# Patient Record
Sex: FEMALE | Race: WHITE | NOT HISPANIC OR LATINO | Employment: FULL TIME | URBAN - METROPOLITAN AREA
[De-identification: names, ages, dates, MRNs, and addresses within clinical notes are randomized per-mention and may not be internally consistent; named-entity substitution may affect disease eponyms.]

---

## 2017-02-07 ENCOUNTER — HOSPITAL ENCOUNTER (INPATIENT)
Facility: HOSPITAL | Age: 37
LOS: 1 days | Discharge: HOME | DRG: 872 | End: 2017-02-09
Attending: EMERGENCY MEDICINE | Admitting: INTERNAL MEDICINE
Payer: COMMERCIAL

## 2017-02-07 DIAGNOSIS — R10.11 INTRACTABLE RIGHT UPPER QUADRANT ABDOMINAL PAIN: Primary | ICD-10-CM

## 2017-02-07 DIAGNOSIS — K83.8 ACQUIRED DILATION OF COMMON BILE DUCT: ICD-10-CM

## 2017-02-07 PROCEDURE — 82150 ASSAY OF AMYLASE: CPT | Performed by: NURSE PRACTITIONER

## 2017-02-07 PROCEDURE — 96374 THER/PROPH/DIAG INJ IV PUSH: CPT

## 2017-02-07 PROCEDURE — 80053 COMPREHEN METABOLIC PANEL: CPT | Performed by: EMERGENCY MEDICINE

## 2017-02-07 PROCEDURE — 36415 COLL VENOUS BLD VENIPUNCTURE: CPT | Performed by: EMERGENCY MEDICINE

## 2017-02-07 PROCEDURE — 83735 ASSAY OF MAGNESIUM: CPT | Performed by: NURSE PRACTITIONER

## 2017-02-07 PROCEDURE — 83690 ASSAY OF LIPASE: CPT | Performed by: EMERGENCY MEDICINE

## 2017-02-07 RX ORDER — ONDANSETRON 2 MG/ML
INJECTION INTRAMUSCULAR; INTRAVENOUS
Status: COMPLETED
Start: 2017-02-07 | End: 2017-02-07

## 2017-02-07 RX ORDER — ALPRAZOLAM 1 MG/1
1 TABLET ORAL 3 TIMES DAILY PRN
COMMUNITY
End: 2020-07-09 | Stop reason: SDUPTHER

## 2017-02-07 RX ORDER — ONDANSETRON 2 MG/ML
4 INJECTION INTRAMUSCULAR; INTRAVENOUS ONCE AS NEEDED
Status: COMPLETED | OUTPATIENT
Start: 2017-02-07 | End: 2017-02-07

## 2017-02-07 RX ADMIN — ONDANSETRON 4 MG: 2 INJECTION INTRAMUSCULAR; INTRAVENOUS at 23:59

## 2017-02-08 ENCOUNTER — APPOINTMENT (OUTPATIENT)
Dept: RADIOLOGY | Facility: HOSPITAL | Age: 37
DRG: 872 | End: 2017-02-08
Payer: COMMERCIAL

## 2017-02-08 ENCOUNTER — APPOINTMENT (EMERGENCY)
Dept: RADIOLOGY | Facility: HOSPITAL | Age: 37
DRG: 872 | End: 2017-02-08
Payer: COMMERCIAL

## 2017-02-08 PROBLEM — K83.8 DILATED CBD, ACQUIRED: Status: ACTIVE | Noted: 2017-02-08

## 2017-02-08 PROBLEM — A41.9 SEPSIS (HCC): Status: ACTIVE | Noted: 2017-02-08

## 2017-02-08 PROBLEM — R10.9 ABDOMINAL PAIN: Status: ACTIVE | Noted: 2017-02-08

## 2017-02-08 PROBLEM — E87.6 HYPOKALEMIA: Status: ACTIVE | Noted: 2017-02-08

## 2017-02-08 PROBLEM — D72.829 LEUKOCYTOSIS: Status: ACTIVE | Noted: 2017-02-08

## 2017-02-08 LAB
ALBUMIN SERPL BCP-MCNC: 4.1 G/DL (ref 3.5–5)
ALP SERPL-CCNC: 49 U/L (ref 46–116)
ALT SERPL W P-5'-P-CCNC: 28 U/L (ref 12–78)
AMYLASE SERPL-CCNC: 50 IU/L (ref 25–115)
ANION GAP SERPL CALCULATED.3IONS-SCNC: 5 MMOL/L (ref 4–13)
APTT PPP: 30 SECONDS (ref 24–33)
AST SERPL W P-5'-P-CCNC: 17 U/L (ref 5–45)
BASOPHILS # BLD AUTO: 0 THOUSANDS/ΜL (ref 0–0.1)
BASOPHILS NFR BLD AUTO: 0 % (ref 0–1)
BILIRUB SERPL-MCNC: 0.6 MG/DL (ref 0.2–1)
BILIRUB UR QL STRIP: NEGATIVE
BUN SERPL-MCNC: 13 MG/DL (ref 5–25)
CALCIUM SERPL-MCNC: 8.5 MG/DL (ref 8.3–10.1)
CHLORIDE SERPL-SCNC: 107 MMOL/L (ref 100–108)
CLARITY UR: CLEAR
CO2 SERPL-SCNC: 30 MMOL/L (ref 21–32)
COLOR UR: YELLOW
CREAT SERPL-MCNC: 0.87 MG/DL (ref 0.6–1.3)
EOSINOPHIL # BLD AUTO: 0.1 THOUSAND/ΜL (ref 0–0.61)
EOSINOPHIL NFR BLD AUTO: 1 % (ref 0–6)
ERYTHROCYTE [DISTWIDTH] IN BLOOD BY AUTOMATED COUNT: 13.5 % (ref 11.6–15.1)
ERYTHROCYTE [DISTWIDTH] IN BLOOD BY AUTOMATED COUNT: 13.8 % (ref 11.6–15.1)
FLUAV AG SPEC QL IA: NEGATIVE
FLUBV AG SPEC QL IA: NEGATIVE
GFR SERPL CREATININE-BSD FRML MDRD: >60 ML/MIN/1.73SQ M
GLUCOSE SERPL-MCNC: 91 MG/DL (ref 65–140)
GLUCOSE UR STRIP-MCNC: NEGATIVE MG/DL
HCT VFR BLD AUTO: 36.3 % (ref 37–47)
HCT VFR BLD AUTO: 41.7 % (ref 37–47)
HGB BLD-MCNC: 12 G/DL (ref 12–16)
HGB BLD-MCNC: 14 G/DL (ref 12–16)
HGB UR QL STRIP.AUTO: NEGATIVE
INR PPP: 1.12 (ref 0.86–1.16)
KETONES UR STRIP-MCNC: NEGATIVE MG/DL
LACTATE SERPL-SCNC: 0.7 MMOL/L (ref 0.5–2)
LEUKOCYTE ESTERASE UR QL STRIP: NEGATIVE
LIPASE SERPL-CCNC: 134 U/L (ref 73–393)
LYMPHOCYTES # BLD AUTO: 0.37 THOUSAND/UL (ref 0.6–4.47)
LYMPHOCYTES # BLD AUTO: 1.5 THOUSANDS/ΜL (ref 0.6–4.47)
LYMPHOCYTES # BLD AUTO: 3 %
LYMPHOCYTES NFR BLD AUTO: 9 % (ref 14–44)
MAGNESIUM SERPL-MCNC: 1.6 MG/DL (ref 1.6–2.6)
MCH RBC QN AUTO: 31.5 PG (ref 27–31)
MCH RBC QN AUTO: 31.7 PG (ref 27–31)
MCHC RBC AUTO-ENTMCNC: 33.2 G/DL (ref 31.4–37.4)
MCHC RBC AUTO-ENTMCNC: 33.5 G/DL (ref 31.4–37.4)
MCV RBC AUTO: 95 FL (ref 82–98)
MCV RBC AUTO: 95 FL (ref 82–98)
MONOCYTES # BLD AUTO: 0.37 THOUSAND/UL (ref 0–1.22)
MONOCYTES # BLD AUTO: 0.7 THOUSAND/ΜL (ref 0.17–1.22)
MONOCYTES NFR BLD AUTO: 3 % (ref 4–12)
MONOCYTES NFR BLD AUTO: 5 % (ref 4–12)
NEUTROPHILS # BLD AUTO: 13.5 THOUSANDS/ΜL (ref 1.85–7.62)
NEUTS BAND NFR BLD MANUAL: 0 % (ref 0–8)
NEUTS SEG # BLD: 11.56 THOUSAND/UL (ref 1.81–6.82)
NEUTS SEG NFR BLD AUTO: 85 % (ref 43–75)
NEUTS SEG NFR BLD AUTO: 94 %
NITRITE UR QL STRIP: NEGATIVE
NRBC BLD AUTO-RTO: 0 /100 WBCS
NRBC BLD AUTO-RTO: 0 /100 WBCS
OVALOCYTES BLD QL SMEAR: PRESENT
PH UR STRIP.AUTO: 6 [PH] (ref 5–9)
PLATELET # BLD AUTO: 242 THOUSANDS/UL (ref 130–400)
PLATELET # BLD AUTO: 298 THOUSANDS/UL (ref 130–400)
PLATELET BLD QL SMEAR: ADEQUATE
PLATELET BLD QL SMEAR: ADEQUATE
PMV BLD AUTO: 7.9 FL (ref 8.9–12.7)
PMV BLD AUTO: 8.4 FL (ref 8.9–12.7)
POTASSIUM SERPL-SCNC: 3.3 MMOL/L (ref 3.5–5.3)
PROT SERPL-MCNC: 7.2 G/DL (ref 6.4–8.2)
PROT UR STRIP-MCNC: NEGATIVE MG/DL
PROTHROMBIN TIME: 11.8 SECONDS (ref 9.4–11.7)
RBC # BLD AUTO: 3.82 MILLION/UL (ref 4.2–5.4)
RBC # BLD AUTO: 4.4 MILLION/UL (ref 4.2–5.4)
SODIUM SERPL-SCNC: 142 MMOL/L (ref 136–145)
SP GR UR STRIP.AUTO: <=1.005 (ref 1–1.03)
TOTAL CELLS COUNTED SPEC: 100
TOXIC GRANULES BLD QL SMEAR: PRESENT
UROBILINOGEN UR QL STRIP.AUTO: 0.2 E.U./DL
WBC # BLD AUTO: 12.3 THOUSAND/UL (ref 4.8–10.8)
WBC # BLD AUTO: 15.9 THOUSAND/UL (ref 4.8–10.8)

## 2017-02-08 PROCEDURE — 87798 DETECT AGENT NOS DNA AMP: CPT | Performed by: NURSE PRACTITIONER

## 2017-02-08 PROCEDURE — 96376 TX/PRO/DX INJ SAME DRUG ADON: CPT

## 2017-02-08 PROCEDURE — 85025 COMPLETE CBC W/AUTO DIFF WBC: CPT | Performed by: EMERGENCY MEDICINE

## 2017-02-08 PROCEDURE — 96375 TX/PRO/DX INJ NEW DRUG ADDON: CPT

## 2017-02-08 PROCEDURE — 85007 BL SMEAR W/DIFF WBC COUNT: CPT | Performed by: INTERNAL MEDICINE

## 2017-02-08 PROCEDURE — 76705 ECHO EXAM OF ABDOMEN: CPT

## 2017-02-08 PROCEDURE — 85610 PROTHROMBIN TIME: CPT | Performed by: NURSE PRACTITIONER

## 2017-02-08 PROCEDURE — 81003 URINALYSIS AUTO W/O SCOPE: CPT | Performed by: EMERGENCY MEDICINE

## 2017-02-08 PROCEDURE — 74177 CT ABD & PELVIS W/CONTRAST: CPT

## 2017-02-08 PROCEDURE — 87400 INFLUENZA A/B EACH AG IA: CPT | Performed by: NURSE PRACTITIONER

## 2017-02-08 PROCEDURE — 85027 COMPLETE CBC AUTOMATED: CPT | Performed by: INTERNAL MEDICINE

## 2017-02-08 PROCEDURE — 36415 COLL VENOUS BLD VENIPUNCTURE: CPT | Performed by: EMERGENCY MEDICINE

## 2017-02-08 PROCEDURE — 87040 BLOOD CULTURE FOR BACTERIA: CPT | Performed by: NURSE PRACTITIONER

## 2017-02-08 PROCEDURE — 96361 HYDRATE IV INFUSION ADD-ON: CPT

## 2017-02-08 PROCEDURE — 87081 CULTURE SCREEN ONLY: CPT | Performed by: NURSE PRACTITIONER

## 2017-02-08 PROCEDURE — 85049 AUTOMATED PLATELET COUNT: CPT | Performed by: NURSE PRACTITIONER

## 2017-02-08 PROCEDURE — A9270 NON-COVERED ITEM OR SERVICE: HCPCS

## 2017-02-08 PROCEDURE — 99285 EMERGENCY DEPT VISIT HI MDM: CPT

## 2017-02-08 PROCEDURE — A9270 NON-COVERED ITEM OR SERVICE: HCPCS | Performed by: NURSE PRACTITIONER

## 2017-02-08 PROCEDURE — 83605 ASSAY OF LACTIC ACID: CPT | Performed by: EMERGENCY MEDICINE

## 2017-02-08 PROCEDURE — 85730 THROMBOPLASTIN TIME PARTIAL: CPT | Performed by: NURSE PRACTITIONER

## 2017-02-08 RX ORDER — MAGNESIUM HYDROXIDE/ALUMINUM HYDROXICE/SIMETHICONE 120; 1200; 1200 MG/30ML; MG/30ML; MG/30ML
30 SUSPENSION ORAL EVERY 6 HOURS PRN
Status: DISCONTINUED | OUTPATIENT
Start: 2017-02-08 | End: 2017-02-09 | Stop reason: HOSPADM

## 2017-02-08 RX ORDER — ONDANSETRON 2 MG/ML
4 INJECTION INTRAMUSCULAR; INTRAVENOUS ONCE
Status: COMPLETED | OUTPATIENT
Start: 2017-02-08 | End: 2017-02-08

## 2017-02-08 RX ORDER — HEPARIN SODIUM 5000 [USP'U]/ML
5000 INJECTION, SOLUTION INTRAVENOUS; SUBCUTANEOUS EVERY 8 HOURS SCHEDULED
Status: DISCONTINUED | OUTPATIENT
Start: 2017-02-08 | End: 2017-02-09 | Stop reason: HOSPADM

## 2017-02-08 RX ORDER — IBUPROFEN 600 MG/1
TABLET ORAL
Status: COMPLETED
Start: 2017-02-08 | End: 2017-02-08

## 2017-02-08 RX ORDER — LEVOFLOXACIN 5 MG/ML
750 INJECTION, SOLUTION INTRAVENOUS ONCE
Status: COMPLETED | OUTPATIENT
Start: 2017-02-08 | End: 2017-02-08

## 2017-02-08 RX ORDER — HEPARIN SODIUM 5000 [USP'U]/ML
5000 INJECTION, SOLUTION INTRAVENOUS; SUBCUTANEOUS EVERY 8 HOURS SCHEDULED
Status: DISCONTINUED | OUTPATIENT
Start: 2017-02-08 | End: 2017-02-08

## 2017-02-08 RX ORDER — MORPHINE SULFATE 4 MG/ML
4 INJECTION, SOLUTION INTRAMUSCULAR; INTRAVENOUS ONCE
Status: COMPLETED | OUTPATIENT
Start: 2017-02-08 | End: 2017-02-08

## 2017-02-08 RX ORDER — ONDANSETRON 2 MG/ML
INJECTION INTRAMUSCULAR; INTRAVENOUS
Status: COMPLETED
Start: 2017-02-08 | End: 2017-02-08

## 2017-02-08 RX ORDER — MORPHINE SULFATE 4 MG/ML
4 INJECTION, SOLUTION INTRAMUSCULAR; INTRAVENOUS
Status: DISCONTINUED | OUTPATIENT
Start: 2017-02-08 | End: 2017-02-09

## 2017-02-08 RX ORDER — SODIUM CHLORIDE 9 MG/ML
125 INJECTION, SOLUTION INTRAVENOUS CONTINUOUS
Status: DISCONTINUED | OUTPATIENT
Start: 2017-02-08 | End: 2017-02-09 | Stop reason: HOSPADM

## 2017-02-08 RX ORDER — ALPRAZOLAM 0.5 MG/1
1 TABLET ORAL 3 TIMES DAILY PRN
Status: DISCONTINUED | OUTPATIENT
Start: 2017-02-08 | End: 2017-02-09 | Stop reason: HOSPADM

## 2017-02-08 RX ORDER — ONDANSETRON 2 MG/ML
4 INJECTION INTRAMUSCULAR; INTRAVENOUS EVERY 6 HOURS PRN
Status: DISCONTINUED | OUTPATIENT
Start: 2017-02-08 | End: 2017-02-09 | Stop reason: HOSPADM

## 2017-02-08 RX ORDER — LEVOFLOXACIN 5 MG/ML
500 INJECTION, SOLUTION INTRAVENOUS EVERY 24 HOURS
Status: DISCONTINUED | OUTPATIENT
Start: 2017-02-09 | End: 2017-02-09 | Stop reason: HOSPADM

## 2017-02-08 RX ORDER — POTASSIUM CHLORIDE 14.9 MG/ML
20 INJECTION INTRAVENOUS ONCE
Status: COMPLETED | OUTPATIENT
Start: 2017-02-08 | End: 2017-02-08

## 2017-02-08 RX ORDER — ACETAMINOPHEN 325 MG/1
650 TABLET ORAL EVERY 6 HOURS PRN
Status: DISCONTINUED | OUTPATIENT
Start: 2017-02-08 | End: 2017-02-09 | Stop reason: HOSPADM

## 2017-02-08 RX ORDER — IBUPROFEN 600 MG/1
600 TABLET ORAL ONCE
Status: COMPLETED | OUTPATIENT
Start: 2017-02-08 | End: 2017-02-08

## 2017-02-08 RX ORDER — METOCLOPRAMIDE HYDROCHLORIDE 5 MG/ML
10 INJECTION INTRAMUSCULAR; INTRAVENOUS ONCE
Status: COMPLETED | OUTPATIENT
Start: 2017-02-08 | End: 2017-02-08

## 2017-02-08 RX ADMIN — IBUPROFEN 600 MG: 600 TABLET, FILM COATED ORAL at 03:29

## 2017-02-08 RX ADMIN — METRONIDAZOLE 500 MG: 500 INJECTION, SOLUTION INTRAVENOUS at 13:52

## 2017-02-08 RX ADMIN — MORPHINE SULFATE 4 MG: 4 INJECTION, SOLUTION INTRAMUSCULAR; INTRAVENOUS at 09:48

## 2017-02-08 RX ADMIN — SODIUM CHLORIDE 125 ML/HR: 0.9 INJECTION, SOLUTION INTRAVENOUS at 13:52

## 2017-02-08 RX ADMIN — MORPHINE SULFATE 4 MG: 4 INJECTION, SOLUTION INTRAMUSCULAR; INTRAVENOUS at 13:40

## 2017-02-08 RX ADMIN — LEVOFLOXACIN 750 MG: 5 INJECTION, SOLUTION INTRAVENOUS at 03:10

## 2017-02-08 RX ADMIN — ONDANSETRON 4 MG: 2 INJECTION INTRAMUSCULAR; INTRAVENOUS at 18:44

## 2017-02-08 RX ADMIN — FAMOTIDINE 20 MG: 10 INJECTION, SOLUTION INTRAVENOUS at 21:54

## 2017-02-08 RX ADMIN — MORPHINE SULFATE 4 MG: 4 INJECTION, SOLUTION INTRAMUSCULAR; INTRAVENOUS at 03:10

## 2017-02-08 RX ADMIN — IOHEXOL 50 ML: 240 INJECTION, SOLUTION INTRATHECAL; INTRAVASCULAR; INTRAVENOUS; ORAL at 00:16

## 2017-02-08 RX ADMIN — ONDANSETRON 4 MG: 2 INJECTION INTRAMUSCULAR; INTRAVENOUS at 00:51

## 2017-02-08 RX ADMIN — IBUPROFEN 600 MG: 600 TABLET ORAL at 03:29

## 2017-02-08 RX ADMIN — IOHEXOL 100 ML: 350 INJECTION, SOLUTION INTRAVENOUS at 01:47

## 2017-02-08 RX ADMIN — METRONIDAZOLE 500 MG: 500 INJECTION, SOLUTION INTRAVENOUS at 05:35

## 2017-02-08 RX ADMIN — METRONIDAZOLE 500 MG: 500 INJECTION, SOLUTION INTRAVENOUS at 22:00

## 2017-02-08 RX ADMIN — ALPRAZOLAM 1 MG: 0.5 TABLET ORAL at 18:50

## 2017-02-08 RX ADMIN — FAMOTIDINE 20 MG: 10 INJECTION, SOLUTION INTRAVENOUS at 05:35

## 2017-02-08 RX ADMIN — HEPARIN SODIUM 5000 UNITS: 5000 INJECTION, SOLUTION INTRAVENOUS; SUBCUTANEOUS at 22:01

## 2017-02-08 RX ADMIN — MORPHINE SULFATE 4 MG: 4 INJECTION, SOLUTION INTRAMUSCULAR; INTRAVENOUS at 21:56

## 2017-02-08 RX ADMIN — SODIUM CHLORIDE 125 ML/HR: 0.9 INJECTION, SOLUTION INTRAVENOUS at 04:43

## 2017-02-08 RX ADMIN — HEPARIN SODIUM 5000 UNITS: 5000 INJECTION, SOLUTION INTRAVENOUS; SUBCUTANEOUS at 13:48

## 2017-02-08 RX ADMIN — MORPHINE SULFATE 4 MG: 4 INJECTION, SOLUTION INTRAMUSCULAR; INTRAVENOUS at 00:10

## 2017-02-08 RX ADMIN — SODIUM CHLORIDE 1000 ML: 0.9 INJECTION, SOLUTION INTRAVENOUS at 00:06

## 2017-02-08 RX ADMIN — POTASSIUM CHLORIDE 20 MEQ: 200 INJECTION, SOLUTION INTRAVENOUS at 04:57

## 2017-02-08 RX ADMIN — METOCLOPRAMIDE 10 MG: 5 INJECTION, SOLUTION INTRAMUSCULAR; INTRAVENOUS at 02:18

## 2017-02-08 RX ADMIN — MORPHINE SULFATE 4 MG: 4 INJECTION, SOLUTION INTRAMUSCULAR; INTRAVENOUS at 06:26

## 2017-02-08 RX ADMIN — ALPRAZOLAM 1 MG: 0.5 TABLET ORAL at 09:48

## 2017-02-09 VITALS
TEMPERATURE: 97.5 F | HEART RATE: 86 BPM | OXYGEN SATURATION: 99 % | SYSTOLIC BLOOD PRESSURE: 118 MMHG | DIASTOLIC BLOOD PRESSURE: 76 MMHG | RESPIRATION RATE: 18 BRPM | WEIGHT: 132.94 LBS | BODY MASS INDEX: 23.55 KG/M2 | HEIGHT: 63 IN

## 2017-02-09 LAB
ALBUMIN SERPL BCP-MCNC: 2.7 G/DL (ref 3.5–5)
ALP SERPL-CCNC: 34 U/L (ref 46–116)
ALT SERPL W P-5'-P-CCNC: 18 U/L (ref 12–78)
ANION GAP SERPL CALCULATED.3IONS-SCNC: 9 MMOL/L (ref 4–13)
AST SERPL W P-5'-P-CCNC: 11 U/L (ref 5–45)
BASOPHILS # BLD AUTO: 0 THOUSANDS/ΜL (ref 0–0.1)
BASOPHILS NFR BLD AUTO: 0 % (ref 0–1)
BILIRUB SERPL-MCNC: 0.2 MG/DL (ref 0.2–1)
BUN SERPL-MCNC: 10 MG/DL (ref 5–25)
CALCIUM SERPL-MCNC: 7.4 MG/DL (ref 8.3–10.1)
CHLORIDE SERPL-SCNC: 109 MMOL/L (ref 100–108)
CO2 SERPL-SCNC: 22 MMOL/L (ref 21–32)
CREAT SERPL-MCNC: 0.66 MG/DL (ref 0.6–1.3)
EOSINOPHIL # BLD AUTO: 0.1 THOUSAND/ΜL (ref 0–0.61)
EOSINOPHIL NFR BLD AUTO: 1 % (ref 0–6)
ERYTHROCYTE [DISTWIDTH] IN BLOOD BY AUTOMATED COUNT: 13.6 % (ref 11.6–15.1)
FLUAV AG SPEC QL: NORMAL
FLUBV AG SPEC QL: NORMAL
GFR SERPL CREATININE-BSD FRML MDRD: >60 ML/MIN/1.73SQ M
GLUCOSE SERPL-MCNC: 91 MG/DL (ref 65–140)
HCT VFR BLD AUTO: 31.5 % (ref 37–47)
HGB BLD-MCNC: 10.4 G/DL (ref 12–16)
LYMPHOCYTES # BLD AUTO: 1.3 THOUSANDS/ΜL (ref 0.6–4.47)
LYMPHOCYTES NFR BLD AUTO: 31 % (ref 14–44)
MCH RBC QN AUTO: 31.5 PG (ref 27–31)
MCHC RBC AUTO-ENTMCNC: 33.1 G/DL (ref 31.4–37.4)
MCV RBC AUTO: 95 FL (ref 82–98)
MONOCYTES # BLD AUTO: 0.5 THOUSAND/ΜL (ref 0.17–1.22)
MONOCYTES NFR BLD AUTO: 12 % (ref 4–12)
MRSA NOSE QL CULT: NORMAL
NEUTROPHILS # BLD AUTO: 2.4 THOUSANDS/ΜL (ref 1.85–7.62)
NEUTS SEG NFR BLD AUTO: 56 % (ref 43–75)
NRBC BLD AUTO-RTO: 0 /100 WBCS
PLATELET # BLD AUTO: 186 THOUSANDS/UL (ref 130–400)
PMV BLD AUTO: 8.3 FL (ref 8.9–12.7)
POTASSIUM SERPL-SCNC: 3.2 MMOL/L (ref 3.5–5.3)
PROT SERPL-MCNC: 5.2 G/DL (ref 6.4–8.2)
RBC # BLD AUTO: 3.3 MILLION/UL (ref 4.2–5.4)
RSV B RNA SPEC QL NAA+PROBE: NORMAL
SODIUM SERPL-SCNC: 140 MMOL/L (ref 136–145)
WBC # BLD AUTO: 4.3 THOUSAND/UL (ref 4.8–10.8)

## 2017-02-09 PROCEDURE — 80053 COMPREHEN METABOLIC PANEL: CPT | Performed by: INTERNAL MEDICINE

## 2017-02-09 PROCEDURE — A9270 NON-COVERED ITEM OR SERVICE: HCPCS | Performed by: NURSE PRACTITIONER

## 2017-02-09 PROCEDURE — 85025 COMPLETE CBC W/AUTO DIFF WBC: CPT | Performed by: INTERNAL MEDICINE

## 2017-02-09 RX ORDER — POTASSIUM CHLORIDE 20MEQ/15ML
40 LIQUID (ML) ORAL 2 TIMES DAILY
Status: DISCONTINUED | OUTPATIENT
Start: 2017-02-09 | End: 2017-02-09 | Stop reason: HOSPADM

## 2017-02-09 RX ORDER — POLYETHYLENE GLYCOL 3350 17 G/17G
17 POWDER, FOR SOLUTION ORAL DAILY
Qty: 510 G | Refills: 0 | Status: SHIPPED | OUTPATIENT
Start: 2017-02-09 | End: 2019-02-11 | Stop reason: ALTCHOICE

## 2017-02-09 RX ORDER — MORPHINE SULFATE 2 MG/ML
2 INJECTION, SOLUTION INTRAMUSCULAR; INTRAVENOUS
Status: DISCONTINUED | OUTPATIENT
Start: 2017-02-09 | End: 2017-02-09

## 2017-02-09 RX ORDER — POLYETHYLENE GLYCOL 3350 17 G/17G
17 POWDER, FOR SOLUTION ORAL DAILY
Status: DISCONTINUED | OUTPATIENT
Start: 2017-02-09 | End: 2017-02-09 | Stop reason: HOSPADM

## 2017-02-09 RX ADMIN — POTASSIUM CHLORIDE 40 MEQ: 20 SOLUTION ORAL at 10:39

## 2017-02-09 RX ADMIN — METRONIDAZOLE 500 MG: 500 INJECTION, SOLUTION INTRAVENOUS at 05:41

## 2017-02-09 RX ADMIN — LEVOFLOXACIN 500 MG: 5 INJECTION, SOLUTION INTRAVENOUS at 04:18

## 2017-02-09 RX ADMIN — HEPARIN SODIUM 5000 UNITS: 5000 INJECTION, SOLUTION INTRAVENOUS; SUBCUTANEOUS at 05:31

## 2017-02-09 RX ADMIN — SODIUM CHLORIDE 125 ML/HR: 0.9 INJECTION, SOLUTION INTRAVENOUS at 01:12

## 2017-02-09 RX ADMIN — MORPHINE SULFATE 4 MG: 4 INJECTION, SOLUTION INTRAMUSCULAR; INTRAVENOUS at 06:39

## 2017-02-09 RX ADMIN — ACETAMINOPHEN 650 MG: 325 TABLET, FILM COATED ORAL at 05:32

## 2017-02-09 RX ADMIN — ALPRAZOLAM 1 MG: 0.5 TABLET ORAL at 12:02

## 2017-02-09 RX ADMIN — POLYETHYLENE GLYCOL 3350 17 G: 17 POWDER, FOR SOLUTION ORAL at 11:23

## 2017-02-09 RX ADMIN — FAMOTIDINE 20 MG: 10 INJECTION, SOLUTION INTRAVENOUS at 09:01

## 2017-02-10 ENCOUNTER — GENERIC CONVERSION - ENCOUNTER (OUTPATIENT)
Dept: OTHER | Facility: OTHER | Age: 37
End: 2017-02-10

## 2017-02-13 LAB
BACTERIA BLD CULT: NORMAL
BACTERIA BLD CULT: NORMAL

## 2017-04-03 ENCOUNTER — ALLSCRIPTS OFFICE VISIT (OUTPATIENT)
Dept: OTHER | Facility: OTHER | Age: 37
End: 2017-04-03

## 2017-04-03 DIAGNOSIS — S46.812A STRAIN OF OTHER MUSCLES, FASCIA AND TENDONS AT SHOULDER AND UPPER ARM LEVEL, LEFT ARM, INITIAL ENCOUNTER: ICD-10-CM

## 2017-08-28 ENCOUNTER — TRANSCRIBE ORDERS (OUTPATIENT)
Dept: ADMINISTRATIVE | Facility: HOSPITAL | Age: 37
End: 2017-08-28

## 2017-08-28 DIAGNOSIS — A08.11 EPIDEMIC VOMITING SYNDROME: Primary | ICD-10-CM

## 2017-09-14 ENCOUNTER — HOSPITAL ENCOUNTER (OUTPATIENT)
Dept: RADIOLOGY | Facility: HOSPITAL | Age: 37
Discharge: HOME | End: 2017-09-14
Payer: COMMERCIAL

## 2017-09-14 DIAGNOSIS — A08.11 EPIDEMIC VOMITING SYNDROME: ICD-10-CM

## 2017-09-14 PROCEDURE — 78264 GASTRIC EMPTYING IMG STUDY: CPT

## 2017-09-14 PROCEDURE — A9541 TC99M SULFUR COLLOID: HCPCS

## 2017-12-07 ENCOUNTER — GENERIC CONVERSION - ENCOUNTER (OUTPATIENT)
Dept: OTHER | Facility: OTHER | Age: 37
End: 2017-12-07

## 2018-01-14 VITALS
DIASTOLIC BLOOD PRESSURE: 70 MMHG | TEMPERATURE: 97.2 F | BODY MASS INDEX: 24.27 KG/M2 | HEIGHT: 63 IN | RESPIRATION RATE: 20 BRPM | HEART RATE: 78 BPM | WEIGHT: 137 LBS | SYSTOLIC BLOOD PRESSURE: 120 MMHG

## 2018-01-16 NOTE — MISCELLANEOUS
Chief Complaint      I spoke with Clarencedarrell Hull at length on 2/10/17 after her hospital discharge to home on 2/9/17  She is still not feeling well and she is very frustrated and concerned that she has a dysfunctional gallbladder  She has been dealing with GI issues for the past year  She has had constipation and acid reflux and has been seeing Dr Adeola Contreras for that  She hasn't really eaten since Tuesday so she is tired and still has stomach cramps (not pain) but no further vomiting  She had chills last pm but doesn't have a thermometer to check her temp  She denies chills this am  She was told that she needs a Disida Scan but it was not done in the hospital and she is frustrated since she was told that she may have had Gastroenteritis and not a gall bladder issue  She isn't sure if she wants to see Dr Liberty Lee again but I told her to either call his office for a hospital follow up appt or we could recommend another GI specialist if she preferred  I reviewed her medications with her and updated her chart  Appt given for this afternoon with Syl Guevara and she is aware that if she has worsening pain in the abdomen or fevers, vomiting, chest pain, dyspnea, etc she needs to go to the ER [facility]   Chief Complaint Free Text Note Form: SHE CANCELLED HER APPT AND WAS NOT SEEN FOR THIS TCM [email or web address]      History of Present Illness  TCM Communication [facility]: The patient is being contacted for follow-up after hospitalization  Hospital records were reviewed  She was hospitalized at [facility]  The date of admission: 2/7/17, date of discharge: 2/9/17  Diagnosis: Sepsis  She was discharged to home  Medications reviewed and updated today  She scheduled a follow up appointment  Follow-up appointments with other specialists: She will call Dr Adeola Contreras to set up her appt  Counseling was provided to the patient   Topics counseled included instructions for management, risk factor reductions, patient and family education, activities of daily living and importance of compliance with treatment  Communication performed and completed by Darshan MOREL 2/10/17      Active Problems    1  Actinic keratosis (702 0) (L57 0)   2  Acute upper respiratory infection (465 9) (J06 9)   3  Allergic rhinitis (477 9) (J30 9)   4  Anxiety disorder (300 00) (F41 9)   5  Body mass index (BMI) of 23 0 to 23 9 in adult (V85 1) (Z68 23)   6  Chronic idiopathic constipation (564 00) (K59 04)   7  Encounter for screening for malignant neoplasm of colon (V76 51) (Z12 11)   8  Herpes simplex infection (054 9) (B00 9)   9  Overactive bladder (596 51) (N32 81)    Past Medical History    1  History of Acute maxillary sinusitis (461 0) (J01 00)   2  History of Acute maxillary sinusitis, recurrence not specified (461 0) (J01 00)   3  History of Acute maxillary sinusitis, recurrence not specified (461 0) (J01 00)   4  History of Acute sinusitis (461 9) (J01 90)   5  History of Acute UTI (599 0) (N39 0)   6  History of Benign paroxysmal positional vertigo (386 11) (H81 10)   7  History of acute bronchitis (V12 69) (Z87 09)   8  History of acute sinusitis (V12 69) (Z87 09)    Family History  Mother    1  No pertinent family history    Social History    · Former smoker (W26 98) (I32 216)    Current Meds   1  Fluticasone Propionate 50 MCG/ACT Nasal Suspension; USE 2 SPRAYS IN EACH   NOSTRIL ONCE DAILY; Therapy: 28Apr2016 to (Last Rx:28Apr2016)  Requested for: 28Apr2016 Ordered   2  MiraLax Oral Packet; USE AS DIRECTED; Therapy: (Recorded:96Uob3530) to Recorded   3  Xanax 1 MG Oral Tablet; 1 Three Times A Day as needed for anxiety; Therapy: 82MEB4725 to  Requested for: 23PUR3822 Recorded  Medication List Reviewed: The medication list was reviewed and updated today  Allergies    1  No Known Drug Allergies    Attending Note  Collaborating Physician Note: Collaborating Physician: I agree with the Advanced Practitioner note        Message   Recorded as Task   Date: 02/09/2017 03:38 PM, Created By: System   Task Name: [facility]   Assigned To:  Skyler Schafer   Regarding Patient: Jessica Bourgeois, Status: Active   Comment:    System - 09 Feb 2017 3:38 PM     Patient discharged from hospital   Patient Name: Amelia Melton  Patient YOB: 1980  Discharge Date: 2/9/2017  Facility: [facility] - 09 Feb 2017 3:41 PM     TASK REASSIGNED: Previously Assigned To [employer]   Electronically signed by : Yang Faust, ; Feb 10 2017  9:19AM EST                       (Co-author)    Electronically signed by : Levy Lopez; Feb 10 2017  8:05PM EST                       (Author)    Electronically signed by : Lily Reed DO; Feb 13 2017  8:47AM EST                       (Review)

## 2018-01-23 ENCOUNTER — ANESTHESIA EVENT (OUTPATIENT)
Dept: GASTROENTEROLOGY | Facility: AMBULARY SURGERY CENTER | Age: 38
End: 2018-01-23
Payer: COMMERCIAL

## 2018-01-23 RX ORDER — BUPROPION HYDROCHLORIDE 150 MG/1
150 TABLET ORAL EVERY MORNING
COMMUNITY
End: 2019-10-01 | Stop reason: ALTCHOICE

## 2018-01-23 NOTE — PRE-PROCEDURE INSTRUCTIONS
Pre-Surgery Instructions:   Medication Instructions    ALPRAZolam (XANAX) 1 mg tablet Patient was instructed by Physician and understands   buPROPion (WELLBUTRIN XL) 150 mg 24 hr tablet Patient was instructed by Physician and understands   polyethylene glycol (MIRALAX) 17 g packet Patient was instructed by Physician and understands

## 2018-01-24 ENCOUNTER — HOSPITAL ENCOUNTER (OUTPATIENT)
Facility: AMBULARY SURGERY CENTER | Age: 38
Setting detail: OUTPATIENT SURGERY
Discharge: HOME | End: 2018-01-24
Attending: INTERNAL MEDICINE | Admitting: INTERNAL MEDICINE
Payer: COMMERCIAL

## 2018-01-24 ENCOUNTER — ANESTHESIA (OUTPATIENT)
Dept: GASTROENTEROLOGY | Facility: AMBULARY SURGERY CENTER | Age: 38
End: 2018-01-24
Payer: COMMERCIAL

## 2018-01-24 VITALS
BODY MASS INDEX: 22.86 KG/M2 | SYSTOLIC BLOOD PRESSURE: 98 MMHG | WEIGHT: 129 LBS | RESPIRATION RATE: 16 BRPM | DIASTOLIC BLOOD PRESSURE: 60 MMHG | TEMPERATURE: 98 F | HEIGHT: 63 IN | HEART RATE: 84 BPM

## 2018-01-24 VITALS
TEMPERATURE: 98.4 F | BODY MASS INDEX: 21.44 KG/M2 | RESPIRATION RATE: 18 BRPM | OXYGEN SATURATION: 100 % | HEART RATE: 69 BPM | WEIGHT: 121 LBS | HEIGHT: 63 IN | SYSTOLIC BLOOD PRESSURE: 101 MMHG | DIASTOLIC BLOOD PRESSURE: 67 MMHG

## 2018-01-24 RX ORDER — FENTANYL CITRATE/PF 50 MCG/ML
25 SYRINGE (ML) INJECTION
Status: DISCONTINUED | OUTPATIENT
Start: 2018-01-24 | End: 2018-01-24 | Stop reason: HOSPADM

## 2018-01-24 RX ORDER — SODIUM CHLORIDE, SODIUM LACTATE, POTASSIUM CHLORIDE, CALCIUM CHLORIDE 600; 310; 30; 20 MG/100ML; MG/100ML; MG/100ML; MG/100ML
125 INJECTION, SOLUTION INTRAVENOUS CONTINUOUS
Status: DISCONTINUED | OUTPATIENT
Start: 2018-01-24 | End: 2018-01-24 | Stop reason: HOSPADM

## 2018-01-24 RX ORDER — ONDANSETRON 2 MG/ML
4 INJECTION INTRAMUSCULAR; INTRAVENOUS ONCE AS NEEDED
Status: DISCONTINUED | OUTPATIENT
Start: 2018-01-24 | End: 2018-01-24 | Stop reason: HOSPADM

## 2018-01-24 RX ORDER — PROPOFOL 10 MG/ML
INJECTION, EMULSION INTRAVENOUS CONTINUOUS PRN
Status: DISCONTINUED | OUTPATIENT
Start: 2018-01-24 | End: 2018-01-24 | Stop reason: SURG

## 2018-01-24 RX ORDER — KETOROLAC TROMETHAMINE 30 MG/ML
30 INJECTION, SOLUTION INTRAMUSCULAR; INTRAVENOUS ONCE
Status: COMPLETED | OUTPATIENT
Start: 2018-01-24 | End: 2018-01-24

## 2018-01-24 RX ORDER — PROPOFOL 10 MG/ML
INJECTION, EMULSION INTRAVENOUS AS NEEDED
Status: DISCONTINUED | OUTPATIENT
Start: 2018-01-24 | End: 2018-01-24 | Stop reason: SURG

## 2018-01-24 RX ADMIN — PROPOFOL 80 MCG/KG/MIN: 10 INJECTION, EMULSION INTRAVENOUS at 10:58

## 2018-01-24 RX ADMIN — SODIUM CHLORIDE, SODIUM LACTATE, POTASSIUM CHLORIDE, AND CALCIUM CHLORIDE 125 ML/HR: .6; .31; .03; .02 INJECTION, SOLUTION INTRAVENOUS at 10:07

## 2018-01-24 RX ADMIN — FENTANYL CITRATE: 50 INJECTION INTRAMUSCULAR; INTRAVENOUS at 12:01

## 2018-01-24 RX ADMIN — FENTANYL CITRATE 25 MCG: 50 INJECTION INTRAMUSCULAR; INTRAVENOUS at 11:53

## 2018-01-24 RX ADMIN — PROPOFOL 100 MG: 10 INJECTION, EMULSION INTRAVENOUS at 10:58

## 2018-01-24 RX ADMIN — KETOROLAC TROMETHAMINE 30 MG: 30 INJECTION, SOLUTION INTRAMUSCULAR at 10:08

## 2018-01-24 NOTE — PERIOPERATIVE NURSING NOTE
Patient would not let me take any more than one post op vital sign  complaining of a level 7 pain  Passing flatus and large amounts of feces  demanding to be cleaned after each passing of flatus and /or feces  Attempting to climb OOB while still under effects of anesthesia medicated with fentanyl at 1153 and 1159  Patient now is a level 3 pain upon discharge  Prescription for percocet given to patient by Dr Kranthi Humphrey

## 2018-01-24 NOTE — OP NOTE
OPERATIVE REPORT  PATIENT NAME: Chel Gross    :  1980  MRN: [id number]  Pt Location: [place] [address] GI ROOM 01    SURGERY DATE: 2018    Surgeon(s) and Role:     Zoe Branch MD - Primary    Preop Diagnosis:  History of colonic polyps [Z86 010]  Hemorrhage of anus and rectum [K62 5]  Second degree hemorrhoids [K64 1]    Post-Op Diagnosis Codes:     * Hemorrhage of anus and rectum [K62 5]     * Second degree hemorrhoids [K64 1]    Procedure  Colonoscopy with hemorrhoidal banding     Specimen(s):  * No specimens in log *    Estimated Blood Loss:   Minimal       Anesthesia Type:   IV Sedation with Anesthesia    Operative Indications:  History of colonic polyps [Z86 010]  Hemorrhage of anus and rectum [K62 5]  Second degree hemorrhoids [K64 1]    Operative Findings:  Colonoscopy-under conscious sedation, digital rectal exam and perianal examination was done  Upper endoscope was used which was passed from anus and reach all way up to the ascending colon  Quality of prep was suboptimal poor with large amount of liquid and semi-solid stool throughout the colon, visualization was very suboptimal, cecum was unable to visualize  Overall colonic mucosa appeared normal  No sign of active bleeding was seen  In the rectum on retroflexion there is a grade 2-3 congested internal hemorrhoid  With the use of, [employer] hemorrhoidal banding device, 6 band was applied at right posterior, right anterior and left lateral location  All visible hemorrhoid tissue are banded    Estimated blood loss-minimal    Specimen-none    Impression- 1  Symptomatic grade 2-3 internal hemorrhoid, status post hemorrhoidal banding, 6 band was applied   2    Suboptimal colonic prep    Complications:   none      Patient Disposition:  PACU     SIGNATURE: Roro Ramírez MD  DATE: 2018  TIME: 11:24 AM

## 2018-01-24 NOTE — ANESTHESIA PREPROCEDURE EVALUATION
Review of Systems/Medical History  Patient summary reviewed  Chart reviewed  No history of anesthetic complications     Cardiovascular   Pulmonary       GI/Hepatic            Endo/Other     GYN       Hematology   Musculoskeletal       Neurology   Psychology   Anxiety, Depression ,              Physical Exam    Airway    Mallampati score: II  TM Distance: >3 FB  Neck ROM: full     Dental   No notable dental hx     Cardiovascular  Cardiovascular exam normal    Pulmonary  Pulmonary exam normal     Other Findings        Anesthesia Plan  ASA Score- 2     Anesthesia Type- IV sedation with anesthesia with ASA Monitors  Additional Monitors:   Airway Plan:         Plan Factors-    Induction-     Postoperative Plan-     Informed Consent- Anesthetic plan and risks discussed with patient

## 2018-01-24 NOTE — H&P
History and Physical -  [facility]  Love Chowdary 42 y o  female MRN: [id number]    HPI: Love Chowdary is a 42y o  year old female who presents with intermittent rectal bleeding  Review of Systems    Historical Information   Past Medical History:   Diagnosis Date    Anxiety     Chronic constipation     Depression     Psychiatric disorder     anxiety      Past Surgical History:   Procedure Laterality Date    APPENDECTOMY      BREAST SURGERY      augmentation    LAPAROSCOPIC TUBAL LIGATION      OVARY SURGERY Bilateral      Social History   History   Alcohol Use No     History   Drug Use No     History   Smoking Status    Former Smoker    Quit date: 2014   Smokeless Tobacco    Never Used     Family History   Problem Relation Age of Onset    No Known Problems Mother     No Known Problems Father     No Known Problems Sister     No Known Problems Brother     No Known Problems Daughter     No Known Problems Son        Meds/Allergies     Prescriptions Prior to Admission   Medication    ALPRAZolam (XANAX) 1 mg tablet    buPROPion (WELLBUTRIN XL) 150 mg 24 hr tablet    polyethylene glycol (MIRALAX) 17 g packet       No Known Allergies    Objective     Blood pressure 109/70, pulse 79, temperature 98 4 °F (36 9 °C), temperature source Tympanic, resp  rate 16, height 5' 3" (1 6 m), weight 54 9 kg (121 lb), SpO2 100 %, not currently breastfeeding        PHYSICAL EXAM    Gen: NAD  CV: RRR  CHEST: Clear  ABD: soft, NT/ND  EXT: no edema  Neuro: AAO      ASSESSMENT/PLAN:  This is a 42y o  year old female here for elective colonoscopy with hemorrhoidal banding  PLAN:   Procedure:  Risk and benefit of the procedure was discussed and will proceed under conscious sedation

## 2018-03-13 ENCOUNTER — OFFICE VISIT (OUTPATIENT)
Dept: FAMILY MEDICINE CLINIC | Facility: CLINIC | Age: 38
End: 2018-03-13
Payer: COMMERCIAL

## 2018-03-13 VITALS
RESPIRATION RATE: 16 BRPM | HEIGHT: 63 IN | WEIGHT: 131 LBS | BODY MASS INDEX: 23.21 KG/M2 | DIASTOLIC BLOOD PRESSURE: 70 MMHG | HEART RATE: 82 BPM | SYSTOLIC BLOOD PRESSURE: 124 MMHG | TEMPERATURE: 98 F

## 2018-03-13 DIAGNOSIS — J01.90 ACUTE SINUSITIS, RECURRENCE NOT SPECIFIED, UNSPECIFIED LOCATION: Primary | ICD-10-CM

## 2018-03-13 DIAGNOSIS — B00.2 ORAL HERPES SIMPLEX INFECTION: ICD-10-CM

## 2018-03-13 PROCEDURE — 99213 OFFICE O/P EST LOW 20 MIN: CPT | Performed by: NURSE PRACTITIONER

## 2018-03-13 RX ORDER — VALACYCLOVIR HYDROCHLORIDE 1 G/1
TABLET, FILM COATED ORAL
Qty: 6 TABLET | Refills: 0 | Status: SHIPPED | OUTPATIENT
Start: 2018-03-13 | End: 2019-02-11 | Stop reason: SDUPTHER

## 2018-03-13 RX ORDER — ACETAMINOPHEN 325 MG/1
650 TABLET ORAL EVERY 6 HOURS
COMMUNITY
End: 2019-02-11 | Stop reason: ALTCHOICE

## 2018-03-13 RX ORDER — IBUPROFEN 200 MG
200 TABLET ORAL EVERY 6 HOURS
COMMUNITY
End: 2019-02-11 | Stop reason: ALTCHOICE

## 2018-03-13 RX ORDER — FLUCONAZOLE 150 MG/1
150 TABLET ORAL ONCE
Qty: 1 TABLET | Refills: 0 | Status: SHIPPED | OUTPATIENT
Start: 2018-03-13 | End: 2018-03-13

## 2018-03-13 RX ORDER — ALPRAZOLAM 1 MG/1
TABLET ORAL
COMMUNITY
Start: 2010-01-27 | End: 2019-02-11 | Stop reason: SDUPTHER

## 2018-03-13 RX ORDER — DOXYCYCLINE 100 MG/1
100 CAPSULE ORAL 2 TIMES DAILY
Qty: 20 CAPSULE | Refills: 0 | Status: SHIPPED | OUTPATIENT
Start: 2018-03-13 | End: 2018-03-23

## 2018-03-13 RX ORDER — FLUTICASONE PROPIONATE 50 MCG
2 SPRAY, SUSPENSION (ML) NASAL DAILY
Qty: 16 G | Refills: 0 | Status: SHIPPED | OUTPATIENT
Start: 2018-03-13 | End: 2018-07-16 | Stop reason: SDUPTHER

## 2018-03-13 RX ORDER — SULINDAC 150 MG/1
1 TABLET ORAL EVERY 12 HOURS
COMMUNITY
Start: 2017-04-03 | End: 2019-02-11 | Stop reason: ALTCHOICE

## 2018-03-13 NOTE — PROGRESS NOTES
Assessment/Plan:  Take medication with food  It is important that you take the entire course of antibiotics prescribed  May also take a probiotic of your choice to maintain healthy GI gagan  Can take some probiotic and yogurt with the medication  Increase fluid intake, saline nasal rinses, and hot tea with honey and lemon  Cool air humidification can be helpful as well  May take Ibuprofen or Tylenol as needed for pain or fevers  Mucinex D for sinus congestion or Coricidin HBP if you have high blood pressure or a heart condition  Mucinex or Robitussin DM are effective for cough and chest congestion  Supportive care discussed and advised  Follow up for no improvement and worsening of conditions  Patient advised and educated when to see immediate medical care  Diagnoses and all orders for this visit:    Acute sinusitis, recurrence not specified, unspecified location  -     fluticasone (FLONASE) 50 mcg/act nasal spray; 2 sprays into each nostril daily  -     doxycycline monohydrate (MONODOX) 100 mg capsule; Take 1 capsule (100 mg total) by mouth 2 (two) times a day for 10 days  -     fluconazole (DIFLUCAN) 150 mg tablet; Take 1 tablet (150 mg total) by mouth once for 1 dose    Oral herpes simplex infection  -     valACYclovir (VALTREX) 1,000 mg tablet; Valacyclovir 1 gm 2 tablets every 12 hourly at sign of eruption and then stop after 4 tabs  BMI 23 0-23 9, adult          Return if symptoms worsen or fail to improve  Subjective:      Patient ID: Anastasia Anders is a 42 y o  female  Chief Complaint   Patient presents with    Nasal Congestion     green-yellow mucus     Sore Throat     burning at night     Headache       HPI   Patient having headache from a week and taking sudafed but no relief, also having chest congestion, sinus pressure, cough and stuffy nose  Denies fever, chills and SOB  Also have cold sore on mouth      The following portions of the patient's history were reviewed and updated as appropriate: allergies, current medications, past family history, past medical history, past social history, past surgical history and problem list       Review of Systems   Constitutional: Positive for chills  Negative for fatigue and fever  HENT: Positive for congestion and sinus pressure  Negative for ear pain, postnasal drip, rhinorrhea, sinus pain, sneezing, sore throat, trouble swallowing and voice change  Respiratory: Positive for cough  Negative for chest tightness, shortness of breath and wheezing  Cardiovascular: Negative  Gastrointestinal: Negative for abdominal pain, constipation, diarrhea and nausea  Neurological: Positive for headaches  Negative for dizziness           Objective:    History   Smoking Status    Former Smoker    Quit date: 2014   Smokeless Tobacco    Never Used       Allergies: No Known Allergies    Vitals:  /70   Pulse 82   Temp 98 °F (36 7 °C)   Resp 16   Ht 5' 3" (1 6 m)   Wt 59 4 kg (131 lb)   BMI 23 21 kg/m²     Current Outpatient Prescriptions   Medication Sig Dispense Refill    ALPRAZolam (XANAX) 1 mg tablet Take 1 mg by mouth 3 (three) times a day as needed for anxiety      buPROPion (WELLBUTRIN XL) 150 mg 24 hr tablet Take 150 mg by mouth every morning      acetaminophen (TYLENOL) 325 mg tablet Take 650 mg by mouth every 6 (six) hours      ALPRAZolam (XANAX) 1 mg tablet Take by mouth      doxycycline monohydrate (MONODOX) 100 mg capsule Take 1 capsule (100 mg total) by mouth 2 (two) times a day for 10 days 20 capsule 0    fluconazole (DIFLUCAN) 150 mg tablet Take 1 tablet (150 mg total) by mouth once for 1 dose 1 tablet 0    fluticasone (FLONASE) 50 mcg/act nasal spray 2 sprays into each nostril daily 16 g 0    ibuprofen (MOTRIN) 200 mg tablet Take 200 mg by mouth every 6 (six) hours      polyethylene glycol (MIRALAX) 17 g packet Take 17 g by mouth daily for 30 days 510 g 0    sulindac (CLINORIL) 150 MG tablet Take 1 tablet by mouth every 12 (twelve) hours      valACYclovir (VALTREX) 1,000 mg tablet Valacyclovir 1 gm 2 tablets every 12 hourly at sign of eruption and then stop after 4 tabs  6 tablet 0     No current facility-administered medications for this visit  Physical Exam   Constitutional: She is oriented to person, place, and time  She appears well-developed and well-nourished  HENT:   Right Ear: Tympanic membrane and ear canal normal    Left Ear: Tympanic membrane and ear canal normal    Nose: Mucosal edema present  Right sinus exhibits maxillary sinus tenderness and frontal sinus tenderness  Left sinus exhibits maxillary sinus tenderness and frontal sinus tenderness  Mouth/Throat: Mucous membranes are normal  Posterior oropharyngeal erythema present  Cardiovascular: Normal rate, regular rhythm and normal heart sounds  Pulmonary/Chest: Effort normal and breath sounds normal    Lymphadenopathy:        Right cervical: No superficial cervical and no posterior cervical adenopathy present  Left cervical: No superficial cervical and no posterior cervical adenopathy present  Neurological: She is alert and oriented to person, place, and time  Skin: Skin is warm and dry  Psychiatric: She has a normal mood and affect  Vitals reviewed          ROSE Grossman

## 2018-03-13 NOTE — PATIENT INSTRUCTIONS
Take medication with food  It is important that you take the entire course of antibiotics prescribed  May also take a probiotic of your choice to maintain healthy GI gagan  Can take some probiotic and yogurt with the medication  Increase fluid intake, saline nasal rinses, and hot tea with honey and lemon  Cool air humidification can be helpful as well  May take Ibuprofen or Tylenol as needed for pain or fevers  Mucinex D for sinus congestion or Coricidin HBP if you have high blood pressure or a heart condition  Mucinex or Robitussin DM are effective for cough and chest congestion  Supportive care discussed and advised  Follow up for no improvement and worsening of conditions  Patient advised and educated when to see immediate medical care  Sinusitis   AMBULATORY CARE:   Sinusitis  is inflammation or infection of your sinuses  It is most often caused by a virus  Acute sinusitis may last up to 12 weeks  Chronic sinusitis lasts longer than 12 weeks  Recurrent sinusitis means you have 4 or more times in 1 year  Common symptoms include the following:   · Fever    · Pain, pressure, redness, or swelling around the forehead, cheeks, or eyes    · Thick yellow or green discharge from your nose    · Tenderness when you touch your face over your sinuses    · Dry cough that happens mostly at night or when you lie down    · Headache and face pain that is worse when you lean forward    · Tooth pain, or pain when you chew  Seek care immediately if:   · Your eye and eyelid are red, swollen, and painful  · You cannot open your eye  · You have vision changes, such as double vision  · Your eyeball bulges out or you cannot move your eye  · You are more sleepy than normal, or you notice changes in your ability to think, move, or talk  · You have a stiff neck, a fever, or a bad headache  · You have swelling of your forehead or scalp    Contact your healthcare provider if:   · Your symptoms do not improve after 3 days  · Your symptoms do not go away after 10 days  · You have nausea and are vomiting  · Your nose is bleeding  · You have questions or concerns about your condition or care  Treatment for sinusitis:  Your symptoms may go away on their own  Your healthcare provider may recommend watchful waiting for up to 10 days before starting antibiotics  You may  need any of the following:  · Acetaminophen  decreases pain and fever  It is available without a doctor's order  Ask how much to take and how often to take it  Follow directions  Read the labels of all other medicines you are using to see if they also contain acetaminophen, or ask your doctor or pharmacist  Acetaminophen can cause liver damage if not taken correctly  Do not use more than 4 grams (4,000 milligrams) total of acetaminophen in one day  · NSAIDs , such as ibuprofen, help decrease swelling, pain, and fever  This medicine is available with or without a doctor's order  NSAIDs can cause stomach bleeding or kidney problems in certain people  If you take blood thinner medicine, always ask your healthcare provider if NSAIDs are safe for you  Always read the medicine label and follow directions  · Nasal steroid sprays  may help decrease inflammation in your nose and sinuses  · Decongestants  help reduce swelling and drain mucus in the nose and sinuses  They may help you breathe easier  · Antihistamines  help dry mucus in the nose and relieve sneezing  · Antibiotics  help treat or prevent a bacterial infection  · Take your medicine as directed  Contact your healthcare provider if you think your medicine is not helping or if you have side effects  Tell him or her if you are allergic to any medicine  Keep a list of the medicines, vitamins, and herbs you take  Include the amounts, and when and why you take them  Bring the list or the pill bottles to follow-up visits   Carry your medicine list with you in case of an emergency  Self-care:   · Rinse your sinuses  Use a sinus rinse device to rinse your nasal passages with a saline (salt water) solution or distilled water  Do not use tap water  This will help thin the mucus in your nose and rinse away pollen and dirt  It will also help reduce swelling so you can breathe normally  Ask your healthcare provider how often to do this  · Breathe in steam   Heat a bowl of water until you see steam  Lean over the bowl and make a tent over your head with a large towel  Breathe deeply for about 20 minutes  Be careful not to get too close to the steam or burn yourself  Do this 3 times a day  You can also breathe deeply when you take a hot shower  · Sleep with your head elevated  Place an extra pillow under your head before you go to sleep to help your sinuses drain  · Drink liquids as directed  Ask your healthcare provider how much liquid to drink each day and which liquids are best for you  Liquids will thin the mucus in your nose and help it drain  Avoid drinks that contain alcohol or caffeine  · Do not smoke, and avoid secondhand smoke  Nicotine and other chemicals in cigarettes and cigars can make your symptoms worse  Ask your healthcare provider for information if you currently smoke and need help to quit  E-cigarettes or smokeless tobacco still contain nicotine  Talk to your healthcare provider before you use these products  Prevent the spread of germs that cause sinusitis:  Wash your hands often with soap and water  Wash your hands after you use the bathroom, change a child's diaper, or sneeze  Wash your hands before you prepare or eat food  Follow up with your healthcare provider as directed: You may be referred to an ear, nose, and throat specialist  Write down your questions so you remember to ask them during your visits     © 2017 [employer] Information is for End User's use only and may not be sold, redistributed or otherwise used for commercial purposes  All illustrations and images included in CareNotes® are the copyrighted property of [employer]  or [employer]   The above information is an  only  It is not intended as medical advice for individual conditions or treatments  Talk to your doctor, nurse or pharmacist before following any medical regimen to see if it is safe and effective for you  Doxycycline (By mouth)   Doxycycline ([id number])  Treats and prevents infections  Also used to prevent malaria and treat rosacea or severe acne  This medicine is a tetracycline antibiotic  Brand Name(s): Acticlate, Adoxa, Adoxa Robinson 1/150, Avidoxy, Avidoxy DK, BenzoDox 30 Kit, BenzoDox 60 Kit, Doryx, Doryx MPC, Monodox, Morgidox [id number], Morgidox [id number] Kit, Morgidox 1x50MG, Morgidox 1x50MG Kit, Morgidox [id number]   There may be other brand names for this medicine  When This Medicine Should Not Be Used: This medicine is not right for everyone  Do not use it if you had an allergic reaction to doxycycline or another tetracycline antibiotic, or if you are pregnant or breastfeeding  How to Use This Medicine:   Capsule, Delayed Release Capsule, Long Acting Capsule, Liquid, Tablet, Delayed Release Tablet  · Your doctor will tell you how much medicine to use  Do not use more than directed  · Ask your pharmacist or doctor if you need to take this medicine with or without food  Some forms can be taken with food or milk, but others must be taken on an empty stomach  · Tablets: You may take this medicine with food or milk to avoid stomach irritation  To break a tablet, hold the tablet between your thumb and index fingers close to the appropriate scored line  Then, apply enough pressure to snap the tablet segments apart  Do not use the tablet if it does not break on the scored lines  · Delayed-release tablets: You may also take this medicine by sprinkling the broken tablets onto room-temperature applesauce   Swallow this mixture right away; do not chew  Do not store the mixture for later use  · Oracea® capsules: This medicine must be taken on an empty stomach, at least 1 hour before or 2 hours after a meal   · Capsule: Swallow whole  Do not break, crush, chew, or open it  · Oral liquid: Shake the bottle well just before each use  Measure the oral liquid medicine with a marked measuring spoon, oral syringe, or medicine cup  · Take all of the medicine in your prescription to clear up your infection, even if you feel better after the first few doses  · Drink plenty of fluids to avoid throat problems, if you take the capsule or tablet form  · Malaria prevention: Start taking the medicine 1 or 2 days before you travel  Take the medicine every day during your trip  Keep taking it for 4 weeks after you return  However, do not use the medicine for longer than 4 months  · Do not use this medicine for more than 9 months if you are using it for rosacea  · Use only the brand of medicine your doctor prescribed  Other brands may not work the same way  · Read and follow the patient instructions that come with this medicine  Talk to your doctor or pharmacist if you have any questions  · Missed dose: Take a dose as soon as you remember  If it is almost time for your next dose, wait until then and take a regular dose  Do not take extra medicine to make up for a missed dose  · Store the medicine in a closed container at room temperature, away from heat, moisture, and direct light  Do not freeze the oral liquid  Drugs and Foods to Avoid:   Ask your doctor or pharmacist before using any other medicine, including over-the-counter medicines, vitamins, and herbal products  · Some foods and medicines can affect how doxycycline works   Tell your doctor if you are using any of the following:  ¨ Bismuth subsalicylate, isotretinoin or other acne medicines, acitretin or other medicine to treat psoriasis  ¨ Penicillin antibiotic, birth control pills, medicine for seizures (such as carbamazepine, phenobarbital, phenytoin), stomach medicine, a blood thinner (such as warfarin), or any medicine that contains aluminum, calcium, or iron (such an antacid or vitamin supplement)  Warnings While Using This Medicine:   · This medicine may cause birth defects if either partner is using it during conception or pregnancy  Tell your doctor right away if you or your partner becomes pregnant  Birth control pills may not work as well when used with this medicine  Use a second form of birth control to keep from getting pregnant  · Tell your doctor if you have kidney disease, liver disease, asthma, or an allergy to sulfites  Tell your doctor if you had surgery on your stomach, or if you have a history of yeast infections  · This medicine may cause the following problems:  ¨ Permanent change in tooth color (in children younger than 6years old)  ¨ Increased pressure inside the head  ¨ Yeast infection  ¨ Immune system problems  · This medicine can cause diarrhea  Call your doctor if the diarrhea becomes severe, does not stop, or is bloody  Do not take any medicine to stop diarrhea until you have talked to your doctor  Diarrhea can occur 2 months or more after you stop taking this medicine  · This medicine may make your skin more sensitive to sunlight  Wear sunscreen  Do not use sunlamps or tanning beds  · Tell any doctor or dentist who treats you that you are using this medicine  This medicine may affect certain medical test results  · Call your doctor if your symptoms do not improve or if they get worse  · Keep all medicine out of the reach of children  Never share your medicine with anyone    Possible Side Effects While Using This Medicine:   Call your doctor right away if you notice any of these side effects:  · Allergic reaction: Itching or hives, swelling in your face or hands, swelling or tingling in your mouth or throat, chest tightness, trouble breathing  · Blistering, peeling, red skin rash  · Burning, pain, or irritation in your upper stomach or throat  · Diarrhea that may contain blood  · Fever, chills, cough, runny or stuffy nose, sore throat, and body aches  · Joint pain, fever, rash, and unusual tiredness or weakness  · Severe headache, dizziness, or vision changes  If you notice these less serious side effects, talk with your doctor:   · Darkening of your skin, scars, teeth, or gums  · Sores or white patches on your lips, mouth, or throat  If you notice other side effects that you think are caused by this medicine, tell your doctor  Call your doctor for medical advice about side effects  You may report side effects to FDA at 2-309-FDA-2080  © 2017 [employer] [employer] Information is for End User's use only and may not be sold, redistributed or otherwise used for commercial purposes  The above information is an  only  It is not intended as medical advice for individual conditions or treatments  Talk to your doctor, nurse or pharmacist before following any medical regimen to see if it is safe and effective for you

## 2018-03-30 ENCOUNTER — TRANSCRIBE ORDERS (OUTPATIENT)
Dept: ADMINISTRATIVE | Facility: HOSPITAL | Age: 38
End: 2018-03-30

## 2018-03-30 DIAGNOSIS — R10.12 ABDOMINAL PAIN, LEFT UPPER QUADRANT: Primary | ICD-10-CM

## 2018-04-16 ENCOUNTER — HOSPITAL ENCOUNTER (OUTPATIENT)
Dept: RADIOLOGY | Facility: HOSPITAL | Age: 38
Discharge: HOME | End: 2018-04-16
Attending: INTERNAL MEDICINE
Payer: COMMERCIAL

## 2018-04-16 VITALS — BODY MASS INDEX: 23.03 KG/M2 | WEIGHT: 130 LBS

## 2018-04-16 DIAGNOSIS — R10.12 ABDOMINAL PAIN, LEFT UPPER QUADRANT: ICD-10-CM

## 2018-04-16 PROCEDURE — 78227 HEPATOBIL SYST IMAGE W/DRUG: CPT

## 2018-04-16 PROCEDURE — A9537 TC99M MEBROFENIN: HCPCS

## 2018-06-04 ENCOUNTER — OFFICE VISIT (OUTPATIENT)
Dept: FAMILY MEDICINE CLINIC | Facility: CLINIC | Age: 38
End: 2018-06-04
Payer: COMMERCIAL

## 2018-06-04 VITALS
DIASTOLIC BLOOD PRESSURE: 70 MMHG | HEIGHT: 63 IN | RESPIRATION RATE: 16 BRPM | HEART RATE: 88 BPM | BODY MASS INDEX: 23.04 KG/M2 | WEIGHT: 130 LBS | SYSTOLIC BLOOD PRESSURE: 100 MMHG | TEMPERATURE: 98.4 F

## 2018-06-04 DIAGNOSIS — J01.90 ACUTE SINUSITIS, RECURRENCE NOT SPECIFIED, UNSPECIFIED LOCATION: Primary | ICD-10-CM

## 2018-06-04 PROCEDURE — 99213 OFFICE O/P EST LOW 20 MIN: CPT | Performed by: NURSE PRACTITIONER

## 2018-06-04 RX ORDER — CEFDINIR 300 MG/1
600 CAPSULE ORAL EVERY 24 HOURS
Qty: 20 CAPSULE | Refills: 0 | Status: SHIPPED | OUTPATIENT
Start: 2018-06-04 | End: 2018-06-14

## 2018-06-04 RX ORDER — LAMOTRIGINE 100 MG/1
1 TABLET ORAL DAILY
COMMUNITY
Start: 2018-04-06 | End: 2020-07-09 | Stop reason: SDUPTHER

## 2018-06-04 RX ORDER — DICYCLOMINE HCL 20 MG
1 TABLET ORAL DAILY
COMMUNITY
Start: 2018-05-09 | End: 2019-02-11 | Stop reason: ALTCHOICE

## 2018-06-04 RX ORDER — FLUCONAZOLE 150 MG/1
150 TABLET ORAL ONCE
Qty: 1 TABLET | Refills: 0 | Status: SHIPPED | OUTPATIENT
Start: 2018-06-04 | End: 2018-06-04

## 2018-06-04 NOTE — PROGRESS NOTES
Assessment/Plan:  Continue with flonase 2 sprays each nostril  Supportive care discussed and advised  Follow up for no improvement and worsening of conditions  Patient advised and educated when to see immediate medical care  Diagnoses and all orders for this visit:    Acute sinusitis, recurrence not specified, unspecified location  -     cefdinir (OMNICEF) 300 mg capsule; Take 2 capsules (600 mg total) by mouth every 24 hours for 10 days  -     fluconazole (DIFLUCAN) 150 mg tablet; Take 1 tablet (150 mg total) by mouth once for 1 dose    BMI 23 0-23 9, adult    Other orders  -     DEXILANT 60 MG capsule; Take 1 capsule by mouth daily  -     dicyclomine (BENTYL) 20 mg tablet; Take 1 tablet by mouth daily  -     lamoTRIgine (LaMICtal) 100 mg tablet; Take 1 tablet by mouth daily          Return if symptoms worsen or fail to improve  Subjective:      Patient ID: Tamika Alanis is a 34 y o  female  Chief Complaint   Patient presents with    Sore Throat     Started 2 days ago  Afebrile JMoyle LPN    Cough       HPI  Patient having sore throat, ear ache, cough, sneezing from 4 days and feels like gonna pass out  Denies fever, chills and sore throat  Using flonase as needed  The following portions of the patient's history were reviewed and updated as appropriate: allergies, current medications, past family history, past medical history, past social history, past surgical history and problem list       Review of Systems   Constitutional: Positive for fatigue  Negative for chills and fever  HENT: Positive for congestion, ear pain, sinus pressure and sore throat  Negative for ear discharge, facial swelling, hearing loss, mouth sores, nosebleeds, postnasal drip, rhinorrhea, sinus pain, sneezing, trouble swallowing and voice change  Respiratory: Negative for cough, chest tightness, shortness of breath and wheezing  Cardiovascular: Negative      Gastrointestinal: Negative for abdominal pain, constipation, diarrhea and nausea  Neurological: Negative for dizziness, weakness, light-headedness and headaches  Objective:    History   Smoking Status    Former Smoker    Quit date: 2014   Smokeless Tobacco    Never Used       Allergies: No Known Allergies    Vitals:  /70   Pulse 88   Temp 98 4 °F (36 9 °C)   Resp 16   Ht 5' 3" (1 6 m)   Wt 59 kg (130 lb)   LMP  (Within Years) Comment: Jan 2016  BMI 23 03 kg/m²     Current Outpatient Prescriptions   Medication Sig Dispense Refill    acetaminophen (TYLENOL) 325 mg tablet Take 650 mg by mouth every 6 (six) hours      ALPRAZolam (XANAX) 1 mg tablet Take 1 mg by mouth 3 (three) times a day as needed for anxiety      buPROPion (WELLBUTRIN XL) 150 mg 24 hr tablet Take 150 mg by mouth every morning      fluticasone (FLONASE) 50 mcg/act nasal spray 2 sprays into each nostril daily 16 g 0    ibuprofen (MOTRIN) 200 mg tablet Take 200 mg by mouth every 6 (six) hours      ALPRAZolam (XANAX) 1 mg tablet Take by mouth      cefdinir (OMNICEF) 300 mg capsule Take 2 capsules (600 mg total) by mouth every 24 hours for 10 days 20 capsule 0    DEXILANT 60 MG capsule Take 1 capsule by mouth daily      dicyclomine (BENTYL) 20 mg tablet Take 1 tablet by mouth daily      fluconazole (DIFLUCAN) 150 mg tablet Take 1 tablet (150 mg total) by mouth once for 1 dose 1 tablet 0    lamoTRIgine (LaMICtal) 100 mg tablet Take 1 tablet by mouth daily      polyethylene glycol (MIRALAX) 17 g packet Take 17 g by mouth daily for 30 days 510 g 0    sulindac (CLINORIL) 150 MG tablet Take 1 tablet by mouth every 12 (twelve) hours      valACYclovir (VALTREX) 1,000 mg tablet Valacyclovir 1 gm 2 tablets every 12 hourly at sign of eruption and then stop after 4 tabs  6 tablet 0     No current facility-administered medications for this visit  Physical Exam   Constitutional: She is oriented to person, place, and time   She appears well-developed and well-nourished  HENT:   Head: Normocephalic  Right Ear: External ear and ear canal normal  Tympanic membrane is retracted  Left Ear: External ear and ear canal normal  Tympanic membrane is retracted  Nose: Mucosal edema present  Right sinus exhibits maxillary sinus tenderness and frontal sinus tenderness  Left sinus exhibits maxillary sinus tenderness and frontal sinus tenderness  Mouth/Throat: Mucous membranes are normal  Posterior oropharyngeal erythema present  Neck: Neck supple  Cardiovascular: Normal rate, regular rhythm and normal heart sounds  Pulmonary/Chest: Effort normal and breath sounds normal    Abdominal: Normal appearance and bowel sounds are normal  There is no hepatosplenomegaly  There is no tenderness  There is no rebound  Musculoskeletal: Normal range of motion  Lymphadenopathy:        Right cervical: No superficial cervical and no posterior cervical adenopathy present  Left cervical: No superficial cervical and no posterior cervical adenopathy present  Neurological: She is alert and oriented to person, place, and time  Skin: Skin is warm and dry  Psychiatric: She has a normal mood and affect  Her behavior is normal  Judgment and thought content normal    Vitals reviewed          ROSE Anderson

## 2018-06-04 NOTE — PATIENT INSTRUCTIONS
Continue with flonase 2 sprays each nostril  Supportive care discussed and advised  Follow up for no improvement and worsening of conditions  Patient advised and educated when to see immediate medical care  Sinusitis   AMBULATORY CARE:   Sinusitis  is inflammation or infection of your sinuses  It is most often caused by a virus  Acute sinusitis may last up to 12 weeks  Chronic sinusitis lasts longer than 12 weeks  Recurrent sinusitis means you have 4 or more times in 1 year  Common symptoms include the following:   · Fever    · Pain, pressure, redness, or swelling around the forehead, cheeks, or eyes    · Thick yellow or green discharge from your nose    · Tenderness when you touch your face over your sinuses    · Dry cough that happens mostly at night or when you lie down    · Headache and face pain that is worse when you lean forward    · Tooth pain, or pain when you chew  Seek care immediately if:   · Your eye and eyelid are red, swollen, and painful  · You cannot open your eye  · You have vision changes, such as double vision  · Your eyeball bulges out or you cannot move your eye  · You are more sleepy than normal, or you notice changes in your ability to think, move, or talk  · You have a stiff neck, a fever, or a bad headache  · You have swelling of your forehead or scalp  Contact your healthcare provider if:   · Your symptoms do not improve after 3 days  · Your symptoms do not go away after 10 days  · You have nausea and are vomiting  · Your nose is bleeding  · You have questions or concerns about your condition or care  Treatment for sinusitis:  Your symptoms may go away on their own  Your healthcare provider may recommend watchful waiting for up to 10 days before starting antibiotics  You may  need any of the following:  · Acetaminophen  decreases pain and fever  It is available without a doctor's order  Ask how much to take and how often to take it   Follow directions  Read the labels of all other medicines you are using to see if they also contain acetaminophen, or ask your doctor or pharmacist  Acetaminophen can cause liver damage if not taken correctly  Do not use more than 4 grams (4,000 milligrams) total of acetaminophen in one day  · NSAIDs , such as ibuprofen, help decrease swelling, pain, and fever  This medicine is available with or without a doctor's order  NSAIDs can cause stomach bleeding or kidney problems in certain people  If you take blood thinner medicine, always ask your healthcare provider if NSAIDs are safe for you  Always read the medicine label and follow directions  · Nasal steroid sprays  may help decrease inflammation in your nose and sinuses  · Decongestants  help reduce swelling and drain mucus in the nose and sinuses  They may help you breathe easier  · Antihistamines  help dry mucus in the nose and relieve sneezing  · Antibiotics  help treat or prevent a bacterial infection  · Take your medicine as directed  Contact your healthcare provider if you think your medicine is not helping or if you have side effects  Tell him or her if you are allergic to any medicine  Keep a list of the medicines, vitamins, and herbs you take  Include the amounts, and when and why you take them  Bring the list or the pill bottles to follow-up visits  Carry your medicine list with you in case of an emergency  Self-care:   · Rinse your sinuses  Use a sinus rinse device to rinse your nasal passages with a saline (salt water) solution or distilled water  Do not use tap water  This will help thin the mucus in your nose and rinse away pollen and dirt  It will also help reduce swelling so you can breathe normally  Ask your healthcare provider how often to do this  · Breathe in steam   Heat a bowl of water until you see steam  Lean over the bowl and make a tent over your head with a large towel  Breathe deeply for about 20 minutes   Be careful not to get too close to the steam or burn yourself  Do this 3 times a day  You can also breathe deeply when you take a hot shower  · Sleep with your head elevated  Place an extra pillow under your head before you go to sleep to help your sinuses drain  · Drink liquids as directed  Ask your healthcare provider how much liquid to drink each day and which liquids are best for you  Liquids will thin the mucus in your nose and help it drain  Avoid drinks that contain alcohol or caffeine  · Do not smoke, and avoid secondhand smoke  Nicotine and other chemicals in cigarettes and cigars can make your symptoms worse  Ask your healthcare provider for information if you currently smoke and need help to quit  E-cigarettes or smokeless tobacco still contain nicotine  Talk to your healthcare provider before you use these products  Prevent the spread of germs that cause sinusitis:  Wash your hands often with soap and water  Wash your hands after you use the bathroom, change a child's diaper, or sneeze  Wash your hands before you prepare or eat food  Follow up with your healthcare provider as directed: You may be referred to an ear, nose, and throat specialist  Write down your questions so you remember to ask them during your visits  © 2017 [facility] Information is for End User's use only and may not be sold, redistributed or otherwise used for commercial purposes  All illustrations and images included in CareNotes® are the copyrighted property of [employer] [facility]  or Yogi Waite  The above information is an  only  It is not intended as medical advice for individual conditions or treatments  Talk to your doctor, nurse or pharmacist before following any medical regimen to see if it is safe and effective for you  Cefdinir (By mouth)   Cefdinir (SEF-di-ciera)  Treats bacterial infections  This medicine is a cephalosporin antibiotic     Brand Name(s):   There may be other brand names for this medicine  When This Medicine Should Not Be Used: This medicine is not right for everyone  Do not use it if you had an allergic reaction to cefdinir or to any type of cephalosporin  How to Use This Medicine:   Capsule, Liquid  · Your doctor will tell you how much medicine to use  Do not use more than directed  · Shake the oral liquid well before use  · Measure the oral liquid medicine with a marked measuring spoon, oral syringe, or medicine cup  · Take all of the medicine in your prescription to clear up your infection, even if you feel better after the first few doses  · This medicine is not for long-term use  · Missed dose: Take a dose as soon as you remember  If it is almost time for your next dose, wait until then and take a regular dose  Do not take extra medicine to make up for a missed dose  · Store the medicine in a closed container at room temperature, away from heat, moisture, and direct light  Discard any unused portion of the oral liquid after 10 days  Drugs and Foods to Avoid:   Ask your doctor or pharmacist before using any other medicine, including over-the-counter medicines, vitamins, and herbal products  · Some medicines can affect how cefdinir works  Tell your doctor if you are also taking probenecid  · If you use antacids or iron supplements (including those found in multivitamins), take them at least 2 hours before or 2 hours after you take cefdinir  Warnings While Using This Medicine:   · Tell your doctor if you are pregnant or breastfeeding, have kidney disease, or had an allergic reaction to penicillin antibiotics or any other medicines  · This medicine can cause diarrhea  Call your doctor if the diarrhea becomes severe, does not stop, or is bloody  Do not take any medicine to stop diarrhea until you have talked to your doctor  Diarrhea can occur 2 months or more after you stop taking this medicine    · If you have diabetes, use Clinistix® or Fallon-Tape® for urine glucose tests while you are taking cefdinir  Cefdinir capsules and liquid may cause incorrect results on the Clinitest® urine glucose test   · Tell any doctor or dentist who treats you that you are using this medicine  This medicine may affect certain medical test results  · Call your doctor if your symptoms do not improve or if they get worse  · Keep all medicine out of the reach of children  Never share your medicine with anyone  Possible Side Effects While Using This Medicine:   Call your doctor right away if you notice any of these side effects:  · Allergic reaction: Itching or hives, swelling in your face or hands, swelling or tingling in your mouth or throat, chest tightness, trouble breathing  · Blistering, peeling, red skin rash  · Red or black stools  · Severe diarrhea or stomach pain  · Sores or white patches on your lips, mouth, or throat  If you notice these less serious side effects, talk with your doctor:   · Bloated feeling, constipation, loss of appetite, nausea, vomiting, or upset stomach  · Dizziness, drowsiness, sleepiness, or unusual weakness  · Dry mouth  · Trouble sleeping  If you notice other side effects that you think are caused by this medicine, tell your doctor  Call your doctor for medical advice about side effects  You may report side effects to FDA at 4-431-FDA-1162  © 2017 [address] [employer] Information is for End User's use only and may not be sold, redistributed or otherwise used for commercial purposes  The above information is an  only  It is not intended as medical advice for individual conditions or treatments  Talk to your doctor, nurse or pharmacist before following any medical regimen to see if it is safe and effective for you

## 2018-06-08 ENCOUNTER — TELEPHONE (OUTPATIENT)
Dept: FAMILY MEDICINE CLINIC | Facility: CLINIC | Age: 38
End: 2018-06-08

## 2018-06-08 NOTE — TELEPHONE ENCOUNTER
Yes if she is only half way through th abx I would suggest finishing them and if no better at that point she can be reevaluated    mucinex is a great choice

## 2018-06-08 NOTE — TELEPHONE ENCOUNTER
She called and saw Ruslan Jang on Monday for a sinus infection she is half done with her antibiotics and she feels no better  I did advise she takes Mucinex and let her know we are onen in the AM for appointments  Please advise any other suggestions?

## 2018-06-09 ENCOUNTER — OFFICE VISIT (OUTPATIENT)
Dept: FAMILY MEDICINE CLINIC | Facility: CLINIC | Age: 38
End: 2018-06-09
Payer: COMMERCIAL

## 2018-06-09 VITALS
HEIGHT: 63 IN | HEART RATE: 82 BPM | TEMPERATURE: 98 F | DIASTOLIC BLOOD PRESSURE: 70 MMHG | BODY MASS INDEX: 22.15 KG/M2 | RESPIRATION RATE: 20 BRPM | WEIGHT: 125 LBS | SYSTOLIC BLOOD PRESSURE: 100 MMHG

## 2018-06-09 DIAGNOSIS — J01.90 ACUTE SINUSITIS, RECURRENCE NOT SPECIFIED, UNSPECIFIED LOCATION: Primary | ICD-10-CM

## 2018-06-09 PROCEDURE — 3008F BODY MASS INDEX DOCD: CPT | Performed by: NURSE PRACTITIONER

## 2018-06-09 PROCEDURE — 99213 OFFICE O/P EST LOW 20 MIN: CPT | Performed by: NURSE PRACTITIONER

## 2018-06-09 RX ORDER — DOXYCYCLINE HYCLATE 100 MG/1
100 CAPSULE ORAL EVERY 12 HOURS SCHEDULED
Qty: 14 CAPSULE | Refills: 0 | Status: SHIPPED | OUTPATIENT
Start: 2018-06-09 | End: 2018-06-16

## 2018-06-09 RX ORDER — METHYLPREDNISOLONE 4 MG/1
TABLET ORAL
Qty: 21 TABLET | Refills: 0 | Status: SHIPPED | OUTPATIENT
Start: 2018-06-09 | End: 2019-02-11 | Stop reason: ALTCHOICE

## 2018-06-09 RX ORDER — FLUCONAZOLE 150 MG/1
150 TABLET ORAL ONCE
Qty: 1 TABLET | Refills: 0 | Status: SHIPPED | OUTPATIENT
Start: 2018-06-09 | End: 2018-06-09

## 2018-06-09 NOTE — PROGRESS NOTES
Assessment/Plan:  Will stop omnicef and will start doxy  Take medication with food  It is important that you take the entire course of antibiotics prescribed  May also take a probiotic of your choice to maintain healthy GI gagan  Can take some probiotic and yogurt with the medication  Supportive care discussed and advised  Follow up for no improvement and worsening of conditions  Patient advised and educated when to see immediate medical care  Diagnoses and all orders for this visit:    Acute sinusitis, recurrence not specified, unspecified location  -     doxycycline hyclate (VIBRAMYCIN) 100 mg capsule; Take 1 capsule (100 mg total) by mouth every 12 (twelve) hours for 7 days  -     Methylprednisolone 4 MG TBPK; Use as directed on package  -     fluconazole (DIFLUCAN) 150 mg tablet; Take 1 tablet (150 mg total) by mouth once for 1 dose    BMI 22 0-22 9, adult          Return if symptoms worsen or fail to improve  Subjective:      Patient ID: Cruz Navarro is a 34 y o  female  Chief Complaint   Patient presents with    Headache     has been treated with antibiotics recently still not feeling any better   [id number]    Fatigue    Fever    Facial Pain     sinus pressure eyes hurt         HPI   Patient was seen on 6/4 for sinusitis and stated that not feeling better at all and cough is worse with sinus pressure  Feeling chills and sweats  Denies chest pain and sob  The following portions of the patient's history were reviewed and updated as appropriate: allergies, current medications, past family history, past medical history, past social history, past surgical history and problem list       Review of Systems   Constitutional: Negative for chills, fatigue and fever  HENT: Positive for ear pain and sinus pressure   Negative for congestion, ear discharge, facial swelling, hearing loss, mouth sores, nosebleeds, postnasal drip, rhinorrhea, sinus pain, sneezing, sore throat, trouble swallowing and voice change  Respiratory: Positive for cough  Negative for chest tightness, shortness of breath and wheezing  Cardiovascular: Negative  Gastrointestinal: Negative for abdominal pain, constipation, diarrhea and nausea  Neurological: Positive for headaches  Negative for dizziness, weakness and light-headedness  Objective:    History   Smoking Status    Former Smoker    Quit date: 2014   Smokeless Tobacco    Never Used       Allergies: No Known Allergies    Vitals:  /70   Pulse 82   Temp 98 °F (36 7 °C)   Resp 20   Ht 5' 3" (1 6 m)   Wt 56 7 kg (125 lb)   BMI 22 14 kg/m²     Current Outpatient Prescriptions   Medication Sig Dispense Refill    acetaminophen (TYLENOL) 325 mg tablet Take 650 mg by mouth every 6 (six) hours      ALPRAZolam (XANAX) 1 mg tablet Take by mouth      buPROPion (WELLBUTRIN XL) 150 mg 24 hr tablet Take 150 mg by mouth every morning      cefdinir (OMNICEF) 300 mg capsule Take 2 capsules (600 mg total) by mouth every 24 hours for 10 days 20 capsule 0    fluticasone (FLONASE) 50 mcg/act nasal spray 2 sprays into each nostril daily 16 g 0    ibuprofen (MOTRIN) 200 mg tablet Take 200 mg by mouth every 6 (six) hours      polyethylene glycol (MIRALAX) 17 g packet Take 17 g by mouth daily for 30 days 510 g 0    valACYclovir (VALTREX) 1,000 mg tablet Valacyclovir 1 gm 2 tablets every 12 hourly at sign of eruption and then stop after 4 tabs   6 tablet 0    ALPRAZolam (XANAX) 1 mg tablet Take 1 mg by mouth 3 (three) times a day as needed for anxiety      DEXILANT 60 MG capsule Take 1 capsule by mouth daily      dicyclomine (BENTYL) 20 mg tablet Take 1 tablet by mouth daily      doxycycline hyclate (VIBRAMYCIN) 100 mg capsule Take 1 capsule (100 mg total) by mouth every 12 (twelve) hours for 7 days 14 capsule 0    fluconazole (DIFLUCAN) 150 mg tablet Take 1 tablet (150 mg total) by mouth once for 1 dose 1 tablet 0    lamoTRIgine (LaMICtal) 100 mg tablet Take 1 tablet by mouth daily      Methylprednisolone 4 MG TBPK Use as directed on package 21 tablet 0    sulindac (CLINORIL) 150 MG tablet Take 1 tablet by mouth every 12 (twelve) hours       No current facility-administered medications for this visit  Physical Exam   Constitutional: She is oriented to person, place, and time  She appears well-developed and well-nourished  HENT:   Head: Normocephalic  Right Ear: External ear and ear canal normal  Tympanic membrane is bulging  Left Ear: External ear and ear canal normal  Tympanic membrane is retracted  Nose: Mucosal edema present  Right sinus exhibits maxillary sinus tenderness and frontal sinus tenderness  Left sinus exhibits maxillary sinus tenderness and frontal sinus tenderness  Mouth/Throat: Mucous membranes are normal  Posterior oropharyngeal erythema present  Neck: Neck supple  Cardiovascular: Normal rate, regular rhythm and normal heart sounds  Pulmonary/Chest: Effort normal and breath sounds normal    Abdominal: Normal appearance and bowel sounds are normal  There is no hepatosplenomegaly  There is no tenderness  There is no rebound  Musculoskeletal: Normal range of motion  Lymphadenopathy:        Right cervical: No superficial cervical and no posterior cervical adenopathy present  Left cervical: No superficial cervical and no posterior cervical adenopathy present  Neurological: She is alert and oriented to person, place, and time  Skin: Skin is warm and dry  Psychiatric: She has a normal mood and affect  Her behavior is normal  Judgment and thought content normal    Vitals reviewed          ROSE Anderson

## 2018-06-09 NOTE — PATIENT INSTRUCTIONS
Take medication with food  It is important that you take the entire course of antibiotics prescribed  May also take a probiotic of your choice to maintain healthy GI gagan  Can take some probiotic and yogurt with the medication  Supportive care discussed and advised  Follow up for no improvement and worsening of conditions  Patient advised and educated when to see immediate medical care  Sinusitis   AMBULATORY CARE:   Sinusitis  is inflammation or infection of your sinuses  It is most often caused by a virus  Acute sinusitis may last up to 12 weeks  Chronic sinusitis lasts longer than 12 weeks  Recurrent sinusitis means you have 4 or more times in 1 year  Common symptoms include the following:   · Fever    · Pain, pressure, redness, or swelling around the forehead, cheeks, or eyes    · Thick yellow or green discharge from your nose    · Tenderness when you touch your face over your sinuses    · Dry cough that happens mostly at night or when you lie down    · Headache and face pain that is worse when you lean forward    · Tooth pain, or pain when you chew  Seek care immediately if:   · Your eye and eyelid are red, swollen, and painful  · You cannot open your eye  · You have vision changes, such as double vision  · Your eyeball bulges out or you cannot move your eye  · You are more sleepy than normal, or you notice changes in your ability to think, move, or talk  · You have a stiff neck, a fever, or a bad headache  · You have swelling of your forehead or scalp  Contact your healthcare provider if:   · Your symptoms do not improve after 3 days  · Your symptoms do not go away after 10 days  · You have nausea and are vomiting  · Your nose is bleeding  · You have questions or concerns about your condition or care  Treatment for sinusitis:  Your symptoms may go away on their own   Your healthcare provider may recommend watchful waiting for up to 10 days before starting antibiotics  You may  need any of the following:  · Acetaminophen  decreases pain and fever  It is available without a doctor's order  Ask how much to take and how often to take it  Follow directions  Read the labels of all other medicines you are using to see if they also contain acetaminophen, or ask your doctor or pharmacist  Acetaminophen can cause liver damage if not taken correctly  Do not use more than 4 grams (4,000 milligrams) total of acetaminophen in one day  · NSAIDs , such as ibuprofen, help decrease swelling, pain, and fever  This medicine is available with or without a doctor's order  NSAIDs can cause stomach bleeding or kidney problems in certain people  If you take blood thinner medicine, always ask your healthcare provider if NSAIDs are safe for you  Always read the medicine label and follow directions  · Nasal steroid sprays  may help decrease inflammation in your nose and sinuses  · Decongestants  help reduce swelling and drain mucus in the nose and sinuses  They may help you breathe easier  · Antihistamines  help dry mucus in the nose and relieve sneezing  · Antibiotics  help treat or prevent a bacterial infection  · Take your medicine as directed  Contact your healthcare provider if you think your medicine is not helping or if you have side effects  Tell him or her if you are allergic to any medicine  Keep a list of the medicines, vitamins, and herbs you take  Include the amounts, and when and why you take them  Bring the list or the pill bottles to follow-up visits  Carry your medicine list with you in case of an emergency  Self-care:   · Rinse your sinuses  Use a sinus rinse device to rinse your nasal passages with a saline (salt water) solution or distilled water  Do not use tap water  This will help thin the mucus in your nose and rinse away pollen and dirt  It will also help reduce swelling so you can breathe normally   Ask your healthcare provider how often to do this      · Breathe in steam   Heat a bowl of water until you see steam  Lean over the bowl and make a tent over your head with a large towel  Breathe deeply for about 20 minutes  Be careful not to get too close to the steam or burn yourself  Do this 3 times a day  You can also breathe deeply when you take a hot shower  · Sleep with your head elevated  Place an extra pillow under your head before you go to sleep to help your sinuses drain  · Drink liquids as directed  Ask your healthcare provider how much liquid to drink each day and which liquids are best for you  Liquids will thin the mucus in your nose and help it drain  Avoid drinks that contain alcohol or caffeine  · Do not smoke, and avoid secondhand smoke  Nicotine and other chemicals in cigarettes and cigars can make your symptoms worse  Ask your healthcare provider for information if you currently smoke and need help to quit  E-cigarettes or smokeless tobacco still contain nicotine  Talk to your healthcare provider before you use these products  Prevent the spread of germs that cause sinusitis:  Wash your hands often with soap and water  Wash your hands after you use the bathroom, change a child's diaper, or sneeze  Wash your hands before you prepare or eat food  Follow up with your healthcare provider as directed: You may be referred to an ear, nose, and throat specialist  Write down your questions so you remember to ask them during your visits  © 2017 [address] [employer] Information is for End User's use only and may not be sold, redistributed or otherwise used for commercial purposes  All illustrations and images included in CareNotes® are the copyrighted property of [employer]  or [employer]ogi Waite  The above information is an  only  It is not intended as medical advice for individual conditions or treatments   Talk to your doctor, nurse or pharmacist before following any medical regimen to see if it is safe and effective for you

## 2018-06-19 ENCOUNTER — TELEPHONE (OUTPATIENT)
Dept: FAMILY MEDICINE CLINIC | Facility: CLINIC | Age: 38
End: 2018-06-19

## 2018-06-19 NOTE — TELEPHONE ENCOUNTER
Pt was a no show yesterday, called her and she was on way to a procedure and said she will call back and r/s

## 2018-06-27 DIAGNOSIS — J01.90 ACUTE SINUSITIS, RECURRENCE NOT SPECIFIED, UNSPECIFIED LOCATION: ICD-10-CM

## 2018-07-02 RX ORDER — FLUTICASONE PROPIONATE 50 MCG
2 SPRAY, SUSPENSION (ML) NASAL DAILY
Qty: 16 G | Refills: 0 | OUTPATIENT
Start: 2018-07-02

## 2018-07-16 DIAGNOSIS — J01.90 ACUTE SINUSITIS, RECURRENCE NOT SPECIFIED, UNSPECIFIED LOCATION: ICD-10-CM

## 2018-07-16 RX ORDER — FLUTICASONE PROPIONATE 50 MCG
2 SPRAY, SUSPENSION (ML) NASAL DAILY
Qty: 16 G | Refills: 0 | Status: SHIPPED | OUTPATIENT
Start: 2018-07-16 | End: 2019-02-11 | Stop reason: SDUPTHER

## 2018-08-07 ENCOUNTER — APPOINTMENT (EMERGENCY)
Dept: RADIOLOGY | Facility: HOSPITAL | Age: 38
End: 2018-08-07
Payer: COMMERCIAL

## 2018-08-07 ENCOUNTER — HOSPITAL ENCOUNTER (EMERGENCY)
Facility: HOSPITAL | Age: 38
Discharge: HOME | End: 2018-08-07
Attending: EMERGENCY MEDICINE | Admitting: EMERGENCY MEDICINE
Payer: COMMERCIAL

## 2018-08-07 VITALS
SYSTOLIC BLOOD PRESSURE: 111 MMHG | HEART RATE: 89 BPM | WEIGHT: 128 LBS | RESPIRATION RATE: 18 BRPM | OXYGEN SATURATION: 99 % | BODY MASS INDEX: 22.68 KG/M2 | DIASTOLIC BLOOD PRESSURE: 73 MMHG | TEMPERATURE: 99.7 F | HEIGHT: 63 IN

## 2018-08-07 DIAGNOSIS — K59.00 CONSTIPATION: Primary | ICD-10-CM

## 2018-08-07 DIAGNOSIS — R10.9 ABDOMINAL PAIN: ICD-10-CM

## 2018-08-07 LAB
ALBUMIN SERPL BCP-MCNC: 3.9 G/DL (ref 3.5–5)
ALP SERPL-CCNC: 42 U/L (ref 46–116)
ALT SERPL W P-5'-P-CCNC: 33 U/L (ref 12–78)
ANION GAP SERPL CALCULATED.3IONS-SCNC: 8 MMOL/L (ref 4–13)
AST SERPL W P-5'-P-CCNC: 15 U/L (ref 5–45)
BASOPHILS # BLD AUTO: 0.03 THOUSANDS/ΜL (ref 0–0.1)
BASOPHILS NFR BLD AUTO: 0 % (ref 0–1)
BILIRUB SERPL-MCNC: 0.3 MG/DL (ref 0.2–1)
BILIRUB UR QL STRIP: NEGATIVE
BUN SERPL-MCNC: 18 MG/DL (ref 5–25)
CALCIUM SERPL-MCNC: 8.7 MG/DL (ref 8.3–10.1)
CHLORIDE SERPL-SCNC: 104 MMOL/L (ref 100–108)
CLARITY UR: CLEAR
CO2 SERPL-SCNC: 29 MMOL/L (ref 21–32)
COLOR UR: YELLOW
CREAT SERPL-MCNC: 0.69 MG/DL (ref 0.6–1.3)
EOSINOPHIL # BLD AUTO: 0.09 THOUSAND/ΜL (ref 0–0.61)
EOSINOPHIL NFR BLD AUTO: 1 % (ref 0–6)
ERYTHROCYTE [DISTWIDTH] IN BLOOD BY AUTOMATED COUNT: 12.6 % (ref 11.6–15.1)
EXT PREG TEST URINE: NEGATIVE
GFR SERPL CREATININE-BSD FRML MDRD: 109 ML/MIN/1.73SQ M
GLUCOSE SERPL-MCNC: 86 MG/DL (ref 65–140)
GLUCOSE UR STRIP-MCNC: NEGATIVE MG/DL
HCT VFR BLD AUTO: 38.1 % (ref 34.8–46.1)
HGB BLD-MCNC: 12.4 G/DL (ref 11.5–15.4)
HGB UR QL STRIP.AUTO: NEGATIVE
IMM GRANULOCYTES # BLD AUTO: 0.02 THOUSAND/UL (ref 0–0.2)
IMM GRANULOCYTES NFR BLD AUTO: 0 % (ref 0–2)
KETONES UR STRIP-MCNC: NEGATIVE MG/DL
LEUKOCYTE ESTERASE UR QL STRIP: NEGATIVE
LIPASE SERPL-CCNC: 150 U/L (ref 73–393)
LYMPHOCYTES # BLD AUTO: 2.74 THOUSANDS/ΜL (ref 0.6–4.47)
LYMPHOCYTES NFR BLD AUTO: 26 % (ref 14–44)
MCH RBC QN AUTO: 32 PG (ref 26.8–34.3)
MCHC RBC AUTO-ENTMCNC: 32.5 G/DL (ref 31.4–37.4)
MCV RBC AUTO: 98 FL (ref 82–98)
MONOCYTES # BLD AUTO: 0.72 THOUSAND/ΜL (ref 0.17–1.22)
MONOCYTES NFR BLD AUTO: 7 % (ref 4–12)
NEUTROPHILS # BLD AUTO: 6.82 THOUSANDS/ΜL (ref 1.85–7.62)
NEUTS SEG NFR BLD AUTO: 66 % (ref 43–75)
NITRITE UR QL STRIP: NEGATIVE
NRBC BLD AUTO-RTO: 0 /100 WBCS
PH UR STRIP.AUTO: 6 [PH] (ref 5–9)
PLATELET # BLD AUTO: 254 THOUSANDS/UL (ref 149–390)
PMV BLD AUTO: 9.6 FL (ref 8.9–12.7)
POTASSIUM SERPL-SCNC: 3.8 MMOL/L (ref 3.5–5.3)
PROT SERPL-MCNC: 6.8 G/DL (ref 6.4–8.2)
PROT UR STRIP-MCNC: NEGATIVE MG/DL
RBC # BLD AUTO: 3.87 MILLION/UL (ref 3.81–5.12)
SODIUM SERPL-SCNC: 141 MMOL/L (ref 136–145)
SP GR UR STRIP.AUTO: 1.02 (ref 1–1.03)
UROBILINOGEN UR QL STRIP.AUTO: 0.2 E.U./DL
WBC # BLD AUTO: 10.42 THOUSAND/UL (ref 4.31–10.16)

## 2018-08-07 PROCEDURE — 85025 COMPLETE CBC W/AUTO DIFF WBC: CPT | Performed by: EMERGENCY MEDICINE

## 2018-08-07 PROCEDURE — 99284 EMERGENCY DEPT VISIT MOD MDM: CPT

## 2018-08-07 PROCEDURE — 96361 HYDRATE IV INFUSION ADD-ON: CPT

## 2018-08-07 PROCEDURE — 81025 URINE PREGNANCY TEST: CPT | Performed by: EMERGENCY MEDICINE

## 2018-08-07 PROCEDURE — 81003 URINALYSIS AUTO W/O SCOPE: CPT | Performed by: EMERGENCY MEDICINE

## 2018-08-07 PROCEDURE — 74177 CT ABD & PELVIS W/CONTRAST: CPT

## 2018-08-07 PROCEDURE — 80053 COMPREHEN METABOLIC PANEL: CPT | Performed by: EMERGENCY MEDICINE

## 2018-08-07 PROCEDURE — 36415 COLL VENOUS BLD VENIPUNCTURE: CPT | Performed by: EMERGENCY MEDICINE

## 2018-08-07 PROCEDURE — 96360 HYDRATION IV INFUSION INIT: CPT

## 2018-08-07 PROCEDURE — 83690 ASSAY OF LIPASE: CPT | Performed by: EMERGENCY MEDICINE

## 2018-08-07 RX ORDER — POLYETHYLENE GLYCOL 3350 17 G/17G
17 POWDER, FOR SOLUTION ORAL 2 TIMES DAILY
Qty: 14 EACH | Refills: 0 | Status: SHIPPED | OUTPATIENT
Start: 2018-08-07 | End: 2019-02-11 | Stop reason: ALTCHOICE

## 2018-08-07 RX ORDER — MAGNESIUM CARB/ALUMINUM HYDROX 105-160MG
296 TABLET,CHEWABLE ORAL ONCE
Status: COMPLETED | OUTPATIENT
Start: 2018-08-07 | End: 2018-08-07

## 2018-08-07 RX ADMIN — IOHEXOL 50 ML: 240 INJECTION, SOLUTION INTRATHECAL; INTRAVASCULAR; INTRAVENOUS; ORAL at 19:38

## 2018-08-07 RX ADMIN — MAGESIUM CITRATE 296 ML: 1.75 LIQUID ORAL at 21:56

## 2018-08-07 RX ADMIN — IOHEXOL 100 ML: 350 INJECTION, SOLUTION INTRAVENOUS at 21:14

## 2018-08-07 RX ADMIN — SODIUM CHLORIDE 1000 ML: 0.9 INJECTION, SOLUTION INTRAVENOUS at 19:38

## 2018-08-07 NOTE — ED PROVIDER NOTES
History  Chief Complaint   Patient presents with    Abdominal Pain     c/o left lower quad abd pain for 3 days, c/o nausea, no vomiting, denies urinary freq/pain        used: No    Abdominal Pain   Pain location:  LLQ  Pain quality: aching and sharp    Pain radiates to:  LLQ  Pain severity:  Moderate  Onset quality:  Gradual  Duration:  2 days  Timing:  Intermittent  Progression:  Waxing and waning  Chronicity:  New  Context: not alcohol use, not previous surgeries and not sick contacts    Relieved by:  None tried  Worsened by:  Nothing  Ineffective treatments:  None tried  Associated symptoms: constipation and nausea    Associated symptoms: no anorexia, no chest pain, no diarrhea, no dysuria, no fatigue, no fever, no flatus, no hematemesis, no hematochezia, no hematuria, no melena, no shortness of breath and no vomiting    Risk factors: has not had multiple surgeries        Prior to Admission Medications   Prescriptions Last Dose Informant Patient Reported? Taking?    ALPRAZolam (XANAX) 1 mg tablet   Yes No   Sig: Take 1 mg by mouth 3 (three) times a day as needed for anxiety   ALPRAZolam (XANAX) 1 mg tablet   Yes No   Sig: Take by mouth   DEXILANT 60 MG capsule   Yes No   Sig: Take 1 capsule by mouth daily   Methylprednisolone 4 MG TBPK   No No   Sig: Use as directed on package   acetaminophen (TYLENOL) 325 mg tablet   Yes No   Sig: Take 650 mg by mouth every 6 (six) hours   buPROPion (WELLBUTRIN XL) 150 mg 24 hr tablet   Yes No   Sig: Take 150 mg by mouth every morning   dicyclomine (BENTYL) 20 mg tablet   Yes No   Sig: Take 1 tablet by mouth daily   fluticasone (FLONASE) 50 mcg/act nasal spray   No No   Si sprays into each nostril daily   ibuprofen (MOTRIN) 200 mg tablet   Yes No   Sig: Take 200 mg by mouth every 6 (six) hours   lamoTRIgine (LaMICtal) 100 mg tablet   Yes No   Sig: Take 1 tablet by mouth daily   polyethylene glycol (MIRALAX) 17 g packet   No No   Sig: Take 17 g by mouth daily for 30 days   sulindac (CLINORIL) 150 MG tablet   Yes No   Sig: Take 1 tablet by mouth every 12 (twelve) hours   valACYclovir (VALTREX) 1,000 mg tablet   No No   Sig: Valacyclovir 1 gm 2 tablets every 12 hourly at sign of eruption and then stop after 4 tabs  Facility-Administered Medications: None       Past Medical History:   Diagnosis Date    Anxiety     Chronic constipation     Depression     Psychiatric disorder     anxiety        Past Surgical History:   Procedure Laterality Date    APPENDECTOMY      BREAST SURGERY      augmentation    COLONOSCOPY N/A 1/24/2018    Procedure: COLONOSCOPY WITH HEMORRHOID BANDING;  Surgeon: Rozina Cuadra MD;  Location: [facility] GI [facility]; Service: Gastroenterology    LAPAROSCOPIC TUBAL LIGATION      OVARY SURGERY Bilateral        Family History   Problem Relation Age of Onset    No Known Problems Mother     No Known Problems Father     No Known Problems Sister     No Known Problems Brother     No Known Problems Daughter     No Known Problems Son      I have reviewed and agree with the history as documented  Social History   Substance Use Topics    Smoking status: Former Smoker     Quit date: 2014    Smokeless tobacco: Never Used    Alcohol use Yes      Comment: occassioanl        Review of Systems   Constitutional: Negative for activity change, fatigue and fever  HENT: Negative for congestion, dental problem and ear discharge  Eyes: Negative for pain, discharge and redness  Respiratory: Negative for apnea, chest tightness and shortness of breath  Cardiovascular: Negative for chest pain  Gastrointestinal: Positive for abdominal pain, constipation and nausea  Negative for anorexia, diarrhea, flatus, hematemesis, hematochezia, melena and vomiting  Endocrine: Negative for polydipsia  Genitourinary: Negative for dysuria and hematuria  Musculoskeletal: Negative for arthralgias, back pain, gait problem and joint swelling     Skin: Negative for color change, pallor and wound  Neurological: Negative for dizziness, facial asymmetry, light-headedness and headaches  Psychiatric/Behavioral: Negative for agitation, behavioral problems and confusion  All other systems reviewed and are negative  Physical Exam  Physical Exam   Constitutional: She is oriented to person, place, and time  She appears well-developed and well-nourished  HENT:   Head: Normocephalic and atraumatic  Eyes: EOM are normal  Pupils are equal, round, and reactive to light  Neck: Normal range of motion  Neck supple  Cardiovascular: Normal rate and regular rhythm  Pulmonary/Chest: Effort normal and breath sounds normal    Abdominal: Soft  Bowel sounds are normal  She exhibits no distension  There is tenderness  LLQ tenderness   Musculoskeletal: Normal range of motion  She exhibits no edema  Neurological: She is alert and oriented to person, place, and time  No cranial nerve deficit  Coordination normal    Skin: Skin is warm and dry  Psychiatric: She has a normal mood and affect  Her behavior is normal    Nursing note and vitals reviewed        Vital Signs  ED Triage Vitals [08/07/18 1741]   Temperature Pulse Respirations Blood Pressure SpO2   99 7 °F (37 6 °C) 98 16 108/66 98 %      Temp Source Heart Rate Source Patient Position - Orthostatic VS BP Location FiO2 (%)   Tympanic Monitor Sitting Right arm --      Pain Score       5           Vitals:    08/07/18 1741   BP: 108/66   Pulse: 98   Patient Position - Orthostatic VS: Sitting       Visual Acuity      ED Medications  Medications   sodium chloride 0 9 % bolus 1,000 mL (1,000 mL Intravenous New Bag 8/7/18 1938)   iohexol (OMNIPAQUE) 240 MG/ML solution 50 mL (50 mL Oral Given 8/7/18 1938)   iohexol (OMNIPAQUE) 350 MG/ML injection (MULTI-DOSE) 100 mL (100 mL Intravenous Given 8/7/18 2114)       Diagnostic Studies  Results Reviewed     Procedure Component Value Units Date/Time    CMP [[id number]]  (Abnormal) Collected: 08/07/18 1908    Lab Status:  Final result Specimen:  Blood from Arm, Right Updated:  08/07/18 1935     Sodium 141 mmol/L      Potassium 3 8 mmol/L      Chloride 104 mmol/L      CO2 29 mmol/L      Anion Gap 8 mmol/L      BUN 18 mg/dL      Creatinine 0 69 mg/dL      Glucose 86 mg/dL      Calcium 8 7 mg/dL      AST 15 U/L      ALT 33 U/L      Alkaline Phosphatase 42 (L) U/L      Total Protein 6 8 g/dL      Albumin 3 9 g/dL      Total Bilirubin 0 30 mg/dL      eGFR 109 ml/min/1 73sq m     Narrative:         National Kidney Disease Education Program recommendations are as follows:  GFR calculation is accurate only with a steady state creatinine  Chronic Kidney disease less than 60 ml/min/1 73 sq  meters  Kidney failure less than 15 ml/min/1 73 sq  meters      Lipase [[id number]]  (Normal) Collected:  08/07/18 1908    Lab Status:  Final result Specimen:  Blood from Arm, Right Updated:  08/07/18 1935     Lipase 150 u/L     CBC and differential [[id number]]  (Abnormal) Collected:  08/07/18 1908    Lab Status:  Final result Specimen:  Blood from Arm, Right Updated:  08/07/18 1915     WBC 10 42 (H) Thousand/uL      RBC 3 87 Million/uL      Hemoglobin 12 4 g/dL      Hematocrit 38 1 %      MCV 98 fL      MCH 32 0 pg      MCHC 32 5 g/dL      RDW 12 6 %      MPV 9 6 fL      Platelets 268 Thousands/uL      nRBC 0 /100 WBCs      Neutrophils Relative 66 %      Immat GRANS % 0 %      Lymphocytes Relative 26 %      Monocytes Relative 7 %      Eosinophils Relative 1 %      Basophils Relative 0 %      Neutrophils Absolute 6 82 Thousands/µL      Immature Grans Absolute 0 02 Thousand/uL      Lymphocytes Absolute 2 74 Thousands/µL      Monocytes Absolute 0 72 Thousand/µL      Eosinophils Absolute 0 09 Thousand/µL      Basophils Absolute 0 03 Thousands/µL     POCT pregnancy, urine [[id number]]  (Normal) Resulted:  08/07/18 1854    Lab Status:  Final result Updated:  08/07/18 1854     EXT PREG TEST UR (Ref: Negative) negative    UA w Reflex to Microscopic w Reflex to Culture [[id number]] Collected:  08/07/18 1822    Lab Status:  Final result Specimen:  Urine from Urine, Clean Catch Updated:  08/07/18 1843     Color, UA Yellow     Clarity, UA Clear     Specific [place], UA 1 020     pH, UA 6 0     Leukocytes, UA Negative     Nitrite, UA Negative     Protein, UA Negative mg/dl      Glucose, UA Negative mg/dl      Ketones, UA Negative mg/dl      Urobilinogen, UA 0 2 E U /dl      Bilirubin, UA Negative     Blood, UA Negative                 CT abdomen pelvis with contrast    (Results Pending)              Procedures  Procedures       Phone Contacts  ED Phone Contact    ED Course                               MDM  Number of Diagnoses or Management Options     Amount and/or Complexity of Data Reviewed  Clinical lab tests: ordered and reviewed    Risk of Complications, Morbidity, and/or Mortality  Presenting problems: moderate  Diagnostic procedures: moderate  Management options: moderate      Patient is a 78-year-old female history of constipation and internal hemorrhoids presents emergency department for evaluation of left lower quadrant pain, nausea for the past 2-3 days  Pain is worsened with ambulation  Afebrile, hemodynamically stable  She overall appears well  She does have pretty significant tenderness in the left lower quadrant without rebound or guarding  Concern for diverticulitis versus constipation  Patient is not actively having periods  Do not suspect ovarian or pelvic pathology  CMP unremarkable, white blood cell count mildly elevated at 10 42, lipase normal, pregnancy test negative  Urinalysis negative  Labs with mildly elevated white blood cell count, CMP normal     CT pending at time of transfer of care to Dr Jt Riojas at 2100  Patient declined nausea or pain medication at this time        CritCare Time    Disposition  Final diagnoses:   None     ED Disposition     None      Follow-up Information    None         Patient's Medications   Discharge Prescriptions    No medications on file     No discharge procedures on file      ED Provider  Electronically Signed by           Idania Navarro MD  08/07/18 5975

## 2018-08-08 ENCOUNTER — VBI (OUTPATIENT)
Dept: ADMINISTRATIVE | Facility: OTHER | Age: 38
End: 2018-08-08

## 2018-08-08 NOTE — TELEPHONE ENCOUNTER
I LMOM for patient to call; she was instructed by ED to schedule an ED follow up appt  ED visit documented in ED log

## 2018-08-08 NOTE — DISCHARGE INSTRUCTIONS
Abdominal Pain   WHAT YOU NEED TO KNOW:   Abdominal pain can be dull, achy, or sharp  You may have pain in one area of your abdomen, or in your entire abdomen  Your pain may be caused by a condition such as constipation, food sensitivity or poisoning, infection, or a blockage  Abdominal pain can also be from a hernia, appendicitis, or an ulcer  Liver, gallbladder, or kidney conditions can also cause abdominal pain  The cause of your abdominal pain may be unknown  DISCHARGE INSTRUCTIONS:   Return to the emergency department if:   · You have new chest pain or shortness of breath  · You have pulsing pain in your upper abdomen or lower back that suddenly becomes constant  · Your pain is in the right lower abdominal area and worsens with movement  · You have a fever over 100 4°F (38°C) or shaking chills  · You are vomiting and cannot keep food or liquids down  · Your pain does not improve or gets worse over the next 8 to 12 hours  · You see blood in your vomit or bowel movements, or they look black and tarry  · Your skin or the whites of your eyes turn yellow  · You are a woman and have a large amount of vaginal bleeding that is not your monthly period  Contact your healthcare provider if:   · You have pain in your lower back  · You are a man and have pain in your testicles  · You have pain when you urinate  · You have questions or concerns about your condition or care  Follow up with your healthcare provider within 24 hours or as directed:  Write down your questions so you remember to ask them during your visits  Medicines:   · Medicines  may be given to calm your stomach and prevent vomiting or to decrease pain  Ask how to take pain medicine safely  · Take your medicine as directed  Contact your healthcare provider if you think your medicine is not helping or if you have side effects  Tell him of her if you are allergic to any medicine   Keep a list of the medicines, vitamins, and herbs you take  Include the amounts, and when and why you take them  Bring the list or the pill bottles to follow-up visits  Carry your medicine list with you in case of an emergency  © 2017 [employer]  Information is for End User's use only and may not be sold, redistributed or otherwise used for commercial purposes  All illustrations and images included in CareNotes® are the copyrighted property of A [employer] Inc  or [employer]  The above information is an  only  It is not intended as medical advice for individual conditions or treatments  Talk to your doctor, nurse or pharmacist before following any medical regimen to see if it is safe and effective for you  Constipation   WHAT YOU NEED TO KNOW:   Constipation is when you have hard, dry bowel movements, or you go longer than usual between bowel movements  DISCHARGE INSTRUCTIONS:   Return to the emergency department if:   · You have blood in your bowel movements  · You have a fever and abdominal pain with the constipation  Contact your healthcare provider if:   · Your constipation gets worse  · You start to vomit  · You have questions or concerns about your condition or care  Medicines:   · Medicine or a fiber supplement  may help make your bowel movement softer  A laxative may help relax and loosen your intestines to help you have a bowel movement  You may also be given medicine to increase fluid in your intestines  The fluid may help move bowel movements through your intestines  · Take your medicine as directed  Contact your healthcare provider if you think your medicine is not helping or if you have side effects  Tell him of her if you are allergic to any medicine  Keep a list of the medicines, vitamins, and herbs you take  Include the amounts, and when and why you take them  Bring the list or the pill bottles to follow-up visits   Carry your medicine list with you in case of an emergency  Manage your constipation:   · Drink liquids as directed  You may need to drink extra liquids to help soften and move your bowels  Ask how much liquid to drink each day and which liquids are best for you  · Eat high-fiber foods  This may help decrease constipation by adding bulk to your bowel movements  High-fiber foods include fruit, vegetables, whole-grain breads and cereals, and beans  Your healthcare provider or dietitian can help you create a high-fiber meal plan  · Exercise regularly  Regular physical activity can help stimulate your intestines  Ask which exercises are best for you  · Schedule a time each day to have a bowel movement  This may help train your body to have regular bowel movements  Bend forward while you are on the toilet to help move the bowel movement out  Sit on the toilet for at least 10 minutes, even if you do not have a bowel movement  Follow up with your healthcare provider as directed:  Write down your questions so you remember to ask them during your visits  © 2017 [employer]  Information is for End User's use only and may not be sold, redistributed or otherwise used for commercial purposes  All illustrations and images included in CareNotes® are the copyrighted property of [employer]  or [employer]  The above information is an  only  It is not intended as medical advice for individual conditions or treatments  Talk to your doctor, nurse or pharmacist before following any medical regimen to see if it is safe and effective for you

## 2018-08-30 ENCOUNTER — TELEPHONE (OUTPATIENT)
Dept: FAMILY MEDICINE CLINIC | Facility: CLINIC | Age: 38
End: 2018-08-30

## 2018-09-27 ENCOUNTER — TELEPHONE (OUTPATIENT)
Dept: FAMILY MEDICINE CLINIC | Facility: CLINIC | Age: 38
End: 2018-09-27

## 2018-11-14 ENCOUNTER — ANESTHESIA (OUTPATIENT)
Dept: GASTROENTEROLOGY | Facility: AMBULARY SURGERY CENTER | Age: 38
End: 2018-11-14
Payer: COMMERCIAL

## 2018-11-14 ENCOUNTER — HOSPITAL ENCOUNTER (OUTPATIENT)
Facility: AMBULARY SURGERY CENTER | Age: 38
Setting detail: OUTPATIENT SURGERY
Discharge: HOME | End: 2018-11-14
Attending: INTERNAL MEDICINE | Admitting: INTERNAL MEDICINE
Payer: COMMERCIAL

## 2018-11-14 VITALS
HEIGHT: 63 IN | TEMPERATURE: 99 F | DIASTOLIC BLOOD PRESSURE: 74 MMHG | SYSTOLIC BLOOD PRESSURE: 112 MMHG | WEIGHT: 123 LBS | RESPIRATION RATE: 18 BRPM | BODY MASS INDEX: 21.79 KG/M2 | HEART RATE: 74 BPM | OXYGEN SATURATION: 100 %

## 2018-11-14 RX ORDER — PROPOFOL 10 MG/ML
INJECTION, EMULSION INTRAVENOUS AS NEEDED
Status: DISCONTINUED | OUTPATIENT
Start: 2018-11-14 | End: 2018-11-14 | Stop reason: SURG

## 2018-11-14 RX ORDER — SODIUM CHLORIDE 9 MG/ML
125 INJECTION, SOLUTION INTRAVENOUS CONTINUOUS
Status: DISCONTINUED | OUTPATIENT
Start: 2018-11-14 | End: 2018-11-14 | Stop reason: HOSPADM

## 2018-11-14 RX ORDER — HYDROMORPHONE HCL/PF 1 MG/ML
1 SYRINGE (ML) INJECTION ONCE
Status: COMPLETED | OUTPATIENT
Start: 2018-11-14 | End: 2018-11-14

## 2018-11-14 RX ORDER — LIDOCAINE HYDROCHLORIDE 10 MG/ML
INJECTION, SOLUTION INFILTRATION; PERINEURAL AS NEEDED
Status: DISCONTINUED | OUTPATIENT
Start: 2018-11-14 | End: 2018-11-14 | Stop reason: SURG

## 2018-11-14 RX ADMIN — SODIUM CHLORIDE: 0.9 INJECTION, SOLUTION INTRAVENOUS at 08:28

## 2018-11-14 RX ADMIN — PROPOFOL 50 MG: 10 INJECTION, EMULSION INTRAVENOUS at 08:55

## 2018-11-14 RX ADMIN — PROPOFOL 50 MG: 10 INJECTION, EMULSION INTRAVENOUS at 09:00

## 2018-11-14 RX ADMIN — SODIUM CHLORIDE 125 ML/HR: 0.9 INJECTION, SOLUTION INTRAVENOUS at 08:38

## 2018-11-14 RX ADMIN — HYDROMORPHONE HYDROCHLORIDE 1 MG: 1 INJECTION, SOLUTION INTRAMUSCULAR; INTRAVENOUS; SUBCUTANEOUS at 09:33

## 2018-11-14 RX ADMIN — LIDOCAINE HYDROCHLORIDE 50 MG: 10 INJECTION, SOLUTION INFILTRATION; PERINEURAL at 08:51

## 2018-11-14 RX ADMIN — PROPOFOL 50 MG: 10 INJECTION, EMULSION INTRAVENOUS at 08:51

## 2018-11-14 NOTE — OP NOTE
OPERATIVE REPORT  PATIENT NAME: Melissa Ash    :  1980  MRN: [id number]  Pt Location: [place] [address] GI ROOM 01    SURGERY DATE: 2018    Surgeon(s) and Role:     * Alona Watson MD - Primary    Preop Diagnosis:  Hemorrhage of anus and rectum [K62 5]  Second degree hemorrhoids [K64 1]    Post-Op Diagnosis Codes:     * Hemorrhage of anus and rectum [K62 5]     * Second degree hemorrhoids [K64 1]     * Constipation due to slow transit [K59 01]    Procedure(s) (LRB):  FLEXIBLE SIGMOIDOSCOPY WITH APC (N/A) with APC of External hemorrhoid and Hemorrhoidal banding     Specimen(s):  * No specimens in log *    Estimated Blood Loss:   Minimal      Anesthesia Type:   IV Sedation with Anesthesia    Operative Indications:  Hemorrhage of anus and rectum [K62 5]  Third  degree hemorrhoids [K64 1]      Operative Findings:  Flexible sigmoidoscopy-under conscious sedation, digital rectal exam and perianal examination was done  Upper endoscope was used which was passed from anus and reach all way up to sigmoid colon  Quality of prep was suboptimal to poor with large amount of liquid and some semi-solid stool throughout the left colon, visualization was very suboptimal   Rectum was flush with water and then hemorrhoid tissue was visualized  Prior hemorrhoidal banding site well-healed  There is a small external hemorrhoid which is a source of bleeding  With the use of argon beam coagulation, hemorrhoidal tissue was cauterized close to dentate line and then with the use of [employer] banding device, 5 band were applied circumferentially proximal to dentate line at left lateral, right posterior and right anterior location    Impression- 1  Symptomatic hemorrhoid, status post APC to control the bleeding and then 5 hemorrhoid band were applied at right posterior, right anterior and left lateral location   2  IBS with constipation with large amount of stool throughout the colon    Recommendation-1  High-fiber diet  2  Amitiza 24 mcg p o  B i d   3  Follow-up office visit in 4-6 weeks    Complications:   None    Patient Disposition:  PACU     SIGNATURE: Justine Khan MD  DATE: November 14, 2018  TIME: 9:11 AM

## 2018-11-14 NOTE — ANESTHESIA PREPROCEDURE EVALUATION
Review of Systems/Medical History  Patient summary reviewed  Chart reviewed  No history of anesthetic complications     Cardiovascular  Exercise tolerance (METS): >4,     Pulmonary  Smoker (Quit 2014) ex-smoker  ,        GI/Hepatic            Endo/Other     GYN       Hematology   Musculoskeletal       Neurology   Psychology   Anxiety, Depression ,              Physical Exam    Airway    Mallampati score: II  TM Distance: >3 FB  Neck ROM: full     Dental       Cardiovascular  Rhythm: regular, Rate: normal, Cardiovascular exam normal    Pulmonary  Pulmonary exam normal Breath sounds clear to auscultation,     Other Findings        Anesthesia Plan  ASA Score- 2     Anesthesia Type- IV sedation with anesthesia with ASA Monitors  Additional Monitors:   Airway Plan:         Plan Factors-    Induction- intravenous  Postoperative Plan-     Informed Consent- Anesthetic plan and risks discussed with patient  I personally reviewed this patient with the CRNA  Discussed and agreed on the Anesthesia Plan with the CRNA  Leyla Short

## 2018-11-14 NOTE — ANESTHESIA POSTPROCEDURE EVALUATION
Post-Op Assessment Note      CV Status:  Stable    Mental Status:  Alert    Hydration Status:  Stable    PONV Controlled:  Controlled    Airway Patency:  Patent        Staff: CRNA           BP      Temp      Pulse     Resp      SpO2

## 2018-12-01 ENCOUNTER — OFFICE VISIT (OUTPATIENT)
Dept: URGENT CARE | Facility: CLINIC | Age: 38
End: 2018-12-01
Payer: COMMERCIAL

## 2018-12-01 VITALS
SYSTOLIC BLOOD PRESSURE: 100 MMHG | DIASTOLIC BLOOD PRESSURE: 70 MMHG | WEIGHT: 126 LBS | HEIGHT: 64 IN | OXYGEN SATURATION: 100 % | RESPIRATION RATE: 16 BRPM | HEART RATE: 99 BPM | BODY MASS INDEX: 21.51 KG/M2 | TEMPERATURE: 99.1 F

## 2018-12-01 DIAGNOSIS — B37.3 VAGINAL CANDIDIASIS: ICD-10-CM

## 2018-12-01 DIAGNOSIS — J06.9 ACUTE UPPER RESPIRATORY INFECTION: Primary | ICD-10-CM

## 2018-12-01 PROCEDURE — 99213 OFFICE O/P EST LOW 20 MIN: CPT | Performed by: PHYSICIAN ASSISTANT

## 2018-12-01 RX ORDER — AZITHROMYCIN 250 MG/1
TABLET, FILM COATED ORAL
Qty: 6 TABLET | Refills: 0 | Status: SHIPPED | OUTPATIENT
Start: 2018-12-01 | End: 2018-12-05

## 2018-12-01 RX ORDER — FLUCONAZOLE 150 MG/1
150 TABLET ORAL ONCE
Qty: 1 TABLET | Refills: 0 | Status: SHIPPED | OUTPATIENT
Start: 2018-12-01 | End: 2018-12-01

## 2018-12-01 NOTE — PROGRESS NOTES
[facility]        NAME: Mario Caba is a 34 y o  female  : 1980    MRN: [id number]  DATE: 2018  TIME: 11:18 AM    Assessment and Plan   Acute upper respiratory infection [J06 9]  1  Acute upper respiratory infection  azithromycin (ZITHROMAX) 250 mg tablet   2  Vaginal candidiasis  fluconazole (DIFLUCAN) 150 mg tablet         Patient Instructions     Discussed pt's condition with her  She has 4 day history of acute URI symptoms with no significant improvement despite adequate OTC symptomatic management  I will prescribe her a Z-pack as well as a Fluconazole tablet as she is prone to vaginal yeast infections with oral abx and rec hydration, rest, and continuing with OTC cold meds  Follow up with PCP in 3-5 days  Proceed to  ER if symptoms worsen  Chief Complaint     Chief Complaint   Patient presents with    Cold Like Symptoms     sore throat, body aches, cough, fever? History of Present Illness       Pt presents with a 4 day history of ST, PND, productive cough, nasal congestion, fever, body aches, chills  She reports some of her mucus was green yesterday  She has taken Nyquil, Claritin, Flonase, Advil, Motrin  Has asthma but no allergies  Does not smoke  She does not receive the flu shot  Review of Systems   Review of Systems   Constitutional: Positive for chills and fever  HENT: Positive for congestion, postnasal drip and sore throat  Respiratory: Positive for cough  Cardiovascular: Negative  Gastrointestinal: Negative  Genitourinary: Negative  Musculoskeletal: Positive for myalgias           Current Medications       Current Outpatient Prescriptions:     acetaminophen (TYLENOL) 325 mg tablet, Take 650 mg by mouth every 6 (six) hours, Disp: , Rfl:     ALPRAZolam (XANAX) 1 mg tablet, Take 1 mg by mouth 3 (three) times a day as needed for anxiety, Disp: , Rfl:     ALPRAZolam (XANAX) 1 mg tablet, Take by mouth, Disp: , Rfl:     buPROPion (WELLBUTRIN XL) 150 mg 24 hr tablet, Take 150 mg by mouth every morning, Disp: , Rfl:     DEXILANT 60 MG capsule, Take 1 capsule by mouth daily, Disp: , Rfl:     dicyclomine (BENTYL) 20 mg tablet, Take 1 tablet by mouth daily, Disp: , Rfl:     fluticasone (FLONASE) 50 mcg/act nasal spray, 2 sprays into each nostril daily, Disp: 16 g, Rfl: 0    ibuprofen (MOTRIN) 200 mg tablet, Take 200 mg by mouth every 6 (six) hours, Disp: , Rfl:     lamoTRIgine (LaMICtal) 100 mg tablet, Take 1 tablet by mouth daily, Disp: , Rfl:     azithromycin (ZITHROMAX) 250 mg tablet, Take 2 tablets day 1 with food, then 1 tablet daily days 2-5 with food  , Disp: 6 tablet, Rfl: 0    fluconazole (DIFLUCAN) 150 mg tablet, Take 1 tablet (150 mg total) by mouth once for 1 dose, Disp: 1 tablet, Rfl: 0    Methylprednisolone 4 MG TBPK, Use as directed on package (Patient not taking: Reported on 12/1/2018 ), Disp: 21 tablet, Rfl: 0    polyethylene glycol (MIRALAX) 17 g packet, Take 17 g by mouth daily for 30 days, Disp: 510 g, Rfl: 0    polyethylene glycol (MIRALAX) 17 g packet, Take 17 g by mouth 2 (two) times a day for 3 days, Disp: 14 each, Rfl: 0    sulindac (CLINORIL) 150 MG tablet, Take 1 tablet by mouth every 12 (twelve) hours, Disp: , Rfl:     valACYclovir (VALTREX) 1,000 mg tablet, Valacyclovir 1 gm 2 tablets every 12 hourly at sign of eruption and then stop after 4 tabs  , Disp: 6 tablet, Rfl: 0    Current Allergies     Allergies as of 12/01/2018    (No Known Allergies)            The following portions of the patient's history were reviewed and updated as appropriate: allergies, current medications, past family history, past medical history, past social history, past surgical history and problem list      Past Medical History:   Diagnosis Date    Anxiety     Chronic constipation     Depression     Psychiatric disorder     anxiety        Past Surgical History:   Procedure Laterality Date    APPENDECTOMY      BREAST SURGERY augmentation    COLONOSCOPY N/A 1/24/2018    Procedure: COLONOSCOPY WITH HEMORRHOID BANDING;  Surgeon: Ahmet Colon MD;  Location: [facility]; Service: Gastroenterology    LAPAROSCOPIC TUBAL LIGATION      OVARY SURGERY Bilateral     WY SIGMOIDOSCOPY FLX DX W/COLLJ SPEC BR/WA IF PFRMD N/A 11/14/2018    Procedure: FLEXIBLE SIGMOIDOSCOPY WITH APC; Surgeon: Ahmet Colon MD;  Location: [facility]; Service: Gastroenterology       Family History   Problem Relation Age of Onset    No Known Problems Mother     No Known Problems Father     No Known Problems Sister     No Known Problems Brother     No Known Problems Daughter     No Known Problems Son          Medications have been verified  Objective   /70   Pulse 99   Temp 99 1 °F (37 3 °C)   Resp 16   Ht 5' 3 5" (1 613 m)   Wt 57 2 kg (126 lb)   SpO2 100%   BMI 21 97 kg/m²        Physical Exam     Physical Exam   Constitutional: She is oriented to person, place, and time  She appears well-developed and well-nourished  No distress  HENT:   Right Ear: Hearing, tympanic membrane, external ear and ear canal normal    Left Ear: Hearing, tympanic membrane, external ear and ear canal normal    Nose: Mucosal edema (B/L boggy turbinates) present  Mouth/Throat: Mucous membranes are normal  Posterior oropharyngeal erythema (PND) present  No oropharyngeal exudate  Neck: Neck supple  Cardiovascular: Normal rate, regular rhythm and normal heart sounds  Pulmonary/Chest: Effort normal and breath sounds normal    Lymphadenopathy:     She has no cervical adenopathy  Neurological: She is alert and oriented to person, place, and time  Psychiatric: She has a normal mood and affect  Vitals reviewed

## 2019-01-21 ENCOUNTER — TELEPHONE (OUTPATIENT)
Dept: FAMILY MEDICINE CLINIC | Facility: CLINIC | Age: 39
End: 2019-01-21

## 2019-02-11 ENCOUNTER — OFFICE VISIT (OUTPATIENT)
Dept: FAMILY MEDICINE CLINIC | Facility: CLINIC | Age: 39
End: 2019-02-11
Payer: COMMERCIAL

## 2019-02-11 VITALS
TEMPERATURE: 98.9 F | HEART RATE: 100 BPM | BODY MASS INDEX: 21.17 KG/M2 | HEIGHT: 64 IN | RESPIRATION RATE: 16 BRPM | WEIGHT: 124 LBS | DIASTOLIC BLOOD PRESSURE: 64 MMHG | SYSTOLIC BLOOD PRESSURE: 120 MMHG

## 2019-02-11 DIAGNOSIS — J30.9 ALLERGIC RHINITIS, UNSPECIFIED SEASONALITY, UNSPECIFIED TRIGGER: ICD-10-CM

## 2019-02-11 DIAGNOSIS — N89.8 VAGINAL DISCHARGE: ICD-10-CM

## 2019-02-11 DIAGNOSIS — Z12.4 SCREENING FOR CERVICAL CANCER: ICD-10-CM

## 2019-02-11 DIAGNOSIS — Z13.29 SCREENING FOR THYROID DISORDER: ICD-10-CM

## 2019-02-11 DIAGNOSIS — Z23 NEED FOR VACCINATION: ICD-10-CM

## 2019-02-11 DIAGNOSIS — K59.04 CHRONIC IDIOPATHIC CONSTIPATION: ICD-10-CM

## 2019-02-11 DIAGNOSIS — Z12.31 SCREENING MAMMOGRAM, ENCOUNTER FOR: ICD-10-CM

## 2019-02-11 DIAGNOSIS — Z00.00 ANNUAL PHYSICAL EXAM: Primary | ICD-10-CM

## 2019-02-11 DIAGNOSIS — F41.9 ANXIETY DISORDER, UNSPECIFIED TYPE: ICD-10-CM

## 2019-02-11 DIAGNOSIS — B00.2 ORAL HERPES SIMPLEX INFECTION: ICD-10-CM

## 2019-02-11 DIAGNOSIS — Z13.6 SCREENING FOR CARDIOVASCULAR CONDITION: ICD-10-CM

## 2019-02-11 PROBLEM — K21.9 GASTROESOPHAGEAL REFLUX DISEASE: Status: ACTIVE | Noted: 2018-12-12

## 2019-02-11 PROCEDURE — 90715 TDAP VACCINE 7 YRS/> IM: CPT

## 2019-02-11 PROCEDURE — 90471 IMMUNIZATION ADMIN: CPT

## 2019-02-11 PROCEDURE — 99396 PREV VISIT EST AGE 40-64: CPT | Performed by: NURSE PRACTITIONER

## 2019-02-11 RX ORDER — VALACYCLOVIR HYDROCHLORIDE 1 G/1
TABLET, FILM COATED ORAL
Qty: 20 TABLET | Refills: 0 | Status: SHIPPED | OUTPATIENT
Start: 2019-02-11 | End: 2019-05-01 | Stop reason: ALTCHOICE

## 2019-02-11 RX ORDER — FLUTICASONE PROPIONATE 50 MCG
2 SPRAY, SUSPENSION (ML) NASAL DAILY
Qty: 16 G | Refills: 3 | Status: SHIPPED | OUTPATIENT
Start: 2019-02-11 | End: 2019-10-01 | Stop reason: SDUPTHER

## 2019-02-11 NOTE — PATIENT INSTRUCTIONS
Wellness Visit for Adults   WHAT YOU NEED TO KNOW:   What is a wellness visit? A wellness visit is when you see your healthcare provider to get screened for health problems  You can also get advice on how to stay healthy  Write down your questions so you remember to ask them  Ask your healthcare provider how often you should have a wellness visit  What happens at a wellness visit? Your healthcare provider will ask about your health, and your family history of health problems  This includes high blood pressure, heart disease, and cancer  He or she will ask if you have symptoms that concern you, if you smoke, and about your mood  You may also be asked about your intake of medicines, supplements, food, and alcohol  Any of the following may be done:  · Your weight  will be checked  Your height may also be checked so your body mass index (BMI) can be calculated  Your BMI shows if you are at a healthy weight  · Your blood pressure  and heart rate will be checked  Your temperature may also be checked  · Blood and urine tests  may be done  Blood tests may be done to check your cholesterol levels  Abnormal cholesterol levels increase your risk for heart disease and stroke  You may also need a blood or urine test to check for diabetes if you are at increased risk  Urine tests may be done to look for signs of an infection or kidney disease  · A physical exam  includes checking your heartbeat and lungs with a stethoscope  Your healthcare provider may also check your skin to look for sun damage  · Screening tests  may be recommended  A screening test is done to check for diseases that may not cause symptoms  The screening tests you may need depend on your age, gender, family history, and lifestyle habits  For example, colorectal screening may be recommended if you are 48years old or older  What screening tests do I need if I am a woman? · A Pap smear  is used to screen for cervical cancer   Pap smears are usually done every 3 to 5 years depending on your age  You may need them more often if you have had abnormal Pap smear test results in the past  Ask your healthcare provider how often you should have a Pap smear  · A mammogram  is an x-ray of your breasts to screen for breast cancer  Experts recommend mammograms every 2 years starting at age 48 years  You may need a mammogram at age 52 years or younger if you have an increased risk for breast cancer  Talk to your healthcare provider about when you should start having mammograms and how often you need them  What vaccines might I need? · Get an influenza vaccine  every year  The influenza vaccine protects you from the flu  Several types of viruses cause the flu  The viruses change over time, so new vaccines are made each year  · Get a tetanus-diphtheria (Td) booster vaccine  every 10 years  This vaccine protects you against tetanus and diphtheria  Tetanus is a severe infection that may cause painful muscle spasms and lockjaw  Diphtheria is a severe bacterial infection that causes a thick covering in the back of your mouth and throat  · Get a human papillomavirus (HPV) vaccine  if you are female and aged 23 to 32 or male 23 to 24 and never received it  This vaccine protects you from HPV infection  HPV is the most common infection spread by sexual contact  HPV may also cause vaginal, penile, and anal cancers  · Get a pneumococcal vaccine  if you are aged 72 years or older  The pneumococcal vaccine is an injection given to protect you from pneumococcal disease  Pneumococcal disease is an infection caused by pneumococcal bacteria  The infection may cause pneumonia, meningitis, or an ear infection  · Get a shingles vaccine  if you are aged 61 or older, even if you have had shingles before  The shingles vaccine is an injection to protect you from the varicella-zoster virus  This is the same virus that causes chickenpox   Shingles is a painful rash that develops in people who had chickenpox or have been exposed to the virus  How can I eat healthy? My Plate is a model for planning healthy meals  It shows the types and amounts of foods that should go on your plate  Fruits and vegetables make up about half of your plate, and grains and protein make up the other half  A serving of dairy is included on the side of your plate  The amount of calories and serving sizes you need depends on your age, gender, weight, and height  Examples of healthy foods are listed below:  · Eat a variety of vegetables  such as dark green, red, and orange vegetables  You can also include canned vegetables low in sodium (salt) and frozen vegetables without added butter or sauces  · Eat a variety of fresh fruits , canned fruit in 100% juice, frozen fruit, and dried fruit  · Include whole grains  At least half of the grains you eat should be whole grains  Examples include whole-wheat bread, wheat pasta, brown rice, and whole-grain cereals such as oatmeal     · Eat a variety of protein foods such as seafood (fish and shellfish), lean meat, and poultry without skin (turkey and chicken)  Examples of lean meats include pork leg, shoulder, or tenderloin, and beef round, sirloin, tenderloin, and extra lean ground beef  Other protein foods include eggs and egg substitutes, beans, peas, soy products, nuts, and seeds  · Choose low-fat dairy products such as skim or 1% milk or low-fat yogurt, cheese, and cottage cheese  · Limit unhealthy fats  such as butter, hard margarine, and shortening  How much exercise do I need? Exercise at least 30 minutes per day on most days of the week  Some examples of exercise include walking, biking, dancing, and swimming  You can also fit in more physical activity by taking the stairs instead of the elevator or parking farther away from stores  Include muscle strengthening activities 2 days each week  Regular exercise provides many health benefits  It helps you manage your weight, and decreases your risk for type 2 diabetes, heart disease, stroke, and high blood pressure  Exercise can also help improve your mood  Ask your healthcare provider about the best exercise plan for you  What are some general health and safety guidelines I should follow? · Do not smoke  Nicotine and other chemicals in cigarettes and cigars can cause lung damage  Ask your healthcare provider for information if you currently smoke and need help to quit  E-cigarettes or smokeless tobacco still contain nicotine  Talk to your healthcare provider before you use these products  · Limit alcohol  A drink of alcohol is 12 ounces of beer, 5 ounces of wine, or 1½ ounces of liquor  · Lose weight, if needed  Being overweight increases your risk of certain health conditions  These include heart disease, high blood pressure, type 2 diabetes, and certain types of cancer  · Protect your skin  Do not sunbathe or use tanning beds  Use sunscreen with a SPF 15 or higher  Apply sunscreen at least 15 minutes before you go outside  Reapply sunscreen every 2 hours  Wear protective clothing, hats, and sunglasses when you are outside  · Drive safely  Always wear your seatbelt  Make sure everyone in your car wears a seatbelt  A seatbelt can save your life if you are in an accident  Do not use your cell phone when you are driving  This could distract you and cause an accident  Pull over if you need to make a call or send a text message  · Practice safe sex  Use latex condoms if are sexually active and have more than one partner  Your healthcare provider may recommend screening tests for sexually transmitted infections (STIs)  · Wear helmets, lifejackets, and protective gear  Always wear a helmet when you ride a bike or motorcycle, go skiing, or play sports that could cause a head injury  Wear protective equipment when you play sports   Wear a lifejacket when you are on a boat or doing water sports  CARE AGREEMENT:   You have the right to help plan your care  Learn about your health condition and how it may be treated  Discuss treatment options with your caregivers to decide what care you want to receive  You always have the right to refuse treatment  The above information is an  only  It is not intended as medical advice for individual conditions or treatments  Talk to your doctor, nurse or pharmacist before following any medical regimen to see if it is safe and effective for you  © 2017 [employer] Information is for End User's use only and may not be sold, redistributed or otherwise used for commercial purposes  All illustrations and images included in CareNotes® are the copyrighted property of [employer]  or [employer]

## 2019-02-11 NOTE — PROGRESS NOTES
FAMILY PRACTICE HEALTH MAINTENANCE OFFICE VISIT  [facility]    NAME: Aleena Alonso  AGE: 34 y o  SEX: female  : 1980     DATE: 2019    Assessment and Plan       Will do blood work and will call with results  Will schedule mammo  Diagnoses and all orders for this visit:    Annual physical exam  -     TSH, 3rd generation with Free T4 reflex; Future  -     Lipid Panel with Direct LDL reflex; Future  -     Comprehensive metabolic panel; Future  -     CBC and differential; Future    Screening for cervical cancer  -     PapIG, HPV, rfx 16/18    Screening mammogram, encounter for  -     Mammo screening bilateral w cad; Future    BMI 21 0-21 9, adult    Chronic idiopathic constipation  Comments:  managed by GI  Orders:  -     CBC and differential; Future    Anxiety disorder, unspecified type  Comments:  Managed by psychatiry  Orders:  -     Comprehensive metabolic panel; Future    Oral herpes simplex infection  Comments:  stable with vlatrek PRN  Orders:  -     valACYclovir (VALTREX) 1,000 mg tablet; Valacyclovir 1 gm 2 tablets every 12 hourly at sign of eruption and then stop after 4 tabs  -     Comprehensive metabolic panel; Future    Allergic rhinitis, unspecified seasonality, unspecified trigger  Comments:  stablw with flonase  Orders:  -     fluticasone (FLONASE) 50 mcg/act nasal spray; 2 sprays into each nostril daily    Screening for cardiovascular condition  -     Lipid Panel with Direct LDL reflex; Future  -     Comprehensive metabolic panel; Future    Screening for thyroid disorder  -     TSH, 3rd generation with Free T4 reflex; Future  -     Comprehensive metabolic panel;  Future    Need for vaccination  -     TDAP VACCINE GREATER THAN OR EQUAL TO 8YO IM    Vaginal discharge  -     VAGINOSIS DNA PROBE (AFFIRM)            · Patient Counseling:   · Nutrition: Stressed importance of a well balanced diet, moderation of sodium/saturated fat, caloric balance and sufficient intake of fiber  · Exercise: Stressed the importance of regular exercise with a goal of 150 minutes per week  · Dental Health: Discussed daily flossing and brushing and regular dental visits   · Sexuality: Discussed sexually transmitted infections, use of condoms and prevention of unintended pregnancy  · Alcohol Use:  Recommended moderation of alcohol intake  · Injury Prevention: Discussed Safety Belts, Safety Helmets, and Smoke Detectors    · Immunizations reviewed  And administered  · Discussed benefits of screening of cervical and breast screening  BMI Counseling: Body mass index is 21 62 kg/m²  Discussed with patient's BMI with her  The BMI is in the acceptable range  Return in about 1 year (around 2/11/2020) for Annual physical         Chief Complaint     Chief Complaint   Patient presents with    Physical Exam    Well Check       History of Present Illness     HPI    Well Adult Physical   Patient here for a comprehensive physical exam   Last pap was done about 5 years ago and was normal   Having often heavy vaginal discharge with itching  Under care of GI for constipation and GERD and will be going for TIFF for hiatal hernia repair  Had multiple colnoscopies for her GI issues and will collect reports  Gets regular menstruation  Quit smoking 5 years ago  Has b/l breast implants             Diet and Physical Activity  Diet: well balanced diet  Weight concerns: None, patient's BMI is between 18 5-24 9  Exercise: never      Depression Screen  PHQ-9 Depression Screening    PHQ-9:    Frequency of the following problems over the past two weeks:       Little interest or pleasure in doing things:  0 - not at all  Feeling down, depressed, or hopeless:  0 - not at all  PHQ-2 Score:  0          General Health  Hearing: Normal:  bilateral  Vision: no vision problems, most recent eye exam <1 year, wears glasses and wears contacts  Dental: regular dental visits    Reproductive Health  Sexually active  The following portions of the patient's history were reviewed and updated as appropriate: allergies, current medications, past family history, past medical history, past social history, past surgical history and problem list     Review of Systems     Review of Systems   Constitutional: Negative  HENT: Negative  Eyes: Negative  Respiratory: Negative  Cardiovascular: Negative  Gastrointestinal: Negative  Endocrine: Negative  Genitourinary: Negative  Musculoskeletal: Negative  Skin: Negative  Allergic/Immunologic: Negative  Neurological: Negative  Hematological: Negative  Psychiatric/Behavioral: Negative  Past Medical History     Past Medical History:   Diagnosis Date    Anxiety     Chronic constipation     Depression     Psychiatric disorder     anxiety        Past Surgical History     Past Surgical History:   Procedure Laterality Date    APPENDECTOMY      BREAST SURGERY      augmentation    COLONOSCOPY N/A 2018    Procedure: COLONOSCOPY WITH HEMORRHOID BANDING;  Surgeon: Leo Calderon MD;  Location: [facility]; Service: Gastroenterology    LAPAROSCOPIC TUBAL LIGATION      OVARY SURGERY Bilateral     IN SIGMOIDOSCOPY FLX DX W/COLLJ SPEC BR/WA IF PFRMD N/A 2018    Procedure: FLEXIBLE SIGMOIDOSCOPY WITH APC; Surgeon: Leo Calderon MD;  Location: [facility] GI LAB;   Service: Gastroenterology       Social History     Social History     Socioeconomic History    Marital status: Single     Spouse name: None    Number of children: None    Years of education: None    Highest education level: None   Occupational History    None   Social Needs    Financial resource strain: None    Food insecurity:     Worry: None     Inability: None    Transportation needs:     Medical: None     Non-medical: None   Tobacco Use    Smoking status: Former Smoker     Last attempt to quit: 2014     Years since quittin 1    Smokeless tobacco: Never Used Substance and Sexual Activity    Alcohol use: Yes     Comment: occassioanl    Drug use: No    Sexual activity: None   Lifestyle    Physical activity:     Days per week: None     Minutes per session: None    Stress: None   Relationships    Social connections:     Talks on phone: None     Gets together: None     Attends Yarsanism service: None     Active member of club or organization: None     Attends meetings of clubs or organizations: None     Relationship status: None    Intimate partner violence:     Fear of current or ex partner: None     Emotionally abused: None     Physically abused: None     Forced sexual activity: None   Other Topics Concern    None   Social History Narrative    None       Family History     Family History   Problem Relation Age of Onset    No Known Problems Mother     No Known Problems Father     No Known Problems Sister     No Known Problems Brother     No Known Problems Daughter     No Known Problems Son        Current Medications       Current Outpatient Medications:     ALPRAZolam (XANAX) 1 mg tablet, Take 1 mg by mouth 3 (three) times a day as needed for anxiety, Disp: , Rfl:     buPROPion (WELLBUTRIN XL) 150 mg 24 hr tablet, Take 150 mg by mouth every morning, Disp: , Rfl:     fluticasone (FLONASE) 50 mcg/act nasal spray, 2 sprays into each nostril daily, Disp: 16 g, Rfl: 3    lamoTRIgine (LaMICtal) 100 mg tablet, Take 1 tablet by mouth daily, Disp: , Rfl:     valACYclovir (VALTREX) 1,000 mg tablet, Valacyclovir 1 gm 2 tablets every 12 hourly at sign of eruption and then stop after 4 tabs  , Disp: 20 tablet, Rfl: 0     Allergies     No Known Allergies    Objective     /64   Pulse 100   Temp 98 9 °F (37 2 °C)   Resp 16   Ht 5' 3 5" (1 613 m)   Wt 56 2 kg (124 lb)   BMI 21 62 kg/m²      Physical Exam   Constitutional: She is oriented to person, place, and time  She appears well-developed and well-nourished  HENT:   Head: Normocephalic     Right Ear: Tympanic membrane, external ear and ear canal normal    Left Ear: Tympanic membrane, external ear and ear canal normal    Nose: Nose normal  Right sinus exhibits no maxillary sinus tenderness and no frontal sinus tenderness  Left sinus exhibits no maxillary sinus tenderness and no frontal sinus tenderness  Mouth/Throat: Oropharynx is clear and moist and mucous membranes are normal    Eyes: Pupils are equal, round, and reactive to light  Conjunctivae and lids are normal    Neck: Normal range of motion and full passive range of motion without pain  Neck supple  No thyromegaly present  Cardiovascular: Normal rate, regular rhythm and normal heart sounds  No murmur heard  Pulmonary/Chest: Effort normal and breath sounds normal    Abdominal: Soft  Normal appearance and bowel sounds are normal  There is no hepatomegaly  There is no tenderness  There is no rebound and no CVA tenderness  Hernia confirmed negative in the right inguinal area and confirmed negative in the left inguinal area  Genitourinary: Rectum normal  There is no rash, tenderness, lesion or injury on the right labia  There is no rash, tenderness, lesion or injury on the left labia  Cervix exhibits discharge  Cervix exhibits no motion tenderness and no friability  Right adnexum displays no tenderness  Left adnexum displays no tenderness  No erythema or tenderness in the vagina  No signs of injury around the vagina  Vaginal discharge found  Genitourinary Comments: Breast exam not done as had bilateral implants and mammogram ordered  Musculoskeletal: Normal range of motion  She exhibits no edema, tenderness or deformity  Lymphadenopathy:        Right cervical: No superficial cervical and no posterior cervical adenopathy present  Left cervical: No superficial cervical and no posterior cervical adenopathy present  No inguinal adenopathy noted on the right or left side  Right: No inguinal and no supraclavicular adenopathy present  Left: No inguinal and no supraclavicular adenopathy present  Neurological: She is alert and oriented to person, place, and time  She has normal strength and normal reflexes  No sensory deficit  Coordination and gait normal  GCS eye subscore is 4  GCS verbal subscore is 5  GCS motor subscore is 6  Skin: Skin is warm, dry and intact  Psychiatric: She has a normal mood and affect  Her speech is normal and behavior is normal  Judgment and thought content normal  Cognition and memory are normal    Vitals reviewed           Visual Acuity Screening    Right eye Left eye Both eyes   Without correction: 20/40 20/30 20/20   With correction:          Health Maintenance     Health Maintenance   Topic Date Due    PAP SMEAR  05/08/1999    INFLUENZA VACCINE  09/01/2019 (Originally 7/1/2018)    Pneumococcal PPSV23 Highest Risk Adult (1 of 3 - PCV13) 01/01/2020 (Originally 5/8/1997)    Depression Screening PHQ  02/11/2020    BMI: Adult  02/11/2020    DTaP,Tdap,and Td Vaccines (2 - Td) 02/11/2029    HEPATITIS B VACCINES  Aged Out     Immunization History   Administered Date(s) Administered    Tdap 02/11/2019       Jania Hawk, [address] [facility]

## 2019-02-12 LAB
CANDIDA RRNA VAG QL PROBE: NEGATIVE
G VAGINALIS RRNA GENITAL QL PROBE: NEGATIVE
T VAGINALIS RRNA GENITAL QL PROBE: NEGATIVE

## 2019-02-13 ENCOUNTER — TELEPHONE (OUTPATIENT)
Dept: FAMILY MEDICINE CLINIC | Facility: CLINIC | Age: 39
End: 2019-02-13

## 2019-02-13 LAB
CYTOLOGIST CVX/VAG CYTO: NORMAL
DX ICD CODE: NORMAL
HPV I/H RISK 1 DNA CVX QL PROBE+SIG AMP: NEGATIVE
LABCORP COMMENT: NORMAL
OTHER STN SPEC: NORMAL
PATH REPORT.FINAL DX SPEC: NORMAL
SL AMB NOTE:: NORMAL
SL AMB SPECIMEN ADEQUACY: NORMAL
SL AMB TEST METHODOLOGY: NORMAL

## 2019-02-26 ENCOUNTER — OFFICE VISIT (OUTPATIENT)
Dept: URGENT CARE | Facility: CLINIC | Age: 39
End: 2019-02-26
Payer: COMMERCIAL

## 2019-02-26 VITALS
RESPIRATION RATE: 18 BRPM | HEART RATE: 88 BPM | SYSTOLIC BLOOD PRESSURE: 100 MMHG | WEIGHT: 125.4 LBS | HEIGHT: 63 IN | DIASTOLIC BLOOD PRESSURE: 60 MMHG | BODY MASS INDEX: 22.22 KG/M2 | TEMPERATURE: 97.6 F | OXYGEN SATURATION: 100 %

## 2019-02-26 DIAGNOSIS — M25.552 ACUTE HIP PAIN, LEFT: Primary | ICD-10-CM

## 2019-02-26 PROCEDURE — 99213 OFFICE O/P EST LOW 20 MIN: CPT | Performed by: PHYSICIAN ASSISTANT

## 2019-02-26 RX ORDER — PREDNISONE 20 MG/1
TABLET ORAL
Qty: 12 TABLET | Refills: 0 | Status: SHIPPED | OUTPATIENT
Start: 2019-02-26 | End: 2019-03-03

## 2019-02-27 NOTE — PATIENT INSTRUCTIONS
Hip Pain   WHAT YOU NEED TO KNOW:   Hip pain can be caused by a number of conditions, such as bursitis, arthritis, or muscle or tendon strain  X-rays do not show broken bones  You may have swelling in the fluid-filled sacs that protect your muscles and tendons  Hip pain can also be caused by a lower back problem  Hip pain may be caused by trauma, playing sports, or running  Your pain may start in your hip and go to your thigh, buttock, or groin  DISCHARGE INSTRUCTIONS:   Medicines:   · NSAIDs , such as ibuprofen, help decrease swelling, pain, and fever  This medicine is available with or without a doctor's order  NSAIDs can cause stomach bleeding or kidney problems in certain people  If you take blood thinner medicine, always ask your healthcare provider if NSAIDs are safe for you  Always read the medicine label and follow directions  · Take your medicine as directed  Contact your healthcare provider if you think your medicine is not helping or if you have side effects  Tell him of her if you are allergic to any medicine  Keep a list of the medicines, vitamins, and herbs you take  Include the amounts, and when and why you take them  Bring the list or the pill bottles to follow-up visits  Carry your medicine list with you in case of an emergency  Return to the emergency department if:   · Your pain gets worse  · You have numbness in your leg or toes  · You cannot put any weight on or move your hip  Contact your healthcare provider if:   · You have a fever  · Your pain does not decrease, even after treatment  · You have questions or concerns about your condition or care  Follow up with your healthcare provider as directed: You may need physical therapy, an injection, or more testing  You may need to see an orthopedic specialist  Write down your questions so you remember to ask them during your visits    Manage your hip pain:   · Rest  your injured hip so that it can heal  You may need to avoid putting any weight on your hip for at least 48 hours  Return to normal activities as directed  · Ice  the injury for 20 minutes every 4 hours, or as directed  Use an ice pack, or put crushed ice in a plastic bag  Cover it with a towel to protect your skin  Ice helps prevent tissue damage and decreases swelling and pain  · Elevate  your injured hip above the level of your heart as often as you can  This will help decrease swelling and pain  If possible, prop your hip and leg on pillows or blankets to keep the area elevated comfortably  · Maintain a healthy weight  Extra body weight can cause pressure and pain in your hip, knee, and ankle joints  Ask your healthcare provider how much you should weigh  Ask him to help you create a weight loss plan if you are overweight  · Use assistive devices as directed  You may need to use a cane or crutches  Assistive devices help decrease pain and pressure on your hip when you walk  Ask your healthcare provider for more information about assistive devices and how to use them correctly  © 2017 [facility] Information is for End User's use only and may not be sold, redistributed or otherwise used for commercial purposes  All illustrations and images included in CareNotes® are the copyrighted property of A D A M [employer] Inc  or Yogi Waite  The above information is an  only  It is not intended as medical advice for individual conditions or treatments  Talk to your doctor, nurse or pharmacist before following any medical regimen to see if it is safe and effective for you

## 2019-02-27 NOTE — PROGRESS NOTES
[facility]        NAME: Sebastian Albert is a 34 y o  female  : 1980    MRN: [id number]  DATE: 2019  TIME: 9:50 AM    Assessment and Plan   Acute hip pain, left [M25 552]  1  Acute hip pain, left  predniSONE 20 mg tablet         Patient Instructions     Discussed condition with pt  Her left hip pain may be due to trochanteric bursitis  I will treat her with an oral Prednisone taper and rec ice, rest, gentle hip stretching, and avoiding overly strenuous activity  Follow up with PCP in 3-5 days  Proceed to  ER if symptoms worsen  Chief Complaint     Chief Complaint   Patient presents with    Hip Pain     c/l L hip pain x 1 week - throughout day  Denies injury or trauma  No swelling or bruising  Taking Motrin 600 mg - last in am  Worse with walking or being in one position for too long  History of Present Illness       Pt presents with 1 week history of acute left hip pain with no known trauma  The pain does not radiate and is located in the area of the hip joint  Prolonged sitting and walking worsens the pain  She has taken IBU at 600 mg/dose which has not helped significantly  Has had some issues sleeping due to the pain  Review of Systems   Review of Systems   Constitutional: Negative  Respiratory: Negative  Cardiovascular: Negative  Gastrointestinal: Negative  Genitourinary: Negative  Musculoskeletal: Negative for back pain          Left hip pain with no known trauma          Current Medications       Current Outpatient Medications:     ALPRAZolam (XANAX) 1 mg tablet, Take 1 mg by mouth 3 (three) times a day as needed for anxiety, Disp: , Rfl:     buPROPion (WELLBUTRIN XL) 150 mg 24 hr tablet, Take 150 mg by mouth every morning, Disp: , Rfl:     fluticasone (FLONASE) 50 mcg/act nasal spray, 2 sprays into each nostril daily, Disp: 16 g, Rfl: 3    lamoTRIgine (LaMICtal) 100 mg tablet, Take 1 tablet by mouth daily, Disp: , Rfl:     valACYclovir (VALTREX) 1,000 mg tablet, Valacyclovir 1 gm 2 tablets every 12 hourly at sign of eruption and then stop after 4 tabs  , Disp: 20 tablet, Rfl: 0    predniSONE 20 mg tablet, Take 3 tabs/day x 2 days, then 2 tabs/day x 2 days, then 1 tab/day x 2 days with food  , Disp: 12 tablet, Rfl: 0    Current Allergies     Allergies as of 02/26/2019    (No Known Allergies)            The following portions of the patient's history were reviewed and updated as appropriate: allergies, current medications, past family history, past medical history, past social history, past surgical history and problem list      Past Medical History:   Diagnosis Date    Allergic rhinitis     Anxiety     Chronic constipation     Depression     Psychiatric disorder     anxiety        Past Surgical History:   Procedure Laterality Date    APPENDECTOMY      BREAST SURGERY      augmentation    COLONOSCOPY N/A 1/24/2018    Procedure: COLONOSCOPY WITH HEMORRHOID BANDING;  Surgeon: Ru Marques MD;  Location: [place] [address] GI LAB; Service: Gastroenterology    LAPAROSCOPIC TUBAL LIGATION      OVARY SURGERY Bilateral     SD SIGMOIDOSCOPY FLX DX W/COLLJ SPEC BR/WA IF PFRMD N/A 11/14/2018    Procedure: FLEXIBLE SIGMOIDOSCOPY WITH APC; Surgeon: Ru Marques MD;  Location: [facility]; Service: Gastroenterology       Family History   Problem Relation Age of Onset    No Known Problems Mother     No Known Problems Father     No Known Problems Sister     No Known Problems Brother     No Known Problems Daughter     No Known Problems Son          Medications have been verified  Objective   /60 (BP Location: Left arm, Patient Position: Sitting, Cuff Size: Standard)   Pulse 88   Temp 97 6 °F (36 4 °C) (Tympanic)   Resp 18   Ht 5' 3" (1 6 m)   Wt 56 9 kg (125 lb 6 4 oz)   SpO2 100%   BMI 22 21 kg/m²        Physical Exam     Physical Exam   Constitutional: She is oriented to person, place, and time  She appears well-developed and well-nourished  No distress  Musculoskeletal:   TTP over the left hip in the area of the greater trochanter  Hip joint exam is WNL as it is nontender with full PROM without difficulty and no crepitus or sign of instability  Neurological: She is alert and oriented to person, place, and time  Psychiatric: She has a normal mood and affect  Vitals reviewed

## 2019-03-10 ENCOUNTER — OFFICE VISIT (OUTPATIENT)
Dept: URGENT CARE | Facility: CLINIC | Age: 39
End: 2019-03-10
Payer: COMMERCIAL

## 2019-03-10 VITALS
OXYGEN SATURATION: 100 % | HEIGHT: 63 IN | HEART RATE: 90 BPM | BODY MASS INDEX: 22.15 KG/M2 | DIASTOLIC BLOOD PRESSURE: 64 MMHG | RESPIRATION RATE: 18 BRPM | WEIGHT: 125 LBS | SYSTOLIC BLOOD PRESSURE: 117 MMHG | TEMPERATURE: 98.6 F

## 2019-03-10 DIAGNOSIS — R09.82 POST-NASAL DRIP: ICD-10-CM

## 2019-03-10 DIAGNOSIS — H66.91 RIGHT OTITIS MEDIA, UNSPECIFIED OTITIS MEDIA TYPE: Primary | ICD-10-CM

## 2019-03-10 PROCEDURE — 99213 OFFICE O/P EST LOW 20 MIN: CPT | Performed by: NURSE PRACTITIONER

## 2019-03-10 RX ORDER — AMOXICILLIN 500 MG/1
500 CAPSULE ORAL EVERY 12 HOURS SCHEDULED
Qty: 14 CAPSULE | Refills: 0 | Status: SHIPPED | OUTPATIENT
Start: 2019-03-10 | End: 2019-03-17

## 2019-03-10 RX ORDER — FLUCONAZOLE 150 MG/1
150 TABLET ORAL ONCE
Qty: 1 TABLET | Refills: 0 | Status: SHIPPED | OUTPATIENT
Start: 2019-03-10 | End: 2019-03-10

## 2019-03-10 NOTE — PATIENT INSTRUCTIONS
Take flonase as prescribed by PCP    Take antibiotic as prescribed    -Use cough medicine as per instructions on box as needed  If you were prescribed a cough medicine take it as prescribed  It may cause drowsiness so take it at home only or before bed   -If using several medications be sure to read all of the ingredients so you are not taking too many of the same medications     -Stay hydrated, drink lots of fluids, soups, and tea with honey   -Warm salt water gargles may help with sore throat  -Use cool or warm humidifier   -Your symptoms may last up to 14 days, continue supportive therapy as needed   -Follow up with your PCP if your symptoms become worse or do not improve  -If you have difficulty breathing, can not catch your breath or feel short of breath go directly to the emergency room

## 2019-03-10 NOTE — PROGRESS NOTES
[facility]        NAME: Renetta  is a 34 y o  female  : 1980    MRN: [id number]  DATE: March 10, 2019  TIME: 12:06 PM    Assessment and Plan   Right otitis media, unspecified otitis media type [H66 91]  1  Right otitis media, unspecified otitis media type  fluconazole (DIFLUCAN) 150 mg tablet    amoxicillin (AMOXIL) 500 mg capsule   2  Post-nasal drip           Patient Instructions     Take flonase as prescribed by PCP    Take antibiotic as prescribed    -Use cough medicine as per instructions on box as needed  If you were prescribed a cough medicine take it as prescribed  It may cause drowsiness so take it at home only or before bed   -If using several medications be sure to read all of the ingredients so you are not taking too many of the same medications     -Stay hydrated, drink lots of fluids, soups, and tea with honey   -Warm salt water gargles may help with sore throat  -Use cool or warm humidifier   -Your symptoms may last up to 14 days, continue supportive therapy as needed   -Follow up with your PCP if your symptoms become worse or do not improve  -If you have difficulty breathing, can not catch your breath or feel short of breath go directly to the emergency room  Chief Complaint     Chief Complaint   Patient presents with    Cold Like Symptoms     Pt c/o sinus congestion, burning, body aches, slight cough x 2 days  Hasn't used any OTC meds         History of Present Illness       34year old female presents with congestion, burning in nose, boday ache, cough and ear pain that began x 2 days ago  She tried OTC aleve yesterday with minimal relief  She denies N/V, CP, SOB, or fevers  She is a nonsmoker and denies any history of asthma  Review of Systems   Review of Systems   Constitutional: Positive for fatigue  Negative for chills and fever  HENT: Positive for congestion, ear pain, postnasal drip, rhinorrhea, sinus pressure and sinus pain   Negative for sore throat  Respiratory: Positive for cough  Negative for chest tightness, shortness of breath and wheezing  Cardiovascular: Negative for chest pain  Gastrointestinal: Negative for nausea and vomiting  Current Medications       Current Outpatient Medications:     ALPRAZolam (XANAX) 1 mg tablet, Take 1 mg by mouth 3 (three) times a day as needed for anxiety, Disp: , Rfl:     buPROPion (WELLBUTRIN XL) 150 mg 24 hr tablet, Take 150 mg by mouth every morning, Disp: , Rfl:     fluticasone (FLONASE) 50 mcg/act nasal spray, 2 sprays into each nostril daily, Disp: 16 g, Rfl: 3    lamoTRIgine (LaMICtal) 100 mg tablet, Take 1 tablet by mouth daily, Disp: , Rfl:     amoxicillin (AMOXIL) 500 mg capsule, Take 1 capsule (500 mg total) by mouth every 12 (twelve) hours for 7 days, Disp: 14 capsule, Rfl: 0    fluconazole (DIFLUCAN) 150 mg tablet, Take 1 tablet (150 mg total) by mouth once for 1 dose, Disp: 1 tablet, Rfl: 0    valACYclovir (VALTREX) 1,000 mg tablet, Valacyclovir 1 gm 2 tablets every 12 hourly at sign of eruption and then stop after 4 tabs  (Patient not taking: Reported on 3/10/2019), Disp: 20 tablet, Rfl: 0    Current Allergies     Allergies as of 03/10/2019    (No Known Allergies)            The following portions of the patient's history were reviewed and updated as appropriate: allergies, current medications, past family history, past medical history, past social history, past surgical history and problem list      Past Medical History:   Diagnosis Date    Allergic rhinitis     Anxiety     Chronic constipation     Depression     Psychiatric disorder     anxiety        Past Surgical History:   Procedure Laterality Date    APPENDECTOMY      BREAST SURGERY      augmentation    COLONOSCOPY N/A 1/24/2018    Procedure: COLONOSCOPY WITH HEMORRHOID BANDING;  Surgeon: Verdell Bernheim, MD;  Location: [facility] GI LAB;   Service: Gastroenterology    LAPAROSCOPIC TUBAL LIGATION      OVARY SURGERY Bilateral     VA SIGMOIDOSCOPY FLX DX W/COLLJ SPEC BR/WA IF PFRMD N/A 11/14/2018    Procedure: FLEXIBLE SIGMOIDOSCOPY WITH APC; Surgeon: Monster Mabry MD;  Location: [facility] GI LAB; Service: Gastroenterology       Family History   Problem Relation Age of Onset    No Known Problems Mother     No Known Problems Father     No Known Problems Sister     No Known Problems Brother     No Known Problems Daughter     No Known Problems Son          Medications have been verified  Objective   /64 (BP Location: Left arm, Patient Position: Sitting, Cuff Size: Standard)   Pulse 90   Temp 98 6 °F (37 °C) (Tympanic)   Resp 18   Ht 5' 3" (1 6 m)   Wt 56 7 kg (125 lb)   SpO2 100%   BMI 22 14 kg/m²        Physical Exam     Physical Exam   Constitutional: She is oriented to person, place, and time  She appears well-developed and well-nourished  HENT:   Right Ear: There is swelling  Tympanic membrane is injected and bulging  Left Ear: Tympanic membrane, external ear and ear canal normal    Nose: Mucosal edema and rhinorrhea present  Mouth/Throat: Posterior oropharyngeal erythema present  No posterior oropharyngeal edema  Cardiovascular: Normal rate, regular rhythm and normal heart sounds  Pulmonary/Chest: Effort normal and breath sounds normal  No respiratory distress  She has no rales  Positive for postnasal drip   Neurological: She is alert and oriented to person, place, and time  Skin: Skin is warm and dry  Nursing note and vitals reviewed

## 2019-03-16 ENCOUNTER — OFFICE VISIT (OUTPATIENT)
Dept: URGENT CARE | Facility: CLINIC | Age: 39
End: 2019-03-16
Payer: COMMERCIAL

## 2019-03-16 VITALS
RESPIRATION RATE: 15 BRPM | HEART RATE: 83 BPM | DIASTOLIC BLOOD PRESSURE: 69 MMHG | OXYGEN SATURATION: 100 % | HEIGHT: 63 IN | BODY MASS INDEX: 21.26 KG/M2 | TEMPERATURE: 99.5 F | SYSTOLIC BLOOD PRESSURE: 115 MMHG | WEIGHT: 120 LBS

## 2019-03-16 DIAGNOSIS — G93.3 POST VIRAL SYNDROME: Primary | ICD-10-CM

## 2019-03-16 PROCEDURE — 99213 OFFICE O/P EST LOW 20 MIN: CPT | Performed by: PHYSICIAN ASSISTANT

## 2019-03-16 NOTE — PATIENT INSTRUCTIONS
Continue a decongestant such as Mucinex  Tylenol or Motrin may be taken for fever and discomfort  Check your package labeling as some decongestants already contain these medications  Apply a warm compress to your jaw for 15-20 minutes at a time, as needed for discomfort relief  You may massage the area for further discomfort relief  Saltwater gargles, warm tea with honey, and throat lozenges may be relieving of throat discomfort  Use a cool mist humidifier at bedtime, turning on hours prior to bed with your bedroom doors shut for maximum relief  Follow up with your family doctor in 3-5 days  Proceed to the ER if symptoms worsen

## 2019-03-16 NOTE — PROGRESS NOTES
[facility]        NAME: Kristen Rondon is a 34 y o  female  : 1980    MRN: [id number]  DATE: 2019  TIME: 9:39 AM    Assessment and Plan   Post viral syndrome [G93 3]  1  Post viral syndrome       Patient Instructions   Continue a decongestant such as Mucinex  Tylenol or Motrin may be taken for fever and discomfort  Check your package labeling as some decongestants already contain these medications  Apply a warm compress to your jaw for 15-20 minutes at a time, as needed for discomfort relief  You may massage the area for further discomfort relief  Saltwater gargles, warm tea with honey, and throat lozenges may be relieving of throat discomfort  Use a cool mist humidifier at bedtime, turning on hours prior to bed with your bedroom doors shut for maximum relief  Follow up with your family doctor in 3-5 days  Proceed to the ER if symptoms worsen  The diagnosis, etiology, expected course of illness, and treatment plan were reviewed  All questions answered  Precautions given  Patient verbalized understanding and agreement the treatment plan  Chief Complaint     Chief Complaint   Patient presents with    URI     Pt reports of worsening URI s/s and right sided jaw pain   Jaw Pain     History of Present Illness   69-year-old female presenting with complaint of jaw x 2 days  Patient describes the pain as a soreness located just under the bottom of the right jaw  No pain or difficulty with jaw movement  She was reportedly seen in this office 1 week ago and diagnosed with a left ear infection and sinus inflammation  She currently has 1 day left of antibiotic treatment but notes that nasal congestion, runny nose, sore throat, and cough persist   She notes ear pain has fully resolved  She notes sore throat is only present first thing in the morning and that cough is productive in the morning then decreases significantly both in frequency and severity as the day goes on   She notes a single episode of wheezing last night, resolved without tx  She continues to feel feverish, has intermittent chills and sweats relieved with Tylenol  She notes yesterday she awoke with upper back ache also decreased with Tylenol  She has attempted treating with amoxicillin, flonase, and tylenol  Daughter and son sick with similar sx  No reecnt travel  No flu shot  Nonsmoker  Review of Systems   Review of Systems   Constitutional: Positive for fatigue  HENT: Negative for ear pain and postnasal drip  Respiratory: Negative for shortness of breath  Cardiovascular: Negative for chest pain and palpitations  Gastrointestinal: Negative for abdominal pain, diarrhea, nausea and vomiting  Musculoskeletal: Negative for arthralgias and myalgias  Skin: Negative for rash  Current Medications       Current Outpatient Medications:     ALPRAZolam (XANAX) 1 mg tablet, Take 1 mg by mouth 3 (three) times a day as needed for anxiety, Disp: , Rfl:     amoxicillin (AMOXIL) 500 mg capsule, Take 1 capsule (500 mg total) by mouth every 12 (twelve) hours for 7 days, Disp: 14 capsule, Rfl: 0    buPROPion (WELLBUTRIN XL) 150 mg 24 hr tablet, Take 150 mg by mouth every morning, Disp: , Rfl:     fluticasone (FLONASE) 50 mcg/act nasal spray, 2 sprays into each nostril daily, Disp: 16 g, Rfl: 3    lamoTRIgine (LaMICtal) 100 mg tablet, Take 1 tablet by mouth daily, Disp: , Rfl:     valACYclovir (VALTREX) 1,000 mg tablet, Valacyclovir 1 gm 2 tablets every 12 hourly at sign of eruption and then stop after 4 tabs   (Patient not taking: Reported on 3/10/2019), Disp: 20 tablet, Rfl: 0    Current Allergies     Allergies as of 03/16/2019    (No Known Allergies)          The following portions of the patient's history were reviewed and updated as appropriate: allergies, current medications, past family history, past medical history, past social history, past surgical history and problem list      Past Medical History: Diagnosis Date    Allergic rhinitis     Anxiety     Chronic constipation     Depression     Psychiatric disorder     anxiety      Past Surgical History:   Procedure Laterality Date    APPENDECTOMY      BREAST SURGERY      augmentation    COLONOSCOPY N/A 1/24/2018    Procedure: COLONOSCOPY WITH HEMORRHOID BANDING;  Surgeon: Mercedes Casey MD;  Location: [facility] GI LAB; Service: Gastroenterology    LAPAROSCOPIC TUBAL LIGATION      OVARY SURGERY Bilateral     NE SIGMOIDOSCOPY FLX DX W/COLLJ SPEC BR/WA IF PFRMD N/A 11/14/2018    Procedure: FLEXIBLE SIGMOIDOSCOPY WITH APC; Surgeon: Mercedes Casey MD;  Location: [facility] GI LAB; Service: Gastroenterology     Family History   Problem Relation Age of Onset    No Known Problems Mother     No Known Problems Father     No Known Problems Sister     No Known Problems Brother     No Known Problems Daughter     No Known Problems Son      Medications have been verified  Objective   /69   Pulse 83   Temp 99 5 °F (37 5 °C)   Resp 15   Ht 5' 3" (1 6 m)   Wt 54 4 kg (120 lb)   SpO2 100%   BMI 21 26 kg/m²      Physical Exam     Physical Exam   Constitutional: She is oriented to person, place, and time  Vital signs are normal  She appears well-developed and well-nourished  She is cooperative  She does not appear ill  No distress  HENT:   Head: Normocephalic and atraumatic  Right Ear: Hearing, tympanic membrane, external ear and ear canal normal  No middle ear effusion  Left Ear: Hearing, tympanic membrane, external ear and ear canal normal   No middle ear effusion  Nose: Nose normal  No mucosal edema or rhinorrhea  Mouth/Throat: Uvula is midline, oropharynx is clear and moist and mucous membranes are normal  Mucous membranes are not pale, not dry and not cyanotic  No oral lesions  No uvula swelling  No oropharyngeal exudate, posterior oropharyngeal edema, posterior oropharyngeal erythema or tonsillar abscesses  Tonsils are 1+ on the right  Tonsils are 1+ on the left  No tonsillar exudate  Erythematous nasal mucosa  No overlying erythema, ecchymosis, edema, or other deformity of the right jaw line  No bony TTP  No crepitus  Eyes: Conjunctivae and lids are normal  Right eye exhibits no discharge and no exudate  Left eye exhibits no discharge and no exudate  Neck: Trachea normal and phonation normal  Neck supple  No tracheal tenderness present  No neck rigidity  No edema and no erythema present  Cardiovascular: Normal rate, regular rhythm and normal heart sounds  Exam reveals no gallop, no distant heart sounds and no friction rub  No murmur heard  Pulmonary/Chest: Effort normal and breath sounds normal  No stridor  No respiratory distress  She has no decreased breath sounds  She has no wheezes  She has no rhonchi  She has no rales  Abdominal: Soft  Bowel sounds are normal  She exhibits no distension and no mass  There is no tenderness  There is no rigidity, no rebound and no guarding  Lymphadenopathy:        Head (right side): Submandibular (single tender node just anterior to the mandiublar angle  Round, well circumscribed, firm, and mobile ) adenopathy present  Head (left side): No submandibular adenopathy present  She has cervical adenopathy  Right cervical: No superficial cervical and no posterior cervical adenopathy present  Left cervical: Superficial cervical (two NT nodes in the upper aspect of the chain) adenopathy present  No posterior cervical adenopathy present  Neurological: She is alert and oriented to person, place, and time  She is not disoriented  No cranial nerve deficit  She exhibits normal muscle tone  Coordination normal    Skin: Skin is warm, dry and intact  No rash noted  She is not diaphoretic  No erythema  No pallor  Psychiatric: She has a normal mood and affect  Her behavior is normal  Judgment and thought content normal    Nursing note and vitals reviewed

## 2019-03-20 ENCOUNTER — OFFICE VISIT (OUTPATIENT)
Dept: FAMILY MEDICINE CLINIC | Facility: CLINIC | Age: 39
End: 2019-03-20
Payer: COMMERCIAL

## 2019-03-20 VITALS
HEIGHT: 63 IN | DIASTOLIC BLOOD PRESSURE: 68 MMHG | TEMPERATURE: 98.9 F | SYSTOLIC BLOOD PRESSURE: 100 MMHG | BODY MASS INDEX: 22.15 KG/M2 | RESPIRATION RATE: 16 BRPM | HEART RATE: 96 BPM | WEIGHT: 125 LBS

## 2019-03-20 DIAGNOSIS — J01.90 ACUTE SINUSITIS, RECURRENCE NOT SPECIFIED, UNSPECIFIED LOCATION: Primary | ICD-10-CM

## 2019-03-20 DIAGNOSIS — R11.0 NAUSEA: ICD-10-CM

## 2019-03-20 PROCEDURE — 1036F TOBACCO NON-USER: CPT | Performed by: NURSE PRACTITIONER

## 2019-03-20 PROCEDURE — 99213 OFFICE O/P EST LOW 20 MIN: CPT | Performed by: NURSE PRACTITIONER

## 2019-03-20 PROCEDURE — 3008F BODY MASS INDEX DOCD: CPT | Performed by: NURSE PRACTITIONER

## 2019-03-20 RX ORDER — DOXYCYCLINE HYCLATE 100 MG/1
100 CAPSULE ORAL EVERY 12 HOURS SCHEDULED
Qty: 20 CAPSULE | Refills: 0 | Status: SHIPPED | OUTPATIENT
Start: 2019-03-20 | End: 2019-03-30

## 2019-03-20 RX ORDER — ONDANSETRON 4 MG/1
4 TABLET, FILM COATED ORAL EVERY 8 HOURS PRN
Qty: 20 TABLET | Refills: 0 | Status: SHIPPED | OUTPATIENT
Start: 2019-03-20 | End: 2019-05-01 | Stop reason: ALTCHOICE

## 2019-03-20 NOTE — PATIENT INSTRUCTIONS
Take medication with food  It is important that you take the entire course of antibiotics prescribed  May also take a probiotic of your choice to maintain healthy GI gagan  Can take some probiotic and yogurt with the medication  Increase fluid intake, saline nasal rinses, and hot tea with honey and lemon  Cool air humidification can be helpful as well  May take Ibuprofen or Tylenol as needed for pain or fevers  Mucinex D for sinus congestion or Coricidin HBP if you have high blood pressure or a heart condition  Mucinex or Robitussin DM are effective for cough and chest congestion  Supportive care discussed and advised  Follow up for no improvement and worsening of conditions  Patient advised and educated when to see immediate medical care

## 2019-03-20 NOTE — LETTER
March 20, 2019     Patient: Sandrita Smith   YOB: 1980   Date of Visit: 3/20/2019       To Whom it May Concern:    Philip Peter is under my professional care  She was seen in my office on 3/20/2019  She may return to work on 03/22/2019  If you have any questions or concerns, please don't hesitate to call           Sincerely,          ROSE Eden        CC: No Recipients

## 2019-05-01 ENCOUNTER — OFFICE VISIT (OUTPATIENT)
Dept: FAMILY MEDICINE CLINIC | Facility: CLINIC | Age: 39
End: 2019-05-01
Payer: COMMERCIAL

## 2019-05-01 VITALS
BODY MASS INDEX: 22.15 KG/M2 | TEMPERATURE: 98.9 F | HEIGHT: 63 IN | SYSTOLIC BLOOD PRESSURE: 126 MMHG | RESPIRATION RATE: 16 BRPM | DIASTOLIC BLOOD PRESSURE: 70 MMHG | WEIGHT: 125 LBS | HEART RATE: 82 BPM

## 2019-05-01 DIAGNOSIS — T78.40XA ALLERGIC REACTION, INITIAL ENCOUNTER: Primary | ICD-10-CM

## 2019-05-01 PROCEDURE — 3008F BODY MASS INDEX DOCD: CPT | Performed by: NURSE PRACTITIONER

## 2019-05-01 PROCEDURE — 1036F TOBACCO NON-USER: CPT | Performed by: NURSE PRACTITIONER

## 2019-05-01 PROCEDURE — 99213 OFFICE O/P EST LOW 20 MIN: CPT | Performed by: NURSE PRACTITIONER

## 2019-05-01 RX ORDER — PREDNISONE 10 MG/1
TABLET ORAL
Qty: 20 TABLET | Refills: 0 | Status: SHIPPED | OUTPATIENT
Start: 2019-05-01 | End: 2019-05-11

## 2019-08-20 ENCOUNTER — OFFICE VISIT (OUTPATIENT)
Dept: URGENT CARE | Facility: CLINIC | Age: 39
End: 2019-08-20
Payer: COMMERCIAL

## 2019-08-20 VITALS
HEART RATE: 78 BPM | TEMPERATURE: 99.9 F | OXYGEN SATURATION: 99 % | WEIGHT: 123 LBS | DIASTOLIC BLOOD PRESSURE: 70 MMHG | BODY MASS INDEX: 21.79 KG/M2 | HEIGHT: 63 IN | SYSTOLIC BLOOD PRESSURE: 110 MMHG | RESPIRATION RATE: 18 BRPM

## 2019-08-20 DIAGNOSIS — H10.31 ACUTE CONJUNCTIVITIS OF RIGHT EYE, UNSPECIFIED ACUTE CONJUNCTIVITIS TYPE: Primary | ICD-10-CM

## 2019-08-20 PROCEDURE — 99213 OFFICE O/P EST LOW 20 MIN: CPT | Performed by: PHYSICIAN ASSISTANT

## 2019-08-20 RX ORDER — TOBRAMYCIN 3 MG/ML
1 SOLUTION/ DROPS OPHTHALMIC
Qty: 5 ML | Refills: 0 | Status: SHIPPED | OUTPATIENT
Start: 2019-08-20 | End: 2019-10-10 | Stop reason: ALTCHOICE

## 2019-08-20 NOTE — PATIENT INSTRUCTIONS

## 2019-08-20 NOTE — PROGRESS NOTES
[facility]        NAME: Molly Hinojosa is a 37 y o  female  : 1980    MRN: [id number]  DATE: 2019  TIME: 8:38 AM    Assessment and Plan   Acute conjunctivitis of right eye, unspecified acute conjunctivitis type [H10 31]  1  Acute conjunctivitis of right eye, unspecified acute conjunctivitis type  tobramycin (TOBREX) 0 3 % SOLN         Patient Instructions     Discussed condition with pt  She has acute right eye conjunctivitis which I will treat with Tobramycin drops and reviewed precautions regarding pinkeye  Follow up with PCP in 3-5 days  Proceed to  ER if symptoms worsen  Chief Complaint     Chief Complaint   Patient presents with    Conjunctivitis     Awoke today with R eye crusted shut and itchy  Denies exudate or pain  Is wearing extended contact lenses (were changed last on Thursday ) No URI s/s  History of Present Illness       Pt presents with one day history of a red, irritated, draining, crusted, itchy right eye  Denies FB/trauma, photophobia, visual changes  She does wear contact lenses and is wearing them currently  Last changed them 5 days ago as they are extended lenses  She has no other complaints  Review of Systems   Review of Systems   Constitutional: Negative  Eyes: Positive for discharge, redness and itching  Negative for photophobia, pain and visual disturbance  Pertinent positives in right eye    Respiratory: Negative  Cardiovascular: Negative  Gastrointestinal: Negative  Genitourinary: Negative            Current Medications       Current Outpatient Medications:     ALPRAZolam (XANAX) 1 mg tablet, Take 1 mg by mouth 3 (three) times a day as needed for anxiety, Disp: , Rfl:     buPROPion (WELLBUTRIN XL) 150 mg 24 hr tablet, Take 150 mg by mouth every morning, Disp: , Rfl:     fluticasone (FLONASE) 50 mcg/act nasal spray, 2 sprays into each nostril daily (Patient taking differently: 2 sprays into each nostril daily as needed ), Disp: 16 g, Rfl: 3    lamoTRIgine (LaMICtal) 100 mg tablet, Take 1 tablet by mouth daily, Disp: , Rfl:     tobramycin (TOBREX) 0 3 % SOLN, Administer 1 drop to the right eye every 4 (four) hours while awake, Disp: 5 mL, Rfl: 0    Current Allergies     Allergies as of 08/20/2019 - Reviewed 08/20/2019   Allergen Reaction Noted    Doxycycline Other (See Comments) 05/01/2019            The following portions of the patient's history were reviewed and updated as appropriate: allergies, current medications, past family history, past medical history, past social history, past surgical history and problem list      Past Medical History:   Diagnosis Date    Allergic rhinitis     Anxiety     Chronic constipation     Depression     Psychiatric disorder     anxiety        Past Surgical History:   Procedure Laterality Date    APPENDECTOMY      BREAST SURGERY      augmentation    COLONOSCOPY N/A 1/24/2018    Procedure: COLONOSCOPY WITH HEMORRHOID BANDING;  Surgeon: Wang Henry MD;  Location: [place] [address] GI LAB; Service: Gastroenterology    LAPAROSCOPIC TUBAL LIGATION      OVARY SURGERY Bilateral     [place] SIGMOIDOSCOPY FLX DX W/COLLJ SPEC BR/WA IF PFRMD N/A 11/14/2018    Procedure: FLEXIBLE SIGMOIDOSCOPY WITH APC; Surgeon: Wang Henry MD;  Location: [facility] GI LAB; Service: Gastroenterology       Family History   Problem Relation Age of Onset    No Known Problems Mother     No Known Problems Father     No Known Problems Sister     No Known Problems Brother     No Known Problems Daughter     No Known Problems Son          Medications have been verified  Objective   /70 (BP Location: Left arm, Patient Position: Sitting, Cuff Size: Standard)   Pulse 78   Temp 99 9 °F (37 7 °C) (Tympanic)   Resp 18   Ht 5' 3" (1 6 m)   Wt 55 8 kg (123 lb)   SpO2 99%   BMI 21 79 kg/m²        Physical Exam     Physical Exam   Constitutional: She is oriented to person, place, and time   She appears well-developed and well-nourished  No distress  Eyes:   Right eye with conjunctival injection, mild photophobia, watery discharge  No crusting on lid margins or FB noted on flipping of lids  Fluorescein stain revealed no corneal abrasion  No lid edema  Left eye exam WNL  Neurological: She is alert and oriented to person, place, and time  Psychiatric: She has a normal mood and affect  Vitals reviewed

## 2019-10-01 ENCOUNTER — OFFICE VISIT (OUTPATIENT)
Dept: FAMILY MEDICINE CLINIC | Facility: CLINIC | Age: 39
End: 2019-10-01
Payer: COMMERCIAL

## 2019-10-01 VITALS
HEIGHT: 63 IN | RESPIRATION RATE: 16 BRPM | DIASTOLIC BLOOD PRESSURE: 70 MMHG | HEART RATE: 80 BPM | SYSTOLIC BLOOD PRESSURE: 100 MMHG | BODY MASS INDEX: 22.15 KG/M2 | WEIGHT: 125 LBS | TEMPERATURE: 98.7 F

## 2019-10-01 DIAGNOSIS — J30.9 ALLERGIC RHINITIS, UNSPECIFIED SEASONALITY, UNSPECIFIED TRIGGER: ICD-10-CM

## 2019-10-01 DIAGNOSIS — J01.90 ACUTE SINUSITIS, RECURRENCE NOT SPECIFIED, UNSPECIFIED LOCATION: Primary | ICD-10-CM

## 2019-10-01 PROCEDURE — 99213 OFFICE O/P EST LOW 20 MIN: CPT | Performed by: NURSE PRACTITIONER

## 2019-10-01 RX ORDER — BUPROPION HYDROCHLORIDE 300 MG/1
TABLET ORAL DAILY
COMMUNITY
Start: 2019-09-19 | End: 2019-12-06 | Stop reason: ALTCHOICE

## 2019-10-01 RX ORDER — MECLIZINE HCL 12.5 MG/1
12.5 TABLET ORAL 3 TIMES DAILY PRN
Qty: 30 TABLET | Refills: 0 | Status: SHIPPED | OUTPATIENT
Start: 2019-10-01 | End: 2019-12-06 | Stop reason: ALTCHOICE

## 2019-10-01 RX ORDER — FLUTICASONE PROPIONATE 50 MCG
2 SPRAY, SUSPENSION (ML) NASAL DAILY
Qty: 16 G | Refills: 3 | Status: SHIPPED | OUTPATIENT
Start: 2019-10-01 | End: 2019-12-06 | Stop reason: ALTCHOICE

## 2019-10-01 RX ORDER — AMOXICILLIN 875 MG/1
875 TABLET, COATED ORAL 2 TIMES DAILY
Qty: 20 TABLET | Refills: 0 | Status: SHIPPED | OUTPATIENT
Start: 2019-10-01 | End: 2019-10-10 | Stop reason: ALTCHOICE

## 2019-10-01 RX ORDER — FLUCONAZOLE 150 MG/1
150 TABLET ORAL ONCE
Qty: 1 TABLET | Refills: 0 | Status: SHIPPED | OUTPATIENT
Start: 2019-10-01 | End: 2019-10-01

## 2019-10-01 NOTE — PATIENT INSTRUCTIONS
Take medication with food  It is important that you take the entire course of antibiotics prescribed  May also take a probiotic of your choice to maintain healthy GI gagan  Can take some probiotic and yogurt with the medication  Supportive care discussed and advised  Advised to RTO for any worsening and no improvement  Follow up for no improvement and worsening of conditions  Patient advised and educated when to see immediate medical care

## 2019-10-01 NOTE — LETTER
October 1, 2019     Patient: Vonda St   YOB: 1980   Date of Visit: 10/1/2019       To Whom it May Concern:    Lilliam Guevara is under my professional care  She was seen in my office on 10/1/2019  She may return to work on 10/3/2019  If you have any questions or concerns, please don't hesitate to call           Sincerely,          ROSE Andrew        CC: No Recipients

## 2019-10-01 NOTE — PROGRESS NOTES
Assessment/Plan:    1  Acute sinusitis, recurrence not specified, unspecified location  -     amoxicillin (AMOXIL) 875 mg tablet; Take 1 tablet (875 mg total) by mouth 2 (two) times a day for 10 days  -     meclizine (ANTIVERT) 12 5 MG tablet; Take 1 tablet (12 5 mg total) by mouth 3 (three) times a day as needed for dizziness  -     fluconazole (DIFLUCAN) 150 mg tablet; Take 1 tablet (150 mg total) by mouth once for 1 dose    2  Allergic rhinitis, unspecified seasonality, unspecified trigger  Comments:  stablw with flonase  Orders:  -     fluticasone (FLONASE) 50 mcg/act nasal spray; 2 sprays into each nostril daily          BMI Counseling: Body mass index is 22 14 kg/m²  Discussed the patient's BMI with her  Patient Instructions: Take medication with food  It is important that you take the entire course of antibiotics prescribed  May also take a probiotic of your choice to maintain healthy GI gagan  Can take some probiotic and yogurt with the medication  Supportive care discussed and advised  Advised to RTO for any worsening and no improvement  Follow up for no improvement and worsening of conditions  Patient advised and educated when to see immediate medical care  Return if symptoms worsen or fail to improve  No future appointments  Subjective:      Patient ID: Adilia Donaldson is a 37 y o  female  Chief Complaint   Patient presents with    Headache     Started a few days ago with h/a, sore throat  No known fever [id number]    Sore Throat         Vitals:  /70   Pulse 80   Temp 98 7 °F (37 1 °C)   Resp 16   Ht 5' 3" (1 6 m)   Wt 56 7 kg (125 lb)   BMI 22 14 kg/m²     HPI  Patient stated that started with sore throat and congestion few days ago and progressed to headache and dizziness  Stated that was dizzy when got up today but better now  Denies fever, chills and sob  Denies any weakness, numbness and tingling of extremities        The following portions of the patient's history were reviewed and updated as appropriate: allergies, current medications, past family history, past medical history, past social history, past surgical history and problem list       Review of Systems   Constitutional: Negative for chills, diaphoresis, fatigue, fever and unexpected weight change  HENT: Positive for congestion and sore throat  Negative for dental problem, drooling, ear discharge, ear pain, facial swelling, hearing loss, mouth sores, nosebleeds, postnasal drip, rhinorrhea, sinus pressure, sinus pain, sneezing, tinnitus, trouble swallowing and voice change  Respiratory: Negative for cough, chest tightness, shortness of breath and wheezing  Cardiovascular: Negative  Gastrointestinal: Negative for abdominal pain, constipation, diarrhea, nausea and vomiting  Musculoskeletal: Negative  Skin: Negative  Neurological: Positive for dizziness  Negative for weakness, light-headedness and headaches  Hematological: Negative  Objective:    Social History     Tobacco Use   Smoking Status Former Smoker    Types: Cigarettes    Last attempt to quit:     Years since quittin 7   Smokeless Tobacco Never Used       Allergies:    Allergies   Allergen Reactions    Doxycycline Other (See Comments)     unknown         Current Outpatient Medications   Medication Sig Dispense Refill    ALPRAZolam (XANAX) 1 mg tablet Take 1 mg by mouth 3 (three) times a day as needed for anxiety      buPROPion (WELLBUTRIN XL) 300 mg 24 hr tablet Take by mouth daily       fluticasone (FLONASE) 50 mcg/act nasal spray 2 sprays into each nostril daily 16 g 3    lamoTRIgine (LaMICtal) 100 mg tablet Take 1 tablet by mouth daily      amoxicillin (AMOXIL) 875 mg tablet Take 1 tablet (875 mg total) by mouth 2 (two) times a day for 10 days 20 tablet 0    fluconazole (DIFLUCAN) 150 mg tablet Take 1 tablet (150 mg total) by mouth once for 1 dose 1 tablet 0    meclizine (ANTIVERT) 12 5 MG tablet Take 1 tablet (12 5 mg total) by mouth 3 (three) times a day as needed for dizziness 30 tablet 0    tobramycin (TOBREX) 0 3 % SOLN Administer 1 drop to the right eye every 4 (four) hours while awake (Patient not taking: Reported on 10/1/2019) 5 mL 0     No current facility-administered medications for this visit  Physical Exam   Constitutional: She is oriented to person, place, and time  She appears well-developed and well-nourished  HENT:   Head: Normocephalic  Right Ear: External ear and ear canal normal  Tympanic membrane is bulging  Left Ear: Tympanic membrane, external ear and ear canal normal    Nose: Mucosal edema present  Right sinus exhibits maxillary sinus tenderness  Right sinus exhibits no frontal sinus tenderness  Left sinus exhibits maxillary sinus tenderness  Left sinus exhibits no frontal sinus tenderness  Mouth/Throat: Oropharynx is clear and moist and mucous membranes are normal    Neck: Neck supple  Cardiovascular: Normal rate, regular rhythm and normal heart sounds  Pulmonary/Chest: Effort normal and breath sounds normal    Abdominal: Normal appearance and bowel sounds are normal  There is no hepatosplenomegaly  There is no tenderness  There is no rebound  Musculoskeletal: Normal range of motion  Lymphadenopathy:        Right cervical: No superficial cervical and no posterior cervical adenopathy present  Left cervical: No superficial cervical and no posterior cervical adenopathy present  Neurological: She is alert and oriented to person, place, and time  She has normal strength  No cranial nerve deficit or sensory deficit  Coordination and gait normal  GCS eye subscore is 4  GCS verbal subscore is 5  GCS motor subscore is 6  Skin: Skin is warm and dry  Psychiatric: She has a normal mood and affect  Her behavior is normal  Judgment and thought content normal    Vitals reviewed                    ROSE Jose

## 2019-10-03 ENCOUNTER — TELEPHONE (OUTPATIENT)
Dept: FAMILY MEDICINE CLINIC | Facility: CLINIC | Age: 39
End: 2019-10-03

## 2019-10-03 DIAGNOSIS — J01.90 ACUTE SINUSITIS, RECURRENCE NOT SPECIFIED, UNSPECIFIED LOCATION: Primary | ICD-10-CM

## 2019-10-03 RX ORDER — PROMETHAZINE HYDROCHLORIDE AND CODEINE PHOSPHATE 6.25; 1 MG/5ML; MG/5ML
5 SYRUP ORAL 2 TIMES DAILY
Qty: 50 ML | Refills: 0 | Status: SHIPPED | OUTPATIENT
Start: 2019-10-03 | End: 2019-10-08

## 2019-10-03 NOTE — TELEPHONE ENCOUNTER
Isabel saw luís two days ago and said she feels so much worse  Antibiotics is not helping at all  She feels more congested    Please call patient back     Thank you

## 2019-10-03 NOTE — TELEPHONE ENCOUNTER
Advised patient to continue with amoxicillin and flonase  I sent cough syrup with codeine  Do not drive and operate any machinery after taking cough medication  Should check with pharmacy as already sent ROSE Martinez

## 2019-10-03 NOTE — LETTER
October 3, 2019     Patient: Chris Newsome   YOB: 1980   Date of Visit: 10/3/2019       To Whom It May Concern:     Sierra George was seen in office on 10/1/2019 and will resume work on 10/4/2019  If you have any questions or concerns, please don't hesitate to call           Sincerely,        ROSE Wang      CC: No Recipients

## 2019-10-03 NOTE — TELEPHONE ENCOUNTER
Called patient and stated that she has been coughing all night  She was also complaining of freezing and sweating  I asked if there were any other symptoms she stated that she just feels worse   I asked if she had a thermometer to check for fever she stated that she didn't, so fever could not be confirmed  Patient denied all other symptoms SoB, Chest pain, Dizzyness  Sending to [facility]

## 2019-10-09 ENCOUNTER — TELEPHONE (OUTPATIENT)
Dept: FAMILY MEDICINE CLINIC | Facility: CLINIC | Age: 39
End: 2019-10-09

## 2019-10-09 DIAGNOSIS — B37.9 YEAST INFECTION: Primary | ICD-10-CM

## 2019-10-09 RX ORDER — FLUCONAZOLE 150 MG/1
150 TABLET ORAL ONCE
Qty: 1 TABLET | Refills: 0 | Status: SHIPPED | OUTPATIENT
Start: 2019-10-09 | End: 2019-10-10 | Stop reason: ALTCHOICE

## 2019-10-09 NOTE — TELEPHONE ENCOUNTER
Pt needs difulcan sent to [facility] this evening, has infection from antibiotics  Pls call when this medication is sent

## 2019-10-10 ENCOUNTER — OFFICE VISIT (OUTPATIENT)
Dept: FAMILY MEDICINE CLINIC | Facility: CLINIC | Age: 39
End: 2019-10-10
Payer: COMMERCIAL

## 2019-10-10 VITALS
HEIGHT: 63 IN | BODY MASS INDEX: 22.5 KG/M2 | HEART RATE: 80 BPM | DIASTOLIC BLOOD PRESSURE: 72 MMHG | SYSTOLIC BLOOD PRESSURE: 100 MMHG | WEIGHT: 127 LBS | TEMPERATURE: 99.5 F | RESPIRATION RATE: 16 BRPM

## 2019-10-10 DIAGNOSIS — J01.90 ACUTE SINUSITIS, RECURRENCE NOT SPECIFIED, UNSPECIFIED LOCATION: Primary | ICD-10-CM

## 2019-10-10 PROCEDURE — 99213 OFFICE O/P EST LOW 20 MIN: CPT | Performed by: NURSE PRACTITIONER

## 2019-10-10 RX ORDER — METHYLPREDNISOLONE 4 MG/1
TABLET ORAL
Qty: 21 TABLET | Refills: 0 | Status: SHIPPED | OUTPATIENT
Start: 2019-10-10 | End: 2019-10-21 | Stop reason: ALTCHOICE

## 2019-10-10 RX ORDER — FLUCONAZOLE 150 MG/1
150 TABLET ORAL ONCE
Qty: 1 TABLET | Refills: 0 | Status: SHIPPED | OUTPATIENT
Start: 2019-10-10 | End: 2019-10-10

## 2019-10-10 RX ORDER — METHYLPHENIDATE HYDROCHLORIDE 18 MG/1
18 TABLET ORAL DAILY
COMMUNITY
Start: 2019-10-01 | End: 2020-07-09 | Stop reason: DRUGHIGH

## 2019-10-10 NOTE — PATIENT INSTRUCTIONS
Afrin nasal spray for 3 days followed by fluticasone (Flonase)  Take prednisone with food in morning and do not take any NSAID's while taking prednisone  Increase fluid intake, saline nasal rinses, and hot tea with honey and lemon  Cool air humidification can be helpful as well  May take Ibuprofen or Tylenol as needed for pain or fevers  Mucinex D for sinus congestion or Coricidin HBP if you have high blood pressure or a heart condition  Mucinex or Robitussin DM are effective for cough and chest congestion  Take medication with food  It is important that you take the entire course of antibiotics prescribed  May also take a probiotic of your choice to maintain healthy GI gagan  Can take some probiotic and yogurt with the medication  Supportive care discussed and advised  Advised to RTO for any worsening and no improvement  Follow up for no improvement and worsening of conditions  Patient advised and educated when to see immediate medical care

## 2019-10-10 NOTE — PROGRESS NOTES
Assessment/Plan:  Advised to call back on 10/14 if still no better, will consider xray sinuses  1  Acute sinusitis, recurrence not specified, unspecified location  -     clarithromycin (BIAXIN XL) 500 MG 24 hr tablet; Take 2 tablets (1,000 mg total) by mouth daily for 10 days  -     methylPREDNISolone 4 MG tablet therapy pack; Use as directed on package  -     fluconazole (DIFLUCAN) 150 mg tablet; Take 1 tablet (150 mg total) by mouth once for 1 dose          BMI Counseling: Body mass index is 22 5 kg/m²  Discussed the patient's BMI with her  Patient Instructions:  Afrin nasal spray for 3 days followed by fluticasone (Flonase)  Take prednisone with food in morning and do not take any NSAID's while taking prednisone  Increase fluid intake, saline nasal rinses, and hot tea with honey and lemon  Cool air humidification can be helpful as well  May take Ibuprofen or Tylenol as needed for pain or fevers  Mucinex D for sinus congestion or Coricidin HBP if you have high blood pressure or a heart condition  Mucinex or Robitussin DM are effective for cough and chest congestion  Take medication with food  It is important that you take the entire course of antibiotics prescribed  May also take a probiotic of your choice to maintain healthy GI gagan  Can take some probiotic and yogurt with the medication  Supportive care discussed and advised  Advised to RTO for any worsening and no improvement  Follow up for no improvement and worsening of conditions  Patient advised and educated when to see immediate medical care  Return if symptoms worsen or fail to improve  Future Appointments   Date Time Provider Billie Osborn   10/10/2019  3:30 PM ROSE Nguyen [facility]           Subjective:      Patient ID: Guanako Guo is a 37 y o  female  Chief Complaint   Patient presents with    Cough     C/O cough, congestion, and still not feeling well   Afebrile JMoylTavon Vitals:  /72   Pulse 80   Temp 99 5 °F (37 5 °C)   Resp 16   Ht 5' 3" (1 6 m)   Wt 57 6 kg (127 lb)   BMI 22 50 kg/m²     HPI  Patient was seen for sinusitis couple of days ago  Stated that finished antibiotic but still does not feel good  Stated that still having chills and sweating on and off  Still feels congested with low grade fever  Denies fever, chills, sob and chest pain  The following portions of the patient's history were reviewed and updated as appropriate: allergies, current medications, past family history, past medical history, past social history, past surgical history and problem list       Review of Systems   Constitutional: Positive for chills and diaphoresis  Negative for fatigue, fever and unexpected weight change  HENT: Positive for congestion and sinus pressure  Negative for dental problem, drooling, ear discharge, ear pain, facial swelling, hearing loss, mouth sores, nosebleeds, postnasal drip, rhinorrhea, sinus pain, sneezing, sore throat, tinnitus, trouble swallowing and voice change  Respiratory: Negative for cough, chest tightness, shortness of breath and wheezing  Cardiovascular: Negative  Gastrointestinal: Negative for abdominal pain, constipation, diarrhea, nausea and vomiting  Musculoskeletal: Negative  Skin: Negative  Neurological: Negative for dizziness, weakness, light-headedness and headaches  Hematological: Negative  Objective:    Social History     Tobacco Use   Smoking Status Former Smoker    Types: Cigarettes    Last attempt to quit: 2014    Years since quittin 7   Smokeless Tobacco Never Used       Allergies:    Allergies   Allergen Reactions    Doxycycline Other (See Comments)     unknown         Current Outpatient Medications   Medication Sig Dispense Refill    ALPRAZolam (XANAX) 1 mg tablet Take 1 mg by mouth 3 (three) times a day as needed for anxiety      buPROPion (WELLBUTRIN XL) 300 mg 24 hr tablet Take by mouth daily       fluticasone (FLONASE) 50 mcg/act nasal spray 2 sprays into each nostril daily 16 g 3    lamoTRIgine (LaMICtal) 100 mg tablet Take 1 tablet by mouth daily      meclizine (ANTIVERT) 12 5 MG tablet Take 1 tablet (12 5 mg total) by mouth 3 (three) times a day as needed for dizziness 30 tablet 0    methylphenidate (CONCERTA) 18 mg ER tablet Take 18 mg by mouth daily       clarithromycin (BIAXIN XL) 500 MG 24 hr tablet Take 2 tablets (1,000 mg total) by mouth daily for 10 days 20 tablet 0    fluconazole (DIFLUCAN) 150 mg tablet Take 1 tablet (150 mg total) by mouth once for 1 dose 1 tablet 0    methylPREDNISolone 4 MG tablet therapy pack Use as directed on package 21 tablet 0     No current facility-administered medications for this visit  Physical Exam   Constitutional: She is oriented to person, place, and time  She appears well-developed and well-nourished  HENT:   Head: Normocephalic  Right Ear: Tympanic membrane, external ear and ear canal normal    Left Ear: Tympanic membrane, external ear and ear canal normal    Nose: Mucosal edema present  Right sinus exhibits maxillary sinus tenderness  Right sinus exhibits no frontal sinus tenderness  Left sinus exhibits maxillary sinus tenderness  Left sinus exhibits no frontal sinus tenderness  Mouth/Throat: Oropharynx is clear and moist and mucous membranes are normal    Neck: Neck supple  Cardiovascular: Normal rate, regular rhythm and normal heart sounds  Pulmonary/Chest: Effort normal and breath sounds normal    Abdominal: Normal appearance and bowel sounds are normal  There is no hepatosplenomegaly  There is no tenderness  There is no rebound  Musculoskeletal: Normal range of motion  Lymphadenopathy:        Right cervical: No superficial cervical and no posterior cervical adenopathy present  Left cervical: No superficial cervical and no posterior cervical adenopathy present     Neurological: She is alert and oriented to person, place, and time  Skin: Skin is warm and dry  Psychiatric: She has a normal mood and affect  Her behavior is normal  Judgment and thought content normal    Vitals reviewed                    ROSE Mancilla

## 2019-10-14 ENCOUNTER — TELEPHONE (OUTPATIENT)
Dept: FAMILY MEDICINE CLINIC | Facility: CLINIC | Age: 39
End: 2019-10-14

## 2019-10-14 ENCOUNTER — APPOINTMENT (OUTPATIENT)
Dept: RADIOLOGY | Facility: CLINIC | Age: 39
End: 2019-10-14
Payer: COMMERCIAL

## 2019-10-14 DIAGNOSIS — R09.81 NASAL CONGESTION: ICD-10-CM

## 2019-10-14 DIAGNOSIS — R51.9 NONINTRACTABLE HEADACHE, UNSPECIFIED CHRONICITY PATTERN, UNSPECIFIED HEADACHE TYPE: ICD-10-CM

## 2019-10-14 DIAGNOSIS — J01.90 ACUTE SINUSITIS, RECURRENCE NOT SPECIFIED, UNSPECIFIED LOCATION: Primary | ICD-10-CM

## 2019-10-14 DIAGNOSIS — J01.90 ACUTE SINUSITIS, RECURRENCE NOT SPECIFIED, UNSPECIFIED LOCATION: ICD-10-CM

## 2019-10-14 PROCEDURE — 71046 X-RAY EXAM CHEST 2 VIEWS: CPT

## 2019-10-14 PROCEDURE — 70220 X-RAY EXAM OF SINUSES: CPT

## 2019-10-14 NOTE — TELEPHONE ENCOUNTER
Patient called and xray results discussed  Stated that I do not feel good at all and having some chest tightness with SOB with nasal congestion  Patient seen in office twice in last 14 days and not getting better and sent to ER for further evaluation and agreed   ROSE Kuhn

## 2019-10-14 NOTE — TELEPHONE ENCOUNTER
Patient call and stated that still does not feel good as still feels very congested, headache and blowing nose, sweating  Denies any chest pain, sob  Does not check her temp  Will do xray sinuses and chest xray    ROSE Ly

## 2019-10-16 ENCOUNTER — APPOINTMENT (EMERGENCY)
Dept: RADIOLOGY | Facility: HOSPITAL | Age: 39
End: 2019-10-16
Payer: COMMERCIAL

## 2019-10-16 ENCOUNTER — HOSPITAL ENCOUNTER (EMERGENCY)
Facility: HOSPITAL | Age: 39
Discharge: HOME | End: 2019-10-17
Attending: EMERGENCY MEDICINE
Payer: COMMERCIAL

## 2019-10-16 ENCOUNTER — TELEPHONE (OUTPATIENT)
Dept: FAMILY MEDICINE CLINIC | Facility: CLINIC | Age: 39
End: 2019-10-16

## 2019-10-16 DIAGNOSIS — R11.0 NAUSEA: ICD-10-CM

## 2019-10-16 DIAGNOSIS — E87.6 HYPOKALEMIA: ICD-10-CM

## 2019-10-16 DIAGNOSIS — R53.81 MALAISE: Primary | ICD-10-CM

## 2019-10-16 LAB
ALBUMIN SERPL BCP-MCNC: 3.9 G/DL (ref 3.5–5)
ALP SERPL-CCNC: 46 U/L (ref 46–116)
ALT SERPL W P-5'-P-CCNC: 14 U/L (ref 12–78)
ANION GAP SERPL CALCULATED.3IONS-SCNC: 8 MMOL/L (ref 4–13)
AST SERPL W P-5'-P-CCNC: 8 U/L (ref 5–45)
BACTERIA UR QL AUTO: ABNORMAL /HPF
BASOPHILS # BLD AUTO: 0.03 THOUSANDS/ΜL (ref 0–0.1)
BASOPHILS NFR BLD AUTO: 0 % (ref 0–1)
BILIRUB SERPL-MCNC: 0.4 MG/DL (ref 0.2–1)
BILIRUB UR QL STRIP: NEGATIVE
BUN SERPL-MCNC: 16 MG/DL (ref 5–25)
CALCIUM SERPL-MCNC: 8.6 MG/DL (ref 8.3–10.1)
CHLORIDE SERPL-SCNC: 106 MMOL/L (ref 100–108)
CLARITY UR: ABNORMAL
CO2 SERPL-SCNC: 27 MMOL/L (ref 21–32)
COLOR UR: ABNORMAL
CREAT SERPL-MCNC: 0.99 MG/DL (ref 0.6–1.3)
EOSINOPHIL # BLD AUTO: 0.19 THOUSAND/ΜL (ref 0–0.61)
EOSINOPHIL NFR BLD AUTO: 2 % (ref 0–6)
ERYTHROCYTE [DISTWIDTH] IN BLOOD BY AUTOMATED COUNT: 12.7 % (ref 11.6–15.1)
EXT PREG TEST URINE: NEGATIVE
EXT. CONTROL ED NAV: NORMAL
GFR SERPL CREATININE-BSD FRML MDRD: 71 ML/MIN/1.73SQ M
GLUCOSE SERPL-MCNC: 81 MG/DL (ref 65–140)
GLUCOSE UR STRIP-MCNC: NEGATIVE MG/DL
HCT VFR BLD AUTO: 38.1 % (ref 34.8–46.1)
HGB BLD-MCNC: 12.8 G/DL (ref 11.5–15.4)
HGB UR QL STRIP.AUTO: NEGATIVE
KETONES UR STRIP-MCNC: NEGATIVE MG/DL
LEUKOCYTE ESTERASE UR QL STRIP: ABNORMAL
LYMPHOCYTES # BLD AUTO: 3.63 THOUSANDS/ΜL (ref 0.6–4.47)
LYMPHOCYTES NFR BLD AUTO: 30 % (ref 14–44)
MAGNESIUM SERPL-MCNC: 1.8 MG/DL (ref 1.6–2.6)
MCH RBC QN AUTO: 32.5 PG (ref 26.8–34.3)
MCHC RBC AUTO-ENTMCNC: 33.6 G/DL (ref 31.4–37.4)
MCV RBC AUTO: 97 FL (ref 82–98)
MONOCYTES # BLD AUTO: 0.81 THOUSAND/ΜL (ref 0.17–1.22)
MONOCYTES NFR BLD AUTO: 7 % (ref 4–12)
NEUTROPHILS # BLD AUTO: 7.66 THOUSANDS/ΜL (ref 1.85–7.62)
NEUTS SEG NFR BLD AUTO: 61 % (ref 43–75)
NITRITE UR QL STRIP: NEGATIVE
NON-SQ EPI CELLS URNS QL MICRO: ABNORMAL /HPF
PH UR STRIP.AUTO: 7 [PH]
PLATELET # BLD AUTO: 294 THOUSANDS/UL (ref 149–390)
PMV BLD AUTO: 9.2 FL (ref 8.9–12.7)
POTASSIUM SERPL-SCNC: 3.3 MMOL/L (ref 3.5–5.3)
PROT SERPL-MCNC: 6.6 G/DL (ref 6.4–8.2)
PROT UR STRIP-MCNC: ABNORMAL MG/DL
RBC # BLD AUTO: 3.94 MILLION/UL (ref 3.81–5.12)
RBC #/AREA URNS AUTO: ABNORMAL /HPF
SODIUM SERPL-SCNC: 141 MMOL/L (ref 136–145)
SP GR UR STRIP.AUTO: 1.02 (ref 1–1.03)
UROBILINOGEN UR QL STRIP.AUTO: 0.2 E.U./DL
WBC # BLD AUTO: 12.32 THOUSAND/UL (ref 4.31–10.16)
WBC #/AREA URNS AUTO: ABNORMAL /HPF

## 2019-10-16 PROCEDURE — 71045 X-RAY EXAM CHEST 1 VIEW: CPT

## 2019-10-16 PROCEDURE — 96374 THER/PROPH/DIAG INJ IV PUSH: CPT

## 2019-10-16 PROCEDURE — 80053 COMPREHEN METABOLIC PANEL: CPT | Performed by: EMERGENCY MEDICINE

## 2019-10-16 PROCEDURE — 36415 COLL VENOUS BLD VENIPUNCTURE: CPT | Performed by: EMERGENCY MEDICINE

## 2019-10-16 PROCEDURE — 85025 COMPLETE CBC W/AUTO DIFF WBC: CPT | Performed by: EMERGENCY MEDICINE

## 2019-10-16 PROCEDURE — 81001 URINALYSIS AUTO W/SCOPE: CPT | Performed by: EMERGENCY MEDICINE

## 2019-10-16 PROCEDURE — 99285 EMERGENCY DEPT VISIT HI MDM: CPT

## 2019-10-16 PROCEDURE — 96375 TX/PRO/DX INJ NEW DRUG ADDON: CPT

## 2019-10-16 PROCEDURE — 96361 HYDRATE IV INFUSION ADD-ON: CPT

## 2019-10-16 PROCEDURE — 81025 URINE PREGNANCY TEST: CPT | Performed by: EMERGENCY MEDICINE

## 2019-10-16 PROCEDURE — 83735 ASSAY OF MAGNESIUM: CPT | Performed by: EMERGENCY MEDICINE

## 2019-10-16 PROCEDURE — 70450 CT HEAD/BRAIN W/O DYE: CPT

## 2019-10-16 PROCEDURE — 87040 BLOOD CULTURE FOR BACTERIA: CPT | Performed by: EMERGENCY MEDICINE

## 2019-10-16 PROCEDURE — 87086 URINE CULTURE/COLONY COUNT: CPT | Performed by: EMERGENCY MEDICINE

## 2019-10-16 RX ORDER — KETOROLAC TROMETHAMINE 30 MG/ML
15 INJECTION, SOLUTION INTRAMUSCULAR; INTRAVENOUS ONCE
Status: COMPLETED | OUTPATIENT
Start: 2019-10-16 | End: 2019-10-16

## 2019-10-16 RX ORDER — ONDANSETRON 2 MG/ML
4 INJECTION INTRAMUSCULAR; INTRAVENOUS ONCE
Status: COMPLETED | OUTPATIENT
Start: 2019-10-16 | End: 2019-10-16

## 2019-10-16 RX ADMIN — KETOROLAC TROMETHAMINE 15 MG: 30 INJECTION INTRAMUSCULAR; INTRAVENOUS at 22:13

## 2019-10-16 RX ADMIN — SODIUM CHLORIDE 1000 ML: 0.9 INJECTION, SOLUTION INTRAVENOUS at 22:10

## 2019-10-16 RX ADMIN — ONDANSETRON 4 MG: 2 INJECTION INTRAMUSCULAR; INTRAVENOUS at 22:11

## 2019-10-16 NOTE — TELEPHONE ENCOUNTER
Spoke with pt, aware of conversation that she had with Deborah Poe ,that pt had agreed to go to ED for further evaluation  Phone conversation was disconnected  I called pt back twice and there was no answer  VM not set up     Ronen Anne MA

## 2019-10-17 ENCOUNTER — VBI (OUTPATIENT)
Dept: ADMINISTRATIVE | Facility: OTHER | Age: 39
End: 2019-10-17

## 2019-10-17 VITALS
DIASTOLIC BLOOD PRESSURE: 62 MMHG | RESPIRATION RATE: 16 BRPM | HEART RATE: 82 BPM | SYSTOLIC BLOOD PRESSURE: 100 MMHG | TEMPERATURE: 99.2 F | OXYGEN SATURATION: 100 %

## 2019-10-17 PROCEDURE — 96361 HYDRATE IV INFUSION ADD-ON: CPT

## 2019-10-17 PROCEDURE — 96376 TX/PRO/DX INJ SAME DRUG ADON: CPT

## 2019-10-17 RX ORDER — ONDANSETRON 4 MG/1
4 TABLET, ORALLY DISINTEGRATING ORAL EVERY 6 HOURS PRN
Qty: 10 TABLET | Refills: 0 | Status: SHIPPED | OUTPATIENT
Start: 2019-10-17 | End: 2019-12-06 | Stop reason: ALTCHOICE

## 2019-10-17 RX ORDER — ONDANSETRON 2 MG/ML
4 INJECTION INTRAMUSCULAR; INTRAVENOUS ONCE
Status: COMPLETED | OUTPATIENT
Start: 2019-10-17 | End: 2019-10-17

## 2019-10-17 RX ORDER — POTASSIUM CHLORIDE 20 MEQ/1
40 TABLET, EXTENDED RELEASE ORAL ONCE
Status: COMPLETED | OUTPATIENT
Start: 2019-10-17 | End: 2019-10-17

## 2019-10-17 RX ADMIN — POTASSIUM CHLORIDE 40 MEQ: 1500 TABLET, EXTENDED RELEASE ORAL at 00:42

## 2019-10-17 RX ADMIN — ONDANSETRON 4 MG: 2 INJECTION INTRAMUSCULAR; INTRAVENOUS at 00:41

## 2019-10-17 NOTE — TELEPHONE ENCOUNTER
[facility]    [facility] Information     Ed visit date:10/16/19  Diagnosis Description: lethargy  In Network? Yes Jose Pitts  Discharge status: Home  Discharged with meds ?  No  Number of ED visits to date: 1  ED Severity:n/a     Outreach Information    Outreach successful: Yes 1  Date letter mailed:n/a  Date Finalized:10/17/2019    Care Coordination    declined  Transportation issues ? no    Value Consolidated Phillip

## 2019-10-17 NOTE — ED PROVIDER NOTES
History  Chief Complaint   Patient presents with   Gentry Rivera     states started two weeks ago with sore throat and progressed, on second round of antibiotics  nauseated, fatigued, had neg chest and sinus xrays     Pt in ER with c/o lethargy  She was diagnosed with sinusitis approx 2wks ago, started on a course of amox, and recently started on clarithromycin  Pt states that symptoms persist  She c/o nausea, headache, generalized weakness  She denies photophobia/phonophobia  Pt had a sore throat at the onset of her symptoms, which has since resolved  She denies fevers/urinary symptoms/abd pain  Pt is on a course of prednisone, which is scheduled to be completed tomorrow  She also has 3 more days of clarithromycin  History provided by:  Patient   used: No        Prior to Admission Medications   Prescriptions Last Dose Informant Patient Reported? Taking?    ALPRAZolam (XANAX) 1 mg tablet 10/15/2019 at Unknown time  Yes Yes   Sig: Take 1 mg by mouth 3 (three) times a day as needed for anxiety   buPROPion (WELLBUTRIN XL) 300 mg 24 hr tablet 10/15/2019 at Unknown time  Yes Yes   Sig: Take by mouth daily    clarithromycin (BIAXIN XL) 500 MG 24 hr tablet 10/16/2019 at Unknown time  No Yes   Sig: Take 2 tablets (1,000 mg total) by mouth daily for 10 days   fluticasone (FLONASE) 50 mcg/act nasal spray Past Month at Unknown time  No Yes   Si sprays into each nostril daily   lamoTRIgine (LaMICtal) 100 mg tablet 10/15/2019 at Unknown time  Yes Yes   Sig: Take 1 tablet by mouth daily   meclizine (ANTIVERT) 12 5 MG tablet Past Week at Unknown time  No Yes   Sig: Take 1 tablet (12 5 mg total) by mouth 3 (three) times a day as needed for dizziness   methylPREDNISolone 4 MG tablet therapy pack Not Taking at Unknown time  No No   Sig: Use as directed on package   Patient not taking: Reported on 10/16/2019   methylphenidate (CONCERTA) 18 mg ER tablet Past Week at Unknown time  Yes Yes   Sig: Take 18 mg by mouth daily       Facility-Administered Medications: None       Past Medical History:   Diagnosis Date    Allergic rhinitis     Anxiety     Chronic constipation     Depression     Psychiatric disorder     anxiety        Past Surgical History:   Procedure Laterality Date    APPENDECTOMY      BREAST SURGERY      augmentation    COLONOSCOPY N/A 2018    Procedure: COLONOSCOPY WITH HEMORRHOID BANDING;  Surgeon: Cally Galvan MD;  Location: [facility] GI LAB; Service: Gastroenterology    LAPAROSCOPIC TUBAL LIGATION      OVARY SURGERY Bilateral     [place] SIGMOIDOSCOPY FLX DX W/COLLJ SPEC BR/WA IF PFRMD N/A 2018    Procedure: FLEXIBLE SIGMOIDOSCOPY WITH APC; Surgeon: Cally Galvan MD;  Location: [facility] GI LAB; Service: Gastroenterology       Family History   Problem Relation Age of Onset    No Known Problems Mother     No Known Problems Father     No Known Problems Sister     No Known Problems Brother     No Known Problems Daughter     No Known Problems Son      I have reviewed and agree with the history as documented  Social History     Tobacco Use    Smoking status: Former Smoker     Types: Cigarettes     Last attempt to quit:      Years since quittin 7    Smokeless tobacco: Never Used   Substance Use Topics    Alcohol use: Yes     Comment: occassional - wine 2 x month    Drug use: No        Review of Systems   Constitutional: Negative for chills and fever  HENT: Negative for ear discharge and ear pain  Respiratory: Negative for cough, chest tightness and shortness of breath  Gastrointestinal: Positive for nausea  Negative for abdominal pain, diarrhea and vomiting  Genitourinary: Negative for dysuria, frequency, hematuria and urgency  Musculoskeletal: Negative for back pain, neck pain and neck stiffness  Neurological: Positive for weakness, light-headedness and headaches  Negative for dizziness, seizures, syncope, facial asymmetry, speech difficulty and numbness     All other systems reviewed and are negative  Physical Exam  Physical Exam   Constitutional: She is oriented to person, place, and time  She appears well-developed and well-nourished  No distress  HENT:   Head: Normocephalic and atraumatic  Eyes: Pupils are equal, round, and reactive to light  Conjunctivae are normal    Neck: Normal range of motion  Neck supple  Cardiovascular: Normal rate, regular rhythm and normal heart sounds  No murmur heard  Pulmonary/Chest: Effort normal and breath sounds normal  No respiratory distress  Abdominal: Soft  Bowel sounds are normal  She exhibits no distension  There is no tenderness  Musculoskeletal: Normal range of motion  She exhibits no edema or deformity  Neurological: She is alert and oriented to person, place, and time  No cranial nerve deficit  Skin: Skin is warm and dry  No rash noted  She is not diaphoretic  No pallor  Psychiatric: Her speech is normal and behavior is normal  Her affect is blunt  She exhibits normal recent memory and normal remote memory  Nursing note and vitals reviewed        Vital Signs  ED Triage Vitals [10/16/19 2123]   Temperature Pulse Respirations Blood Pressure SpO2   100 °F (37 8 °C) 103 20 117/70 100 %      Temp Source Heart Rate Source Patient Position - Orthostatic VS BP Location FiO2 (%)   Tympanic Monitor Sitting Right arm --      Pain Score       6           Vitals:    10/16/19 2123 10/17/19 0001 10/17/19 0047 10/17/19 0049   BP: 117/70 104/69 100/62 100/62   Pulse: 103 85 82 82   Patient Position - Orthostatic VS: Sitting Sitting Sitting          Visual Acuity      ED Medications  Medications   ketorolac (TORADOL) injection 15 mg (15 mg Intravenous Given 10/16/19 2213)   ondansetron (ZOFRAN) injection 4 mg (4 mg Intravenous Given 10/16/19 2211)   sodium chloride 0 9 % bolus 1,000 mL (0 mL Intravenous Stopped 10/17/19 0048)   potassium chloride (K-DUR,KLOR-CON) CR tablet 40 mEq (40 mEq Oral Given 10/17/19 0042) ondansetron [facility] injection 4 mg (4 mg Intravenous Given 10/17/19 0041)       Diagnostic Studies  Results Reviewed     Procedure Component Value Units Date/Time    Blood culture #2 [[id number]] Collected:  10/16/19 2340    Lab Status: In process Specimen:  Blood from Arm, Right Updated:  10/16/19 2352    Blood culture #1 [[id number]] Collected:  10/16/19 2348    Lab Status: In process Specimen:  Blood from Arm, Left Updated:  10/16/19 2352    Urine culture [[id number]] Collected:  10/16/19 2208    Lab Status:   In process Specimen:  Urine Updated:  10/16/19 2351    Comprehensive metabolic panel [[id number]]  (Abnormal) Collected:  10/16/19 2208    Lab Status:  Final result Specimen:  Blood from Arm, Right Updated:  10/16/19 2232     Sodium 141 mmol/L      Potassium 3 3 mmol/L      Chloride 106 mmol/L      CO2 27 mmol/L      ANION GAP 8 mmol/L      BUN 16 mg/dL      Creatinine 0 99 mg/dL      Glucose 81 mg/dL      Calcium 8 6 mg/dL      AST 8 U/L      ALT 14 U/L      Alkaline Phosphatase 46 U/L      Total Protein 6 6 g/dL      Albumin 3 9 g/dL      Total Bilirubin 0 40 mg/dL      eGFR 71 ml/min/1 73sq m     Narrative:       [place] guidelines for Chronic Kidney Disease (CKD):     Stage 1 with normal or high GFR (GFR > 90 mL/min/1 73 square meters)    Stage 2 Mild CKD (GFR = 60-89 mL/min/1 73 square meters)    Stage 3A Moderate CKD (GFR = 45-59 mL/min/1 73 square meters)    Stage 3B Moderate CKD (GFR = 30-44 mL/min/1 73 square meters)    Stage 4 Severe CKD (GFR = 15-29 mL/min/1 73 square meters)    Stage 5 End Stage CKD (GFR <15 mL/min/1 73 square meters)  Note: GFR calculation is accurate only with a steady state creatinine    Magnesium [[id number]]  (Normal) Collected:  10/16/19 2208    Lab Status:  Final result Specimen:  Blood from Arm, Right Updated:  10/16/19 2232     Magnesium 1 8 mg/dL     Urine Microscopic [[id number]]  (Abnormal) Collected:  10/16/19 2208    Lab Status: Final result Specimen:  Urine, Clean Catch Updated:  10/16/19 2221     RBC, UA None Seen /hpf      WBC, UA 2-4 /hpf      Epithelial Cells Innumerable /hpf      Bacteria, UA Moderate /hpf     UA w Reflex to Microscopic [[id number]]  (Abnormal) Collected:  10/16/19 2208    Lab Status:  Final result Specimen:  Urine, Clean Catch Updated:  10/16/19 2215     Color, UA Light Yellow     Clarity, UA Slightly Cloudy     Specific [place], UA 1 020     pH, UA 7 0     Leukocytes, UA Small     Nitrite, UA Negative     Protein, UA Trace mg/dl      Glucose, UA Negative mg/dl      Ketones, UA Negative mg/dl      Urobilinogen, UA 0 2 E U /dl      Bilirubin, UA Negative     Blood, UA Negative    CBC and differential [[id number]]  (Abnormal) Collected:  10/16/19 2208    Lab Status:  Final result Specimen:  Blood from Arm, Right Updated:  10/16/19 2214     WBC 12 32 Thousand/uL      RBC 3 94 Million/uL      Hemoglobin 12 8 g/dL      Hematocrit 38 1 %      MCV 97 fL      MCH 32 5 pg      MCHC 33 6 g/dL      RDW 12 7 %      MPV 9 2 fL      Platelets 904 Thousands/uL      Neutrophils Relative 61 %      Lymphocytes Relative 30 %      Monocytes Relative 7 %      Eosinophils Relative 2 %      Basophils Relative 0 %      Neutrophils Absolute 7 66 Thousands/µL      Lymphocytes Absolute 3 63 Thousands/µL      Monocytes Absolute 0 81 Thousand/µL      Eosinophils Absolute 0 19 Thousand/µL      Basophils Absolute 0 03 Thousands/µL     POCT pregnancy, urine [[id number]]  (Normal) Resulted:  10/16/19 2208    Lab Status:  Final result Updated:  10/16/19 2208     EXT PREG TEST UR (Ref: Negative) negative     Control valid                 XR chest 1 view portable   ED Interpretation by Jose G Mercado DO (10/16 2251)   nad      CT head without contrast   Final Result by Florinda Salas DO (10/16 2309)      No acute intracranial abnormality is seen                    Workstation performed: [id number]                    Procedures  Procedures ED Course                               MDM  Number of Diagnoses or Management Options  Hypokalemia:   Malaise:   Nausea:      Amount and/or Complexity of Data Reviewed  Clinical lab tests: ordered and reviewed  Tests in the radiology section of CPT®: ordered    Risk of Complications, Morbidity, and/or Mortality  Presenting problems: moderate  Diagnostic procedures: moderate  Management options: moderate    Patient Progress  Patient progress: stable      Disposition  Final diagnoses:   Malaise   Hypokalemia   Nausea     Time reflects when diagnosis was documented in both MDM as applicable and the Disposition within this note     Time User Action Codes Description Comment    10/17/2019 12:21 AM Say Neyda Add [R53 81] Malaise     10/17/2019 12:21 AM Say Neyda O Add [E87 6] Hypokalemia     10/17/2019 12:22 AM Say Neyda Add [R11 0] Nausea       ED Disposition     ED Disposition Condition Date/Time Comment    Discharge Stable u Oct 17, 2019 12:24 AM Melissa Hawkins discharge to home/self care              Follow-up Information     Follow up With Specialties Details Why Contact Info Additional Information    Blair Calderón, [address] [facility], Nurse Practitioner Schedule an appointment as soon as possible for a visit in 1 day for follow up [address] [facility] [phone] [address] [facility] Rheumatology Schedule an appointment as soon as possible for a visit in 1 day for follow up Isa Lund [address] [facility] [address], [address] [facility], [place], [place], [address] [facility]          Discharge Medication List as of 10/17/2019 12:28 AM      START taking these medications    Details   ondansetron (ZOFRAN-ODT) 4 mg disintegrating tablet Take 1 tablet (4 mg total) by mouth every 6 (six) hours as needed for nausea or vomiting, Starting Thu 10/17/2019, Normal         CONTINUE these medications which have NOT CHANGED    Details   ALPRAZolam (XANAX) 1 mg tablet Take 1 mg by mouth 3 (three) times a day as needed for anxiety, Until Discontinued, Historical Med      buPROPion (WELLBUTRIN XL) 300 mg 24 hr tablet Take by mouth daily , Starting Thu 9/19/2019, Historical Med      clarithromycin (BIAXIN XL) 500 MG 24 hr tablet Take 2 tablets (1,000 mg total) by mouth daily for 10 days, Starting Thu 10/10/2019, Until Sun 10/20/2019, Normal      fluticasone (FLONASE) 50 mcg/act nasal spray 2 sprays into each nostril daily, Starting Tue 10/1/2019, Normal      lamoTRIgine (LaMICtal) 100 mg tablet Take 1 tablet by mouth daily, Starting Fri 4/6/2018, Historical Med      meclizine (ANTIVERT) 12 5 MG tablet Take 1 tablet (12 5 mg total) by mouth 3 (three) times a day as needed for dizziness, Starting Tue 10/1/2019, Normal      methylphenidate (CONCERTA) 18 mg ER tablet Take 18 mg by mouth daily , Starting Tue 10/1/2019, Historical Med      methylPREDNISolone 4 MG tablet therapy pack Use as directed on package, Normal           No discharge procedures on file      ED Provider  Electronically Signed by           Jenny Houston DO  10/17/19 1998

## 2019-10-18 LAB — BACTERIA UR CULT: NORMAL

## 2019-10-21 ENCOUNTER — OFFICE VISIT (OUTPATIENT)
Dept: FAMILY MEDICINE CLINIC | Facility: CLINIC | Age: 39
End: 2019-10-21
Payer: COMMERCIAL

## 2019-10-21 VITALS
HEART RATE: 84 BPM | WEIGHT: 126 LBS | DIASTOLIC BLOOD PRESSURE: 62 MMHG | RESPIRATION RATE: 16 BRPM | BODY MASS INDEX: 22.32 KG/M2 | SYSTOLIC BLOOD PRESSURE: 96 MMHG | TEMPERATURE: 98.2 F | HEIGHT: 63 IN

## 2019-10-21 DIAGNOSIS — M25.50 ARTHRALGIA, UNSPECIFIED JOINT: ICD-10-CM

## 2019-10-21 DIAGNOSIS — M79.10 MYALGIA: ICD-10-CM

## 2019-10-21 DIAGNOSIS — E55.9 VITAMIN D INSUFFICIENCY: ICD-10-CM

## 2019-10-21 DIAGNOSIS — R53.83 FATIGUE, UNSPECIFIED TYPE: Primary | ICD-10-CM

## 2019-10-21 DIAGNOSIS — R11.0 NAUSEA: ICD-10-CM

## 2019-10-21 DIAGNOSIS — R06.02 SHORTNESS OF BREATH ON EXERTION: ICD-10-CM

## 2019-10-21 DIAGNOSIS — R51.9 ACUTE NONINTRACTABLE HEADACHE, UNSPECIFIED HEADACHE TYPE: ICD-10-CM

## 2019-10-21 PROCEDURE — 99214 OFFICE O/P EST MOD 30 MIN: CPT | Performed by: NURSE PRACTITIONER

## 2019-10-21 PROCEDURE — 3008F BODY MASS INDEX DOCD: CPT | Performed by: NURSE PRACTITIONER

## 2019-10-21 NOTE — LETTER
October 21, 2019     Patient: Avinash Overton   YOB: 1980   Date of Visit: 10/21/2019       To Whom it May Concern:    Selena Art is under my professional care  She was seen in my office on 10/21/2019  She may return to work on 10/22/2019  If you have any questions or concerns, please don't hesitate to call           Sincerely,          ROSE Caro        CC: No Recipients

## 2019-10-21 NOTE — LETTER
October 21, 2019     Patient: Vonda St   YOB: 1980   Date of Visit: 10/21/2019       To Whom it May Concern:    Lilliam Guevara is under my professional care  She was seen in my office on 10/21/2019  She may return to work on 10/22/2019  Please excuse from work on 10/17/2019 and 10/18/2019  If you have any questions or concerns, please don't hesitate to call           Sincerely,          ROSE Andrew        CC: No Recipients

## 2019-10-22 LAB
BACTERIA BLD CULT: NORMAL
BACTERIA BLD CULT: NORMAL

## 2019-10-23 LAB
25(OH)D3+25(OH)D2 SERPL-MCNC: 31.7 NG/ML (ref 30–100)
ALBUMIN SERPL-MCNC: 4.6 G/DL (ref 3.5–5.5)
ALBUMIN/GLOB SERPL: 2.4 {RATIO} (ref 1.2–2.2)
ALP SERPL-CCNC: 42 IU/L (ref 39–117)
ALT SERPL-CCNC: 11 IU/L (ref 0–32)
ANA TITR SER IF: NEGATIVE {TITER}
AST SERPL-CCNC: 14 IU/L (ref 0–40)
B BURGDOR IGG+IGM SER-ACNC: <0.91 ISR (ref 0–0.9)
B BURGDOR IGM SER IA-ACNC: <0.8 INDEX (ref 0–0.79)
B MICROTI IGG TITR SER: NORMAL {TITER}
B MICROTI IGM TITR SER: NORMAL {TITER}
BASOPHILS # BLD AUTO: 0 X10E3/UL (ref 0–0.2)
BASOPHILS NFR BLD AUTO: 0 %
BILIRUB SERPL-MCNC: 0.4 MG/DL (ref 0–1.2)
BUN SERPL-MCNC: 13 MG/DL (ref 6–24)
BUN/CREAT SERPL: 16 (ref 9–23)
CALCIUM SERPL-MCNC: 9.4 MG/DL (ref 8.7–10.2)
CCP IGA+IGG SERPL IA-ACNC: 6 UNITS (ref 0–19)
CHLORIDE SERPL-SCNC: 104 MMOL/L (ref 96–106)
CK MB SERPL-MCNC: <1 NG/ML (ref 0–5.3)
CO2 SERPL-SCNC: 21 MMOL/L (ref 20–29)
CREAT SERPL-MCNC: 0.79 MG/DL (ref 0.57–1)
CRP SERPL-MCNC: <1 MG/L (ref 0–10)
EOSINOPHIL # BLD AUTO: 0.1 X10E3/UL (ref 0–0.4)
EOSINOPHIL NFR BLD AUTO: 2 %
ERYTHROCYTE [DISTWIDTH] IN BLOOD BY AUTOMATED COUNT: 12.9 % (ref 12.3–15.4)
ERYTHROCYTE [SEDIMENTATION RATE] IN BLOOD BY WESTERGREN METHOD: 2 MM/HR (ref 0–32)
GLOBULIN SER-MCNC: 1.9 G/DL (ref 1.5–4.5)
GLUCOSE SERPL-MCNC: 73 MG/DL (ref 65–99)
HCT VFR BLD AUTO: 38.7 % (ref 34–46.6)
HGB BLD-MCNC: 13.2 G/DL (ref 11.1–15.9)
IMM GRANULOCYTES # BLD: 0 X10E3/UL (ref 0–0.1)
IMM GRANULOCYTES NFR BLD: 0 %
LYMPHOCYTES # BLD AUTO: 2 X10E3/UL (ref 0.7–3.1)
LYMPHOCYTES NFR BLD AUTO: 34 %
MCH RBC QN AUTO: 31.4 PG (ref 26.6–33)
MCHC RBC AUTO-ENTMCNC: 34.1 G/DL (ref 31.5–35.7)
MCV RBC AUTO: 92 FL (ref 79–97)
MONOCYTES # BLD AUTO: 0.5 X10E3/UL (ref 0.1–0.9)
MONOCYTES NFR BLD AUTO: 9 %
NEUTROPHILS # BLD AUTO: 3.3 X10E3/UL (ref 1.4–7)
NEUTROPHILS NFR BLD AUTO: 55 %
PLATELET # BLD AUTO: 276 X10E3/UL (ref 150–450)
POTASSIUM SERPL-SCNC: 4.2 MMOL/L (ref 3.5–5.2)
PROT SERPL-MCNC: 6.5 G/DL (ref 6–8.5)
RBC # BLD AUTO: 4.2 X10E6/UL (ref 3.77–5.28)
RHEUMATOID FACT SERPL-ACNC: <10 IU/ML (ref 0–13.9)
SL AMB EGFR AFRICAN AMERICAN: 108 ML/MIN/1.73
SL AMB EGFR NON AFRICAN AMERICAN: 93 ML/MIN/1.73
SODIUM SERPL-SCNC: 141 MMOL/L (ref 134–144)
TSH SERPL DL<=0.005 MIU/L-ACNC: 0.87 UIU/ML (ref 0.45–4.5)
WBC # BLD AUTO: 5.9 X10E3/UL (ref 3.4–10.8)

## 2019-10-24 ENCOUNTER — TELEPHONE (OUTPATIENT)
Dept: FAMILY MEDICINE CLINIC | Facility: CLINIC | Age: 39
End: 2019-10-24

## 2019-10-24 DIAGNOSIS — R53.83 FATIGUE, UNSPECIFIED TYPE: Primary | ICD-10-CM

## 2019-10-24 DIAGNOSIS — R11.0 NAUSEA: ICD-10-CM

## 2019-10-24 DIAGNOSIS — M25.50 ARTHRALGIA, UNSPECIFIED JOINT: ICD-10-CM

## 2019-10-24 DIAGNOSIS — R09.81 NASAL CONGESTION: ICD-10-CM

## 2019-10-24 DIAGNOSIS — M79.10 MYALGIA: ICD-10-CM

## 2019-10-28 LAB
EBV EA IGG SER-ACNC: 27.5 U/ML (ref 0–8.9)
EBV NA IGG SER IA-ACNC: 106 U/ML (ref 0–17.9)
EBV PATRN SPEC IB-IMP: ABNORMAL
EBV VCA IGG SER IA-ACNC: 510 U/ML (ref 0–17.9)
EBV VCA IGM SER IA-ACNC: <36 U/ML (ref 0–35.9)

## 2019-10-29 ENCOUNTER — TELEPHONE (OUTPATIENT)
Dept: FAMILY MEDICINE CLINIC | Facility: CLINIC | Age: 39
End: 2019-10-29

## 2019-10-29 DIAGNOSIS — N89.8 VAGINAL ITCHING: ICD-10-CM

## 2019-10-29 DIAGNOSIS — R53.83 FATIGUE, UNSPECIFIED TYPE: Primary | ICD-10-CM

## 2019-10-29 RX ORDER — FLUCONAZOLE 150 MG/1
150 TABLET ORAL ONCE
Qty: 1 TABLET | Refills: 0 | Status: SHIPPED | OUTPATIENT
Start: 2019-10-29 | End: 2019-10-29

## 2019-10-29 RX ORDER — METHYLPREDNISOLONE 4 MG/1
TABLET ORAL
Qty: 21 TABLET | Refills: 0 | Status: SHIPPED | OUTPATIENT
Start: 2019-10-29 | End: 2019-12-06 | Stop reason: ALTCHOICE

## 2019-10-29 NOTE — LETTER
October 29, 2019     Patient: Colby Alvarado   YOB: 1980   Date of Visit: 10/29/2019       To Whom it May Concern:    Libby Renteria is under my professional care  Please excuse from work on 10/30/2019 and 10/31/2019 and will resume work on 11/1/2019  If you have any questions or concerns, please don't hesitate to call           Sincerely,          ROSE Kuhn        CC: No Recipients

## 2019-10-29 NOTE — TELEPHONE ENCOUNTER
Patient called and discussed EBV results  Also had mono in past at young age  Patient stated that her headache is resolved and getting much better but still having fatigue and would like 2 days off to get more rest as goes to [employer] every day  Stated that overall getting much better  Last time did not get diflucan and having vaginal itching  Will send steroid pack with diflucan and will get 2 days off from work    ROSE Urbina

## 2019-12-06 ENCOUNTER — OFFICE VISIT (OUTPATIENT)
Dept: FAMILY MEDICINE CLINIC | Facility: CLINIC | Age: 39
End: 2019-12-06
Payer: COMMERCIAL

## 2019-12-06 VITALS
BODY MASS INDEX: 22.15 KG/M2 | TEMPERATURE: 98.1 F | DIASTOLIC BLOOD PRESSURE: 78 MMHG | HEART RATE: 96 BPM | WEIGHT: 125 LBS | SYSTOLIC BLOOD PRESSURE: 110 MMHG | RESPIRATION RATE: 16 BRPM | HEIGHT: 63 IN

## 2019-12-06 DIAGNOSIS — S46.812A STRAIN OF LEFT TRAPEZIUS MUSCLE, INITIAL ENCOUNTER: Primary | ICD-10-CM

## 2019-12-06 PROCEDURE — 99213 OFFICE O/P EST LOW 20 MIN: CPT | Performed by: NURSE PRACTITIONER

## 2019-12-06 PROCEDURE — 3008F BODY MASS INDEX DOCD: CPT | Performed by: NURSE PRACTITIONER

## 2019-12-06 PROCEDURE — 1036F TOBACCO NON-USER: CPT | Performed by: NURSE PRACTITIONER

## 2019-12-06 RX ORDER — CYCLOBENZAPRINE HCL 10 MG
10 TABLET ORAL
Qty: 30 TABLET | Refills: 0 | Status: SHIPPED | OUTPATIENT
Start: 2019-12-06 | End: 2020-03-05

## 2019-12-06 RX ORDER — PREDNISONE 10 MG/1
TABLET ORAL
Qty: 20 TABLET | Refills: 0 | Status: SHIPPED | OUTPATIENT
Start: 2019-12-06 | End: 2019-12-16

## 2019-12-06 RX ORDER — FLUCONAZOLE 150 MG/1
TABLET ORAL
COMMUNITY
Start: 2019-10-29 | End: 2019-12-06 | Stop reason: ALTCHOICE

## 2019-12-06 NOTE — PROGRESS NOTES
Assessment/Plan:    1  Strain of left trapezius muscle, initial encounter  -     cyclobenzaprine (FLEXERIL) 10 mg tablet; Take 1 tablet (10 mg total) by mouth daily at bedtime for 20 days  -     predniSONE 10 mg tablet; 4 tabs x 2 days, 3 tabs x 2 days, 2 tabs x 2 days, 1 tab x 2 days          BMI Counseling: Body mass index is 22 14 kg/m²  Discussed the patient's BMI with her  Patient Instructions: Take prednisone with food in morning and do not take any NSAID's while taking prednisone  Do not drive and operate any machinery after taking muscle relaxant  Supportive care discussed and advised  Advised to RTO for any worsening and no improvement  Follow up for no improvement and worsening of conditions  Patient advised and educated when to see immediate medical care  Return if symptoms worsen or fail to improve  No future appointments  Subjective:      Patient ID: Bailee Gross is a 37 y o  female  Chief Complaint   Patient presents with    Neck Pain     for 2 days prcma    Shoulder Pain         Vitals:  /78   Pulse 96   Temp 98 1 °F (36 7 °C)   Resp 16   Ht 5' 3" (1 6 m)   Wt 56 7 kg (125 lb)   BMI 22 14 kg/m²     HPI  Patient stated that started with pain in her neck about 2 days ago radiating to left shoulders  Went to [employer] yesterday and felt better  Stated that but still having lot of pain and tried advil but no relief  Denies any numbness/tingling/weakness of extremities  The following portions of the patient's history were reviewed and updated as appropriate: allergies, current medications, past family history, past medical history, past social history, past surgical history and problem list       Review of Systems   Constitutional: Negative  Respiratory: Negative  Cardiovascular: Negative  Musculoskeletal: Positive for arthralgias and neck pain  Negative for neck stiffness  Skin: Negative  Psychiatric/Behavioral: Negative  Objective:    Social History     Tobacco Use   Smoking Status Former Smoker    Types: Cigarettes    Last attempt to quit:     Years since quittin 9   Smokeless Tobacco Never Used       Allergies: Allergies   Allergen Reactions    Doxycycline Other (See Comments)     unknown         Current Outpatient Medications   Medication Sig Dispense Refill    ALPRAZolam (XANAX) 1 mg tablet Take 1 mg by mouth 3 (three) times a day as needed for anxiety      lamoTRIgine (LaMICtal) 100 mg tablet Take 1 tablet by mouth daily      methylphenidate (CONCERTA) 18 mg ER tablet Take 18 mg by mouth daily       cyclobenzaprine (FLEXERIL) 10 mg tablet Take 1 tablet (10 mg total) by mouth daily at bedtime for 20 days 30 tablet 0    predniSONE 10 mg tablet 4 tabs x 2 days, 3 tabs x 2 days, 2 tabs x 2 days, 1 tab x 2 days 20 tablet 0     No current facility-administered medications for this visit  Physical Exam   Constitutional: She is oriented to person, place, and time  She appears well-developed and well-nourished  Cardiovascular: Normal rate, regular rhythm and normal heart sounds  Pulmonary/Chest: Effort normal and breath sounds normal    Musculoskeletal: Normal range of motion  She exhibits tenderness (tender on palpation on left trapeizus muscle  No swelling noted)  She exhibits no edema or deformity  Neurological: She is alert and oriented to person, place, and time  Skin: Skin is warm and dry  No rash noted  Psychiatric: She has a normal mood and affect   Her behavior is normal  Judgment and thought content normal                    ROSE Bah

## 2019-12-30 ENCOUNTER — TELEPHONE (OUTPATIENT)
Dept: FAMILY MEDICINE CLINIC | Facility: CLINIC | Age: 39
End: 2019-12-30

## 2019-12-30 DIAGNOSIS — Z20.828 EXPOSURE TO INFLUENZA: Primary | ICD-10-CM

## 2019-12-30 RX ORDER — OSELTAMIVIR PHOSPHATE 75 MG/1
75 CAPSULE ORAL DAILY
Qty: 10 CAPSULE | Refills: 0 | Status: SHIPPED | OUTPATIENT
Start: 2019-12-30 | End: 2020-01-09

## 2019-12-30 NOTE — TELEPHONE ENCOUNTER
diagnosed with flu yesterday at [facility] and she was told to call her PCP and have them give her [employer] is Deanne Lemus

## 2020-01-15 ENCOUNTER — TELEPHONE (OUTPATIENT)
Dept: FAMILY MEDICINE CLINIC | Facility: CLINIC | Age: 40
End: 2020-01-15

## 2020-01-15 NOTE — TELEPHONE ENCOUNTER
Called patient back informed her of note and gave her options for her to come in and she stated that she will not be able to come in  And stated that she will call back if she can make it    Pepper Grewal MA

## 2020-01-15 NOTE — TELEPHONE ENCOUNTER
Called patient and she stated that she is having headache blowing out green and yellowish mucus has been dizzy for 1-2 days  She thinks its a poss sinus inf  She stated that she started taking amoxicillin and has taken a total of 1,500 mg  Patient denies chest pain, vomiting, diarrhea    She stated that she cant come in till Saturday and did not want to break the Abx cycle  Pablo Shelley MA

## 2020-01-15 NOTE — TELEPHONE ENCOUNTER
KEVIN     Patient left a message stating she is sick  She doesn't want to come in, she just wants something called in

## 2020-01-31 ENCOUNTER — HOSPITAL ENCOUNTER (EMERGENCY)
Facility: HOSPITAL | Age: 40
Discharge: HOME | End: 2020-02-01
Attending: EMERGENCY MEDICINE | Admitting: EMERGENCY MEDICINE

## 2020-01-31 VITALS
HEART RATE: 95 BPM | TEMPERATURE: 99.3 F | OXYGEN SATURATION: 99 % | BODY MASS INDEX: 22.68 KG/M2 | HEIGHT: 63 IN | RESPIRATION RATE: 20 BRPM | SYSTOLIC BLOOD PRESSURE: 139 MMHG | WEIGHT: 128 LBS | DIASTOLIC BLOOD PRESSURE: 82 MMHG

## 2020-01-31 DIAGNOSIS — S61.419A HAND LACERATION: Primary | ICD-10-CM

## 2020-01-31 PROCEDURE — 99283 EMERGENCY DEPT VISIT LOW MDM: CPT

## 2020-01-31 PROCEDURE — 90471 IMMUNIZATION ADMIN: CPT

## 2020-02-01 ENCOUNTER — APPOINTMENT (EMERGENCY)
Dept: RADIOLOGY | Facility: HOSPITAL | Age: 40
End: 2020-02-01

## 2020-02-01 PROCEDURE — 99283 EMERGENCY DEPT VISIT LOW MDM: CPT | Performed by: EMERGENCY MEDICINE

## 2020-02-01 PROCEDURE — 73130 X-RAY EXAM OF HAND: CPT

## 2020-02-01 PROCEDURE — 12001 RPR S/N/AX/GEN/TRNK 2.5CM/<: CPT | Performed by: EMERGENCY MEDICINE

## 2020-02-01 PROCEDURE — 90715 TDAP VACCINE 7 YRS/> IM: CPT | Performed by: EMERGENCY MEDICINE

## 2020-02-01 RX ORDER — GINSENG 100 MG
1 CAPSULE ORAL ONCE
Status: COMPLETED | OUTPATIENT
Start: 2020-02-01 | End: 2020-02-01

## 2020-02-01 RX ORDER — LIDOCAINE HYDROCHLORIDE 10 MG/ML
5 INJECTION, SOLUTION EPIDURAL; INFILTRATION; INTRACAUDAL; PERINEURAL ONCE
Status: COMPLETED | OUTPATIENT
Start: 2020-02-01 | End: 2020-02-01

## 2020-02-01 RX ADMIN — BACITRACIN 1 SMALL APPLICATION: 500 OINTMENT TOPICAL at 00:32

## 2020-02-01 RX ADMIN — LIDOCAINE HYDROCHLORIDE 5 ML: 10 INJECTION, SOLUTION EPIDURAL; INFILTRATION; INTRACAUDAL; PERINEURAL at 00:37

## 2020-02-01 RX ADMIN — TETANUS TOXOID, REDUCED DIPHTHERIA TOXOID AND ACELLULAR PERTUSSIS VACCINE, ADSORBED 0.5 ML: 5; 2.5; 8; 8; 2.5 SUSPENSION INTRAMUSCULAR at 00:29

## 2020-02-01 NOTE — DISCHARGE INSTRUCTIONS
Return to the ER for further concerns or worsening symptoms  Follow up with your primary care physician in 1-2 days

## 2020-02-01 NOTE — ED PROVIDER NOTES
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 Taking? ALPRAZolam (XANAX) 1 mg tablet 1/31/2020 at Unknown time  Yes Yes   Sig: Take 1 mg by mouth 3 (three) times a day as needed for anxiety   cyclobenzaprine (FLEXERIL) 10 mg tablet   No No   Sig: Take 1 tablet (10 mg total) by mouth daily at bedtime for 20 days   lamoTRIgine (LaMICtal) 100 mg tablet 1/30/2020 at Unknown time  Yes Yes   Sig: Take 1 tablet by mouth daily   methylphenidate (CONCERTA) 18 mg ER tablet 1/31/2020 at Unknown time  Yes Yes   Sig: Take 18 mg by mouth daily       Facility-Administered Medications: None       Past Medical History:   Diagnosis Date    Allergic rhinitis     Anxiety     Chronic constipation     Depression     Psychiatric disorder     anxiety        Past Surgical History:   Procedure Laterality Date    APPENDECTOMY      BREAST SURGERY      augmentation    COLONOSCOPY N/A 1/24/2018    Procedure: COLONOSCOPY WITH HEMORRHOID BANDING;  Surgeon: Adriano Reyes MD;  Location: [facility] GI LAB; Service: Gastroenterology    LAPAROSCOPIC TUBAL LIGATION      OVARY SURGERY Bilateral     CA SIGMOIDOSCOPY FLX DX W/COLLJ SPEC BR/WA IF PFRMD N/A 11/14/2018    Procedure: FLEXIBLE SIGMOIDOSCOPY WITH APC; Surgeon: Adriano Reyes MD;  Location: [facility] GI LAB;   Service: Gastroenterology       Family History   Problem Relation Age of Onset    No Known Problems Mother     No Known Problems Father     No Known Problems Sister     No Known Problems Brother     No Known Problems Daughter     No Known Problems Son      I have reviewed and agree with the history as documented  Social History     Tobacco Use    Smoking status: Former Smoker     Types: Cigarettes     Last attempt to quit: 2014     Years since quittin 1    Smokeless tobacco: Never Used   Substance Use Topics    Alcohol use: Yes     Comment: occassional - wine 2 x month    Drug use: No        Review of Systems   Constitutional: Negative for chills and fever  Respiratory: Negative for cough, chest tightness and shortness of breath  Gastrointestinal: Negative for abdominal pain, diarrhea, nausea and vomiting  Genitourinary: Negative for dysuria, frequency, hematuria and urgency  Musculoskeletal: Negative for back pain, neck pain and neck stiffness  Skin: Positive for wound  All other systems reviewed and are negative  Physical Exam  Physical Exam   Constitutional: She is oriented to person, place, and time  She appears well-developed and well-nourished  No distress  HENT:   Head: Normocephalic and atraumatic  Eyes: Pupils are equal, round, and reactive to light  Conjunctivae and EOM are normal    Musculoskeletal:        Right hand: She exhibits tenderness and laceration  She exhibits normal range of motion, no bony tenderness, normal two-point discrimination, normal capillary refill, no deformity and no swelling  Normal sensation noted  Normal strength noted  1 5cm partial thickness laceration noted to dorsum of right hand, without active bleeding  Neurological: She is alert and oriented to person, place, and time  Psychiatric: She has a normal mood and affect  Her behavior is normal  Thought content normal    Nursing note and vitals reviewed        Vital Signs  ED Triage Vitals [20]   Temperature Pulse Respirations Blood Pressure SpO2   99 3 °F (37 4 °C) 95 20 139/82 99 %      Temp Source Heart Rate Source Patient Position - Orthostatic VS BP Location FiO2 (%)   Tympanic Monitor Sitting Left arm --      Pain Score       3           Vitals:    207   BP: 139/82   Pulse: 95   Patient Position - Orthostatic VS: Sitting         Visual Acuity      ED Medications  Medications   tetanus-diphtheria-acellular pertussis (BOOSTRIX) IM injection 0 5 mL (0 5 mL Intramuscular Given 2/1/20 0029)   lidocaine (PF) (XYLOCAINE-MPF) 1 % injection 5 mL (5 mL Infiltration Given 2/1/20 0037)   bacitracin topical ointment 1 small application (1 small application Topical Given 2/1/20 0032)       Diagnostic Studies  Results Reviewed     None                 XR hand 3+ views RIGHT   ED Interpretation by Keke Grey DO (02/01 0046)   No foreign body      Final Result by Irene Tucker MD (02/01 1822)      Exam within normal limits  No evidence for radiopaque foreign body  Workstation performed: [id number]                    Procedures  Laceration repair  Date/Time: 2/1/2020 12:35 AM  Performed by: Keke Grey DO  Authorized by: Keke Grey DO   Consent: Verbal consent obtained  Risks and benefits: risks, benefits and alternatives were discussed  Consent given by: patient  Patient understanding: patient states understanding of the procedure being performed  Patient consent: the patient's understanding of the procedure matches consent given  Site marked: the operative site was marked  Required items: required blood products, implants, devices, and special equipment available  Patient identity confirmed: verbally with patient  Time out: Immediately prior to procedure a "time out" was called to verify the correct patient, procedure, equipment, support staff and site/side marked as required    Body area: upper extremity  Location details: right hand  Laceration length: 1 5 cm  Foreign bodies: no foreign bodies  Tendon involvement: none  Nerve involvement: none  Vascular damage: no  Anesthesia: local infiltration    Anesthesia:  Local Anesthetic: lidocaine 1% without epinephrine  Anesthetic total: 3 mL    Sedation:  Patient sedated: no      Wound Dehiscence:  Superficial Wound Dehiscence: simple closure      Procedure Details:  Preparation: Patient was prepped and draped in the usual sterile fashion  Irrigation solution: saline  Irrigation method: tap  Amount of cleaning: extensive  Debridement: none  Degree of undermining: none  Skin closure: 4-0 nylon  Number of sutures: 3  Technique: simple  Approximation: close  Approximation difficulty: simple  Dressing: 4x4 sterile gauze, antibiotic ointment and gauze roll  Patient tolerance: Patient tolerated the procedure well with no immediate complications               ED Course                               MDM  Number of Diagnoses or Management Options  Hand laceration:      Amount and/or Complexity of Data Reviewed  Tests in the radiology section of CPT®: ordered and reviewed    Risk of Complications, Morbidity, and/or Mortality  Presenting problems: low  Diagnostic procedures: low  Management options: low    Patient Progress  Patient progress: stable        Disposition  Final diagnoses:   Hand laceration     Time reflects when diagnosis was documented in both MDM as applicable and the Disposition within this note     Time User Action Codes Description Comment    2/1/2020  1:07 AM Sunshine Calles Add [S63 419A] Hand laceration       ED Disposition     ED Disposition Condition Date/Time Comment    Discharge Stable Sat Feb 1, 2020  1:07 AM Zoë Biswas discharge to home/self care              Follow-up Information     Follow up With Specialties Details Why Contact Info    Nitin Abraham, [address], Nurse Practitioner Schedule an appointment as soon as possible for a visit in 2 days For wound re-check [facility]  [phone]      Your primary care physician or the ER  Schedule an appointment as soon as possible for a visit in 10 days For suture removal           Discharge Medication List as of 2/1/2020  1:09 AM      CONTINUE these medications which have NOT CHANGED Details   ALPRAZolam (XANAX) 1 mg tablet Take 1 mg by mouth 3 (three) times a day as needed for anxiety, Until Discontinued, Historical Med      lamoTRIgine (LaMICtal) 100 mg tablet Take 1 tablet by mouth daily, Starting Fri 4/6/2018, Historical Med      methylphenidate (CONCERTA) 18 mg ER tablet Take 18 mg by mouth daily , Starting Tue 10/1/2019, Historical Med      cyclobenzaprine (FLEXERIL) 10 mg tablet Take 1 tablet (10 mg total) by mouth daily at bedtime for 20 days, Starting Fri 12/6/2019, Until Thu 12/26/2019, Normal           No discharge procedures on file      ED Provider  Electronically Signed by           Preeti Alvarez DO  02/09/20 7123

## 2020-02-04 ENCOUNTER — VBI (OUTPATIENT)
Dept: FAMILY MEDICINE CLINIC | Facility: CLINIC | Age: 40
End: 2020-02-04

## 2020-02-04 NOTE — TELEPHONE ENCOUNTER
Dax Bran    ED Visit Information     Ed visit date: 01/31/2020  Diagnosis Description: Hand laceration  In Network? Yes [facility]  Discharge status: Home  Discharged with meds ? No  Number of ED visits to date: 1  ED Severity:NA     Outreach Information    Outreach successful: Yes 2  Date letter mailed:02/05/2020  Date Finalized:02/05/2020    Care Coordination    Follow up appointment with pcp: no no  Transportation issues ?  NA    Value Base Outreach    02/04/2020 12:38 PM - [facility]  02/05/2020 11:48 AM

## 2020-02-04 NOTE — LETTER
[address]  [address] [facility]  [address]  [place] [address]    Date: 02/05/20       Pavan Waggoner Offer [phone]    Dear [facility]: Thank you for choosing [facility] Emergency Department for care  As your primary care provider we want to make sure that your ongoing medical care is being addressed  If you require follow up care as a result of your emergency department visit, there are a few things we would like you to know  As part of our continuing commitment to caring for our patient, we have added more same day appointments and have extended our office hours to meet your medical needs  After hours, on-call physicians are available via our main office line  We encourage you to contact our office prior to seeking treatment to discuss your symptoms with our medical staff  Together, we can determine the correct course of action  A majority of non-emergent conditions such as: common cold, flu-like symptoms, fevers, strains/sprains, dislocations, minor burns, cuts and animal bites can be treated at [address] [facility] facilities  Diagnostic testing is available at some sites  Of course, if you are experiencing a life threatening medical emergency call 911 or proceed directly to the nearest emergency room      SKIP THE WAIT  Conveniently offered at most [facility] locations  [employer] your spot online at [email or web address] or on the [place] [address]    Sincerely,        [address] [facility]  Dept: [phone]

## 2020-03-05 ENCOUNTER — OFFICE VISIT (OUTPATIENT)
Dept: FAMILY MEDICINE CLINIC | Facility: CLINIC | Age: 40
End: 2020-03-05
Payer: COMMERCIAL

## 2020-03-05 ENCOUNTER — TELEPHONE (OUTPATIENT)
Dept: FAMILY MEDICINE CLINIC | Facility: CLINIC | Age: 40
End: 2020-03-05

## 2020-03-05 VITALS
SYSTOLIC BLOOD PRESSURE: 110 MMHG | RESPIRATION RATE: 16 BRPM | HEART RATE: 76 BPM | HEIGHT: 62 IN | DIASTOLIC BLOOD PRESSURE: 70 MMHG | TEMPERATURE: 98.4 F | WEIGHT: 128 LBS | BODY MASS INDEX: 23.55 KG/M2 | OXYGEN SATURATION: 97 %

## 2020-03-05 DIAGNOSIS — T78.40XA ALLERGIC REACTION, INITIAL ENCOUNTER: Primary | ICD-10-CM

## 2020-03-05 DIAGNOSIS — Z11.4 SCREENING FOR HIV (HUMAN IMMUNODEFICIENCY VIRUS): Primary | ICD-10-CM

## 2020-03-05 DIAGNOSIS — D72.829 LEUKOCYTOSIS, UNSPECIFIED TYPE: ICD-10-CM

## 2020-03-05 DIAGNOSIS — Z13.6 SCREENING FOR CARDIOVASCULAR CONDITION: ICD-10-CM

## 2020-03-05 PROCEDURE — 99213 OFFICE O/P EST LOW 20 MIN: CPT | Performed by: NURSE PRACTITIONER

## 2020-03-05 PROCEDURE — 1036F TOBACCO NON-USER: CPT | Performed by: NURSE PRACTITIONER

## 2020-03-05 PROCEDURE — 96372 THER/PROPH/DIAG INJ SC/IM: CPT

## 2020-03-05 PROCEDURE — 3008F BODY MASS INDEX DOCD: CPT | Performed by: NURSE PRACTITIONER

## 2020-03-05 RX ORDER — METHYLPREDNISOLONE 4 MG/1
TABLET ORAL
Qty: 1 EACH | Refills: 0 | Status: SHIPPED | OUTPATIENT
Start: 2020-03-05 | End: 2020-03-11

## 2020-03-05 RX ORDER — METHYLPHENIDATE HYDROCHLORIDE 36 MG/1
TABLET, EXTENDED RELEASE ORAL DAILY
COMMUNITY
Start: 2020-02-25 | End: 2020-07-09 | Stop reason: SDUPTHER

## 2020-03-05 RX ORDER — METHYLPREDNISOLONE ACETATE 80 MG/ML
80 INJECTION, SUSPENSION INTRA-ARTICULAR; INTRALESIONAL; INTRAMUSCULAR; SOFT TISSUE ONCE
Status: COMPLETED | OUTPATIENT
Start: 2020-03-05 | End: 2020-03-05

## 2020-03-05 RX ADMIN — METHYLPREDNISOLONE ACETATE 80 MG: 80 INJECTION, SUSPENSION INTRA-ARTICULAR; INTRALESIONAL; INTRAMUSCULAR; SOFT TISSUE at 12:01

## 2020-03-05 NOTE — PROGRESS NOTES
Assessment/Plan:    Will treat as below  Allergy referral if symptoms recur  1  Allergic reaction, initial encounter  -     methylPREDNISolone acetate (DEPO-MEDROL) injection 80 mg  -     methylPREDNISolone 4 MG tablet therapy pack; Use as directed on package            There are no Patient Instructions on file for this visit  Return if symptoms worsen or fail to improve  Subjective:      Patient ID: Elva Junior is a 37 y o  female  Chief Complaint   Patient presents with    Swollen face     wmcma       Here today with complaints of itching and facial swelling   5 days ago, she developed itchy, red bumps on both of her forearms  Rash has resolved, however she now has facial swelling, mostly affecting her eyes  Yesterday, her lips were somewhat swollen  She feels itchy all over  Her skin feels dry where the rash on her arms was  No new detergents, bath products, or lotions  She does not have any food or environmental allergies  She was tested for these in 2016 prior to sinus surgery, which all came back negative  No known food allergies  She denies SOB, wheezing, chest tightness, or difficulty swallowing  She has been taking Benadryl which helps, but makes her feel groggy  She has not been outdoors  Household members do not have any similar symptoms  She does not have any pets      The following portions of the patient's history were reviewed and updated as appropriate: allergies, current medications, past family history, past medical history, past social history, past surgical history and problem list     Review of Systems   Constitutional: Negative  HENT: Negative  Respiratory: Negative  Cardiovascular: Negative  Skin: Positive for rash           Current Outpatient Medications   Medication Sig Dispense Refill    ALPRAZolam (XANAX) 1 mg tablet Take 1 mg by mouth 3 (three) times a day as needed for anxiety      lamoTRIgine (LaMICtal) 100 mg tablet Take 1 tablet by mouth daily  methylphenidate (CONCERTA) 18 mg ER tablet Take 18 mg by mouth daily       methylPREDNISolone 4 MG tablet therapy pack Use as directed on package 1 each 0     Current Facility-Administered Medications   Medication Dose Route Frequency Provider Last Rate Last Dose    methylPREDNISolone acetate (DEPO-MEDROL) injection 80 mg  80 mg Intramuscular Once ROSE Tyler           Objective:    /70   Pulse 76   Temp 98 4 °F (36 9 °C)   Resp 16   Ht 5' 2" (1 575 m)   Wt 58 1 kg (128 lb)   SpO2 97%   BMI 23 41 kg/m²        Physical Exam   Constitutional: She appears well-developed and well-nourished  HENT:   Head: Normocephalic and atraumatic  Right Ear: Tympanic membrane, external ear and ear canal normal    Left Ear: Tympanic membrane, external ear and ear canal normal    Nose: No mucosal edema or rhinorrhea  Mouth/Throat: Uvula is midline, oropharynx is clear and moist and mucous membranes are normal    Eyes: Conjunctivae are normal    Neck: Neck supple  No edema present  No thyromegaly present  Cardiovascular: Normal rate, regular rhythm, normal heart sounds and intact distal pulses  No murmur heard  Pulmonary/Chest: Effort normal and breath sounds normal    Abdominal: Bowel sounds are normal  She exhibits no distension  There is no splenomegaly or hepatomegaly  There is no tenderness  Lymphadenopathy:        Right cervical: No superficial cervical adenopathy present  Left cervical: No superficial cervical adenopathy present  Skin: Skin is warm, dry and intact  No rash noted  Bilateral forearms without rash  Dry skin  Mild bilateral periorbital swelling  No erythema  No other body rashes   Psychiatric: She has a normal mood and affect  Nursing note and vitals reviewed               Gely Vargas

## 2020-03-05 NOTE — TELEPHONE ENCOUNTER
I ordered Lipid profile, CBC, CMP and HIV screening  Advised her to do blood work week before my appt and do it fastROSE Hawley

## 2020-03-05 NOTE — TELEPHONE ENCOUNTER
LMOM for a call back  Please tell the patient her labs are in her chart and she should have them done fasting before her next appt   Mickie Frank MA

## 2020-03-05 NOTE — LETTER
March 5, 2020     Patient: Joanie Dandy   YOB: 1980   Date of Visit: 3/5/2020       To Whom it May Concern:    Sophia Laurent is under my professional care  She was seen in my office on 3/5/2020  Please excuse from work 3/5/20    If you have any questions or concerns, please don't hesitate to call           Sincerely,          ROSE Leal        CC: No Recipients

## 2020-04-13 ENCOUNTER — TELEMEDICINE (OUTPATIENT)
Dept: FAMILY MEDICINE CLINIC | Facility: CLINIC | Age: 40
End: 2020-04-13
Payer: COMMERCIAL

## 2020-04-13 DIAGNOSIS — M25.512 LEFT SHOULDER PAIN, UNSPECIFIED CHRONICITY: Primary | ICD-10-CM

## 2020-04-13 PROCEDURE — 99213 OFFICE O/P EST LOW 20 MIN: CPT | Performed by: NURSE PRACTITIONER

## 2020-04-13 RX ORDER — CYCLOBENZAPRINE HCL 10 MG
10 TABLET ORAL 2 TIMES DAILY
Qty: 40 TABLET | Refills: 0 | Status: SHIPPED | OUTPATIENT
Start: 2020-04-13 | End: 2020-10-13

## 2020-05-13 ENCOUNTER — TELEMEDICINE (OUTPATIENT)
Dept: FAMILY MEDICINE CLINIC | Facility: CLINIC | Age: 40
End: 2020-05-13
Payer: COMMERCIAL

## 2020-05-13 DIAGNOSIS — H92.02 LEFT EAR PAIN: Primary | ICD-10-CM

## 2020-05-13 DIAGNOSIS — N89.8 VAGINAL ITCHING: ICD-10-CM

## 2020-05-13 PROCEDURE — 99214 OFFICE O/P EST MOD 30 MIN: CPT | Performed by: NURSE PRACTITIONER

## 2020-05-13 RX ORDER — FLUCONAZOLE 150 MG/1
150 TABLET ORAL ONCE
Qty: 1 TABLET | Refills: 0 | Status: SHIPPED | OUTPATIENT
Start: 2020-05-13 | End: 2020-05-13

## 2020-05-13 RX ORDER — AMOXICILLIN 875 MG/1
875 TABLET, COATED ORAL 2 TIMES DAILY
Qty: 20 TABLET | Refills: 0 | Status: SHIPPED | OUTPATIENT
Start: 2020-05-13 | End: 2020-05-23

## 2020-07-09 ENCOUNTER — TELEPHONE (OUTPATIENT)
Dept: FAMILY MEDICINE CLINIC | Facility: CLINIC | Age: 40
End: 2020-07-09

## 2020-07-09 ENCOUNTER — TELEMEDICINE (OUTPATIENT)
Dept: FAMILY MEDICINE CLINIC | Facility: CLINIC | Age: 40
End: 2020-07-09
Payer: COMMERCIAL

## 2020-07-09 VITALS — HEIGHT: 62 IN | WEIGHT: 124 LBS | BODY MASS INDEX: 22.82 KG/M2

## 2020-07-09 DIAGNOSIS — B00.1 HERPES LABIALIS: Primary | ICD-10-CM

## 2020-07-09 DIAGNOSIS — F90.0 ATTENTION DEFICIT HYPERACTIVITY DISORDER (ADHD), PREDOMINANTLY INATTENTIVE TYPE: ICD-10-CM

## 2020-07-09 DIAGNOSIS — F31.81 BIPOLAR II DISORDER (HCC): Primary | ICD-10-CM

## 2020-07-09 PROCEDURE — 3008F BODY MASS INDEX DOCD: CPT | Performed by: NURSE PRACTITIONER

## 2020-07-09 PROCEDURE — 1036F TOBACCO NON-USER: CPT | Performed by: NURSE PRACTITIONER

## 2020-07-09 PROCEDURE — 99213 OFFICE O/P EST LOW 20 MIN: CPT | Performed by: NURSE PRACTITIONER

## 2020-07-09 RX ORDER — METHYLPHENIDATE HYDROCHLORIDE 36 MG/1
36 TABLET, EXTENDED RELEASE ORAL
Qty: 30 TABLET | Refills: 0 | Status: SHIPPED | OUTPATIENT
Start: 2020-07-09 | End: 2020-08-13 | Stop reason: SDUPTHER

## 2020-07-09 RX ORDER — VALACYCLOVIR HYDROCHLORIDE 1 G/1
2000 TABLET, FILM COATED ORAL ONCE
Qty: 30 TABLET | Refills: 1 | Status: SHIPPED | OUTPATIENT
Start: 2020-07-09 | End: 2020-10-13

## 2020-07-09 RX ORDER — LAMOTRIGINE 100 MG/1
100 TABLET ORAL DAILY
Qty: 30 TABLET | Refills: 3 | Status: SHIPPED | OUTPATIENT
Start: 2020-07-09 | End: 2020-07-21 | Stop reason: DRUGHIGH

## 2020-07-09 RX ORDER — ALPRAZOLAM 1 MG/1
1 TABLET ORAL 3 TIMES DAILY PRN
Qty: 90 TABLET | Refills: 0 | Status: SHIPPED | OUTPATIENT
Start: 2020-07-09 | End: 2020-08-13 | Stop reason: SDUPTHER

## 2020-07-09 NOTE — PROGRESS NOTES
Virtual Regular Visit      Assessment/Plan:    Problem List Items Addressed This Visit        Digestive    Herpes labialis - Primary    Relevant Medications    valACYclovir (VALTREX) 1,000 mg tablet      Reviewed how to take Valacyclovir for herpes outbreak  F/u as needed           Reason for visit is   Chief Complaint   Patient presents with    Mouth Lesions     "I have a cold sore on my lip" [id number]    Virtual Regular Visit        Encounter provider ROSE Rodriguez    Provider located at [place] [address]  [address] [address]      Recent Visits  No visits were found meeting these conditions  Showing recent visits within past 7 days and meeting all other requirements     Today's Visits  Date Type Provider Dept   07/09/20 Telemedicine [facility]   07/09/20 Telephone [facility] today's visits and meeting all other requirements     Future Appointments  Date Type Provider Dept   07/09/20 [facility]phone [facility]   07/09/20 Telemedicine Isidoro Rodriguez 113   Showing future appointments within next 150 days and meeting all other requirements        The patient was identified by name and date of birth  Noman Lopez was informed that this is a telemedicine visit and that the visit is being conducted through [address] [place] [address] and patient was informed that this is not a secure, HIPAA-complaint platform  She agrees to proceed     My office door was closed  No one else was in the room  She acknowledged consent and understanding of privacy and security of the video platform  The patient has agreed to participate and understands they can discontinue the visit at any time  Patient is aware this is a billable service  Christine Hudson is a 41 y o  female   She has a cold sore that started on her upper lip yesterday    She has had these before  No recent illness or recent stressors  Has taken Valacyclovir in the past for this, which has been effective  Past Medical History:   Diagnosis Date    Allergic rhinitis     Anxiety     Chronic constipation     Depression     Psychiatric disorder     anxiety        Past Surgical History:   Procedure Laterality Date    APPENDECTOMY      BREAST SURGERY      augmentation    COLONOSCOPY N/A 1/24/2018    Procedure: COLONOSCOPY WITH HEMORRHOID BANDING;  Surgeon: Verdell Bernheim, MD;  Location: [place] [address] GI LAB; Service: Gastroenterology    LAPAROSCOPIC TUBAL LIGATION      OVARY SURGERY Bilateral     PA SIGMOIDOSCOPY FLX DX W/COLLJ SPEC BR/WA IF PFRMD N/A 11/14/2018    Procedure: FLEXIBLE SIGMOIDOSCOPY WITH APC; Surgeon: Verdell Bernheim, MD;  Location: [facility] GI LAB; Service: Gastroenterology       Current Outpatient Medications   Medication Sig Dispense Refill    ALPRAZolam (XANAX) 1 mg tablet Take 1 mg by mouth 3 (three) times a day as needed for anxiety      cyclobenzaprine (FLEXERIL) 10 mg tablet Take 1 tablet (10 mg total) by mouth 2 (two) times a day for 20 days 40 tablet 0    lamoTRIgine (LaMICtal) 100 mg tablet Take 1 tablet by mouth daily      methylphenidate (CONCERTA) 18 mg ER tablet Take 18 mg by mouth daily       Methylphenidate HCl ER 36 MG TB24 daily       valACYclovir (VALTREX) 1,000 mg tablet Take 2 tablets (2,000 mg total) by mouth once for 1 dose Repeat dose after 12 hours 30 tablet 1     No current facility-administered medications for this visit  Allergies   Allergen Reactions    Doxycycline Other (See Comments)     unknown       Review of Systems   Constitutional: Negative  HENT:        See HPI   Respiratory: Negative  Cardiovascular: Negative  Neurological: Negative  Video Exam    Vitals:    07/09/20 1412   Weight: 56 2 kg (124 lb)   Height: 5' 2" (1 575 m)       Physical Exam   Constitutional: She appears well-developed and well-nourished   She does not have a sickly appearance  She does not appear ill  HENT:   Head: Normocephalic and atraumatic  Left side upper lip with ulcerated lesion   Eyes: Conjunctivae are normal    Neck: Normal range of motion  Pulmonary/Chest: Effort normal  No accessory muscle usage  No tachypnea  No respiratory distress  Neurological: She is alert  Skin: She is not diaphoretic  No pallor  Psychiatric: She has a normal mood and affect  Her speech is normal    Nursing note reviewed  I spent 6 minutes directly with the patient during this visit      Nenagirish acknowledges that she has consented to an online visit or consultation  She understands that the online visit is based solely on information provided by her, and that, in the absence of a face-to-face physical evaluation by the physician, the diagnosis she receives is both limited and provisional in terms of accuracy and completeness  This is not intended to replace a full medical face-to-face evaluation by the physician  Emily Marx understands and accepts these terms

## 2020-07-09 NOTE — TELEPHONE ENCOUNTER
Cold sore and Stephanie usually calls in valtrex for her  I told her I would ask if someone could call it in but she may need an apt      Thank you

## 2020-07-21 ENCOUNTER — TELEMEDICINE (OUTPATIENT)
Dept: PSYCHIATRY | Facility: CLINIC | Age: 40
End: 2020-07-21
Payer: COMMERCIAL

## 2020-07-21 VITALS — WEIGHT: 125 LBS | BODY MASS INDEX: 22.86 KG/M2

## 2020-07-21 DIAGNOSIS — F90.0 ATTENTION DEFICIT HYPERACTIVITY DISORDER (ADHD), PREDOMINANTLY INATTENTIVE TYPE: ICD-10-CM

## 2020-07-21 DIAGNOSIS — F31.81 BIPOLAR II DISORDER (HCC): Primary | ICD-10-CM

## 2020-07-21 DIAGNOSIS — F41.0 PANIC DISORDER WITHOUT AGORAPHOBIA WITH MODERATE PANIC ATTACKS: ICD-10-CM

## 2020-07-21 PROCEDURE — 99213 OFFICE O/P EST LOW 20 MIN: CPT | Performed by: NURSE PRACTITIONER

## 2020-07-21 PROCEDURE — NC001 PR NO CHARGE: Performed by: NURSE PRACTITIONER

## 2020-07-21 PROCEDURE — 1036F TOBACCO NON-USER: CPT | Performed by: NURSE PRACTITIONER

## 2020-07-21 RX ORDER — ESCITALOPRAM OXALATE 10 MG/1
10 TABLET ORAL DAILY
Qty: 30 TABLET | Refills: 3 | Status: SHIPPED | OUTPATIENT
Start: 2020-07-21 | End: 2020-09-14 | Stop reason: DRUGHIGH

## 2020-07-21 RX ORDER — LAMOTRIGINE 200 MG/1
200 TABLET ORAL DAILY
Qty: 30 TABLET | Refills: 3 | Status: SHIPPED | OUTPATIENT
Start: 2020-07-21 | End: 2020-11-16 | Stop reason: SDUPTHER

## 2020-07-21 NOTE — PSYCH
Virtual Regular Visit    Problem List Items Addressed This Visit        Other    Bipolar II disorder ([address] ) - Primary    Relevant Medications    escitalopram (LEXAPRO) 10 mg tablet    Panic disorder without agoraphobia with moderate panic attacks    Relevant Medications    escitalopram (LEXAPRO) 10 mg tablet    Attention deficit hyperactivity disorder (ADHD), predominantly inattentive type    Relevant Medications    escitalopram (LEXAPRO) 10 mg tablet        Reason for visit is   Chief Complaint   Patient presents with   [address] [facility] Depression     Encounter provider Chapis Domínguez, PhD    Provider located at [address] [place] [address]  [address]  [place] [address]  [phone]    Recent Visits  No visits were found meeting these conditions  Showing recent visits within past 7 days and meeting all other requirements     Today's Visits  Date Type Provider Dept   07/21/20 Telemedicine Chapis Domínguez, PhD  [facility]   Showing today's visits and meeting all other requirements     Future Appointments  No visits were found meeting these conditions  Showing future appointments within next 150 days and meeting all other requirements        After connecting through [employer], the patient was identified by name and date of birth  Ailyn Vazquez was informed that this is a telemedicine visit and that the visit is being conducted through phone and patient was informed that this is not a secure, HIPAA-complaint platform  She agrees to proceed  which may not be secure and therefore, might not be HIPAA-compliant  My office door was closed  No one else was in the room  She acknowledged consent and understanding of privacy and security of the video platform  The patient has agreed to participate and understands they can discontinue the visit at any time      SUBJECTIVE:    Ailyn Vazquez is a 41 y o  female with a history of anxiety, depression, mood swings ADHD seen for medication management  Isabel reports lack of motivation, increased anxiety and depression due to Covid and change in her life  Anxious about her son returning to school  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  Depressed denies SI  Taking medication as ordered  Denies side effects      Psychiatric status since last visit: declined    HPI ROS Appetite Changes and Sleep: decreased appetite and decreased energy    Review Of Systems:     Mood Depression   Behavior Normal    Thought Content Normal   General Emotional Problems   Personality Normal   Other Psych Symptoms Normal   Constitutional Normal   ENT As Noted in HPI   Cardiovascular As Noted in HPI   Respiratory As Noted in HPI   Gastrointestinal As Noted in HPI   Genitourinary As Noted in HPI   Musculoskeletal As Noted in HPI   Integumentary As Noted in HPI   Neurological As Noted in HPI   Endocrine Normal    Other Symptoms Normal        Substance Abuse History:    Social History     Substance and Sexual Activity   Drug Use No       Family Psychiatric History:     Family History   Problem Relation Age of Onset    No Known Problems Mother     No Known Problems Father     No Known Problems Sister     No Known Problems Brother     No Known Problems Daughter     No Known Problems Son        Social History     Socioeconomic History    Marital status: /Civil Union     Spouse name: Not on file    Number of children: Not on file    Years of education: Not on file    Highest education level: Not on file   Occupational History    Not on file   Social Needs    Financial resource strain: Not on file    Food insecurity:     Worry: Not on file     Inability: Not on file    Transportation needs:     Medical: Not on file     Non-medical: Not on file   Tobacco Use    Smoking status: Former Smoker     Types: Cigarettes     Last attempt to quit:      Years since quittin 5    Smokeless tobacco: Never Used   Substance and Sexual Activity    Alcohol use: Yes     Comment: occassional - wine 2 x month    Drug use: No    Sexual activity: Not on file   Lifestyle    Physical activity:     Days per week: Not on file     Minutes per session: Not on file    Stress: Not on file   Relationships    Social connections:     Talks on phone: Not on file     Gets together: Not on file     Attends Rastafarian service: Not on file     Active member of club or organization: Not on file     Attends meetings of clubs or organizations: Not on file     Relationship status: Not on file    Intimate partner violence:     Fear of current or ex partner: Not on file     Emotionally abused: Not on file     Physically abused: Not on file     Forced sexual activity: Not on file   Other Topics Concern    Not on file   Social History Narrative    Not on file       Past Medical History:   Diagnosis Date    Allergic rhinitis     Anxiety     Chronic constipation     Depression     Psychiatric disorder     anxiety        Past Surgical History:   Procedure Laterality Date    APPENDECTOMY      BREAST SURGERY      augmentation    COLONOSCOPY N/A 1/24/2018    Procedure: COLONOSCOPY WITH HEMORRHOID BANDING;  Surgeon: Crys Santiago MD;  Location[facility]; Service: Gastroenterology    LAPAROSCOPIC TUBAL LIGATION      OVARY SURGERY Bilateral     IN SIGMOIDOSCOPY FLX DX W/COLLJ SPEC BR/WA IF PFRMD N/A 11/14/2018    Procedure: FLEXIBLE SIGMOIDOSCOPY WITH APC; Surgeon: Crys Santiago MD;  Location: [facility] GI LAB;   Service: Gastroenterology       Current Outpatient Medications   Medication Sig Dispense Refill    Methylphenidate HCl ER 36 MG TB24 Take 1 tablet (36 mg total) by mouth daily after breakfastMax Daily Amount: 36 mg 30 tablet 0    ALPRAZolam (XANAX) 1 mg tablet Take 1 tablet (1 mg total) by mouth 3 (three) times a day as needed for anxiety 90 tablet 0    cyclobenzaprine (FLEXERIL) 10 mg tablet Take 1 tablet (10 mg total) by mouth 2 (two) times a day for 20 days 40 tablet 0    escitalopram (LEXAPRO) 10 mg tablet Take 1 tablet (10 mg total) by mouth daily 30 tablet 3    valACYclovir (VALTREX) 1,000 mg tablet Take 2 tablets (2,000 mg total) by mouth once for 1 dose Repeat dose after 12 hours 30 tablet 1     No current facility-administered medications for this visit  Allergies   Allergen Reactions    Doxycycline Other (See Comments)     unknown       reviewed medication    OBJECTIVE:     Mental Status Examination:    Appearance virtual contact   Mood depressed and anxious   Affect virtual contact   Speech a normal rate   Thought Processes normal thought processes   Hallucinations no hallucinations present    Thought Content no delusions   Abnormal Thoughts no suicidal thoughts  and no homicidal thoughts    Orientation  oriented to person and place and time   Remote Memory short term memory intact and long term memory intact   Attention Span concentration impaired   Intellect Appears to be of Average Intelligence   Insight Limited insight   Judgement judgment was intact   Muscle Strength denies problems   Language no difficulty naming common objects   Fund of Knowledge displays adequate knowledge of current events   Pain none   Pain Scale 0       Laboratory Results: No results found for this or any previous visit  Assessment/Plan:       Diagnoses and all orders for this visit:    Bipolar II disorder ([facility] [address] [facility] )    Panic disorder without agoraphobia with moderate panic attacks  -     escitalopram (LEXAPRO) 10 mg tablet; Take 1 tablet (10 mg total) by mouth daily    Attention deficit hyperactivity disorder (ADHD), predominantly inattentive type          Treatment Recommendations- Risks Benefits      Immediate Medical/Psychiatric/Psychotherapy Treatments and Any Precautions: Support provided, 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, etc to prevent CoVid when he returns to school   Discussed increase structure to the day  Lexapro 10 mg ordered for anxiety and depression  She agreed    Risks, Benefits And Possible Side Effects Of Medications: Instructed on Lexapro  Controlled Medication Discussion: Discussed with patient the risks of sedation, respiratory depression, impairment of ability to drive and potential for abuse and addiction related to treatment with benzodiazepine medications  The patient understands risk of treatment with benzodiazepine medications, agrees to not drive if feels impaired and agrees to take medications as prescribed  Psychotherapy Provided: Support provided, education on medication  reframe automatic negative thoughts      Goals discussed in session: decrease anxiety and depression    Counseling provided: 15     Treatment Plan:    Completed and signed during the session: Yes - Treatment Plan done but not signed at time of office visit due to:  Plan reviewed by phone and verbal consent given due to [address] distancing    I spent 15 minutes with the patient during this visit      Yin Mercado, PhD 07/21/20

## 2020-07-21 NOTE — BH TREATMENT PLAN
TREATMENT PLAN (Medication Management Only)        [email or web address]    Name and Date of Birth:  Jonh Piña 41 y o  1980  Date of Treatment Plan: July 21, 2020  Diagnosis/Diagnoses:    1  Bipolar II disorder ([address] )    2  Panic disorder without agoraphobia with moderate panic attacks    3  Attention deficit hyperactivity disorder (ADHD), predominantly inattentive type      Strengths/Personal Resources for Self-Care: supportive family, taking medications as prescribed, ability to communicate needs, ability to listen, motivation for treatment  Area/Areas of need (in own words): anxiety symptoms, depressive symptoms  1  Long Term Goal: improve control of anxiety  Target Date: 2 months - 9/21/2020  Person/Persons responsible for completion of goal: Isabel and provider  2  Short Term Objective (s) - How will we reach this goal?:   A  Provider new recommended medication/dosage changes and/or continue medication(s): continue current medications as prescribed (lexapro)  Kelly mckenna structure in her day  C  Create realistic expectations  Target Date: 3 months - 10/21/2020  Person/Persons Responsible for Completion of Goal: Isabel and provider  Progress Towards Goals: progressing slowly  Treatment Modality: medication management every 1 month  Review due 90 to 120 days from date of this plan: 6 months 01/21/2021  Expected length of service: ongoing treatment  My Physician/PA/NP and I have developed this plan together and I agree to work on the goals and objectives  I understand the treatment goals that were developed for my treatment

## 2020-08-13 DIAGNOSIS — F31.81 BIPOLAR II DISORDER (HCC): ICD-10-CM

## 2020-08-13 DIAGNOSIS — F90.0 ATTENTION DEFICIT HYPERACTIVITY DISORDER (ADHD), PREDOMINANTLY INATTENTIVE TYPE: ICD-10-CM

## 2020-08-13 RX ORDER — METHYLPHENIDATE HYDROCHLORIDE 36 MG/1
36 TABLET, EXTENDED RELEASE ORAL
Qty: 30 TABLET | Refills: 0 | Status: SHIPPED | OUTPATIENT
Start: 2020-08-13 | End: 2020-09-04

## 2020-08-13 RX ORDER — ALPRAZOLAM 1 MG/1
1 TABLET ORAL 3 TIMES DAILY PRN
Qty: 90 TABLET | Refills: 0 | Status: SHIPPED | OUTPATIENT
Start: 2020-08-13 | End: 2020-09-14 | Stop reason: SDUPTHER

## 2020-09-04 ENCOUNTER — TELEMEDICINE (OUTPATIENT)
Dept: FAMILY MEDICINE CLINIC | Facility: CLINIC | Age: 40
End: 2020-09-04
Payer: COMMERCIAL

## 2020-09-04 VITALS — TEMPERATURE: 98.6 F | BODY MASS INDEX: 22.82 KG/M2 | HEIGHT: 62 IN | WEIGHT: 124 LBS

## 2020-09-04 DIAGNOSIS — J01.00 ACUTE NON-RECURRENT MAXILLARY SINUSITIS: Primary | ICD-10-CM

## 2020-09-04 PROCEDURE — 3725F SCREEN DEPRESSION PERFORMED: CPT | Performed by: NURSE PRACTITIONER

## 2020-09-04 PROCEDURE — 99213 OFFICE O/P EST LOW 20 MIN: CPT | Performed by: NURSE PRACTITIONER

## 2020-09-04 RX ORDER — METHYLPHENIDATE HYDROCHLORIDE 36 MG/1
TABLET, EXTENDED RELEASE ORAL DAILY
COMMUNITY
Start: 2020-08-13 | End: 2020-09-14 | Stop reason: DRUGHIGH

## 2020-09-04 RX ORDER — AMOXICILLIN 875 MG/1
875 TABLET, COATED ORAL 2 TIMES DAILY
Qty: 20 TABLET | Refills: 0 | Status: SHIPPED | OUTPATIENT
Start: 2020-09-04 | End: 2020-09-14

## 2020-09-04 RX ORDER — FLUCONAZOLE 150 MG/1
150 TABLET ORAL ONCE
Qty: 1 TABLET | Refills: 0 | Status: SHIPPED | OUTPATIENT
Start: 2020-09-04 | End: 2020-09-04

## 2020-09-04 NOTE — PROGRESS NOTES
Virtual Regular Visit      Assessment/Plan:    Problem List Items Addressed This Visit     None      Visit Diagnoses     Acute non-recurrent maxillary sinusitis    -  Primary    Relevant Medications    amoxicillin (AMOXIL) 875 mg tablet    fluconazole (DIFLUCAN) 150 mg tablet               Reason for visit is   Chief Complaint   Patient presents with    Sore Throat     symptoms started 1 week ago  rmklpn    Headache    Fatigue    Virtual Regular Visit        Encounter provider ROSE Wilde    Provider located at  [address]  [address] [address]      Recent Visits  No visits were found meeting these conditions  Showing recent visits within past 7 days and meeting all other requirements     Today's Visits  Date Type Provider Dept   09/04/20 Telemedicine Jeremy Wilde today's visits and meeting all other requirements     Future Appointments  No visits were found meeting these conditions  Showing future appointments within next 150 days and meeting all other requirements        The patient was identified by name and date of birth  Triston Frias was informed that this is a telemedicine visit and that the visit is being conducted through [employer] and patient was informed that this is not a secure, HIPAA-complaint platform  She agrees to proceed     My office door was closed  No one else was in the room  She acknowledged consent and understanding of privacy and security of the video platform  The patient has agreed to participate and understands they can discontinue the visit at any time  Patient is aware this is a billable service  Holly Kate is a 41 y o  female   Pt has been experiencing sore throat, sinus congestion, and a swollen lymph node in the left side of her neck intermittently for almost 2 weeks  She has worsening sinus pressure    She denies fever, cough, SOB, or wheezing  She is using Advil and Flonase to treat her symptoms  Nasal discharge has been clear  Past Medical History:   Diagnosis Date    Allergic rhinitis     Anxiety     Chronic constipation     Depression     Psychiatric disorder     anxiety        Past Surgical History:   Procedure Laterality Date    APPENDECTOMY      BREAST SURGERY      augmentation    COLONOSCOPY N/A 1/24/2018    Procedure: COLONOSCOPY WITH HEMORRHOID BANDING;  Surgeon: Jamarcus Garcia MD;  Location: [facility] GI [facility]; Service: Gastroenterology    LAPAROSCOPIC TUBAL LIGATION      OVARY SURGERY Bilateral     NV SIGMOIDOSCOPY FLX DX W/COLLJ SPEC BR/WA IF PFRMD N/A 11/14/2018    Procedure: FLEXIBLE SIGMOIDOSCOPY WITH APC; Surgeon: Jamarcus Garcia MD;  Location: [facility] GI LAB; Service: Gastroenterology       Current Outpatient Medications   Medication Sig Dispense Refill    ALPRAZolam (XANAX) 1 mg tablet Take 1 tablet (1 mg total) by mouth 3 (three) times a day as needed for anxiety 90 tablet 0    escitalopram (LEXAPRO) 10 mg tablet Take 1 tablet (10 mg total) by mouth daily 30 tablet 3    lamoTRIgine (LaMICtal) 200 MG tablet Take 1 tablet (200 mg total) by mouth daily 30 tablet 3    amoxicillin (AMOXIL) 875 mg tablet Take 1 tablet (875 mg total) by mouth 2 (two) times a day for 10 days 20 tablet 0    Concerta 36 MG ER tablet daily      cyclobenzaprine (FLEXERIL) 10 mg tablet Take 1 tablet (10 mg total) by mouth 2 (two) times a day for 20 days 40 tablet 0    fluconazole (DIFLUCAN) 150 mg tablet Take 1 tablet (150 mg total) by mouth once for 1 dose 1 tablet 0    valACYclovir (VALTREX) 1,000 mg tablet Take 2 tablets (2,000 mg total) by mouth once for 1 dose Repeat dose after 12 hours 30 tablet 1     No current facility-administered medications for this visit  Allergies   Allergen Reactions    Doxycycline Other (See Comments)     unknown       Review of Systems   Constitutional: Negative for chills, fatigue and fever  HENT: Positive for congestion, sinus pressure and sore throat  Negative for ear pain, postnasal drip and rhinorrhea  Respiratory: Negative for cough, shortness of breath and wheezing  Cardiovascular: Negative for chest pain  Gastrointestinal: Negative for abdominal pain, diarrhea, nausea and vomiting  Musculoskeletal: Negative for arthralgias  Skin: Negative for rash  Neurological: Positive for headaches  Hematological: Positive for adenopathy  Video Exam    Vitals:    09/04/20 1050   Temp: 98 6 °F (37 °C)   Weight: 56 2 kg (124 lb)   Height: 5' 2" (1 575 m)       Physical Exam  Nursing note reviewed  Constitutional:       Appearance: She is well-developed  Cardiovascular:      Rate and Rhythm: Normal rate and regular rhythm  Heart sounds: Normal heart sounds  No murmur  Pulmonary:      Effort: Pulmonary effort is normal       Breath sounds: Normal breath sounds  Skin:     General: Skin is warm and dry  Neurological:      Mental Status: She is alert  I spent 8 minutes directly with the patient during this visit      Stepshayneblanca acknowledges that she has consented to an online visit or consultation  She understands that the online visit is based solely on information provided by her, and that, in the absence of a face-to-face physical evaluation by the physician, the diagnosis she receives is both limited and provisional in terms of accuracy and completeness  This is not intended to replace a full medical face-to-face evaluation by the physician  Kristen Rondon understands and accepts these terms

## 2020-09-14 DIAGNOSIS — F31.81 BIPOLAR II DISORDER (HCC): ICD-10-CM

## 2020-09-14 RX ORDER — ALPRAZOLAM 1 MG/1
1 TABLET ORAL 3 TIMES DAILY PRN
Qty: 90 TABLET | Refills: 0 | Status: SHIPPED | OUTPATIENT
Start: 2020-09-14 | End: 2020-10-15 | Stop reason: SDUPTHER

## 2020-10-08 DIAGNOSIS — Z20.822 COVID-19 RULED OUT BY LABORATORY TESTING: ICD-10-CM

## 2020-10-08 PROCEDURE — U0003 INFECTIOUS AGENT DETECTION BY NUCLEIC ACID (DNA OR RNA); SEVERE ACUTE RESPIRATORY SYNDROME CORONAVIRUS 2 (SARS-COV-2) (CORONAVIRUS DISEASE [COVID-19]), AMPLIFIED PROBE TECHNIQUE, MAKING USE OF HIGH THROUGHPUT TECHNOLOGIES AS DESCRIBED BY CMS-2020-01-R: HCPCS | Performed by: INTERNAL MEDICINE

## 2020-10-09 ENCOUNTER — APPOINTMENT (EMERGENCY)
Dept: RADIOLOGY | Facility: HOSPITAL | Age: 40
End: 2020-10-09
Payer: COMMERCIAL

## 2020-10-09 ENCOUNTER — HOSPITAL ENCOUNTER (EMERGENCY)
Facility: HOSPITAL | Age: 40
Discharge: HOME | End: 2020-10-09
Attending: EMERGENCY MEDICINE | Admitting: EMERGENCY MEDICINE
Payer: COMMERCIAL

## 2020-10-09 VITALS
WEIGHT: 143.3 LBS | OXYGEN SATURATION: 99 % | SYSTOLIC BLOOD PRESSURE: 117 MMHG | RESPIRATION RATE: 18 BRPM | HEART RATE: 88 BPM | DIASTOLIC BLOOD PRESSURE: 66 MMHG | TEMPERATURE: 97.8 F | BODY MASS INDEX: 26.37 KG/M2 | HEIGHT: 62 IN

## 2020-10-09 DIAGNOSIS — R10.9 ABDOMINAL PAIN: Primary | ICD-10-CM

## 2020-10-09 DIAGNOSIS — R33.9 URINARY RETENTION: ICD-10-CM

## 2020-10-09 LAB
ALBUMIN SERPL BCP-MCNC: 4 G/DL (ref 3.5–5)
ALP SERPL-CCNC: 43 U/L (ref 46–116)
ALT SERPL W P-5'-P-CCNC: 26 U/L (ref 12–78)
ANION GAP SERPL CALCULATED.3IONS-SCNC: 6 MMOL/L (ref 4–13)
AST SERPL W P-5'-P-CCNC: 18 U/L (ref 5–45)
BASOPHILS # BLD AUTO: 0.03 THOUSANDS/ΜL (ref 0–0.1)
BASOPHILS NFR BLD AUTO: 1 % (ref 0–1)
BILIRUB SERPL-MCNC: 0.5 MG/DL (ref 0.2–1)
BILIRUB UR QL STRIP: NEGATIVE
BUN SERPL-MCNC: 11 MG/DL (ref 5–25)
CALCIUM SERPL-MCNC: 8.5 MG/DL (ref 8.3–10.1)
CHLORIDE SERPL-SCNC: 104 MMOL/L (ref 100–108)
CLARITY UR: NORMAL
CO2 SERPL-SCNC: 29 MMOL/L (ref 21–32)
COLOR UR: YELLOW
CREAT SERPL-MCNC: 0.88 MG/DL (ref 0.6–1.3)
EOSINOPHIL # BLD AUTO: 0.11 THOUSAND/ΜL (ref 0–0.61)
EOSINOPHIL NFR BLD AUTO: 2 % (ref 0–6)
ERYTHROCYTE [DISTWIDTH] IN BLOOD BY AUTOMATED COUNT: 12.5 % (ref 11.6–15.1)
GFR SERPL CREATININE-BSD FRML MDRD: 81 ML/MIN/1.73SQ M
GLUCOSE SERPL-MCNC: 91 MG/DL (ref 65–140)
GLUCOSE UR STRIP-MCNC: NEGATIVE MG/DL
HCT VFR BLD AUTO: 43.7 % (ref 34.8–46.1)
HGB BLD-MCNC: 13.9 G/DL (ref 11.5–15.4)
HGB UR QL STRIP.AUTO: NEGATIVE
IMM GRANULOCYTES # BLD AUTO: 0.01 THOUSAND/UL (ref 0–0.2)
IMM GRANULOCYTES NFR BLD AUTO: 0 % (ref 0–2)
KETONES UR STRIP-MCNC: NEGATIVE MG/DL
LEUKOCYTE ESTERASE UR QL STRIP: NEGATIVE
LIPASE SERPL-CCNC: 128 U/L (ref 73–393)
LYMPHOCYTES # BLD AUTO: 1.87 THOUSANDS/ΜL (ref 0.6–4.47)
LYMPHOCYTES NFR BLD AUTO: 29 % (ref 14–44)
MAGNESIUM SERPL-MCNC: 1.8 MG/DL (ref 1.6–2.6)
MCH RBC QN AUTO: 31.8 PG (ref 26.8–34.3)
MCHC RBC AUTO-ENTMCNC: 31.8 G/DL (ref 31.4–37.4)
MCV RBC AUTO: 100 FL (ref 82–98)
MONOCYTES # BLD AUTO: 0.54 THOUSAND/ΜL (ref 0.17–1.22)
MONOCYTES NFR BLD AUTO: 8 % (ref 4–12)
NEUTROPHILS # BLD AUTO: 4.01 THOUSANDS/ΜL (ref 1.85–7.62)
NEUTS SEG NFR BLD AUTO: 60 % (ref 43–75)
NITRITE UR QL STRIP: NEGATIVE
NRBC BLD AUTO-RTO: 0 /100 WBCS
PH UR STRIP.AUTO: 6.5 [PH]
PLATELET # BLD AUTO: 250 THOUSANDS/UL (ref 149–390)
PMV BLD AUTO: 9.6 FL (ref 8.9–12.7)
POTASSIUM SERPL-SCNC: 4 MMOL/L (ref 3.5–5.3)
PROT SERPL-MCNC: 6.8 G/DL (ref 6.4–8.2)
PROT UR STRIP-MCNC: NEGATIVE MG/DL
RBC # BLD AUTO: 4.37 MILLION/UL (ref 3.81–5.12)
SODIUM SERPL-SCNC: 139 MMOL/L (ref 136–145)
SP GR UR STRIP.AUTO: 1.01 (ref 1–1.03)
UROBILINOGEN UR QL STRIP.AUTO: 0.2 E.U./DL
WBC # BLD AUTO: 6.57 THOUSAND/UL (ref 4.31–10.16)

## 2020-10-09 PROCEDURE — 83735 ASSAY OF MAGNESIUM: CPT | Performed by: EMERGENCY MEDICINE

## 2020-10-09 PROCEDURE — 36415 COLL VENOUS BLD VENIPUNCTURE: CPT | Performed by: EMERGENCY MEDICINE

## 2020-10-09 PROCEDURE — 99285 EMERGENCY DEPT VISIT HI MDM: CPT | Performed by: EMERGENCY MEDICINE

## 2020-10-09 PROCEDURE — G1004 CDSM NDSC: HCPCS

## 2020-10-09 PROCEDURE — 96374 THER/PROPH/DIAG INJ IV PUSH: CPT

## 2020-10-09 PROCEDURE — 99284 EMERGENCY DEPT VISIT MOD MDM: CPT

## 2020-10-09 PROCEDURE — 74177 CT ABD & PELVIS W/CONTRAST: CPT

## 2020-10-09 PROCEDURE — 80053 COMPREHEN METABOLIC PANEL: CPT | Performed by: EMERGENCY MEDICINE

## 2020-10-09 PROCEDURE — 83690 ASSAY OF LIPASE: CPT | Performed by: EMERGENCY MEDICINE

## 2020-10-09 PROCEDURE — 85025 COMPLETE CBC W/AUTO DIFF WBC: CPT | Performed by: EMERGENCY MEDICINE

## 2020-10-09 PROCEDURE — 81003 URINALYSIS AUTO W/O SCOPE: CPT | Performed by: EMERGENCY MEDICINE

## 2020-10-09 RX ORDER — MORPHINE SULFATE 4 MG/ML
4 INJECTION, SOLUTION INTRAMUSCULAR; INTRAVENOUS ONCE
Status: COMPLETED | OUTPATIENT
Start: 2020-10-09 | End: 2020-10-09

## 2020-10-09 RX ADMIN — MORPHINE SULFATE 4 MG: 4 INJECTION INTRAVENOUS at 10:28

## 2020-10-09 RX ADMIN — IOHEXOL 100 ML: 350 INJECTION, SOLUTION INTRAVENOUS at 10:51

## 2020-10-10 LAB — SARS-COV-2 RNA SPEC QL NAA+PROBE: NOT DETECTED

## 2020-10-13 ENCOUNTER — ANESTHESIA EVENT (OUTPATIENT)
Dept: GASTROENTEROLOGY | Facility: AMBULARY SURGERY CENTER | Age: 40
End: 2020-10-13

## 2020-10-14 ENCOUNTER — HOSPITAL ENCOUNTER (OUTPATIENT)
Dept: GASTROENTEROLOGY | Facility: AMBULARY SURGERY CENTER | Age: 40
Setting detail: OUTPATIENT SURGERY
Discharge: HOME | End: 2020-10-14
Attending: INTERNAL MEDICINE | Admitting: INTERNAL MEDICINE
Payer: COMMERCIAL

## 2020-10-14 ENCOUNTER — ANESTHESIA (OUTPATIENT)
Dept: GASTROENTEROLOGY | Facility: AMBULARY SURGERY CENTER | Age: 40
End: 2020-10-14

## 2020-10-14 VITALS
RESPIRATION RATE: 14 BRPM | OXYGEN SATURATION: 100 % | WEIGHT: 122 LBS | SYSTOLIC BLOOD PRESSURE: 110 MMHG | HEIGHT: 62 IN | DIASTOLIC BLOOD PRESSURE: 65 MMHG | BODY MASS INDEX: 22.45 KG/M2 | HEART RATE: 85 BPM | TEMPERATURE: 96.4 F

## 2020-10-14 VITALS — HEART RATE: 63 BPM

## 2020-10-14 DIAGNOSIS — Z20.822 COVID-19 RULED OUT BY LABORATORY TESTING: Primary | ICD-10-CM

## 2020-10-14 DIAGNOSIS — K62.5 HEMORRHAGE OF ANUS AND RECTUM: ICD-10-CM

## 2020-10-14 PROCEDURE — 87635 SARS-COV-2 COVID-19 AMP PRB: CPT

## 2020-10-14 RX ORDER — SODIUM CHLORIDE, SODIUM LACTATE, POTASSIUM CHLORIDE, CALCIUM CHLORIDE 600; 310; 30; 20 MG/100ML; MG/100ML; MG/100ML; MG/100ML
INJECTION, SOLUTION INTRAVENOUS CONTINUOUS PRN
Status: DISCONTINUED | OUTPATIENT
Start: 2020-10-14 | End: 2020-10-14

## 2020-10-14 RX ORDER — POLYETHYLENE GLYCOL 3350 17 G/17G
17 POWDER, FOR SOLUTION ORAL 2 TIMES DAILY
Qty: 237 G | Refills: 0 | Status: SHIPPED | OUTPATIENT
Start: 2020-10-14 | End: 2021-01-12 | Stop reason: ALTCHOICE

## 2020-10-14 RX ORDER — SODIUM CHLORIDE, SODIUM LACTATE, POTASSIUM CHLORIDE, CALCIUM CHLORIDE 600; 310; 30; 20 MG/100ML; MG/100ML; MG/100ML; MG/100ML
125 INJECTION, SOLUTION INTRAVENOUS CONTINUOUS
Status: DISCONTINUED | OUTPATIENT
Start: 2020-10-14 | End: 2020-10-18 | Stop reason: HOSPADM

## 2020-10-14 RX ORDER — LIDOCAINE HYDROCHLORIDE 10 MG/ML
INJECTION, SOLUTION EPIDURAL; INFILTRATION; INTRACAUDAL; PERINEURAL AS NEEDED
Status: DISCONTINUED | OUTPATIENT
Start: 2020-10-14 | End: 2020-10-14

## 2020-10-14 RX ORDER — PROPOFOL 10 MG/ML
INJECTION, EMULSION INTRAVENOUS AS NEEDED
Status: DISCONTINUED | OUTPATIENT
Start: 2020-10-14 | End: 2020-10-14

## 2020-10-14 RX ADMIN — PROPOFOL 100 MG: 10 INJECTION, EMULSION INTRAVENOUS at 07:45

## 2020-10-14 RX ADMIN — PROPOFOL 20 MG: 10 INJECTION, EMULSION INTRAVENOUS at 08:01

## 2020-10-14 RX ADMIN — PROPOFOL 20 MG: 10 INJECTION, EMULSION INTRAVENOUS at 07:50

## 2020-10-14 RX ADMIN — PROPOFOL 20 MG: 10 INJECTION, EMULSION INTRAVENOUS at 07:52

## 2020-10-14 RX ADMIN — PROPOFOL 20 MG: 10 INJECTION, EMULSION INTRAVENOUS at 07:56

## 2020-10-14 RX ADMIN — PROPOFOL 20 MG: 10 INJECTION, EMULSION INTRAVENOUS at 07:58

## 2020-10-14 RX ADMIN — LIDOCAINE HYDROCHLORIDE 50 MG: 10 INJECTION, SOLUTION EPIDURAL; INFILTRATION; INTRACAUDAL; PERINEURAL at 07:45

## 2020-10-14 RX ADMIN — PROPOFOL 50 MG: 10 INJECTION, EMULSION INTRAVENOUS at 07:46

## 2020-10-14 RX ADMIN — PROPOFOL 30 MG: 10 INJECTION, EMULSION INTRAVENOUS at 07:48

## 2020-10-14 RX ADMIN — PROPOFOL 20 MG: 10 INJECTION, EMULSION INTRAVENOUS at 07:54

## 2020-10-14 RX ADMIN — PHENYLEPHRINE HYDROCHLORIDE 100 MCG: 10 INJECTION INTRAVENOUS at 07:59

## 2020-10-14 RX ADMIN — SODIUM CHLORIDE, SODIUM LACTATE, POTASSIUM CHLORIDE, AND CALCIUM CHLORIDE: .6; .31; .03; .02 INJECTION, SOLUTION INTRAVENOUS at 07:40

## 2020-10-14 RX ADMIN — PROPOFOL 20 MG: 10 INJECTION, EMULSION INTRAVENOUS at 08:03

## 2020-10-15 DIAGNOSIS — F90.0 ATTENTION DEFICIT HYPERACTIVITY DISORDER (ADHD), PREDOMINANTLY INATTENTIVE TYPE: ICD-10-CM

## 2020-10-15 DIAGNOSIS — F31.81 BIPOLAR II DISORDER (HCC): ICD-10-CM

## 2020-10-15 RX ORDER — DEXTROAMPHETAMINE SACCHARATE, AMPHETAMINE ASPARTATE MONOHYDRATE, DEXTROAMPHETAMINE SULFATE AND AMPHETAMINE SULFATE 5; 5; 5; 5 MG/1; MG/1; MG/1; MG/1
20 CAPSULE, EXTENDED RELEASE ORAL EVERY MORNING
Qty: 30 CAPSULE | Refills: 0 | Status: SHIPPED | OUTPATIENT
Start: 2020-10-15 | End: 2020-11-16 | Stop reason: SDUPTHER

## 2020-10-15 RX ORDER — ALPRAZOLAM 1 MG/1
1 TABLET ORAL 3 TIMES DAILY PRN
Qty: 90 TABLET | Refills: 0 | Status: SHIPPED | OUTPATIENT
Start: 2020-10-15 | End: 2020-11-16 | Stop reason: SDUPTHER

## 2020-10-20 ENCOUNTER — OFFICE VISIT (OUTPATIENT)
Dept: PSYCHIATRY | Facility: CLINIC | Age: 40
End: 2020-10-20
Payer: COMMERCIAL

## 2020-10-20 DIAGNOSIS — F90.0 ATTENTION DEFICIT HYPERACTIVITY DISORDER (ADHD), PREDOMINANTLY INATTENTIVE TYPE: ICD-10-CM

## 2020-10-20 DIAGNOSIS — F31.81 BIPOLAR II DISORDER (HCC): Primary | ICD-10-CM

## 2020-10-20 DIAGNOSIS — F41.0 PANIC DISORDER WITHOUT AGORAPHOBIA WITH MODERATE PANIC ATTACKS: ICD-10-CM

## 2020-10-20 PROCEDURE — 99213 OFFICE O/P EST LOW 20 MIN: CPT | Performed by: NURSE PRACTITIONER

## 2020-11-03 ENCOUNTER — TELEMEDICINE (OUTPATIENT)
Dept: FAMILY MEDICINE CLINIC | Facility: CLINIC | Age: 40
End: 2020-11-03
Payer: COMMERCIAL

## 2020-11-03 ENCOUNTER — TELEPHONE (OUTPATIENT)
Dept: FAMILY MEDICINE CLINIC | Facility: CLINIC | Age: 40
End: 2020-11-03

## 2020-11-03 DIAGNOSIS — B37.9 YEAST INFECTION: Primary | ICD-10-CM

## 2020-11-03 DIAGNOSIS — J01.90 ACUTE SINUSITIS, RECURRENCE NOT SPECIFIED, UNSPECIFIED LOCATION: Primary | ICD-10-CM

## 2020-11-03 PROCEDURE — 99213 OFFICE O/P EST LOW 20 MIN: CPT | Performed by: NURSE PRACTITIONER

## 2020-11-03 RX ORDER — FLUCONAZOLE 150 MG/1
150 TABLET ORAL ONCE
Qty: 1 TABLET | Refills: 0 | Status: SHIPPED | OUTPATIENT
Start: 2020-11-03 | End: 2020-11-03

## 2020-11-03 RX ORDER — METHYLPREDNISOLONE 4 MG/1
TABLET ORAL
Qty: 21 TABLET | Refills: 0 | Status: SHIPPED | OUTPATIENT
Start: 2020-11-03 | End: 2021-01-12 | Stop reason: ALTCHOICE

## 2020-11-03 RX ORDER — AMOXICILLIN 875 MG/1
875 TABLET, COATED ORAL 2 TIMES DAILY
Qty: 14 TABLET | Refills: 0 | Status: SHIPPED | OUTPATIENT
Start: 2020-11-03 | End: 2020-11-10

## 2020-11-16 DIAGNOSIS — F31.81 BIPOLAR II DISORDER (HCC): ICD-10-CM

## 2020-11-16 DIAGNOSIS — F41.0 PANIC DISORDER WITHOUT AGORAPHOBIA WITH MODERATE PANIC ATTACKS: ICD-10-CM

## 2020-11-16 DIAGNOSIS — F90.0 ATTENTION DEFICIT HYPERACTIVITY DISORDER (ADHD), PREDOMINANTLY INATTENTIVE TYPE: ICD-10-CM

## 2020-11-16 RX ORDER — LAMOTRIGINE 200 MG/1
200 TABLET ORAL
Qty: 30 TABLET | Refills: 3 | Status: SHIPPED | OUTPATIENT
Start: 2020-11-16 | End: 2021-03-19 | Stop reason: SDUPTHER

## 2020-11-16 RX ORDER — DEXTROAMPHETAMINE SACCHARATE, AMPHETAMINE ASPARTATE MONOHYDRATE, DEXTROAMPHETAMINE SULFATE AND AMPHETAMINE SULFATE 5; 5; 5; 5 MG/1; MG/1; MG/1; MG/1
20 CAPSULE, EXTENDED RELEASE ORAL EVERY MORNING
Qty: 30 CAPSULE | Refills: 0 | Status: SHIPPED | OUTPATIENT
Start: 2020-11-16 | End: 2020-12-15 | Stop reason: SDUPTHER

## 2020-11-16 RX ORDER — ESCITALOPRAM OXALATE 20 MG/1
20 TABLET ORAL
Qty: 30 TABLET | Refills: 3 | Status: SHIPPED | OUTPATIENT
Start: 2020-11-16 | End: 2021-03-03 | Stop reason: SINTOL

## 2020-11-16 RX ORDER — ALPRAZOLAM 1 MG/1
1 TABLET ORAL 3 TIMES DAILY PRN
Qty: 90 TABLET | Refills: 0 | Status: SHIPPED | OUTPATIENT
Start: 2020-11-16 | End: 2020-12-15 | Stop reason: SDUPTHER

## 2020-11-17 ENCOUNTER — TELEMEDICINE (OUTPATIENT)
Dept: PSYCHIATRY | Facility: CLINIC | Age: 40
End: 2020-11-17
Payer: COMMERCIAL

## 2020-11-17 DIAGNOSIS — F90.0 ATTENTION DEFICIT HYPERACTIVITY DISORDER (ADHD), PREDOMINANTLY INATTENTIVE TYPE: ICD-10-CM

## 2020-11-17 DIAGNOSIS — F41.0 PANIC DISORDER WITHOUT AGORAPHOBIA WITH MODERATE PANIC ATTACKS: Primary | ICD-10-CM

## 2020-11-17 DIAGNOSIS — F31.81 BIPOLAR II DISORDER (HCC): ICD-10-CM

## 2020-11-17 DIAGNOSIS — F41.9 ANXIETY DISORDER, UNSPECIFIED TYPE: ICD-10-CM

## 2020-11-17 PROCEDURE — 99214 OFFICE O/P EST MOD 30 MIN: CPT | Performed by: NURSE PRACTITIONER

## 2020-11-21 ENCOUNTER — TELEMEDICINE (OUTPATIENT)
Dept: FAMILY MEDICINE CLINIC | Facility: CLINIC | Age: 40
End: 2020-11-21
Payer: COMMERCIAL

## 2020-11-21 ENCOUNTER — TELEPHONE (OUTPATIENT)
Dept: FAMILY MEDICINE CLINIC | Facility: CLINIC | Age: 40
End: 2020-11-21

## 2020-11-21 DIAGNOSIS — M54.50 ACUTE LOW BACK PAIN WITHOUT SCIATICA, UNSPECIFIED BACK PAIN LATERALITY: ICD-10-CM

## 2020-11-21 DIAGNOSIS — Z20.828 EXPOSURE TO SARS-ASSOCIATED CORONAVIRUS: Primary | ICD-10-CM

## 2020-11-21 DIAGNOSIS — R05.9 COUGH: ICD-10-CM

## 2020-11-21 PROCEDURE — 99213 OFFICE O/P EST LOW 20 MIN: CPT | Performed by: NURSE PRACTITIONER

## 2020-11-23 ENCOUNTER — TELEMEDICINE (OUTPATIENT)
Dept: FAMILY MEDICINE CLINIC | Facility: CLINIC | Age: 40
End: 2020-11-23
Payer: COMMERCIAL

## 2020-11-23 DIAGNOSIS — U07.1 COVID-19 VIRUS INFECTION: Primary | ICD-10-CM

## 2020-11-23 DIAGNOSIS — M54.9 UPPER BACK PAIN: ICD-10-CM

## 2020-11-23 PROCEDURE — 1036F TOBACCO NON-USER: CPT | Performed by: NURSE PRACTITIONER

## 2020-11-23 PROCEDURE — 99213 OFFICE O/P EST LOW 20 MIN: CPT | Performed by: NURSE PRACTITIONER

## 2020-12-10 ENCOUNTER — TELEMEDICINE (OUTPATIENT)
Dept: FAMILY MEDICINE CLINIC | Facility: CLINIC | Age: 40
End: 2020-12-10
Payer: COMMERCIAL

## 2020-12-10 DIAGNOSIS — N89.8 VAGINAL ITCHING: ICD-10-CM

## 2020-12-10 DIAGNOSIS — J01.90 ACUTE SINUSITIS, RECURRENCE NOT SPECIFIED, UNSPECIFIED LOCATION: Primary | ICD-10-CM

## 2020-12-10 PROCEDURE — 99213 OFFICE O/P EST LOW 20 MIN: CPT | Performed by: NURSE PRACTITIONER

## 2020-12-10 PROCEDURE — 1036F TOBACCO NON-USER: CPT | Performed by: NURSE PRACTITIONER

## 2020-12-10 RX ORDER — FLUCONAZOLE 150 MG/1
150 TABLET ORAL ONCE
Qty: 1 TABLET | Refills: 0 | Status: SHIPPED | OUTPATIENT
Start: 2020-12-10 | End: 2020-12-10

## 2020-12-10 RX ORDER — FLUTICASONE PROPIONATE 50 MCG
2 SPRAY, SUSPENSION (ML) NASAL DAILY
Qty: 16 G | Refills: 0 | Status: SHIPPED | OUTPATIENT
Start: 2020-12-10 | End: 2021-07-28 | Stop reason: SDUPTHER

## 2020-12-10 RX ORDER — AZITHROMYCIN 250 MG/1
TABLET, FILM COATED ORAL
Qty: 6 TABLET | Refills: 0 | Status: SHIPPED | OUTPATIENT
Start: 2020-12-10 | End: 2020-12-15

## 2020-12-15 DIAGNOSIS — F31.81 BIPOLAR II DISORDER (HCC): ICD-10-CM

## 2020-12-15 DIAGNOSIS — F90.0 ATTENTION DEFICIT HYPERACTIVITY DISORDER (ADHD), PREDOMINANTLY INATTENTIVE TYPE: ICD-10-CM

## 2020-12-16 RX ORDER — ALPRAZOLAM 1 MG/1
1 TABLET ORAL 3 TIMES DAILY PRN
Qty: 90 TABLET | Refills: 0 | Status: SHIPPED | OUTPATIENT
Start: 2020-12-16 | End: 2021-01-14 | Stop reason: SDUPTHER

## 2020-12-16 RX ORDER — DEXTROAMPHETAMINE SACCHARATE, AMPHETAMINE ASPARTATE MONOHYDRATE, DEXTROAMPHETAMINE SULFATE AND AMPHETAMINE SULFATE 5; 5; 5; 5 MG/1; MG/1; MG/1; MG/1
20 CAPSULE, EXTENDED RELEASE ORAL EVERY MORNING
Qty: 30 CAPSULE | Refills: 0 | Status: SHIPPED | OUTPATIENT
Start: 2020-12-16 | End: 2021-01-14 | Stop reason: SDUPTHER

## 2021-01-12 ENCOUNTER — APPOINTMENT (OUTPATIENT)
Dept: RADIOLOGY | Facility: CLINIC | Age: 41
End: 2021-01-12
Payer: COMMERCIAL

## 2021-01-12 ENCOUNTER — TELEMEDICINE (OUTPATIENT)
Dept: FAMILY MEDICINE CLINIC | Facility: CLINIC | Age: 41
End: 2021-01-12
Payer: COMMERCIAL

## 2021-01-12 ENCOUNTER — TELEPHONE (OUTPATIENT)
Dept: FAMILY MEDICINE CLINIC | Facility: CLINIC | Age: 41
End: 2021-01-12

## 2021-01-12 VITALS — HEIGHT: 63 IN | BODY MASS INDEX: 21.26 KG/M2 | WEIGHT: 120 LBS

## 2021-01-12 DIAGNOSIS — R06.02 SOB (SHORTNESS OF BREATH): ICD-10-CM

## 2021-01-12 DIAGNOSIS — J20.9 ACUTE BRONCHITIS, UNSPECIFIED ORGANISM: ICD-10-CM

## 2021-01-12 DIAGNOSIS — F90.0 ATTENTION DEFICIT HYPERACTIVITY DISORDER (ADHD), PREDOMINANTLY INATTENTIVE TYPE: ICD-10-CM

## 2021-01-12 DIAGNOSIS — F31.81 BIPOLAR II DISORDER (HCC): ICD-10-CM

## 2021-01-12 DIAGNOSIS — J20.9 ACUTE BRONCHITIS, UNSPECIFIED ORGANISM: Primary | ICD-10-CM

## 2021-01-12 PROCEDURE — 3008F BODY MASS INDEX DOCD: CPT | Performed by: NURSE PRACTITIONER

## 2021-01-12 PROCEDURE — 99213 OFFICE O/P EST LOW 20 MIN: CPT | Performed by: NURSE PRACTITIONER

## 2021-01-12 PROCEDURE — 71046 X-RAY EXAM CHEST 2 VIEWS: CPT

## 2021-01-12 PROCEDURE — 1036F TOBACCO NON-USER: CPT | Performed by: NURSE PRACTITIONER

## 2021-01-12 NOTE — TELEPHONE ENCOUNTER
Pt informed  States she feels worse than she did before, she would like to know what she can do now  emilieh lpn

## 2021-01-12 NOTE — TELEPHONE ENCOUNTER
She can take some tylenol and advil for bodyaches, back ache and can try OTC for congestion and cough   ROSE Urbina

## 2021-01-12 NOTE — TELEPHONE ENCOUNTER
----- Message from P[address] sent at 1/12/2021  1:13 PM EST -----  Please let patient know that her chest xray is normal  ROSE Mancilla

## 2021-01-12 NOTE — PROGRESS NOTES
Virtual Regular Visit      Assessment/Plan:    1  Acute bronchitis, unspecified organism  -     XR chest pa & lateral; Future; Expected date: 01/12/2021    2  SOB (shortness of breath)  -     XR chest pa & lateral; Future; Expected date: 01/12/2021    3  Attention deficit hyperactivity disorder (ADHD), predominantly inattentive type  Comments:  managed by psychatrist and tolerating current regimen    4  Bipolar II disorder ([facility] [address] )  Comments:  managed by psychatrist and tolerating current regimen          Will do chest xray STAT to r/o pneumonia and if negative then will continue with supportive care as discussed  Supportive care discussed and advised  Advised to RTO for any worsening and no improvement  Follow up for no improvement and worsening of conditions  Patient advised and educated when to see immediate medical care  Reason for visit is   Chief Complaint   Patient presents with    Virtual Regular Visit        Encounter provider ROSE Urbina    Provider located at [address]  [address] [address]      Recent Visits  No visits were found meeting these conditions  Showing recent visits within past 7 days and meeting all other requirements     Today's Visits  Date Type Provider Dept   01/12/21 Telemedicine Jeremy Urbina today's visits and meeting all other requirements     Future Appointments  No visits were found meeting these conditions  Showing future appointments within next 150 days and meeting all other requirements        The patient was identified by name and date of birth  Zoë Biswas was informed that this is a telemedicine visit and that the visit is being conducted through [address] [place] [address] and patient was informed that this is not a secure, HIPAA-compliant platform  She agrees to proceed     My office door was closed  No one else was in the room    She acknowledged consent and understanding of privacy and security of the video platform  The patient has agreed to participate and understands they can discontinue the visit at any time  Patient is aware this is a billable service  Emily Lawson is a 41 y o  female    Body mass index is 21 26 kg/m²  HPI   Patient stated that started with congestion, cough, back pain in between shoulder blades, headache and mild sob occasionally since 1/9/2021  Have not checked temp  Patient is currently vapoing  Working from home today and denies any known exposure to any kind of COVID-19  Not using any OTC for symptoms  No video call patient is doing her house chores without any distress    Past Medical History:   Diagnosis Date    Abdominal pain     ADHD     Allergic rhinitis     Anxiety     panic attacks    Bipolar disorder (HCC)     Bladder distention     came to the ED on 10/9/2020-greene in to drain then removed    Chronic constipation     Contact lens/glasses fitting     and glasses    Depression     Psychiatric disorder     anxiety     Rectal bleeding        Past Surgical History:   Procedure Laterality Date    APPENDECTOMY      BREAST SURGERY      augmentation silicone    COLONOSCOPY N/A 1/24/2018    Procedure: COLONOSCOPY WITH HEMORRHOID BANDING;  Surgeon: Asher Helms MD;  Location: [place] [address] GI LAB; Service: Gastroenterology    ENDOMETRIAL ABLATION  2016    MO SIGMOIDOSCOPY FLX DX W/COLLJ SPEC BR/WA IF PFRMD N/A 11/14/2018    Procedure: FLEXIBLE SIGMOIDOSCOPY WITH APC; Surgeon: Asher Helms MD;  Location: [facility] GI LAB;   Service: Gastroenterology    TUBAL LIGATION         Current Outpatient Medications   Medication Sig Dispense Refill    ALPRAZolam (XANAX) 1 mg tablet Take 1 tablet (1 mg total) by mouth 3 (three) times a day as needed for anxiety 90 tablet 0    amphetamine-dextroamphetamine (ADDERALL XR) 20 MG 24 hr capsule Take 1 capsule (20 mg total) by mouth every morningMax Daily Amount: 20 mg 30 capsule 0    escitalopram (LEXAPRO) 20 mg tablet Take 1 tablet (20 mg total) by mouth daily at bedtime 30 tablet 3    fluticasone (FLONASE) 50 mcg/act nasal spray 2 sprays into each nostril daily 16 g 0    lamoTRIgine (LaMICtal) 200 MG tablet Take 1 tablet (200 mg total) by mouth daily at bedtime 30 tablet 3     No current facility-administered medications for this visit  Allergies   Allergen Reactions    Doxycycline Other (See Comments)     unknown       Review of Systems   Constitutional: Negative for chills, diaphoresis, fatigue, fever and unexpected weight change  HENT: Positive for congestion  Negative for dental problem, drooling, ear discharge, ear pain, facial swelling, hearing loss, mouth sores, nosebleeds, postnasal drip, rhinorrhea, sinus pressure, sinus pain, sneezing, sore throat, tinnitus, trouble swallowing and voice change  Respiratory: Positive for cough and shortness of breath  Negative for chest tightness and wheezing  Cardiovascular: Negative  Gastrointestinal: Negative for abdominal pain, constipation, diarrhea, nausea and vomiting  Musculoskeletal: Positive for back pain  Skin: Negative  Neurological: Positive for headaches  Negative for dizziness and light-headedness  Hematological: Negative  Video Exam    Vitals:    01/12/21 0901   Weight: 54 4 kg (120 lb)   Height: 5' 3" (1 6 m)       Physical Exam  Constitutional:       Appearance: She is well-developed  HENT:      Nose: Nose normal    Eyes:      Conjunctiva/sclera: Conjunctivae normal    Neck:      Musculoskeletal: Normal range of motion  Pulmonary:      Effort: Pulmonary effort is normal       Comments: No cough and distress noted on video call  Skin:     Comments: No diaphoresis noted on video call   Neurological:      Mental Status: She is alert and oriented to person, place, and time  Psychiatric:         Mood and Affect: Mood normal          Behavior: Behavior normal          Thought Content:  Thought content normal          Judgment: Judgment normal           I spent 7 minutes directly with the patient during this visit      Sonia acknowledges that she has consented to an online visit or consultation  She understands that the online visit is based solely on information provided by her, and that, in the absence of a face-to-face physical evaluation by the physician, the diagnosis she receives is both limited and provisional in terms of accuracy and completeness  This is not intended to replace a full medical face-to-face evaluation by the physician  Elizabeth Donnelly understands and accepts these terms

## 2021-01-14 DIAGNOSIS — F90.0 ATTENTION DEFICIT HYPERACTIVITY DISORDER (ADHD), PREDOMINANTLY INATTENTIVE TYPE: ICD-10-CM

## 2021-01-14 DIAGNOSIS — F31.81 BIPOLAR II DISORDER (HCC): ICD-10-CM

## 2021-01-14 RX ORDER — DEXTROAMPHETAMINE SACCHARATE, AMPHETAMINE ASPARTATE MONOHYDRATE, DEXTROAMPHETAMINE SULFATE AND AMPHETAMINE SULFATE 5; 5; 5; 5 MG/1; MG/1; MG/1; MG/1
20 CAPSULE, EXTENDED RELEASE ORAL EVERY MORNING
Qty: 30 CAPSULE | Refills: 0 | Status: SHIPPED | OUTPATIENT
Start: 2021-01-14 | End: 2021-02-17 | Stop reason: SDUPTHER

## 2021-01-14 RX ORDER — ALPRAZOLAM 1 MG/1
1 TABLET ORAL 3 TIMES DAILY PRN
Qty: 90 TABLET | Refills: 0 | Status: SHIPPED | OUTPATIENT
Start: 2021-01-14 | End: 2021-02-17 | Stop reason: SDUPTHER

## 2021-02-17 ENCOUNTER — TELEPHONE (OUTPATIENT)
Dept: GASTROENTEROLOGY | Facility: CLINIC | Age: 41
End: 2021-02-17

## 2021-02-17 DIAGNOSIS — F90.0 ATTENTION DEFICIT HYPERACTIVITY DISORDER (ADHD), PREDOMINANTLY INATTENTIVE TYPE: ICD-10-CM

## 2021-02-17 DIAGNOSIS — F31.81 BIPOLAR II DISORDER (HCC): ICD-10-CM

## 2021-02-17 RX ORDER — ALPRAZOLAM 1 MG/1
1 TABLET ORAL 3 TIMES DAILY PRN
Qty: 90 TABLET | Refills: 0 | Status: SHIPPED | OUTPATIENT
Start: 2021-02-17 | End: 2021-03-08 | Stop reason: SDUPTHER

## 2021-02-17 RX ORDER — DEXTROAMPHETAMINE SACCHARATE, AMPHETAMINE ASPARTATE MONOHYDRATE, DEXTROAMPHETAMINE SULFATE AND AMPHETAMINE SULFATE 5; 5; 5; 5 MG/1; MG/1; MG/1; MG/1
20 CAPSULE, EXTENDED RELEASE ORAL EVERY MORNING
Qty: 30 CAPSULE | Refills: 0 | Status: SHIPPED | OUTPATIENT
Start: 2021-02-17 | End: 2021-03-19 | Stop reason: SDUPTHER

## 2021-02-17 NOTE — TELEPHONE ENCOUNTER
Patient left message with just her name and phone number asking for a call  Returned her call and had to leave message for her to call back

## 2021-02-19 ENCOUNTER — TELEMEDICINE (OUTPATIENT)
Dept: FAMILY MEDICINE CLINIC | Facility: CLINIC | Age: 41
End: 2021-02-19
Payer: COMMERCIAL

## 2021-02-19 DIAGNOSIS — R10.32 LLQ PAIN: Primary | ICD-10-CM

## 2021-02-19 DIAGNOSIS — N76.0 ACUTE VAGINITIS: ICD-10-CM

## 2021-02-19 DIAGNOSIS — K59.00 CONSTIPATION, UNSPECIFIED CONSTIPATION TYPE: ICD-10-CM

## 2021-02-19 PROCEDURE — 99213 OFFICE O/P EST LOW 20 MIN: CPT | Performed by: NURSE PRACTITIONER

## 2021-02-19 RX ORDER — FLUCONAZOLE 150 MG/1
150 TABLET ORAL ONCE
Qty: 1 TABLET | Refills: 0 | Status: SHIPPED | OUTPATIENT
Start: 2021-02-19 | End: 2021-02-19

## 2021-02-19 NOTE — PROGRESS NOTES
Virtual Regular Visit      Assessment/Plan:    Recommended she take a dose of Miralax today  If no BM by tomorrow, take a dose of Magnesium Citrate  ER for severe abdominal pain  F/u as needed    Problem List Items Addressed This Visit     None      Visit Diagnoses     LLQ pain    -  Primary    Constipation, unspecified constipation type        Acute vaginitis        Relevant Medications    fluconazole (DIFLUCAN) 150 mg tablet               Reason for visit is   Chief Complaint   Patient presents with    Virtual Regular Visit        Encounter provider ROSE Escobedo    Provider located at [place] [address]  [address] [address]      Recent Visits  No visits were found meeting these conditions  Showing recent visits within past 7 days and meeting all other requirements     Today's Visits  Date Type Provider Dept   02/19/21 Telemedicine Jeremy Escobedo today's visits and meeting all other requirements     Future Appointments  No visits were found meeting these conditions  Showing future appointments within next 150 days and meeting all other requirements        The patient was identified by name and date of birth  RamosLacassine Manju was informed that this is a telemedicine visit and that the visit is being conducted through [address] [place] [address] and patient was informed that this is not a secure, HIPAA-compliant platform  She agrees to proceed     My office door was closed  No one else was in the room  She acknowledged consent and understanding of privacy and security of the video platform  The patient has agreed to participate and understands they can discontinue the visit at any time  Patient is aware this is a billable service  Clovis Cunningham is a 41 y o  female   She has been experiencing LUQ abdominal pain for the past 3 days  Started initially with back pain     Last night started to develop symptoms of a yeast infection  Developed itching, burning, and thick discharge  Has had yeast infections and rare UTIs  No prior history of kidney stones  She normally has constipation, but has moving her bowels more regularly with water and exercise  Thinks the last time she moved her bowels was 5 or 6 days ago  Reports she was seen in the ER last year with significant constipation  Denies fevers, chills, urinary s/s, vomiting, or body aches       Past Medical History:   Diagnosis Date    Abdominal pain     ADHD     Allergic rhinitis     Anxiety     panic attacks    Bipolar disorder (HCC)     Bladder distention     came to the ED on 10/9/2020-greene in to drain then removed    Chronic constipation     Contact lens/glasses fitting     and glasses    Depression     Psychiatric disorder     anxiety     Rectal bleeding        Past Surgical History:   Procedure Laterality Date    APPENDECTOMY      BREAST SURGERY      augmentation silicone    COLONOSCOPY N/A 1/24/2018    Procedure: COLONOSCOPY WITH HEMORRHOID BANDING;  Surgeon: Lasha Mccollum MD;  Location: [facility]; Service: Gastroenterology    ENDOMETRIAL ABLATION  2016    [place] SIGMOIDOSCOPY FLX DX W/COLLJ SPEC BR/WA IF PFRMD N/A 11/14/2018    Procedure: FLEXIBLE SIGMOIDOSCOPY WITH APC; Surgeon: Lasha Mccollum MD;  Location: [facility] GI LAB;   Service: Gastroenterology    TUBAL LIGATION         Current Outpatient Medications   Medication Sig Dispense Refill    ALPRAZolam (XANAX) 1 mg tablet Take 1 tablet (1 mg total) by mouth 3 (three) times a day as needed for anxiety 90 tablet 0    amphetamine-dextroamphetamine (ADDERALL XR) 20 MG 24 hr capsule Take 1 capsule (20 mg total) by mouth every morningMax Daily Amount: 20 mg 30 capsule 0    escitalopram (LEXAPRO) 20 mg tablet Take 1 tablet (20 mg total) by mouth daily at bedtime 30 tablet 3    fluconazole (DIFLUCAN) 150 mg tablet Take 1 tablet (150 mg total) by mouth once for 1 dose 1 tablet 0    fluticasone (FLONASE) 50 mcg/act nasal spray 2 sprays into each nostril daily 16 g 0    lamoTRIgine (LaMICtal) 200 MG tablet Take 1 tablet (200 mg total) by mouth daily at bedtime 30 tablet 3     No current facility-administered medications for this visit  Allergies   Allergen Reactions    Doxycycline Other (See Comments)     unknown       Review of Systems   Constitutional: Negative  Respiratory: Negative  Cardiovascular: Negative  Gastrointestinal: Positive for abdominal pain and constipation  Negative for nausea, rectal pain and vomiting  Genitourinary: Positive for dysuria and vaginal pain  Neurological: Negative for dizziness and headaches  Video Exam    There were no vitals filed for this visit  Physical Exam  Constitutional:       Appearance: She is well-developed  She is not ill-appearing or diaphoretic  HENT:      Head: Normocephalic and atraumatic  Eyes:      Conjunctiva/sclera: Conjunctivae normal    Neck:      Musculoskeletal: Normal range of motion  Pulmonary:      Effort: Pulmonary effort is normal  No tachypnea, accessory muscle usage or respiratory distress  Skin:     Coloration: Skin is not pale  Neurological:      Mental Status: She is alert  Psychiatric:         Mood and Affect: Mood normal          Speech: Speech normal          Behavior: Behavior normal           I spent 7 minutes directly with the patient during this visit      Sonia acknowledges that she has consented to an online visit or consultation  She understands that the online visit is based solely on information provided by her, and that, in the absence of a face-to-face physical evaluation by the physician, the diagnosis she receives is both limited and provisional in terms of accuracy and completeness  This is not intended to replace a full medical face-to-face evaluation by the physician  Guanako Guo understands and accepts these terms

## 2021-03-03 ENCOUNTER — TELEMEDICINE (OUTPATIENT)
Dept: PSYCHIATRY | Facility: CLINIC | Age: 41
End: 2021-03-03
Payer: COMMERCIAL

## 2021-03-03 DIAGNOSIS — F41.0 PANIC DISORDER WITHOUT AGORAPHOBIA WITH MODERATE PANIC ATTACKS: ICD-10-CM

## 2021-03-03 DIAGNOSIS — F31.81 BIPOLAR II DISORDER (HCC): Primary | ICD-10-CM

## 2021-03-03 DIAGNOSIS — F41.9 ANXIETY DISORDER, UNSPECIFIED TYPE: ICD-10-CM

## 2021-03-03 PROCEDURE — 99215 OFFICE O/P EST HI 40 MIN: CPT | Performed by: NURSE PRACTITIONER

## 2021-03-03 PROCEDURE — 1036F TOBACCO NON-USER: CPT | Performed by: NURSE PRACTITIONER

## 2021-03-03 RX ORDER — SERTRALINE HYDROCHLORIDE 25 MG/1
25 TABLET, FILM COATED ORAL DAILY
Qty: 30 TABLET | Refills: 3 | Status: SHIPPED | OUTPATIENT
Start: 2021-03-03 | End: 2021-04-22

## 2021-03-03 NOTE — PSYCH
Virtual Regular Visit    Problem List Items Addressed This Visit        Other    Anxiety disorder    Relevant Medications    sertraline (ZOLOFT) 25 mg tablet    Bipolar II disorder (HCC) - Primary    Relevant Medications    sertraline (ZOLOFT) 25 mg tablet    Panic disorder without agoraphobia with moderate panic attacks    Relevant Medications    sertraline (ZOLOFT) 25 mg tablet        Reason for visit is   Chief Complaint   Patient presents with    ADHD    Mood Swings    Anxiety    Depression     Encounter provider Ez Bryant, PhD    Provider located at [address] [place] [address]  [address]  [place] [address]  [phone]    Recent Visits  No visits were found meeting these conditions  Showing recent visits within past 7 days and meeting all other requirements     Today's Visits  Date Type Provider Dept   03/03/21 Telemedicine Ez Bryant, PhD  [facility]   Showing today's visits and meeting all other requirements     Future Appointments  No visits were found meeting these conditions  Showing future appointments within next 150 days and meeting all other requirements        After connecting through [employer], the patient was identified by name and date of birth  Lori Murillo was informed that this is a telemedicine visit and that the visit is being conducted through phone and patient was informed that this is not a secure, HIPAA-compliant platform  She agrees to proceed  which may not be secure and therefore, might not be HIPAA-compliant  My office door was closed  No one else was in the room  She acknowledged consent and understanding of privacy and security of the video platform  The patient has agreed to participate and understands they can discontinue the visit at any time      SUBJECTIVE:    Lori Murillo is a 41 y o  female with a history of mood swings, ADHD, anxiety and depression seen for medication management  Isabel reports she continues with depression and anxiety  Stopped the lexapro due to feeling as if she was getting urine retention  Appetite is fair and sleep continues to be a problem  Working and caring for home  Denies SI  Family are supportive, takes other medication as prescribed  HPI ROS Appetite Changes and Sleep: normal appetite and normal energy level    Review Of Systems:     Mood Anxiety and Depression   Behavior Normal    Thought Content Normal   General Sleep Disturbances   Personality Normal   Other Psych Symptoms Normal   Constitutional As Noted in HPI   ENT As Noted in HPI   Cardiovascular As Noted in HPI   Respiratory As Noted in HPI   Gastrointestinal As Noted in HPI   Genitourinary As Noted in HPI   Musculoskeletal As Noted in HPI   Integumentary As Noted in HPI   Neurological As Noted in HPI   Endocrine Normal    Other Symptoms Normal        Substance Abuse History:    Social History     Substance and Sexual Activity   Drug Use No       Family Psychiatric History:     Family History   Problem Relation Age of Onset    No Known Problems Mother     No Known Problems Father     No Known Problems Sister     No Known Problems Brother     No Known Problems Daughter     No Known Problems Son        Social History     Socioeconomic History    Marital status: /Civil Union     Spouse name: Not on file    Number of children: Not on file    Years of education: Not on file    Highest education level: Not on file   Occupational History    Not on file   Social Needs    Financial resource strain: Not on file    Food insecurity     Worry: Not on file     Inability: Not on file   [employer] needs     Medical: Not on file     Non-medical: Not on file   Tobacco Use    Smoking status: Former Smoker     Types: Cigarettes     Quit date:      Years since quittin 1    Smokeless tobacco: Never Used   Substance and Sexual Activity    Alcohol use:  Yes Frequency: 2-4 times a month     Comment: occassional - wine 2 x month    Drug use: No    Sexual activity: Not on file     Comment: ablation   Lifestyle    Physical activity     Days per week: Not on file     Minutes per session: Not on file    Stress: Not on file   Relationships    Social connections     Talks on phone: Not on file     Gets together: Not on file     Attends Sikhism service: Not on file     Active member of club or organization: Not on file     Attends meetings of clubs or organizations: Not on file     Relationship status: Not on file    Intimate partner violence     Fear of current or ex partner: Not on file     Emotionally abused: Not on file     Physically abused: Not on file     Forced sexual activity: Not on file   Other Topics Concern    Not on file   Social History Narrative    Not on file       Past Medical History:   Diagnosis Date    Abdominal pain     ADHD     Allergic rhinitis     Anxiety     panic attacks    Bipolar disorder ([facility] [address] )     Bladder distention     came to the ED on 10/9/2020-greene in to drain then removed    Chronic constipation     Contact lens/glasses fitting     and glasses    Depression     Psychiatric disorder     anxiety     Rectal bleeding        Past Surgical History:   Procedure Laterality Date    APPENDECTOMY      BREAST SURGERY      augmentation silicone    COLONOSCOPY N/A 1/24/2018    Procedure: COLONOSCOPY WITH HEMORRHOID BANDING;  Surgeon: Severo House, MD;  Location: [place] [address] GI LAB; Service: Gastroenterology    ENDOMETRIAL ABLATION  2016    [place] SIGMOIDOSCOPY FLX DX W/COLLJ SPEC BR/WA IF PFRMD N/A 11/14/2018    Procedure: FLEXIBLE SIGMOIDOSCOPY WITH APC; Surgeon: Severo House, MD;  Location: [facility] GI LAB;   Service: Gastroenterology    TUBAL LIGATION         Current Outpatient Medications   Medication Sig Dispense Refill    ALPRAZolam (XANAX) 1 mg tablet Take 1 tablet (1 mg total) by mouth 3 (three) times a day as needed for anxiety 90 tablet 0    amphetamine-dextroamphetamine (ADDERALL XR) 20 MG 24 hr capsule Take 1 capsule (20 mg total) by mouth every morningMax Daily Amount: 20 mg 30 capsule 0    fluticasone (FLONASE) 50 mcg/act nasal spray 2 sprays into each nostril daily 16 g 0    lamoTRIgine (LaMICtal) 200 MG tablet Take 1 tablet (200 mg total) by mouth daily at bedtime 30 tablet 3    sertraline (ZOLOFT) 25 mg tablet Take 1 tablet (25 mg total) by mouth daily 30 tablet 3     No current facility-administered medications for this visit  Allergies   Allergen Reactions    Doxycycline Other (See Comments)     unknown       reviewed medication, problem list and medication history    OBJECTIVE:     Mental Status Examination:    Appearance phone session   Mood anxious   Affect phone session   Speech a normal rate   Thought Processes normal thought processes   Hallucinations no hallucinations present    Thought Content no delusions   Abnormal Thoughts no suicidal thoughts  and no homicidal thoughts    Orientation  oriented to person and place and time   Remote Memory short term memory intact and long term memory intact   Attention Span concentration intact   Intellect Appears to be of Average Intelligence   Insight Insight intact   Judgement judgment was intact   Muscle Strength denies problems   Language no difficulty naming common objects   Fund of Knowledge displays adequate knowledge of current events   Pain none   Pain Scale 0       Laboratory Results: No results found for this or any previous visit  Assessment/Plan:       Diagnoses and all orders for this visit:    Bipolar II disorder ([facility] [address] )    Panic disorder without agoraphobia with moderate panic attacks    Anxiety disorder, unspecified type  -     sertraline (ZOLOFT) 25 mg tablet;  Take 1 tablet (25 mg total) by mouth daily          Treatment Recommendations- Risks Benefits      Immediate Medical/Psychiatric/Psychotherapy Treatments and Any Precautions: change lexapro to zoloft, she agreed  Support provided  Discussed coping skills    Risks, Benefits And Possible Side Effects Of Medications:  Zoloft    Controlled Medication Discussion: Discussed with patient the risks of sedation, respiratory depression, impairment of ability to drive and potential for abuse and addiction related to treatment with benzodiazepine medications  The patient understands risk of treatment with benzodiazepine medications, agrees to not drive if feels impaired and agrees to take medications as prescribed  and she is aware of safe use and storage of medication     Psychotherapy Provided: support, medication education    Goals discussed in session:improve anxiety and decrease depression    Counseling provided: 40     Treatment Plan:    Completed and signed during the session: Not applicable - Treatment Plan not due at this session, enacted 7/21/2020, updated 10/20/2020     I spent 35 minutes with the patient during this visit and 5 min reviewing Epic for all data/events relevant to this visit, composing this note and conducting other visit follow up activities    Liliane Denver, PhD 03/03/21

## 2021-03-08 DIAGNOSIS — F31.81 BIPOLAR II DISORDER (HCC): ICD-10-CM

## 2021-03-08 RX ORDER — ALPRAZOLAM 1 MG/1
1 TABLET ORAL 3 TIMES DAILY PRN
Qty: 30 TABLET | Refills: 0 | Status: SHIPPED | OUTPATIENT
Start: 2021-03-08 | End: 2021-03-19 | Stop reason: SDUPTHER

## 2021-03-19 DIAGNOSIS — F31.81 BIPOLAR II DISORDER (HCC): ICD-10-CM

## 2021-03-19 DIAGNOSIS — F90.0 ATTENTION DEFICIT HYPERACTIVITY DISORDER (ADHD), PREDOMINANTLY INATTENTIVE TYPE: ICD-10-CM

## 2021-03-19 RX ORDER — LAMOTRIGINE 200 MG/1
200 TABLET ORAL
Qty: 30 TABLET | Refills: 3 | Status: SHIPPED | OUTPATIENT
Start: 2021-03-19 | End: 2021-08-25 | Stop reason: SDUPTHER

## 2021-03-19 RX ORDER — ALPRAZOLAM 1 MG/1
1 TABLET ORAL 3 TIMES DAILY PRN
Qty: 30 TABLET | Refills: 0 | Status: SHIPPED | OUTPATIENT
Start: 2021-03-19 | End: 2021-04-03 | Stop reason: SDUPTHER

## 2021-03-19 RX ORDER — DEXTROAMPHETAMINE SACCHARATE, AMPHETAMINE ASPARTATE MONOHYDRATE, DEXTROAMPHETAMINE SULFATE AND AMPHETAMINE SULFATE 5; 5; 5; 5 MG/1; MG/1; MG/1; MG/1
20 CAPSULE, EXTENDED RELEASE ORAL EVERY MORNING
Qty: 30 CAPSULE | Refills: 0 | Status: SHIPPED | OUTPATIENT
Start: 2021-03-19 | End: 2021-05-03 | Stop reason: SDUPTHER

## 2021-04-02 DIAGNOSIS — F31.81 BIPOLAR II DISORDER (HCC): ICD-10-CM

## 2021-04-02 RX ORDER — ALPRAZOLAM 1 MG/1
1 TABLET ORAL 3 TIMES DAILY PRN
Qty: 30 TABLET | Refills: 0 | Status: CANCELLED | OUTPATIENT
Start: 2021-04-02

## 2021-04-03 DIAGNOSIS — F31.81 BIPOLAR II DISORDER (HCC): ICD-10-CM

## 2021-04-03 RX ORDER — ALPRAZOLAM 1 MG/1
1 TABLET ORAL 3 TIMES DAILY PRN
Qty: 90 TABLET | Refills: 0 | Status: SHIPPED | OUTPATIENT
Start: 2021-04-03 | End: 2021-05-03 | Stop reason: SDUPTHER

## 2021-04-05 ENCOUNTER — TELEPHONE (OUTPATIENT)
Dept: OBGYN CLINIC | Facility: CLINIC | Age: 41
End: 2021-04-05

## 2021-04-05 NOTE — TELEPHONE ENCOUNTER
Pt called to schedule NP annual  Rubi Soni when her last women's wellness exam was over three years ago  Annual scheduled 5/24/21

## 2021-04-09 ENCOUNTER — TELEPHONE (OUTPATIENT)
Dept: GASTROENTEROLOGY | Facility: CLINIC | Age: 41
End: 2021-04-09

## 2021-04-09 NOTE — TELEPHONE ENCOUNTER
Returned sravan's call, she states she has a "repeat Hemorrhoid" I made her first available appt  On 6/4/21 and put her on the wait list   She insisted she needed a call form the doctor because she couldn't wait that long  [phone]

## 2021-04-13 ENCOUNTER — TELEPHONE (OUTPATIENT)
Dept: GASTROENTEROLOGY | Facility: CLINIC | Age: 41
End: 2021-04-13

## 2021-04-13 ENCOUNTER — OFFICE VISIT (OUTPATIENT)
Dept: GASTROENTEROLOGY | Facility: CLINIC | Age: 41
End: 2021-04-13
Payer: COMMERCIAL

## 2021-04-13 VITALS
HEIGHT: 63 IN | BODY MASS INDEX: 22.15 KG/M2 | DIASTOLIC BLOOD PRESSURE: 72 MMHG | WEIGHT: 125 LBS | HEART RATE: 92 BPM | SYSTOLIC BLOOD PRESSURE: 125 MMHG

## 2021-04-13 DIAGNOSIS — K64.9 BLEEDING HEMORRHOID: Primary | ICD-10-CM

## 2021-04-13 PROCEDURE — 1036F TOBACCO NON-USER: CPT | Performed by: INTERNAL MEDICINE

## 2021-04-13 PROCEDURE — 99214 OFFICE O/P EST MOD 30 MIN: CPT | Performed by: INTERNAL MEDICINE

## 2021-04-13 PROCEDURE — 3008F BODY MASS INDEX DOCD: CPT | Performed by: INTERNAL MEDICINE

## 2021-04-13 NOTE — PROGRESS NOTES
[facility] - Outpatient Follow-up Note  Sandrita Smith 41 y o  female MRN: [id number]  Encounter: [id number]          ASSESSMENT AND PLAN:      1  Bleeding hemorrhoid   patient is complaining recurrence of hemorrhoidal bleed, mostly blood on the toilet paper, no change in stool color, she denying any strenuous bowel movement  She had hemorrhoidal banding in the past in October 2020 and now symptoms recur  She consume good amount of fiber in the diet  On digital rectal examination, there is no external hemorrhoid or perianal fissure, she did not want anuscopy examination  Will schedule for colonoscopy and then decide about further treatment plan  Will consider hemorrhoid banding if there is a large internal hemorrhoid, prior colonoscopy was suboptimal to poor prep  - Colonoscopy; Future  - Sodium Sulfate-Mag Sulfate-KCl 7030-793-405 MG TABS; 24 tablets orally as directed  Dispense: 24 tablet; Refill: 0    ______________________________________________________________________    SUBJECTIVE:  Patient come for urgent follow-up visit because of rectal bleeding  She has a long history of symptomatic hemorrhoid, status post hemorrhoidal banding in October 2020 and now having recurrence of symptoms  She denying any abdominal pain, no chronic constipation, no strenuous bowel movement, no nausea, no vomiting  Last colonoscopy was suboptimal prep, she want to discuss about low volume bowel prep because she cannot tolerate  1 gal bowel prep      REVIEW OF SYSTEMS IS OTHERWISE NEGATIVE        Historical Information   Past Medical History:   Diagnosis Date    Abdominal pain     ADHD     Allergic rhinitis     Anxiety     panic attacks    Anxiety     Bipolar disorder (HCC)     Bladder distention     came to the ED on 10/9/2020-greene in to drain then removed    Chronic constipation     Contact lens/glasses fitting     and glasses    Depression     Depression     Psychiatric disorder anxiety     Rectal bleeding      Past Surgical History:   Procedure Laterality Date    APPENDECTOMY      BREAST SURGERY      augmentation silicone    COLONOSCOPY N/A 2018    Procedure: COLONOSCOPY WITH HEMORRHOID BANDING;  Surgeon: Brando Ledezma MD;  Location: [facility] GI LAB; Service: Gastroenterology    ENDOMETRIAL ABLATION  2016    [place] SIGMOIDOSCOPY FLX DX W/COLLJ SPEC BR/WA IF PFRMD N/A 2018    Procedure: FLEXIBLE SIGMOIDOSCOPY WITH APC; Surgeon: Brando Ledezma MD;  Location: [facility] GI LAB; Service: Gastroenterology    TUBAL LIGATION      UPPER GASTROINTESTINAL ENDOSCOPY       Social History   Social History     Substance and Sexual Activity   Alcohol Use Yes    Frequency: 2-4 times a month    Comment: occassional - wine 2 x month     Social History     Substance and Sexual Activity   Drug Use No     Social History     Tobacco Use   Smoking Status Former Smoker    Types: Cigarettes    Quit date:     Years since quittin 2   Smokeless Tobacco Never Used     Family History   Problem Relation Age of Onset    No Known Problems Mother     No Known Problems Father     No Known Problems Sister     No Known Problems Brother     No Known Problems Daughter     No Known Problems Son        Meds/Allergies       Current Outpatient Medications:     ALPRAZolam (XANAX) 1 mg tablet    amphetamine-dextroamphetamine (ADDERALL XR) 20 MG 24 hr capsule    fluticasone (FLONASE) 50 mcg/act nasal spray    lamoTRIgine (LaMICtal) 200 MG tablet    sertraline (ZOLOFT) 25 mg tablet    Sodium Sulfate-Mag Sulfate-KCl 5754-743-845 MG TABS    Allergies   Allergen Reactions    Doxycycline Other (See Comments)     unknown           Objective     Blood pressure 125/72, pulse 92, height 5' 3" (1 6 m), weight 56 7 kg (125 lb), not currently breastfeeding  Body mass index is 22 14 kg/m²        PHYSICAL EXAM:      General Appearance:   Alert, cooperative, no distress   HEENT:   Normocephalic, atraumatic, anicteric      Neck:  Supple, symmetrical, trachea midline   Lungs:   Clear to auscultation bilaterally; no rales, rhonchi or wheezing; respirations unlabored    Heart[de-identified]   Regular rate and rhythm; no murmur, rub, or gallop  Abdomen:   Soft, non-tender, non-distended; normal bowel sounds; no masses, no organomegaly    Genitalia:   Deferred    Rectal:    prolapsing internal hemorrhoid, no external hemorrhoid, no perianal fissure    Extremities:  No cyanosis, clubbing or edema    Pulses:  2+ and symmetric    Skin:  No jaundice, rashes, or lesions    Lymph nodes:  No palpable cervical lymphadenopathy        Lab Results:   No visits with results within 1 Day(s) from this visit     Latest known visit with results is:   Admission on 10/09/2020, Discharged on 10/09/2020   Component Date Value    WBC 10/09/2020 6 57     RBC 10/09/2020 4 37     Hemoglobin 10/09/2020 13 9     Hematocrit 10/09/2020 43 7     MCV 10/09/2020 100*    MCH 10/09/2020 31 8     MCHC 10/09/2020 31 8     RDW 10/09/2020 12 5     MPV 10/09/2020 9 6     Platelets 34/61/3934 250     nRBC 10/09/2020 0     Neutrophils Relative 10/09/2020 60     Immat GRANS % 10/09/2020 0     Lymphocytes Relative 10/09/2020 29     Monocytes Relative 10/09/2020 8     Eosinophils Relative 10/09/2020 2     Basophils Relative 10/09/2020 1     Neutrophils Absolute 10/09/2020 4 01     Immature Grans Absolute 10/09/2020 0 01     Lymphocytes Absolute 10/09/2020 1 87     Monocytes Absolute 10/09/2020 0 54     Eosinophils Absolute 10/09/2020 0 11     Basophils Absolute 10/09/2020 0 03     Sodium 10/09/2020 139     Potassium 10/09/2020 4 0     Chloride 10/09/2020 104     CO2 10/09/2020 29     ANION GAP 10/09/2020 6     BUN 10/09/2020 11     Creatinine 10/09/2020 0 88     Glucose 10/09/2020 91     Calcium 10/09/2020 8 5     AST 10/09/2020 18     ALT 10/09/2020 26     Alkaline Phosphatase 10/09/2020 43*    Total Protein 10/09/2020 6 8     Albumin 10/09/2020 4 0     Total Bilirubin 10/09/2020 0 50     eGFR 10/09/2020 81     Lipase 10/09/2020 128     Magnesium 10/09/2020 1 8     Color, UA 10/09/2020 Yellow     Clarity, UA 10/09/2020 Slightly Cloudy     Specific Gravity, UA 10/09/2020 1 015     pH, UA 10/09/2020 6 5     Leukocytes, UA 10/09/2020 Negative     Nitrite, UA 10/09/2020 Negative     Protein, UA 10/09/2020 Negative     Glucose, UA 10/09/2020 Negative     Ketones, UA 10/09/2020 Negative     Urobilinogen, UA 10/09/2020 0 2     Bilirubin, UA 10/09/2020 Negative     Blood, UA 10/09/2020 Negative          Radiology Results:   No results found

## 2021-04-22 DIAGNOSIS — Z20.822 COVID-19 RULED OUT BY LABORATORY TESTING: ICD-10-CM

## 2021-04-22 PROCEDURE — U0005 INFEC AGEN DETEC AMPLI PROBE: HCPCS | Performed by: INTERNAL MEDICINE

## 2021-04-22 PROCEDURE — U0003 INFECTIOUS AGENT DETECTION BY NUCLEIC ACID (DNA OR RNA); SEVERE ACUTE RESPIRATORY SYNDROME CORONAVIRUS 2 (SARS-COV-2) (CORONAVIRUS DISEASE [COVID-19]), AMPLIFIED PROBE TECHNIQUE, MAKING USE OF HIGH THROUGHPUT TECHNOLOGIES AS DESCRIBED BY CMS-2020-01-R: HCPCS | Performed by: INTERNAL MEDICINE

## 2021-04-22 NOTE — PRE-PROCEDURE INSTRUCTIONS
Pre-Surgery Instructions:   Medication Instructions    ALPRAZolam (XANAX) 1 mg tablet Instructed patient per Anesthesia Guidelines   amphetamine-dextroamphetamine (ADDERALL XR) 20 MG 24 hr capsule Instructed patient per Anesthesia Guidelines   fluticasone (FLONASE) 50 mcg/act nasal spray Instructed patient per Anesthesia Guidelines   lamoTRIgine (LaMICtal) 200 MG tablet Instructed patient per Anesthesia Guidelines   SERTRALINE HCL PO Instructed patient per Anesthesia Guidelines   Sodium Sulfate-Mag Sulfate-KCl 8627-936-200 MG TABS Patient was instructed by Physician and understands  Pt to follow Dr Dorothea Whitlock instructions    Marisolrandi Karel [phone]

## 2021-04-23 LAB — SARS-COV-2 RNA RESP QL NAA+PROBE: NEGATIVE

## 2021-04-28 ENCOUNTER — HOSPITAL ENCOUNTER (OUTPATIENT)
Dept: GASTROENTEROLOGY | Facility: AMBULARY SURGERY CENTER | Age: 41
Setting detail: OUTPATIENT SURGERY
Discharge: HOME | End: 2021-04-28
Attending: INTERNAL MEDICINE | Admitting: INTERNAL MEDICINE
Payer: COMMERCIAL

## 2021-04-28 ENCOUNTER — ANESTHESIA (OUTPATIENT)
Dept: GASTROENTEROLOGY | Facility: AMBULARY SURGERY CENTER | Age: 41
End: 2021-04-28

## 2021-04-28 ENCOUNTER — ANESTHESIA EVENT (OUTPATIENT)
Dept: GASTROENTEROLOGY | Facility: AMBULARY SURGERY CENTER | Age: 41
End: 2021-04-28

## 2021-04-28 VITALS
SYSTOLIC BLOOD PRESSURE: 120 MMHG | WEIGHT: 125 LBS | RESPIRATION RATE: 16 BRPM | OXYGEN SATURATION: 98 % | HEIGHT: 63 IN | BODY MASS INDEX: 22.15 KG/M2 | DIASTOLIC BLOOD PRESSURE: 68 MMHG | HEART RATE: 72 BPM

## 2021-04-28 DIAGNOSIS — Z20.822 COVID-19 RULED OUT BY LABORATORY TESTING: Primary | ICD-10-CM

## 2021-04-28 DIAGNOSIS — K64.9 BLEEDING HEMORRHOID: ICD-10-CM

## 2021-04-28 PROCEDURE — 45398 COLONOSCOPY W/BAND LIGATION: CPT | Performed by: INTERNAL MEDICINE

## 2021-04-28 RX ORDER — PROPOFOL 10 MG/ML
INJECTION, EMULSION INTRAVENOUS AS NEEDED
Status: DISCONTINUED | OUTPATIENT
Start: 2021-04-28 | End: 2021-04-28

## 2021-04-28 RX ORDER — SODIUM CHLORIDE, SODIUM LACTATE, POTASSIUM CHLORIDE, CALCIUM CHLORIDE 600; 310; 30; 20 MG/100ML; MG/100ML; MG/100ML; MG/100ML
125 INJECTION, SOLUTION INTRAVENOUS CONTINUOUS
Status: DISCONTINUED | OUTPATIENT
Start: 2021-04-28 | End: 2021-05-02 | Stop reason: HOSPADM

## 2021-04-28 RX ORDER — LIDOCAINE HYDROCHLORIDE 10 MG/ML
INJECTION, SOLUTION EPIDURAL; INFILTRATION; INTRACAUDAL; PERINEURAL AS NEEDED
Status: DISCONTINUED | OUTPATIENT
Start: 2021-04-28 | End: 2021-04-28

## 2021-04-28 RX ORDER — PROPOFOL 10 MG/ML
INJECTION, EMULSION INTRAVENOUS CONTINUOUS PRN
Status: DISCONTINUED | OUTPATIENT
Start: 2021-04-28 | End: 2021-04-28

## 2021-04-28 RX ADMIN — PROPOFOL 100 MG: 10 INJECTION, EMULSION INTRAVENOUS at 11:11

## 2021-04-28 RX ADMIN — LIDOCAINE HYDROCHLORIDE 50 MG: 10 INJECTION, SOLUTION EPIDURAL; INFILTRATION; INTRACAUDAL; PERINEURAL at 11:11

## 2021-04-28 RX ADMIN — SODIUM CHLORIDE, SODIUM LACTATE, POTASSIUM CHLORIDE, AND CALCIUM CHLORIDE 125 ML/HR: .6; .31; .03; .02 INJECTION, SOLUTION INTRAVENOUS at 10:47

## 2021-04-28 RX ADMIN — PROPOFOL 100 MCG/KG/MIN: 10 INJECTION, EMULSION INTRAVENOUS at 11:11

## 2021-04-28 NOTE — H&P
History and Physical - [facility]  Chip Benavidez 41 y o  female MRN: [id number]                  HPI: Chip Benavidez is a 41y o  year old female who presents for elective colonoscopy for rectal bleeding      REVIEW OF SYSTEMS: Per the HPI, and otherwise unremarkable  Historical Information   Past Medical History:   Diagnosis Date    Abdominal pain     ADHD     Allergic rhinitis     Anxiety     panic attacks    Anxiety     Bipolar disorder (HCC)     Bladder distention     came to the ED on 10/9/2020-greene in to drain then removed    Chronic constipation     Colon polyp     Contact lens/glasses fitting     and glasses    Depression     Depression     GERD (gastroesophageal reflux disease)     Psychiatric disorder     anxiety     Rectal bleeding      Past Surgical History:   Procedure Laterality Date    APPENDECTOMY      BREAST SURGERY      augmentation silicone    COLONOSCOPY N/A 2018    Procedure: COLONOSCOPY WITH HEMORRHOID BANDING;  Surgeon: Smitha Sandoval MD;  Location: [place] [address] GI LAB; Service: Gastroenterology    ENDOMETRIAL ABLATION  2016    [place] SIGMOIDOSCOPY FLX DX W/COLLJ SPEC BR/WA IF PFRMD N/A 2018    Procedure: FLEXIBLE SIGMOIDOSCOPY WITH APC; Surgeon: Smitha Sandoval MD;  Location: [facility];   Service: Gastroenterology    TUBAL LIGATION      UPPER GASTROINTESTINAL ENDOSCOPY       Social History   Social History     Substance and Sexual Activity   Alcohol Use Yes    Frequency: 2-4 times a month    Comment: occassional - wine 2 x month     Social History     Substance and Sexual Activity   Drug Use No     Social History     Tobacco Use   Smoking Status Former Smoker    Types: Cigarettes    Quit date:     Years since quittin 3   Smokeless Tobacco Never Used     Family History   Problem Relation Age of Onset    No Known Problems Mother     No Known Problems Father     No Known Problems Sister     No Known Problems Brother     No Known Problems Daughter     No Known Problems Son        Meds/Allergies     (Not in a hospital admission)      Allergies   Allergen Reactions    Doxycycline Other (See Comments)     unknown       Objective     Ht 5' 3" (1 6 m)   Wt 56 7 kg (125 lb)   BMI 22 14 kg/m²       PHYSICAL EXAM    Gen: NAD  CV: RRR  CHEST: Clear  ABD: soft, NT/ND  EXT: no edema      ASSESSMENT/PLAN:  This is a 41y o  year old female here for colonoscopy, and she is stable and optimized for her procedure

## 2021-04-28 NOTE — ANESTHESIA PREPROCEDURE EVALUATION
Procedure:  COLONOSCOPY    Relevant Problems   GI/HEPATIC   (+) Gastroesophageal reflux disease      NEURO/PSYCH   (+) Anxiety disorder   (+) Panic disorder without agoraphobia with moderate panic attacks        Physical Exam    Airway    Mallampati score: II  TM Distance: >3 FB  Neck ROM: full     Dental   No notable dental hx     Cardiovascular  Cardiovascular exam normal    Pulmonary  Pulmonary exam normal     Other Findings        Anesthesia Plan  ASA Score- 2     Anesthesia Type- IV sedation with anesthesia with ASA Monitors  Additional Monitors:   Airway Plan:           Plan Factors-Exercise tolerance (METS): >4 METS  Chart reviewed  Imaging results reviewed  Existing labs reviewed  Patient summary reviewed  Patient is not a current smoker  Induction-     Postoperative Plan-     Informed Consent- Anesthetic plan and risks discussed with patient  I personally reviewed this patient with the CRNA  Discussed and agreed on the Anesthesia Plan with the CRNA  Vanda Moreno

## 2021-05-03 DIAGNOSIS — F31.81 BIPOLAR II DISORDER (HCC): ICD-10-CM

## 2021-05-03 DIAGNOSIS — F90.0 ATTENTION DEFICIT HYPERACTIVITY DISORDER (ADHD), PREDOMINANTLY INATTENTIVE TYPE: ICD-10-CM

## 2021-05-03 RX ORDER — ALPRAZOLAM 1 MG/1
1 TABLET ORAL 3 TIMES DAILY PRN
Qty: 90 TABLET | Refills: 0 | Status: SHIPPED | OUTPATIENT
Start: 2021-05-03 | End: 2021-06-02 | Stop reason: SDUPTHER

## 2021-05-03 RX ORDER — DEXTROAMPHETAMINE SACCHARATE, AMPHETAMINE ASPARTATE MONOHYDRATE, DEXTROAMPHETAMINE SULFATE AND AMPHETAMINE SULFATE 5; 5; 5; 5 MG/1; MG/1; MG/1; MG/1
20 CAPSULE, EXTENDED RELEASE ORAL EVERY MORNING
Qty: 30 CAPSULE | Refills: 0 | Status: SHIPPED | OUTPATIENT
Start: 2021-05-03 | End: 2021-06-02 | Stop reason: SDUPTHER

## 2021-06-02 DIAGNOSIS — F90.0 ATTENTION DEFICIT HYPERACTIVITY DISORDER (ADHD), PREDOMINANTLY INATTENTIVE TYPE: ICD-10-CM

## 2021-06-02 DIAGNOSIS — F31.81 BIPOLAR II DISORDER (HCC): ICD-10-CM

## 2021-06-02 RX ORDER — ALPRAZOLAM 1 MG/1
1 TABLET ORAL 3 TIMES DAILY PRN
Qty: 90 TABLET | Refills: 0 | Status: SHIPPED | OUTPATIENT
Start: 2021-06-02 | End: 2021-07-08 | Stop reason: SDUPTHER

## 2021-06-02 RX ORDER — DEXTROAMPHETAMINE SACCHARATE, AMPHETAMINE ASPARTATE MONOHYDRATE, DEXTROAMPHETAMINE SULFATE AND AMPHETAMINE SULFATE 5; 5; 5; 5 MG/1; MG/1; MG/1; MG/1
20 CAPSULE, EXTENDED RELEASE ORAL EVERY MORNING
Qty: 30 CAPSULE | Refills: 0 | Status: SHIPPED | OUTPATIENT
Start: 2021-06-02 | End: 2021-07-08 | Stop reason: SDUPTHER

## 2021-07-06 ENCOUNTER — OFFICE VISIT (OUTPATIENT)
Dept: OBGYN CLINIC | Facility: CLINIC | Age: 41
End: 2021-07-06
Payer: COMMERCIAL

## 2021-07-06 VITALS
SYSTOLIC BLOOD PRESSURE: 127 MMHG | HEIGHT: 63 IN | HEART RATE: 81 BPM | WEIGHT: 125 LBS | DIASTOLIC BLOOD PRESSURE: 80 MMHG | BODY MASS INDEX: 22.15 KG/M2

## 2021-07-06 DIAGNOSIS — G56.03 BILATERAL CARPAL TUNNEL SYNDROME: Primary | ICD-10-CM

## 2021-07-06 DIAGNOSIS — R20.2 NUMBNESS AND TINGLING IN BOTH HANDS: ICD-10-CM

## 2021-07-06 DIAGNOSIS — R20.0 NUMBNESS AND TINGLING IN BOTH HANDS: ICD-10-CM

## 2021-07-06 PROCEDURE — 3008F BODY MASS INDEX DOCD: CPT | Performed by: ORTHOPAEDIC SURGERY

## 2021-07-06 PROCEDURE — 99203 OFFICE O/P NEW LOW 30 MIN: CPT | Performed by: ORTHOPAEDIC SURGERY

## 2021-07-06 PROCEDURE — 1036F TOBACCO NON-USER: CPT | Performed by: ORTHOPAEDIC SURGERY

## 2021-07-06 PROCEDURE — 20526 THER INJECTION CARP TUNNEL: CPT | Performed by: ORTHOPAEDIC SURGERY

## 2021-07-06 RX ORDER — TRIAMCINOLONE ACETONIDE 40 MG/ML
20 INJECTION, SUSPENSION INTRA-ARTICULAR; INTRAMUSCULAR
Status: COMPLETED | OUTPATIENT
Start: 2021-07-06 | End: 2021-07-06

## 2021-07-06 RX ORDER — LIDOCAINE HYDROCHLORIDE 10 MG/ML
0.5 INJECTION, SOLUTION INFILTRATION; PERINEURAL
Status: COMPLETED | OUTPATIENT
Start: 2021-07-06 | End: 2021-07-06

## 2021-07-06 RX ADMIN — LIDOCAINE HYDROCHLORIDE 0.5 ML: 10 INJECTION, SOLUTION INFILTRATION; PERINEURAL at 11:15

## 2021-07-06 RX ADMIN — TRIAMCINOLONE ACETONIDE 20 MG: 40 INJECTION, SUSPENSION INTRA-ARTICULAR; INTRAMUSCULAR at 11:15

## 2021-07-06 NOTE — PROGRESS NOTES
Assessment/Plan:  1  Bilateral carpal tunnel syndrome  Cock Up Wrist Splint    EMG 2 Limb Upper Extremity   2  Numbness and tingling in both hands  Cock Up Wrist Splint    EMG 2 Limb Upper Extremity       Scribe Attestation    I,:  Alona Fortune am acting as a scribe while in the presence of the attending physician :       I,:  Stefani Pelletier DO personally performed the services described in this documentation    as scribed in my presence :         Nathanael Oreilly is a pleasant 35year old who presents to the office today for an initial evaluation of her bilateral hand numbness and tingling  Upon evaluation today she has clinical findings consistent with bilateral carpal tunnel syndrome  Non-operative treatments were discussed with the patient in the forms of bilateral corticosteroid injections, bracing and bilateral upper extremity EMG  We discussed the risks and benefits of corticosteroid injection today in the office and she did elect to proceed  The bilateral carpal tunnel injections were administered without issue and well tolerated by the patient  Post injection instructions were provided  She understands can be repeated no sooner than three months  She was fitted for bilateral cock-up wrist braces at today's visit  I discussed with the patient that she should wear the braces at nighttime when she sleeps  I ordered an EMG of her patient bilateral upper extremity to further evaluate for carpal tunnel syndrome  I will follow-up with her once the EMG is completed  She understood and had no further questions  Subjective:   Patient ID: Francia Montoya is a 35 y o  female who presents to the office today for an initial evaluation of her bilateral hand numbness and tingling  She states that she had bilateral numbness and tingling for a few years with no known mechanism of injury  She states that she has constant numbness and tingling into her feet fingers  She states that her right hand is worse than her left hand   She states that she works as  and types all day  She states that she does lift weights  She denies any weakness into her hands  Review of Systems   Constitutional: Negative for chills, fever and unexpected weight change  HENT: Negative for hearing loss, nosebleeds and sore throat  Eyes: Negative for pain, redness and visual disturbance  Respiratory: Negative for cough, shortness of breath and wheezing  Cardiovascular: Negative for chest pain, palpitations and leg swelling  Gastrointestinal: Negative for abdominal pain, nausea and vomiting  Endocrine: Negative for polyphagia and polyuria  Genitourinary: Negative for dysuria and hematuria  Musculoskeletal: Negative for arthralgias, gait problem and joint swelling  Skin: Negative for rash and wound  Neurological: Negative for dizziness, numbness and headaches  Psychiatric/Behavioral: Negative for decreased concentration and suicidal ideas  The patient is not nervous/anxious  Past Medical History:   Diagnosis Date    Abdominal pain     ADHD     Allergic rhinitis     Anxiety     panic attacks    Anxiety     Bipolar disorder (HCC)     Bladder distention     came to the ED on 10/9/2020-greene in to drain then removed    Chronic constipation     Colon polyp     Contact lens/glasses fitting     and glasses    Depression     Depression     GERD (gastroesophageal reflux disease)     Psychiatric disorder     anxiety     Rectal bleeding        Past Surgical History:   Procedure Laterality Date    APPENDECTOMY      BREAST SURGERY      augmentation silicone    COLONOSCOPY N/A 1/24/2018    Procedure: COLONOSCOPY WITH HEMORRHOID BANDING;  Surgeon: Misa Whaley MD;  Location: [place] [address] GI LAB; Service: Gastroenterology    ENDOMETRIAL ABLATION  2016    OR SIGMOIDOSCOPY FLX DX W/COLLJ SPEC BR/WA IF PFRMD N/A 11/14/2018    Procedure: FLEXIBLE SIGMOIDOSCOPY WITH APC;   Surgeon: Misa Whaley MD;  Location: [facility];   Service: Gastroenterology    TUBAL LIGATION      UPPER GASTROINTESTINAL ENDOSCOPY         Family History   Problem Relation Age of Onset    No Known Problems Mother     No Known Problems Father     No Known Problems Sister     No Known Problems Brother     No Known Problems Daughter     No Known Problems Son        Social History     Occupational History    Not on file   Tobacco Use    Smoking status: Former Smoker     Types: Cigarettes     Quit date:      Years since quittin 5    Smokeless tobacco: Never Used   Vaping Use    Vaping Use: Every day    Substances: Nicotine   Substance and Sexual Activity    Alcohol use: Yes     Comment: occassional - wine 2 x month    Drug use: No    Sexual activity: Not on file     Comment: ablation         Current Outpatient Medications:     ALPRAZolam (XANAX) 1 mg tablet, Take 1 tablet (1 mg total) by mouth 3 (three) times a day as needed for anxiety, Disp: 90 tablet, Rfl: 0    amphetamine-dextroamphetamine (ADDERALL XR) 20 MG 24 hr capsule, Take 1 capsule (20 mg total) by mouth every morningMax Daily Amount: 20 mg, Disp: 30 capsule, Rfl: 0    lamoTRIgine (LaMICtal) 200 MG tablet, Take 1 tablet (200 mg total) by mouth daily at bedtime, Disp: 30 tablet, Rfl: 3    SERTRALINE HCL PO, Take 200 mg by mouth daily at bedtime, Disp: , Rfl:     fluticasone (FLONASE) 50 mcg/act nasal spray, 2 sprays into each nostril daily (Patient not taking: Reported on 2021), Disp: 16 g, Rfl: 0    Allergies   Allergen Reactions    Doxycycline Other (See Comments)     unknown       Objective:  Vitals:    21 1103   BP: 127/80   Pulse: 81       Ortho Exam   Right Hand  - Tinel's  + Durkan's  - Tinel's at cubital tunnel  No thenar atrophy  Compartments soft  Brisk capillary refill  S/m intact median, radial, and ulnar nerve     Left Hand  - Tinel's  - Durkan's  - Tinel's at cubital tunnel  No thenar atrophy   Compartments soft  Brisk capillary refill  S/m intact median, radial, and ulnar nerve     Physical Exam  Vitals reviewed  Constitutional:       Appearance: Normal appearance  She is well-developed  HENT:      Head: Normocephalic and atraumatic  Eyes:      General:         Right eye: No discharge  Left eye: No discharge  Extraocular Movements: Extraocular movements intact  Conjunctiva/sclera: Conjunctivae normal    Cardiovascular:      Rate and Rhythm: Normal rate  Pulmonary:      Effort: Pulmonary effort is normal  No respiratory distress  Musculoskeletal:      Cervical back: Normal range of motion and neck supple  Skin:     General: Skin is warm and dry  Neurological:      General: No focal deficit present  Mental Status: She is alert and oriented to person, place, and time  Psychiatric:         Mood and Affect: Mood normal          Behavior: Behavior normal      Hand/upper extremity injection: bilateral carpal tunnel  [place] Protocol:  Consent: Verbal consent obtained  Risks and benefits: risks, benefits and alternatives were discussed  Consent given by: patient  Site marked: the operative site was marked  Supporting Documentation  Indications: tendon swelling and pain   Procedure Details  Condition:carpal tunnel syndrome Site: bilateral carpal tunnel   Preparation: Patient was prepped and draped in the usual sterile fashion  Needle size: 27 G  Ultrasound guidance: no  Approach: volar    Medications (Right): 0 5 mL lidocaine 1 %; 20 mg triamcinolone acetonide 40 mg/mLMedications (Left): 0 5 mL lidocaine 1 %; 20 mg triamcinolone acetonide 40 mg/mL   Patient tolerance: patient tolerated the procedure well with no immediate complications  Dressing:  Sterile dressing applied             I have personally reviewed pertinent films in PACS and my interpretation is as follows: no new images reviewed today

## 2021-07-08 DIAGNOSIS — F90.0 ATTENTION DEFICIT HYPERACTIVITY DISORDER (ADHD), PREDOMINANTLY INATTENTIVE TYPE: ICD-10-CM

## 2021-07-08 DIAGNOSIS — F31.81 BIPOLAR II DISORDER (HCC): ICD-10-CM

## 2021-07-08 DIAGNOSIS — F41.0 PANIC DISORDER WITHOUT AGORAPHOBIA WITH MODERATE PANIC ATTACKS: Primary | ICD-10-CM

## 2021-07-08 RX ORDER — ALPRAZOLAM 1 MG/1
1 TABLET ORAL 3 TIMES DAILY PRN
Qty: 90 TABLET | Refills: 0 | Status: SHIPPED | OUTPATIENT
Start: 2021-07-08 | End: 2021-08-05 | Stop reason: SDUPTHER

## 2021-07-08 RX ORDER — SERTRALINE HYDROCHLORIDE 100 MG/1
200 TABLET, FILM COATED ORAL
Qty: 60 TABLET | Refills: 3 | Status: SHIPPED | OUTPATIENT
Start: 2021-07-08 | End: 2021-11-08 | Stop reason: SINTOL

## 2021-07-08 RX ORDER — DEXTROAMPHETAMINE SACCHARATE, AMPHETAMINE ASPARTATE MONOHYDRATE, DEXTROAMPHETAMINE SULFATE AND AMPHETAMINE SULFATE 5; 5; 5; 5 MG/1; MG/1; MG/1; MG/1
20 CAPSULE, EXTENDED RELEASE ORAL EVERY MORNING
Qty: 30 CAPSULE | Refills: 0 | Status: SHIPPED | OUTPATIENT
Start: 2021-07-08 | End: 2021-08-05 | Stop reason: SDUPTHER

## 2021-07-08 NOTE — TELEPHONE ENCOUNTER
----- Message from [address] [place] [address] sent at 7/7/2021  6:42 PM EDT -----  Regarding: refills  ALPRAZolam (XANAX) 1 mg tablet TID PRN  amphetamine-dextroamphetamine (ADDERALL XR) 20 MG 24 hr capsule daily  SERTRALINE HCL PO 25mg  Deanne Lemus  7/15/21 SAUD Nicolas let me order the seetraline

## 2021-07-12 ENCOUNTER — TELEPHONE (OUTPATIENT)
Dept: OBGYN CLINIC | Facility: HOSPITAL | Age: 41
End: 2021-07-12

## 2021-07-12 NOTE — TELEPHONE ENCOUNTER
Patient is waiting for a call to schedule her EMG appt  She has been trying to schedule and has not gotten an appt  Could we give her a call at [phone]   Thank you

## 2021-07-13 ENCOUNTER — HOSPITAL ENCOUNTER (EMERGENCY)
Facility: HOSPITAL | Age: 41
Discharge: HOME | End: 2021-07-13
Attending: EMERGENCY MEDICINE | Admitting: EMERGENCY MEDICINE
Payer: COMMERCIAL

## 2021-07-13 ENCOUNTER — APPOINTMENT (EMERGENCY)
Dept: RADIOLOGY | Facility: HOSPITAL | Age: 41
End: 2021-07-13
Payer: COMMERCIAL

## 2021-07-13 VITALS
WEIGHT: 127 LBS | HEART RATE: 64 BPM | DIASTOLIC BLOOD PRESSURE: 80 MMHG | RESPIRATION RATE: 18 BRPM | BODY MASS INDEX: 22.5 KG/M2 | SYSTOLIC BLOOD PRESSURE: 130 MMHG | OXYGEN SATURATION: 100 % | TEMPERATURE: 98.3 F

## 2021-07-13 DIAGNOSIS — N39.0 UTI (URINARY TRACT INFECTION): ICD-10-CM

## 2021-07-13 DIAGNOSIS — K59.00 CONSTIPATION: Primary | ICD-10-CM

## 2021-07-13 DIAGNOSIS — R10.9 ABDOMINAL PAIN: ICD-10-CM

## 2021-07-13 LAB
ALBUMIN SERPL BCP-MCNC: 4.4 G/DL (ref 3.5–5)
ALP SERPL-CCNC: 56 U/L (ref 46–116)
ALT SERPL W P-5'-P-CCNC: 69 U/L (ref 12–78)
ANION GAP SERPL CALCULATED.3IONS-SCNC: 9 MMOL/L (ref 4–13)
AST SERPL W P-5'-P-CCNC: 39 U/L (ref 5–45)
BACTERIA UR QL AUTO: ABNORMAL /HPF
BASOPHILS # BLD AUTO: 0.06 THOUSANDS/ΜL (ref 0–0.1)
BASOPHILS NFR BLD AUTO: 0 % (ref 0–1)
BILIRUB SERPL-MCNC: 0.6 MG/DL (ref 0.2–1)
BILIRUB UR QL STRIP: NEGATIVE
BUN SERPL-MCNC: 17 MG/DL (ref 5–25)
CALCIUM SERPL-MCNC: 8.8 MG/DL (ref 8.3–10.1)
CHLORIDE SERPL-SCNC: 102 MMOL/L (ref 100–108)
CLARITY UR: CLEAR
CO2 SERPL-SCNC: 31 MMOL/L (ref 21–32)
COLOR UR: YELLOW
CREAT SERPL-MCNC: 1.01 MG/DL (ref 0.6–1.3)
EOSINOPHIL # BLD AUTO: 0.05 THOUSAND/ΜL (ref 0–0.61)
EOSINOPHIL NFR BLD AUTO: 0 % (ref 0–6)
ERYTHROCYTE [DISTWIDTH] IN BLOOD BY AUTOMATED COUNT: 12.4 % (ref 11.6–15.1)
EXT PREG TEST URINE: NEGATIVE
EXT. CONTROL ED NAV: NORMAL
GFR SERPL CREATININE-BSD FRML MDRD: 68 ML/MIN/1.73SQ M
GLUCOSE SERPL-MCNC: 101 MG/DL (ref 65–140)
GLUCOSE UR STRIP-MCNC: NEGATIVE MG/DL
HCT VFR BLD AUTO: 48.1 % (ref 34.8–46.1)
HGB BLD-MCNC: 15.8 G/DL (ref 11.5–15.4)
HGB UR QL STRIP.AUTO: ABNORMAL
IMM GRANULOCYTES # BLD AUTO: 0.07 THOUSAND/UL (ref 0–0.2)
IMM GRANULOCYTES NFR BLD AUTO: 1 % (ref 0–2)
KETONES UR STRIP-MCNC: NEGATIVE MG/DL
LEUKOCYTE ESTERASE UR QL STRIP: ABNORMAL
LIPASE SERPL-CCNC: 215 U/L (ref 73–393)
LYMPHOCYTES # BLD AUTO: 3.23 THOUSANDS/ΜL (ref 0.6–4.47)
LYMPHOCYTES NFR BLD AUTO: 22 % (ref 14–44)
MCH RBC QN AUTO: 32.7 PG (ref 26.8–34.3)
MCHC RBC AUTO-ENTMCNC: 32.8 G/DL (ref 31.4–37.4)
MCV RBC AUTO: 100 FL (ref 82–98)
MONOCYTES # BLD AUTO: 1.11 THOUSAND/ΜL (ref 0.17–1.22)
MONOCYTES NFR BLD AUTO: 8 % (ref 4–12)
NEUTROPHILS # BLD AUTO: 10.1 THOUSANDS/ΜL (ref 1.85–7.62)
NEUTS SEG NFR BLD AUTO: 69 % (ref 43–75)
NITRITE UR QL STRIP: NEGATIVE
NON-SQ EPI CELLS URNS QL MICRO: ABNORMAL /HPF
NRBC BLD AUTO-RTO: 0 /100 WBCS
PH UR STRIP.AUTO: 6 [PH]
PLATELET # BLD AUTO: 403 THOUSANDS/UL (ref 149–390)
PMV BLD AUTO: 9.3 FL (ref 8.9–12.7)
POTASSIUM SERPL-SCNC: 4 MMOL/L (ref 3.5–5.3)
PROT SERPL-MCNC: 7.7 G/DL (ref 6.4–8.2)
PROT UR STRIP-MCNC: NEGATIVE MG/DL
RBC # BLD AUTO: 4.83 MILLION/UL (ref 3.81–5.12)
RBC #/AREA URNS AUTO: ABNORMAL /HPF
SODIUM SERPL-SCNC: 142 MMOL/L (ref 136–145)
SP GR UR STRIP.AUTO: 1.02 (ref 1–1.03)
UROBILINOGEN UR QL STRIP.AUTO: 0.2 E.U./DL
WBC # BLD AUTO: 14.62 THOUSAND/UL (ref 4.31–10.16)
WBC #/AREA URNS AUTO: ABNORMAL /HPF

## 2021-07-13 PROCEDURE — 85025 COMPLETE CBC W/AUTO DIFF WBC: CPT | Performed by: EMERGENCY MEDICINE

## 2021-07-13 PROCEDURE — 83690 ASSAY OF LIPASE: CPT | Performed by: EMERGENCY MEDICINE

## 2021-07-13 PROCEDURE — 96374 THER/PROPH/DIAG INJ IV PUSH: CPT

## 2021-07-13 PROCEDURE — 99284 EMERGENCY DEPT VISIT MOD MDM: CPT | Performed by: EMERGENCY MEDICINE

## 2021-07-13 PROCEDURE — G1004 CDSM NDSC: HCPCS

## 2021-07-13 PROCEDURE — 80053 COMPREHEN METABOLIC PANEL: CPT | Performed by: EMERGENCY MEDICINE

## 2021-07-13 PROCEDURE — 74177 CT ABD & PELVIS W/CONTRAST: CPT

## 2021-07-13 PROCEDURE — 96361 HYDRATE IV INFUSION ADD-ON: CPT

## 2021-07-13 PROCEDURE — 99284 EMERGENCY DEPT VISIT MOD MDM: CPT

## 2021-07-13 PROCEDURE — 36415 COLL VENOUS BLD VENIPUNCTURE: CPT | Performed by: EMERGENCY MEDICINE

## 2021-07-13 PROCEDURE — 81001 URINALYSIS AUTO W/SCOPE: CPT | Performed by: EMERGENCY MEDICINE

## 2021-07-13 PROCEDURE — 81025 URINE PREGNANCY TEST: CPT | Performed by: EMERGENCY MEDICINE

## 2021-07-13 RX ORDER — POLYETHYLENE GLYCOL 3350 17 G/17G
17 POWDER, FOR SOLUTION ORAL DAILY
Qty: 12 EACH | Refills: 0 | Status: SHIPPED | OUTPATIENT
Start: 2021-07-13 | End: 2021-11-08

## 2021-07-13 RX ORDER — CEPHALEXIN 500 MG/1
500 CAPSULE ORAL ONCE
Status: COMPLETED | OUTPATIENT
Start: 2021-07-13 | End: 2021-07-13

## 2021-07-13 RX ORDER — CEFADROXIL 500 MG/1
500 CAPSULE ORAL EVERY 12 HOURS SCHEDULED
Qty: 14 CAPSULE | Refills: 0 | Status: SHIPPED | OUTPATIENT
Start: 2021-07-13 | End: 2021-07-20

## 2021-07-13 RX ORDER — ONDANSETRON 2 MG/ML
4 INJECTION INTRAMUSCULAR; INTRAVENOUS ONCE
Status: COMPLETED | OUTPATIENT
Start: 2021-07-13 | End: 2021-07-13

## 2021-07-13 RX ADMIN — SODIUM CHLORIDE 1000 ML: 0.9 INJECTION, SOLUTION INTRAVENOUS at 20:00

## 2021-07-13 RX ADMIN — IOHEXOL 100 ML: 350 INJECTION, SOLUTION INTRAVENOUS at 21:06

## 2021-07-13 RX ADMIN — ONDANSETRON 4 MG: 2 INJECTION INTRAMUSCULAR; INTRAVENOUS at 20:01

## 2021-07-13 RX ADMIN — CEPHALEXIN 500 MG: 500 CAPSULE ORAL at 21:48

## 2021-07-13 NOTE — TELEPHONE ENCOUNTER
Can someone please call this patient with a status on her EMG appointment?   An appt request was emailed on 7/6/21

## 2021-07-13 NOTE — ED PROVIDER NOTES
History  Chief Complaint   Patient presents with    Abdominal Pain     left sided abdominal pain   increased gas and nausea  26-year-old female presents with left-sided abdominal pain that started a few days ago sharp continuous currently 6/10 nothing makes it better worse  She has increased gas and nausea denies any vomiting dysuria urgency frequency diarrhea  Admits to having constipation  No cough congestion  No abdominal trauma      History provided by:  Patient   used: No        Prior to Admission Medications   Prescriptions Last Dose Informant Patient Reported? Taking?    ALPRAZolam (XANAX) 1 mg tablet   No No   Sig: Take 1 tablet (1 mg total) by mouth 3 (three) times a day as needed for anxiety   amphetamine-dextroamphetamine (ADDERALL XR) 20 MG 24 hr capsule   No No   Sig: Take 1 capsule (20 mg total) by mouth every morningMax Daily Amount: 20 mg   fluticasone (FLONASE) 50 mcg/act nasal spray  Self No No   Si sprays into each nostril daily   Patient not taking: Reported on 2021   lamoTRIgine (LaMICtal) 200 MG tablet  Self No No   Sig: Take 1 tablet (200 mg total) by mouth daily at bedtime   sertraline (ZOLOFT) 100 mg tablet   No No   Sig: Take 2 tablets (200 mg total) by mouth daily at bedtime      Facility-Administered Medications: None       Past Medical History:   Diagnosis Date    Abdominal pain     ADHD     Allergic rhinitis     Anxiety     panic attacks    Anxiety     Bipolar disorder (HCC)     Bladder distention     came to the ED on 10/9/2020-greene in to drain then removed    Chronic constipation     Colon polyp     Contact lens/glasses fitting     and glasses    Depression     Depression     GERD (gastroesophageal reflux disease)     Psychiatric disorder     anxiety     Rectal bleeding        Past Surgical History:   Procedure Laterality Date    APPENDECTOMY      BREAST SURGERY      augmentation silicone    COLONOSCOPY N/A 2018 Procedure: COLONOSCOPY WITH HEMORRHOID BANDING;  Surgeon: Gt Mehta MD;  Location: [place] [address] GI LAB; Service: Gastroenterology    ENDOMETRIAL ABLATION  2016    [place] SIGMOIDOSCOPY FLX DX W/COLLJ SPEC BR/WA IF PFRMD N/A 2018    Procedure: FLEXIBLE SIGMOIDOSCOPY WITH APC; Surgeon: Gt Mehta MD;  Location: [facility]; Service: Gastroenterology    TUBAL LIGATION      UPPER GASTROINTESTINAL ENDOSCOPY         Family History   Problem Relation Age of Onset    No Known Problems Mother     No Known Problems Father     No Known Problems Sister     No Known Problems Brother     No Known Problems Daughter     No Known Problems Son      I have reviewed and agree with the history as documented  E-Cigarette/Vaping    E-Cigarette Use Current Every Day User     Cartridges/Day not even one      E-Cigarette/Vaping Substances    Nicotine Yes     THC No     CBD No      Social History     Tobacco Use    Smoking status: Former Smoker     Types: Cigarettes     Quit date:      Years since quittin 5    Smokeless tobacco: Never Used   Vaping Use    Vaping Use: Every day    Substances: Nicotine   Substance Use Topics    Alcohol use: Yes     Comment: occassional - wine 2 x month    Drug use: No       Review of Systems   Constitutional: Negative  HENT: Negative  Eyes: Negative  Respiratory: Negative  Cardiovascular: Negative  Gastrointestinal: Positive for abdominal pain, constipation and nausea  Endocrine: Negative  Genitourinary: Negative  Musculoskeletal: Negative  Skin: Negative  Allergic/Immunologic: Negative  Neurological: Negative  Hematological: Negative  Psychiatric/Behavioral: Negative  All other systems reviewed and are negative  Physical Exam  Physical Exam  Constitutional:       Appearance: Normal appearance  HENT:      Head: Normocephalic and atraumatic        Nose: Nose normal       Mouth/Throat:      Mouth: Mucous membranes are moist  Eyes:      Extraocular Movements: Extraocular movements intact  Pupils: Pupils are equal, round, and reactive to light  Cardiovascular:      Rate and Rhythm: Normal rate and regular rhythm  Pulmonary:      Effort: Pulmonary effort is normal       Breath sounds: Normal breath sounds  Abdominal:      General: Abdomen is flat  Bowel sounds are normal       Palpations: Abdomen is soft  Tenderness: There is abdominal tenderness in the left lower quadrant  There is no right CVA tenderness, left CVA tenderness, guarding or rebound  Negative signs include Roman's sign, Rovsing's sign, McBurney's sign, psoas sign and obturator sign  Hernia: No hernia is present  Musculoskeletal:         General: Normal range of motion  Cervical back: Normal range of motion and neck supple  Skin:     General: Skin is warm  Capillary Refill: Capillary refill takes less than 2 seconds  Neurological:      General: No focal deficit present  Mental Status: She is alert and oriented to person, place, and time  Mental status is at baseline  Psychiatric:         Mood and Affect: Mood normal          Thought Content:  Thought content normal          Vital Signs  ED Triage Vitals [07/13/21 1720]   Temperature Pulse Respirations Blood Pressure SpO2   98 3 °F (36 8 °C) 71 18 130/80 100 %      Temp src Heart Rate Source Patient Position - Orthostatic VS BP Location FiO2 (%)   -- -- -- -- --      Pain Score       7           Vitals:    07/13/21 1945 07/13/21 2000 07/13/21 2015 07/13/21 2030   BP:       Pulse: 70 72 70 64         Visual Acuity      ED Medications  Medications   sodium chloride 0 9 % bolus 1,000 mL (0 mL Intravenous Stopped 7/13/21 2100)   ondansetron (ZOFRAN) injection 4 mg (4 mg Intravenous Given 7/13/21 2001)   iohexol (OMNIPAQUE) 350 MG/ML injection (SINGLE-DOSE) 100 mL (100 mL Intravenous Given 7/13/21 2106)   cephalexin (KEFLEX) capsule 500 mg (500 mg Oral Given 7/13/21 2148) Diagnostic Studies  Results Reviewed     Procedure Component Value Units Date/Time    Lipase [[id number]]  (Normal) Collected: 07/13/21 2000    Lab Status: Final result Specimen: Blood from Arm, Left Updated: 07/13/21 2029     Lipase 215 u/L     Comprehensive metabolic panel [[id number]] Collected: 07/13/21 2000    Lab Status: Final result Specimen: Blood from Arm, Left Updated: 07/13/21 2029     Sodium 142 mmol/L      Potassium 4 0 mmol/L      Chloride 102 mmol/L      CO2 31 mmol/L      ANION GAP 9 mmol/L      BUN 17 mg/dL      Creatinine 1 01 mg/dL      Glucose 101 mg/dL      Calcium 8 8 mg/dL      AST 39 U/L      ALT 69 U/L      Alkaline Phosphatase 56 U/L      Total Protein 7 7 g/dL      Albumin 4 4 g/dL      Total Bilirubin 0 60 mg/dL      eGFR 68 ml/min/1 73sq m     Narrative:      [place] guidelines for Chronic Kidney Disease (CKD):     Stage 1 with normal or high GFR (GFR > 90 mL/min/1 73 square meters)    Stage 2 Mild CKD (GFR = 60-89 mL/min/1 73 square meters)    Stage 3A Moderate CKD (GFR = 45-59 mL/min/1 73 square meters)    Stage 3B Moderate CKD (GFR = 30-44 mL/min/1 73 square meters)    Stage 4 Severe CKD (GFR = 15-29 mL/min/1 73 square meters)    Stage 5 End Stage CKD (GFR <15 mL/min/1 73 square meters)  Note: GFR calculation is accurate only with a steady state creatinine    Urine Microscopic [[id number]]  (Abnormal) Collected: 07/13/21 1959    Lab Status: Final result Specimen: Urine, Clean Catch Updated: 07/13/21 2020     RBC, UA None Seen /hpf      WBC, UA 4-10 /hpf      Epithelial Cells Occasional /hpf      Bacteria, UA Occasional /hpf     UA (URINE) with reflex to Scope [[id number]]  (Abnormal) Collected: 07/13/21 1959    Lab Status: Final result Specimen: Urine, Clean Catch Updated: 07/13/21 2014     Color, UA Yellow     Clarity, UA Clear     Specific [place], UA 1 020     pH, UA 6 0     Leukocytes, UA Moderate     Nitrite, UA Negative     Protein, UA Negative mg/dl      Glucose, UA Negative mg/dl      Ketones, UA Negative mg/dl      Urobilinogen, UA 0 2 E U /dl      Bilirubin, UA Negative     Blood, UA Trace-Intact    CBC and differential [[id number]]  (Abnormal) Collected: 07/13/21 2000    Lab Status: Final result Specimen: Blood from Arm, Left Updated: 07/13/21 2013     WBC 14 62 Thousand/uL      RBC 4 83 Million/uL      Hemoglobin 15 8 g/dL      Hematocrit 48 1 %       fL      MCH 32 7 pg      MCHC 32 8 g/dL      RDW 12 4 %      MPV 9 3 fL      Platelets 704 Thousands/uL      nRBC 0 /100 WBCs      Neutrophils Relative 69 %      Immat GRANS % 1 %      Lymphocytes Relative 22 %      Monocytes Relative 8 %      Eosinophils Relative 0 %      Basophils Relative 0 %      Neutrophils Absolute 10 10 Thousands/µL      Immature Grans Absolute 0 07 Thousand/uL      Lymphocytes Absolute 3 23 Thousands/µL      Monocytes Absolute 1 11 Thousand/µL      Eosinophils Absolute 0 05 Thousand/µL      Basophils Absolute 0 06 Thousands/µL     POCT pregnancy, urine [[id number]]  (Normal) Resulted: 07/13/21 2009    Lab Status: Final result Specimen: Urine Updated: 07/13/21 2009     EXT PREG TEST UR (Ref: Negative) Negative     Control Valid                 CT abdomen pelvis with contrast   Final Result by Vidal Plata MD (07/13 2131)      Findings compatible with constipation  No evidence of bowel obstruction, colitis, diverticulitis or appendicitis              Workstation performed: [id number]                    Procedures  Procedures         ED Course                                           MDM  Number of Diagnoses or Management Options     Amount and/or Complexity of Data Reviewed  Clinical lab tests: ordered and reviewed  Tests in the radiology section of CPT®: ordered and reviewed  Tests in the medicine section of CPT®: ordered and reviewed    Patient Progress  Patient progress: stable      Disposition  Final diagnoses:   Constipation   Abdominal pain   UTI (urinary tract infection)     Time reflects when diagnosis was documented in both MDM as applicable and the Disposition within this note     Time User Action Codes Description Comment    7/13/2021  9:37 PM Anepu, Jenna Add [K59 00] Constipation     7/13/2021  9:37 PM Anepu, Christophe Shaffer Add [R10 9] Abdominal pain     7/13/2021  9:37 PM Anepu, Christophe Shaffer Add [N39 0] UTI (urinary tract infection)       ED Disposition     ED Disposition Condition Date/Time Comment    Discharge Stable Tue Jul 13, 2021  9:37 PM Elder [place] discharge to home/self care              Follow-up Information     Follow up With Specialties Details Why Contact Info Additional Information    Erminio Leventhal, [address], Nurse Practitioner Schedule an appointment as soon as possible for a visit   [facility] Harshal Cardenas [address] Emergency Department Emergency Medicine  If symptoms worsen [address] [place] [address] Emergency Department, [facility], [facility]          Discharge Medication List as of 7/13/2021  9:38 PM      START taking these medications    Details   cefadroxil (DURICEF) 500 mg capsule Take 1 capsule (500 mg total) by mouth every 12 (twelve) hours for 7 days, Starting Tue 7/13/2021, Until Tue 7/20/2021, Normal      magnesium citrate solution Take 296 mL by mouth once for 1 dose, Starting Tue 7/13/2021, Normal      polyethylene glycol (MIRALAX) 17 g packet Take 17 g by mouth daily for 7 days, Starting Tue 7/13/2021, Until Tue 7/20/2021, Normal         CONTINUE these medications which have NOT CHANGED    Details   ALPRAZolam (XANAX) 1 mg tablet Take 1 tablet (1 mg total) by mouth 3 (three) times a day as needed for anxiety, Starting u 7/8/2021, Normal      amphetamine-dextroamphetamine (ADDERALL XR) 20 MG 24 hr capsule Take 1 capsule (20 mg total) by mouth every morningMax Daily Amount: 20 mg, Starting Thu 7/8/2021, Normal      fluticasone (FLONASE) 50 mcg/act nasal spray 2 sprays into each nostril daily, Starting Thu 12/10/2020, Normal      lamoTRIgine (LaMICtal) 200 MG tablet Take 1 tablet (200 mg total) by mouth daily at bedtime, Starting Fri 3/19/2021, Normal      sertraline (ZOLOFT) 100 mg tablet Take 2 tablets (200 mg total) by mouth daily at bedtime, Starting Thu 7/8/2021, Normal           No discharge procedures on file      PDMP Review       Value Time User    PDMP Reviewed  Yes 10/3/2019 10:49 AM ROSE Albrecht          ED Provider  Electronically Signed by           Moni Perkins DO  07/14/21 8782

## 2021-07-14 ENCOUNTER — VBI (OUTPATIENT)
Dept: FAMILY MEDICINE CLINIC | Facility: CLINIC | Age: 41
End: 2021-07-14

## 2021-07-14 NOTE — TELEPHONE ENCOUNTER
Follow-Ups     1 Schedule an appointment with Ann Benoit (Family Medicine)  2 Follow up with [address] [place] [address] Emergency Department (Emergency Medicine);  If symptoms worsen  3   Medication

## 2021-07-15 PROBLEM — F41.1 GAD (GENERALIZED ANXIETY DISORDER): Status: ACTIVE | Noted: 2021-07-15

## 2021-07-16 NOTE — TELEPHONE ENCOUNTER
Patient called back frustrated that she hasn't been called yet to schedule her EMG  Attempted to connect with the office  No answer      Call back # [phone]

## 2021-07-27 ENCOUNTER — TELEPHONE (OUTPATIENT)
Dept: OBGYN CLINIC | Facility: HOSPITAL | Age: 41
End: 2021-07-27

## 2021-07-27 NOTE — TELEPHONE ENCOUNTER
Patient sees Dr Saleem Delarosa  Patient called because she saw the doctor at the beginning of July and was referred to do an EMG test and follow up with Dr Saleem Delarosa  She called to schedule the test, and they do not have any openings until January, 2022  She called asking, what she can do in the meantime since she can not tolerate the pain in her hands? Please advise  CB # [phone]    Thank you

## 2021-07-28 ENCOUNTER — OFFICE VISIT (OUTPATIENT)
Dept: FAMILY MEDICINE CLINIC | Facility: CLINIC | Age: 41
End: 2021-07-28
Payer: COMMERCIAL

## 2021-07-28 VITALS
RESPIRATION RATE: 18 BRPM | SYSTOLIC BLOOD PRESSURE: 130 MMHG | WEIGHT: 123.6 LBS | BODY MASS INDEX: 21.9 KG/M2 | TEMPERATURE: 99.2 F | DIASTOLIC BLOOD PRESSURE: 72 MMHG | HEIGHT: 63 IN | HEART RATE: 78 BPM

## 2021-07-28 DIAGNOSIS — R06.02 SHORTNESS OF BREATH: Primary | ICD-10-CM

## 2021-07-28 DIAGNOSIS — Z87.09 HISTORY OF ASTHMA: ICD-10-CM

## 2021-07-28 DIAGNOSIS — J06.9 UPPER RESPIRATORY TRACT INFECTION, UNSPECIFIED TYPE: ICD-10-CM

## 2021-07-28 PROCEDURE — 3008F BODY MASS INDEX DOCD: CPT | Performed by: FAMILY MEDICINE

## 2021-07-28 PROCEDURE — 1036F TOBACCO NON-USER: CPT | Performed by: FAMILY MEDICINE

## 2021-07-28 PROCEDURE — 99214 OFFICE O/P EST MOD 30 MIN: CPT | Performed by: FAMILY MEDICINE

## 2021-07-28 RX ORDER — ACETAMINOPHEN 325 MG/1
650 TABLET ORAL EVERY 6 HOURS PRN
COMMUNITY
End: 2022-03-01 | Stop reason: ALTCHOICE

## 2021-07-28 RX ORDER — ALBUTEROL SULFATE 90 UG/1
2 AEROSOL, METERED RESPIRATORY (INHALATION) EVERY 6 HOURS PRN
Qty: 6.7 G | Refills: 1 | Status: SHIPPED | OUTPATIENT
Start: 2021-07-28 | End: 2021-11-03 | Stop reason: SDUPTHER

## 2021-07-28 RX ORDER — FLUTICASONE PROPIONATE 50 MCG
2 SPRAY, SUSPENSION (ML) NASAL DAILY
Qty: 16 G | Refills: 0 | Status: SHIPPED | OUTPATIENT
Start: 2021-07-28 | End: 2021-11-03 | Stop reason: SDUPTHER

## 2021-07-28 NOTE — PROGRESS NOTES
Assessment/Plan:     Diagnoses and all orders for this visit:    History of asthma  Shortness of breath  -     Ambulatory referral to Pulmonology; Future  -     albuterol (Proventil HFA) 90 mcg/act inhaler; Inhale 2 puffs every 6 (six) hours as needed for wheezing or shortness of breath  - Will need PFTs    Upper respiratory tract infection, unspecified type  -     fluticasone (FLONASE) 50 mcg/act nasal spray; 2 sprays into each nostril daily  - Counseled on supportive care  Subjective:      Patient ID: Molly Hinojosa is a 35 y o  female  HPI  She reports sore throat, dry cough, nasal congestion, worsening SOB for the past 3-4 days  Denies sinus pain, pressure, myalgias, ear pain, excessive fatigue, headaches, dizziness, loss of taste/smell  She has chronic SOB  Had asthma as a child  Has not been evaluated since then  She is a former smoker, quit cigarette smoking in 2014, but vapes regularly  Trying to quit  The following portions of the patient's history were reviewed and updated as appropriate: allergies, current medications, past family history, past medical history, past social history, past surgical history and problem list     Review of Systems   Constitutional: Negative  HENT: Positive for congestion, rhinorrhea and sore throat  Eyes: Negative  Respiratory: Positive for cough and shortness of breath  Cardiovascular: Negative  Gastrointestinal: Negative  Endocrine: Negative  Genitourinary: Negative  Musculoskeletal: Negative  Skin: Negative  Allergic/Immunologic: Negative  Neurological: Negative  Hematological: Negative  Psychiatric/Behavioral: Negative  Objective:      /72   Pulse 78   Temp 99 2 °F (37 3 °C)   Resp 18   Ht 5' 3" (1 6 m)   Wt 56 1 kg (123 lb 9 6 oz)   BMI 21 89 kg/m²          Physical Exam  Constitutional:       General: She is not in acute distress  Appearance: Normal appearance  She is well-developed   She is not diaphoretic  HENT:      Head: Normocephalic and atraumatic  Right Ear: Tympanic membrane, ear canal and external ear normal  There is no impacted cerumen  Left Ear: Tympanic membrane, ear canal and external ear normal  There is no impacted cerumen  Nose: Nose normal       Mouth/Throat:      Mouth: Mucous membranes are moist       Pharynx: No oropharyngeal exudate or posterior oropharyngeal erythema  Eyes:      General: No scleral icterus  Right eye: No discharge  Left eye: No discharge  Extraocular Movements: Extraocular movements intact  Conjunctiva/sclera: Conjunctivae normal       Pupils: Pupils are equal, round, and reactive to light  Cardiovascular:      Rate and Rhythm: Normal rate and regular rhythm  Heart sounds: Normal heart sounds  No murmur heard  No friction rub  No gallop  Pulmonary:      Effort: Pulmonary effort is normal  No respiratory distress  Breath sounds: Normal breath sounds  No wheezing or rales  Chest:      Chest wall: No tenderness  Abdominal:      Palpations: There is no mass  Tenderness: There is no rebound  Musculoskeletal:         General: No deformity  Normal range of motion  Cervical back: Normal range of motion and neck supple  Skin:     General: Skin is warm and dry  Findings: No erythema or rash  Neurological:      Mental Status: She is alert and oriented to person, place, and time  Psychiatric:         Behavior: Behavior normal          Thought Content:  Thought content normal          Judgment: Judgment normal

## 2021-07-30 ENCOUNTER — TELEPHONE (OUTPATIENT)
Dept: FAMILY MEDICINE CLINIC | Facility: CLINIC | Age: 41
End: 2021-07-30

## 2021-07-30 DIAGNOSIS — B37.9 YEAST INFECTION: Primary | ICD-10-CM

## 2021-07-30 DIAGNOSIS — J06.9 UPPER RESPIRATORY TRACT INFECTION, UNSPECIFIED TYPE: Primary | ICD-10-CM

## 2021-07-30 RX ORDER — FLUCONAZOLE 150 MG/1
150 TABLET ORAL ONCE
Qty: 1 TABLET | Refills: 0 | Status: SHIPPED | OUTPATIENT
Start: 2021-07-30 | End: 2021-07-30

## 2021-07-30 RX ORDER — AMOXICILLIN 875 MG/1
875 TABLET, COATED ORAL 2 TIMES DAILY
Qty: 14 TABLET | Refills: 0 | Status: SHIPPED | OUTPATIENT
Start: 2021-07-30 | End: 2021-08-06

## 2021-07-30 NOTE — TELEPHONE ENCOUNTER
Isabel saw DR Negron for URI this week  She is calling stating she is not feeling any better, yellow mucus from nose, still congested  She thinks it turned into a sinus infection  Can we call in an antibiotic for her please      zhane Lemus

## 2021-07-30 NOTE — TELEPHONE ENCOUNTER
Can we please send a diflucan for Isabel  We just sent an antibiotic today and always gets yeast infections with antibiotics    Thank You

## 2021-08-05 DIAGNOSIS — F90.0 ATTENTION DEFICIT HYPERACTIVITY DISORDER (ADHD), PREDOMINANTLY INATTENTIVE TYPE: ICD-10-CM

## 2021-08-05 DIAGNOSIS — F31.81 BIPOLAR II DISORDER (HCC): ICD-10-CM

## 2021-08-05 RX ORDER — DEXTROAMPHETAMINE SACCHARATE, AMPHETAMINE ASPARTATE MONOHYDRATE, DEXTROAMPHETAMINE SULFATE AND AMPHETAMINE SULFATE 5; 5; 5; 5 MG/1; MG/1; MG/1; MG/1
20 CAPSULE, EXTENDED RELEASE ORAL EVERY MORNING
Qty: 10 CAPSULE | Refills: 0 | Status: SHIPPED | OUTPATIENT
Start: 2021-08-08 | End: 2021-08-25 | Stop reason: SDUPTHER

## 2021-08-05 RX ORDER — ALPRAZOLAM 1 MG/1
1 TABLET ORAL 3 TIMES DAILY PRN
Qty: 30 TABLET | Refills: 0 | Status: SHIPPED | OUTPATIENT
Start: 2021-08-08 | End: 2021-08-25 | Stop reason: SDUPTHER

## 2021-08-05 NOTE — TELEPHONE ENCOUNTER
----- Message from Yahaira Alex sent at 8/5/2021  9:16 AM EDT -----  Regarding: Refill  Lamictal 200mg   Xanax 1mg  Adderall xr 20mg  [facility] [place]  Appt 8/18Miguelito

## 2021-08-12 ENCOUNTER — HOSPITAL ENCOUNTER (OUTPATIENT)
Dept: NEUROLOGY | Facility: HOSPITAL | Age: 41
Discharge: HOME | End: 2021-08-12
Attending: ORTHOPAEDIC SURGERY
Payer: COMMERCIAL

## 2021-08-12 DIAGNOSIS — G56.03 BILATERAL CARPAL TUNNEL SYNDROME: ICD-10-CM

## 2021-08-12 DIAGNOSIS — R20.0 NUMBNESS AND TINGLING IN BOTH HANDS: ICD-10-CM

## 2021-08-12 DIAGNOSIS — R20.2 NUMBNESS AND TINGLING IN BOTH HANDS: ICD-10-CM

## 2021-08-12 PROCEDURE — 95911 NRV CNDJ TEST 9-10 STUDIES: CPT | Performed by: PSYCHIATRY & NEUROLOGY

## 2021-08-12 PROCEDURE — 95886 MUSC TEST DONE W/N TEST COMP: CPT | Performed by: PSYCHIATRY & NEUROLOGY

## 2021-08-23 ENCOUNTER — TELEPHONE (OUTPATIENT)
Dept: FAMILY MEDICINE CLINIC | Facility: CLINIC | Age: 41
End: 2021-08-23

## 2021-08-23 DIAGNOSIS — B00.1 COLD SORE: Primary | ICD-10-CM

## 2021-08-23 RX ORDER — VALACYCLOVIR HYDROCHLORIDE 1 G/1
TABLET, FILM COATED ORAL
Qty: 4 TABLET | Refills: 0 | Status: SHIPPED | OUTPATIENT
Start: 2021-08-23 | End: 2021-11-16

## 2021-08-23 NOTE — TELEPHONE ENCOUNTER
Pt states she has a cold sore, was seen about 2 weeks ago for a cold  Sore started one week ago  leaving for [facility] tomorrow  Can't come in for appt  Would like medication called into [facility]  Pls advise

## 2021-08-24 PROBLEM — R06.02 SHORTNESS OF BREATH: Status: ACTIVE | Noted: 2021-08-24

## 2021-08-25 ENCOUNTER — OFFICE VISIT (OUTPATIENT)
Dept: PSYCHIATRY | Facility: CLINIC | Age: 41
End: 2021-08-25
Payer: COMMERCIAL

## 2021-08-25 VITALS
HEIGHT: 63 IN | DIASTOLIC BLOOD PRESSURE: 68 MMHG | HEART RATE: 86 BPM | BODY MASS INDEX: 21.97 KG/M2 | WEIGHT: 124 LBS | SYSTOLIC BLOOD PRESSURE: 118 MMHG

## 2021-08-25 DIAGNOSIS — F41.1 GAD (GENERALIZED ANXIETY DISORDER): ICD-10-CM

## 2021-08-25 DIAGNOSIS — R53.83 OTHER FATIGUE: ICD-10-CM

## 2021-08-25 DIAGNOSIS — F31.81 BIPOLAR II DISORDER (HCC): ICD-10-CM

## 2021-08-25 DIAGNOSIS — E55.9 VITAMIN D DEFICIENCY: ICD-10-CM

## 2021-08-25 DIAGNOSIS — L65.9 ALOPECIA: ICD-10-CM

## 2021-08-25 DIAGNOSIS — Z79.899 HIGH RISK MEDICATION USE: Primary | ICD-10-CM

## 2021-08-25 DIAGNOSIS — F90.0 ATTENTION DEFICIT HYPERACTIVITY DISORDER (ADHD), PREDOMINANTLY INATTENTIVE TYPE: ICD-10-CM

## 2021-08-25 PROCEDURE — 3008F BODY MASS INDEX DOCD: CPT | Performed by: NURSE PRACTITIONER

## 2021-08-25 PROCEDURE — 90833 PSYTX W PT W E/M 30 MIN: CPT | Performed by: NURSE PRACTITIONER

## 2021-08-25 PROCEDURE — 99214 OFFICE O/P EST MOD 30 MIN: CPT | Performed by: NURSE PRACTITIONER

## 2021-08-25 PROCEDURE — 3725F SCREEN DEPRESSION PERFORMED: CPT | Performed by: NURSE PRACTITIONER

## 2021-08-25 PROCEDURE — 1036F TOBACCO NON-USER: CPT | Performed by: NURSE PRACTITIONER

## 2021-08-25 RX ORDER — DEXTROAMPHETAMINE SACCHARATE, AMPHETAMINE ASPARTATE MONOHYDRATE, DEXTROAMPHETAMINE SULFATE AND AMPHETAMINE SULFATE 5; 5; 5; 5 MG/1; MG/1; MG/1; MG/1
20 CAPSULE, EXTENDED RELEASE ORAL EVERY MORNING
Qty: 30 CAPSULE | Refills: 0 | Status: SHIPPED | OUTPATIENT
Start: 2021-08-25 | End: 2021-09-27 | Stop reason: SDUPTHER

## 2021-08-25 RX ORDER — LAMOTRIGINE 200 MG/1
200 TABLET ORAL
Qty: 30 TABLET | Refills: 3 | Status: SHIPPED | OUTPATIENT
Start: 2021-08-25 | End: 2021-12-29 | Stop reason: SDUPTHER

## 2021-08-25 RX ORDER — ALPRAZOLAM 1 MG/1
1 TABLET ORAL 3 TIMES DAILY PRN
Qty: 30 TABLET | Refills: 0 | Status: SHIPPED | OUTPATIENT
Start: 2021-08-25 | End: 2021-09-27 | Stop reason: SDUPTHER

## 2021-08-25 NOTE — BH TREATMENT PLAN
TREATMENT PLAN (Medication Management Only)         [facility]     Name and Date of Meagan NICE 59 y o  1980  Date of Treatment Plan: July 21, 2020, October 20, 2020, August 25, 2021  Diagnosis/Diagnoses:    1  Bipolar II disorder ([address] )    2  Panic disorder without agoraphobia with moderate panic attacks    3  Attention deficit hyperactivity disorder (ADHD), predominantly inattentive type       Strengths/Personal Resources for Self-Care: supportive family, taking medications as prescribed, ability to communicate needs, ability to listen, motivation for treatment  Area/Areas of need (in own words): anxiety symptoms, depressive symptoms  1          Long Term Goal: improve control of anxiety  Target Date: 6 months -2/25/2022  Person/Persons responsible for completion of goal: Isabel and provider  2          Short Term Objective (s) - How will we reach this goal?:   A  Provider new recommended medication/dosage changes and/or continue medication(s): continue current medications as prescribed (lexapro)  Gary mckenna structure in her day  C  Create realistic expectations  Target Date: 6 months -2/25/2022  Person/Persons Responsible for Completion of Goal: Isabel and chasity  Progress Towards Goals: progressing slowly  Treatment Modality: medication management every 1-3 month  Review due 180 days from date of this plan: 6 months -2/25/2022  Expected length of service: ongoing treatment  My Physician/PA/NP and I have developed this plan together and I agree to work on the goals and objectives  I understand the treatment goals that were developed for my treatment

## 2021-08-27 PROBLEM — R53.83 OTHER FATIGUE: Status: ACTIVE | Noted: 2021-08-27

## 2021-08-27 PROBLEM — E55.9 VITAMIN D DEFICIENCY: Status: ACTIVE | Noted: 2021-08-27

## 2021-08-27 PROBLEM — Z79.899 HIGH RISK MEDICATION USE: Status: ACTIVE | Noted: 2021-08-27

## 2021-08-27 PROBLEM — L65.9 ALOPECIA: Status: ACTIVE | Noted: 2021-08-27

## 2021-08-27 NOTE — PSYCH
Subjective:     Patient ID: John Delarosa is a 35 y o  female bipolar disorder, anxiety, depression seen for medication management and mood assessment  Isabel reports changing  job on weekend  She is exercising and training for  competition with her brother  Has reduce her caloric intake to 1,000 calories  Takes her medication as prescribed  Reports anxiety and insomnia; however, does not want something that my make her drowsy in the morning  Family are supportive  HPI ROS Appetite Changes and Sleep: decreased appetite and normal energy level    Review Of Systems:     Mood Anxiety and Depression   Behavior Normal    Thought Content Normal   General Emotional Problems and Sleep Disturbances   Personality Normal   Other Psych Symptoms Normal   Constitutional As Noted in HPI   ENT As Noted in HPI   Cardiovascular As Noted in HPI   Respiratory As Noted in HPI   Gastrointestinal As Noted in HPI   Genitourinary As Noted in HPI   Musculoskeletal As Noted in HPI   Integumentary As Noted in HPI   Neurological As Noted in HPI   Endocrine Normal    Other Symptoms Normal              Laboratory Results: No results found for this or any previous visit      Substance Abuse History:  Social History     Substance and Sexual Activity   Drug Use No       Family Psychiatric History:   Family History   Problem Relation Age of Onset    No Known Problems Mother     No Known Problems Father     Bipolar disorder Sister     No Known Problems Brother     Anxiety disorder Daughter     Depression Daughter     No Known Problems Son        The following portions of the patient's history were reviewed and updated as appropriate: allergies, current medications, past family history, past medical history, past social history, past surgical history and problem list     Social History     Socioeconomic History    Marital status: /Civil Union     Spouse name: Not on file    Number of children: 2    Years of education: Not on file    Highest education level: Not on file   Occupational History    Not on file   Tobacco Use    Smoking status: Former Smoker     Types: Cigarettes     Quit date:      Years since quittin 6    Smokeless tobacco: Never Used   Vaping Use    Vaping Use: Every day    Substances: Nicotine   Substance and Sexual Activity    Alcohol use: Yes     Comment: occassional - wine 2 x month    Drug use: No    Sexual activity: Yes     Partners: Male     Birth control/protection: Female Sterilization     Comment: ablation   Other Topics Concern    Not on file   Social History Narrative    Not on file     Social Determinants of Health     Financial Resource Strain:     Difficulty of Paying Living Expenses:    Food Insecurity:     Worried About Running Out of Food in the Last Year:     Ran Out of Food in the Last Year:    Transportation Needs:     Lack of Transportation (Medical):      Lack of Transportation (Non-Medical):    Physical Activity:     Days of Exercise per Week:     Minutes of Exercise per Session:    Stress:     Feeling of Stress :    Social Connections:     Frequency of Communication with Friends and Family:     Frequency of Social Gatherings with Friends and Family:     Attends Religion Services:     Active Member of Clubs or Organizations:     Attends Club or Organization Meetings:     Marital Status:    Intimate Partner Violence:     Fear of Current or Ex-Partner:     Emotionally Abused:     Physically Abused:     Sexually Abused:      Social History     Social History Narrative    Not on file       Objective:       Mental status:  Appearance adequate hygiene and grooming, restless and fidgety and good eye contact    Mood anxious and mood appropriate   Affect affect appropriate    Speech a normal rate   Thought Processes normal thought processes   Hallucinations no hallucinations present    Thought Content no delusions   Abnormal Thoughts no suicidal thoughts  and no homicidal thoughts    Orientation  oriented to person and place and time   Remote Memory short term memory intact and long term memory intact   Attention Span concentration intact   Intellect Appears to be of Average Intelligence   Insight Insight intact   Judgement judgment was intact   Muscle Strength Muscle strength and tone were normal and Normal gait    Language no difficulty naming common objects   Fund of Knowledge displays adequate knowledge of current events   Pain none   Pain Scale 0       Assessment/Plan:       Diagnoses and all orders for this visit:    High risk medication use  -     CBC and differential; Future  -     Comprehensive metabolic panel; Future  -     Lipid Panel with Direct LDL reflex; Future    Bipolar II disorder (HCC)  -     lamoTRIgine (LaMICtal) 200 MG tablet; Take 1 tablet (200 mg total) by mouth daily at bedtime  -     ALPRAZolam (XANAX) 1 mg tablet; Take 1 tablet (1 mg total) by mouth 3 (three) times a day as needed for anxiety    Attention deficit hyperactivity disorder (ADHD), predominantly inattentive type  -     amphetamine-dextroamphetamine (ADDERALL XR) 20 MG 24 hr capsule; Take 1 capsule (20 mg total) by mouth every morningMax Daily Amount: 20 mg    Alopecia  -     TSH, 3rd generation with T4 reflex; Future  -     Vitamin B12; Future    Vitamin D deficiency  -     Vitamin D 25 hydroxy; Future    Other fatigue  -     Iron Panel (Includes Ferritin, Iron Sat%, Iron, and TIBC);  Future            Treatment Recommendations- Risks Benefits      Immediate Medical/Psychiatric/Psychotherapy Treatments and Any Precautions: Reviewed medication, continue with treatment plan    Risks, Benefits And Possible Side Effects Of Medications:  {PSYCH RISK, BENEFITS AND POSSIBLE SIDE EFFECTS (Optional):27672    Controlled Medication Discussion: Discussed with patient the risks of sedation, respiratory depression, impairment of ability to drive and potential for abuse and addiction related to treatment with benzodiazepine medications  The patient understands risk of treatment with benzodiazepine medications, agrees to not drive if feels impaired and agrees to take medications as prescribed  and she is aware of safe use and storage of medication     Psychotherapy Provided: 16 min Individual psychotherapy provided  Supportive therapy  Mood assessment  Coping with strenuous exercise and diet   May want to increased calories  Obtain lab work and we will think about sleep aid when she returns from [place]  Lab slips will be emailed to her     Goals discussed in session: improve sleep and reduce anxiety    Treatment plan was enacted 10/20/2020, updated 8/25/2021

## 2021-08-31 ENCOUNTER — TELEPHONE (OUTPATIENT)
Dept: FAMILY MEDICINE CLINIC | Facility: CLINIC | Age: 41
End: 2021-08-31

## 2021-08-31 NOTE — TELEPHONE ENCOUNTER
I see that her psychiatrist ordered Vitamin B12 blood work and do that first and if level is low then will order B12 shots and if normal then not indicated   ROSE Gamez

## 2021-08-31 NOTE — TELEPHONE ENCOUNTER
Louann Spaulding is calling stating she would like to start getting B12 shots  She stated she heard they were beneficial for a lot of things  She also stated she is always fatigue    Please call patient back     Thank you

## 2021-08-31 NOTE — TELEPHONE ENCOUNTER
Spoke with pt, she is aware to get the labs first and then we will be in touch with her and depending on her levels she may not need the shots  Jerry DoctorSHIRLENE

## 2021-09-03 NOTE — TELEPHONE ENCOUNTER
Patient had her labs done and stated Carlosyina Bennett has results regarding her B12    I told her I would let Elena Olivia know and we would call her back

## 2021-09-08 NOTE — TELEPHONE ENCOUNTER
She should contact the ordering provider to give her the results but we will also call [employer] to request the results to be faxed  I don't see it on the [facility] link   Neena Quiles

## 2021-09-09 NOTE — TELEPHONE ENCOUNTER
Spoke with patient and asked her to call ordering provider to get results  She can see them on her [facility] portal   I did let her know that we are calling [facility] and will get results faxed over and will go from there  Called Risa at [facility] and they are faxing over results from Dr Blank Carrero office    Enrioc Heard

## 2021-09-10 NOTE — TELEPHONE ENCOUNTER
Isabel is aware of Stephanie's message   No further action is required [address] [place] [address]

## 2021-09-10 NOTE — TELEPHONE ENCOUNTER
Please let patient know that her vitamin B12 level is normal and she does not need B12 shots and if wants can take over the counter vitamin B12 tablets   ROSE Leiva

## 2021-09-13 ENCOUNTER — TELEPHONE (OUTPATIENT)
Dept: FAMILY MEDICINE CLINIC | Facility: CLINIC | Age: 41
End: 2021-09-13

## 2021-09-13 DIAGNOSIS — R79.89 ABNORMAL CBC: Primary | ICD-10-CM

## 2021-09-13 DIAGNOSIS — R79.89 ELEVATED FERRITIN: ICD-10-CM

## 2021-09-13 NOTE — TELEPHONE ENCOUNTER
Patient called as received blood work from [address] [place] [address] as ordered by Dr Judy Lopez  Ferritin is elevated with wbc and neutrophils and denies any sickness currently  Denies intake of iron supplements but takes MVI  Had URI symptoms when got blood work done and ferritin is acute phase reactant and will recheck levels in a month for ferritin and cbc and will call for any concerns   Ms

## 2021-09-15 ENCOUNTER — OFFICE VISIT (OUTPATIENT)
Dept: FAMILY MEDICINE CLINIC | Facility: CLINIC | Age: 41
End: 2021-09-15
Payer: COMMERCIAL

## 2021-09-15 VITALS
HEART RATE: 81 BPM | SYSTOLIC BLOOD PRESSURE: 128 MMHG | TEMPERATURE: 99.7 F | BODY MASS INDEX: 21.4 KG/M2 | DIASTOLIC BLOOD PRESSURE: 70 MMHG | WEIGHT: 120.8 LBS | RESPIRATION RATE: 18 BRPM | HEIGHT: 63 IN

## 2021-09-15 DIAGNOSIS — L98.9 SCALP LESION: ICD-10-CM

## 2021-09-15 DIAGNOSIS — L65.9 HAIR LOSS: ICD-10-CM

## 2021-09-15 DIAGNOSIS — B34.9 VIRAL INFECTION, UNSPECIFIED: Primary | ICD-10-CM

## 2021-09-15 PROCEDURE — 3008F BODY MASS INDEX DOCD: CPT | Performed by: NURSE PRACTITIONER

## 2021-09-15 PROCEDURE — U0005 INFEC AGEN DETEC AMPLI PROBE: HCPCS | Performed by: NURSE PRACTITIONER

## 2021-09-15 PROCEDURE — U0003 INFECTIOUS AGENT DETECTION BY NUCLEIC ACID (DNA OR RNA); SEVERE ACUTE RESPIRATORY SYNDROME CORONAVIRUS 2 (SARS-COV-2) (CORONAVIRUS DISEASE [COVID-19]), AMPLIFIED PROBE TECHNIQUE, MAKING USE OF HIGH THROUGHPUT TECHNOLOGIES AS DESCRIBED BY CMS-2020-01-R: HCPCS | Performed by: NURSE PRACTITIONER

## 2021-09-15 PROCEDURE — 1036F TOBACCO NON-USER: CPT | Performed by: NURSE PRACTITIONER

## 2021-09-15 PROCEDURE — 99213 OFFICE O/P EST LOW 20 MIN: CPT | Performed by: NURSE PRACTITIONER

## 2021-09-15 NOTE — LETTER
September 15, 2021     Patient: Norm Galvan   YOB: 1980   Date of Visit: 9/15/2021       To Whom it May Concern:    Hayder Almanzar is under my professional care  She was seen in my office on 9/15/2021  Candice Vincent of work until covid-19 is pending  If you have any questions or concerns, please don't hesitate to call           Sincerely,          ROSE Burton        CC: No Recipients

## 2021-09-15 NOTE — PROGRESS NOTES
Assessment/Plan:  Advised on self isolation until covid-19 results pending  Supportive care discussed and advised  Advised to RTO for any worsening and no improvement  Follow up for no improvement and worsening of conditions  Patient advised and educated when to see immediate medical care  Supportive care for viral illness discussed and advised  will follow up for any worsening and no improvement    1  Viral infection, unspecified  -     Novel Coronavirus (Covid-19),PCR [facility] - Collected in Office    2  Scalp lesion  -     Ambulatory referral to Dermatology; Future    3  Hair loss  -     Ambulatory referral to Dermatology; Future          BMI Counseling: Body mass index is 21 4 kg/m²  Discussed the patient's BMI with her  Patient Instructions:  Supportive care discussed and advised  Advised to RTO for any worsening and no improvement  Follow up for no improvement and worsening of conditions  Patient advised and educated when to see immediate medical care  Return if symptoms worsen or fail to improve  Future Appointments   Date Time Provider Billie Osborn   9/16/2021  1:00 PM Aaliyah Le MD [facility]   9/23/2021  8:00 AM Queta Collins MD On license of [facility]           Subjective:      Patient ID: Star Kahlil is a 35 y o  female  Chief Complaint   Patient presents with    Nasal Congestion     nm  lpn    Fatigue    Hair/Scalp Problem     losing hair, dry, lumpy         Vitals:  /70   Pulse 81   Temp 99 7 °F (37 6 °C)   Resp 18   Ht 5' 3" (1 6 m)   Wt 54 8 kg (120 lb 12 8 oz)   BMI 21 40 kg/m²     HPI  Patient stated that started with congestion and cough on 9/15/2021  Denies Fever or chills, Shortness of breath or difficulty breathing, Fatigue, Muscle or body aches, Headache, New loss of taste or smell, Sore throat,  runny nose, Nausea, vomiting, and Diarrhea  Banged her left shin as did mud race couple of days ago and feeling some pain in left shin   Having hair loss and has some infection in her head which happened before in past too  Have appt with dermatologist tomorrow  Have not bee vaccinated for covid-19 and goes to [place] daily            The following portions of the patient's history were reviewed and updated as appropriate: allergies, current medications, past family history, past medical history, past social history, past surgical history and problem list       Review of Systems   Constitutional: Positive for fatigue  Negative for chills, diaphoresis, fever and unexpected weight change  HENT: Positive for congestion  Negative for dental problem, drooling, ear discharge, ear pain, facial swelling, hearing loss, mouth sores, nosebleeds, postnasal drip, rhinorrhea, sinus pressure, sinus pain, sneezing, sore throat, tinnitus, trouble swallowing and voice change  Respiratory: Negative for cough, chest tightness, shortness of breath and wheezing  Cardiovascular: Negative  Gastrointestinal: Negative for abdominal pain, constipation, diarrhea, nausea and vomiting  Musculoskeletal: Negative  Skin: Positive for rash  Neurological: Negative for dizziness, light-headedness and headaches  Hematological: Negative  Objective:    Social History     Tobacco Use   Smoking Status Former Smoker    Types: Cigarettes    Quit date:     Years since quittin 7   Smokeless Tobacco Never Used       Allergies:    Allergies   Allergen Reactions    Doxycycline Other (See Comments)     unknown         Current Outpatient Medications   Medication Sig Dispense Refill    acetaminophen (TYLENOL) 325 mg tablet Take 650 mg by mouth every 6 (six) hours as needed      albuterol (Proventil HFA) 90 mcg/act inhaler Inhale 2 puffs every 6 (six) hours as needed for wheezing or shortness of breath 6 7 g 1    ALPRAZolam (XANAX) 1 mg tablet Take 1 tablet (1 mg total) by mouth 3 (three) times a day as needed for anxiety 30 tablet 0    amphetamine-dextroamphetamine (ADDERALL XR) 20 MG 24 hr capsule Take 1 capsule (20 mg total) by mouth every morningMax Daily Amount: 20 mg 30 capsule 0    fluticasone (FLONASE) 50 mcg/act nasal spray 2 sprays into each nostril daily 16 g 0    lamoTRIgine (LaMICtal) 200 MG tablet Take 1 tablet (200 mg total) by mouth daily at bedtime 30 tablet 3    valACYclovir (VALTREX) 1,000 mg tablet Take 2 tablets every 12 hourly for day first with initial signs of breakout and stop after taking four pills  4 tablet 0    magnesium citrate solution Take 296 mL by mouth once for 1 dose (Patient not taking: Reported on 7/28/2021) 296 mL 0    polyethylene glycol (MIRALAX) 17 g packet Take 17 g by mouth daily for 7 days (Patient not taking: Reported on 9/15/2021) 12 each 0    sertraline (ZOLOFT) 100 mg tablet Take 2 tablets (200 mg total) by mouth daily at bedtime (Patient not taking: Reported on 7/28/2021) 60 tablet 3     No current facility-administered medications for this visit  Physical Exam  Constitutional:       Appearance: Normal appearance  HENT:      Head: Normocephalic and atraumatic  Nose: Nose normal    Eyes:      Conjunctiva/sclera: Conjunctivae normal    Cardiovascular:      Rate and Rhythm: Normal rate and regular rhythm  Pulses: Normal pulses  Heart sounds: Normal heart sounds  Pulmonary:      Effort: Pulmonary effort is normal       Breath sounds: Normal breath sounds  Skin:     General: Skin is warm and dry  Findings: No rash  Comments: Scalp with flakes and patches noted and possible fungal infection or psoriasis and will follow with dermatologist  tomorrow   Neurological:      Mental Status: She is alert and oriented to person, place, and time  Psychiatric:         Mood and Affect: Mood normal          Behavior: Behavior normal          Thought Content:  Thought content normal          Judgment: Judgment normal                      ROSE Rosado

## 2021-09-16 LAB — SARS-COV-2 RNA RESP QL NAA+PROBE: NEGATIVE

## 2021-09-17 ENCOUNTER — TELEPHONE (OUTPATIENT)
Dept: FAMILY MEDICINE CLINIC | Facility: CLINIC | Age: 41
End: 2021-09-17

## 2021-09-17 NOTE — TELEPHONE ENCOUNTER
Please advise patient that her symptoms just started on 9/15/2021 two days ago and possibly viral and needs to try OTC and if not improved by Monday then call back and will call something in   Maria ElenaROSE Coughlin

## 2021-09-17 NOTE — TELEPHONE ENCOUNTER
Saw Stephanie for URI    Neg covid test and stating she is feeling worse  Sorethroat, congested, really exhausted  Can Stephanie call something in for her?

## 2021-09-27 DIAGNOSIS — F90.0 ATTENTION DEFICIT HYPERACTIVITY DISORDER (ADHD), PREDOMINANTLY INATTENTIVE TYPE: ICD-10-CM

## 2021-09-27 DIAGNOSIS — F31.81 BIPOLAR II DISORDER (HCC): ICD-10-CM

## 2021-09-27 RX ORDER — DEXTROAMPHETAMINE SACCHARATE, AMPHETAMINE ASPARTATE MONOHYDRATE, DEXTROAMPHETAMINE SULFATE AND AMPHETAMINE SULFATE 5; 5; 5; 5 MG/1; MG/1; MG/1; MG/1
20 CAPSULE, EXTENDED RELEASE ORAL EVERY MORNING
Qty: 30 CAPSULE | Refills: 0 | Status: SHIPPED | OUTPATIENT
Start: 2021-09-27 | End: 2021-10-28 | Stop reason: SDUPTHER

## 2021-09-27 RX ORDER — ALPRAZOLAM 1 MG/1
1 TABLET ORAL 3 TIMES DAILY PRN
Qty: 90 TABLET | Refills: 0 | Status: SHIPPED | OUTPATIENT
Start: 2021-09-27 | End: 2021-10-28 | Stop reason: SDUPTHER

## 2021-10-27 ENCOUNTER — OFFICE VISIT (OUTPATIENT)
Dept: FAMILY MEDICINE CLINIC | Facility: CLINIC | Age: 41
End: 2021-10-27
Payer: COMMERCIAL

## 2021-10-27 VITALS
SYSTOLIC BLOOD PRESSURE: 94 MMHG | TEMPERATURE: 98.4 F | RESPIRATION RATE: 14 BRPM | HEIGHT: 63 IN | DIASTOLIC BLOOD PRESSURE: 66 MMHG | WEIGHT: 129 LBS | BODY MASS INDEX: 22.86 KG/M2 | OXYGEN SATURATION: 96 % | HEART RATE: 90 BPM

## 2021-10-27 DIAGNOSIS — F31.81 BIPOLAR II DISORDER (HCC): ICD-10-CM

## 2021-10-27 DIAGNOSIS — J01.90 ACUTE BACTERIAL SINUSITIS: Primary | ICD-10-CM

## 2021-10-27 DIAGNOSIS — B96.89 ACUTE BACTERIAL SINUSITIS: Primary | ICD-10-CM

## 2021-10-27 PROCEDURE — 1036F TOBACCO NON-USER: CPT | Performed by: FAMILY MEDICINE

## 2021-10-27 PROCEDURE — 99213 OFFICE O/P EST LOW 20 MIN: CPT | Performed by: FAMILY MEDICINE

## 2021-10-27 PROCEDURE — 3008F BODY MASS INDEX DOCD: CPT | Performed by: FAMILY MEDICINE

## 2021-10-27 RX ORDER — FLUCONAZOLE 150 MG/1
150 TABLET ORAL ONCE
Qty: 1 TABLET | Refills: 0 | Status: SHIPPED | OUTPATIENT
Start: 2021-10-27 | End: 2021-10-27

## 2021-10-27 RX ORDER — KETOCONAZOLE 20 MG/ML
SHAMPOO TOPICAL
COMMUNITY
Start: 2021-09-16 | End: 2021-11-16

## 2021-10-27 RX ORDER — AMOXICILLIN AND CLAVULANATE POTASSIUM 875; 125 MG/1; MG/1
1 TABLET, FILM COATED ORAL EVERY 12 HOURS SCHEDULED
Qty: 20 TABLET | Refills: 0 | Status: SHIPPED | OUTPATIENT
Start: 2021-10-27 | End: 2021-11-06

## 2021-10-27 RX ORDER — MOMETASONE FUROATE 1 MG/ML
SOLUTION TOPICAL
COMMUNITY
Start: 2021-09-16

## 2021-10-28 DIAGNOSIS — F90.0 ATTENTION DEFICIT HYPERACTIVITY DISORDER (ADHD), PREDOMINANTLY INATTENTIVE TYPE: ICD-10-CM

## 2021-10-28 DIAGNOSIS — F31.81 BIPOLAR II DISORDER (HCC): ICD-10-CM

## 2021-10-29 RX ORDER — DEXTROAMPHETAMINE SACCHARATE, AMPHETAMINE ASPARTATE MONOHYDRATE, DEXTROAMPHETAMINE SULFATE AND AMPHETAMINE SULFATE 5; 5; 5; 5 MG/1; MG/1; MG/1; MG/1
20 CAPSULE, EXTENDED RELEASE ORAL EVERY MORNING
Qty: 30 CAPSULE | Refills: 0 | Status: SHIPPED | OUTPATIENT
Start: 2021-10-29 | End: 2021-11-23 | Stop reason: DRUGHIGH

## 2021-10-29 RX ORDER — ALPRAZOLAM 1 MG/1
1 TABLET ORAL 3 TIMES DAILY PRN
Qty: 90 TABLET | Refills: 0 | Status: SHIPPED | OUTPATIENT
Start: 2021-10-29 | End: 2021-11-16 | Stop reason: ALTCHOICE

## 2021-10-30 ENCOUNTER — TELEPHONE (OUTPATIENT)
Dept: FAMILY MEDICINE CLINIC | Facility: CLINIC | Age: 41
End: 2021-10-30

## 2021-11-03 ENCOUNTER — CONSULT (OUTPATIENT)
Dept: PULMONOLOGY | Facility: CLINIC | Age: 41
End: 2021-11-03
Payer: COMMERCIAL

## 2021-11-03 VITALS
HEIGHT: 63 IN | BODY MASS INDEX: 23.35 KG/M2 | DIASTOLIC BLOOD PRESSURE: 68 MMHG | HEART RATE: 87 BPM | OXYGEN SATURATION: 100 % | WEIGHT: 131.8 LBS | TEMPERATURE: 98.6 F | SYSTOLIC BLOOD PRESSURE: 108 MMHG | RESPIRATION RATE: 16 BRPM

## 2021-11-03 DIAGNOSIS — R09.82 POST-NASAL DRIP: ICD-10-CM

## 2021-11-03 DIAGNOSIS — J45.30 MILD PERSISTENT ASTHMA WITHOUT COMPLICATION: Primary | ICD-10-CM

## 2021-11-03 DIAGNOSIS — J06.9 UPPER RESPIRATORY TRACT INFECTION, UNSPECIFIED TYPE: ICD-10-CM

## 2021-11-03 DIAGNOSIS — Z87.09 HISTORY OF ASTHMA: ICD-10-CM

## 2021-11-03 DIAGNOSIS — R06.02 SHORTNESS OF BREATH: ICD-10-CM

## 2021-11-03 PROCEDURE — 99244 OFF/OP CNSLTJ NEW/EST MOD 40: CPT | Performed by: INTERNAL MEDICINE

## 2021-11-03 RX ORDER — FLUCONAZOLE 150 MG/1
TABLET ORAL
COMMUNITY
Start: 2021-10-27 | End: 2021-11-16

## 2021-11-03 RX ORDER — FLUTICASONE FUROATE AND VILANTEROL TRIFENATATE 100; 25 UG/1; UG/1
1 POWDER RESPIRATORY (INHALATION) DAILY
Qty: 60 BLISTER | Refills: 2 | Status: SHIPPED | OUTPATIENT
Start: 2021-11-03 | End: 2021-12-03

## 2021-11-03 RX ORDER — FLUTICASONE PROPIONATE 50 MCG
2 SPRAY, SUSPENSION (ML) NASAL DAILY
Qty: 16 G | Refills: 2 | Status: SHIPPED | OUTPATIENT
Start: 2021-11-03 | End: 2022-07-05

## 2021-11-03 RX ORDER — ALBUTEROL SULFATE 90 UG/1
2 AEROSOL, METERED RESPIRATORY (INHALATION) EVERY 6 HOURS PRN
Qty: 6.7 G | Refills: 2 | Status: SHIPPED | OUTPATIENT
Start: 2021-11-03

## 2021-11-08 ENCOUNTER — TELEPHONE (OUTPATIENT)
Dept: BEHAVIORAL/MENTAL HEALTH CLINIC | Facility: CLINIC | Age: 41
End: 2021-11-08

## 2021-11-08 ENCOUNTER — TELEPHONE (OUTPATIENT)
Dept: FAMILY MEDICINE CLINIC | Facility: CLINIC | Age: 41
End: 2021-11-08

## 2021-11-08 DIAGNOSIS — G47.00 INSOMNIA, UNSPECIFIED TYPE: Primary | ICD-10-CM

## 2021-11-08 DIAGNOSIS — F31.81 BIPOLAR II DISORDER (HCC): ICD-10-CM

## 2021-11-08 DIAGNOSIS — F33.1 MODERATE EPISODE OF RECURRENT MAJOR DEPRESSIVE DISORDER (HCC): ICD-10-CM

## 2021-11-08 RX ORDER — ESZOPICLONE 2 MG/1
2 TABLET, FILM COATED ORAL
Qty: 30 TABLET | Refills: 0 | Status: SHIPPED | OUTPATIENT
Start: 2021-11-08 | End: 2021-11-16

## 2021-11-10 ENCOUNTER — TELEPHONE (OUTPATIENT)
Dept: PSYCHIATRY | Facility: CLINIC | Age: 41
End: 2021-11-10

## 2021-11-15 ENCOUNTER — TELEPHONE (OUTPATIENT)
Dept: PSYCHIATRY | Facility: CLINIC | Age: 41
End: 2021-11-15

## 2021-11-15 ENCOUNTER — OFFICE VISIT (OUTPATIENT)
Dept: FAMILY MEDICINE CLINIC | Facility: CLINIC | Age: 41
End: 2021-11-15
Payer: COMMERCIAL

## 2021-11-15 VITALS
RESPIRATION RATE: 16 BRPM | HEIGHT: 64 IN | DIASTOLIC BLOOD PRESSURE: 80 MMHG | TEMPERATURE: 98 F | HEART RATE: 94 BPM | SYSTOLIC BLOOD PRESSURE: 110 MMHG | BODY MASS INDEX: 21.17 KG/M2 | OXYGEN SATURATION: 99 % | WEIGHT: 124 LBS

## 2021-11-15 DIAGNOSIS — M79.10 MYALGIA: ICD-10-CM

## 2021-11-15 DIAGNOSIS — Z12.31 ENCOUNTER FOR SCREENING MAMMOGRAM FOR MALIGNANT NEOPLASM OF BREAST: ICD-10-CM

## 2021-11-15 DIAGNOSIS — F41.9 ANXIETY AND DEPRESSION: Primary | ICD-10-CM

## 2021-11-15 DIAGNOSIS — L65.9 FALLING HAIR: ICD-10-CM

## 2021-11-15 DIAGNOSIS — F32.A ANXIETY AND DEPRESSION: Primary | ICD-10-CM

## 2021-11-15 PROCEDURE — 3725F SCREEN DEPRESSION PERFORMED: CPT | Performed by: NURSE PRACTITIONER

## 2021-11-15 PROCEDURE — 99213 OFFICE O/P EST LOW 20 MIN: CPT | Performed by: NURSE PRACTITIONER

## 2021-11-15 PROCEDURE — 3008F BODY MASS INDEX DOCD: CPT | Performed by: NURSE PRACTITIONER

## 2021-11-16 ENCOUNTER — TELEMEDICINE (OUTPATIENT)
Dept: PSYCHIATRY | Facility: CLINIC | Age: 41
End: 2021-11-16
Payer: COMMERCIAL

## 2021-11-16 DIAGNOSIS — F41.0 PANIC DISORDER WITHOUT AGORAPHOBIA WITH MODERATE PANIC ATTACKS: ICD-10-CM

## 2021-11-16 DIAGNOSIS — F99 INSOMNIA DUE TO OTHER MENTAL DISORDER: ICD-10-CM

## 2021-11-16 DIAGNOSIS — R53.83 OTHER FATIGUE: Primary | ICD-10-CM

## 2021-11-16 DIAGNOSIS — F31.81 BIPOLAR II DISORDER (HCC): ICD-10-CM

## 2021-11-16 DIAGNOSIS — F51.05 INSOMNIA DUE TO OTHER MENTAL DISORDER: ICD-10-CM

## 2021-11-16 DIAGNOSIS — F41.1 GAD (GENERALIZED ANXIETY DISORDER): ICD-10-CM

## 2021-11-16 DIAGNOSIS — F90.0 ATTENTION DEFICIT HYPERACTIVITY DISORDER (ADHD), PREDOMINANTLY INATTENTIVE TYPE: ICD-10-CM

## 2021-11-16 PROCEDURE — 1036F TOBACCO NON-USER: CPT | Performed by: NURSE PRACTITIONER

## 2021-11-16 PROCEDURE — 90836 PSYTX W PT W E/M 45 MIN: CPT | Performed by: NURSE PRACTITIONER

## 2021-11-16 PROCEDURE — 99214 OFFICE O/P EST MOD 30 MIN: CPT | Performed by: NURSE PRACTITIONER

## 2021-11-16 RX ORDER — ESZOPICLONE 3 MG/1
3 TABLET, FILM COATED ORAL
Qty: 30 TABLET | Refills: 0 | Status: SHIPPED | OUTPATIENT
Start: 2021-11-16 | End: 2022-03-01 | Stop reason: ALTCHOICE

## 2021-11-16 RX ORDER — CLONAZEPAM 1 MG/1
1 TABLET ORAL 3 TIMES DAILY PRN
Qty: 90 TABLET | Refills: 0 | Status: SHIPPED | OUTPATIENT
Start: 2021-11-16 | End: 2021-11-30

## 2021-11-22 ENCOUNTER — TELEPHONE (OUTPATIENT)
Dept: PSYCHIATRY | Facility: CLINIC | Age: 41
End: 2021-11-22

## 2021-11-22 NOTE — TELEPHONE ENCOUNTER
Pt states clonazepam is making her too drowsy during the day  Please advise what she should do    She can be reached at [phone]

## 2021-11-23 DIAGNOSIS — F90.2 ATTENTION DEFICIT HYPERACTIVITY DISORDER (ADHD), COMBINED TYPE: Primary | ICD-10-CM

## 2021-11-23 RX ORDER — DEXTROAMPHETAMINE SACCHARATE, AMPHETAMINE ASPARTATE, DEXTROAMPHETAMINE SULFATE AND AMPHETAMINE SULFATE 2.5; 2.5; 2.5; 2.5 MG/1; MG/1; MG/1; MG/1
10 TABLET ORAL
Qty: 60 TABLET | Refills: 0 | Status: SHIPPED | OUTPATIENT
Start: 2021-11-23 | End: 2021-12-29 | Stop reason: SDUPTHER

## 2021-11-30 DIAGNOSIS — F90.2 ATTENTION DEFICIT HYPERACTIVITY DISORDER (ADHD), COMBINED TYPE: ICD-10-CM

## 2021-11-30 DIAGNOSIS — F41.1 GAD (GENERALIZED ANXIETY DISORDER): Primary | ICD-10-CM

## 2021-11-30 LAB
ANA TITR SER IF: NEGATIVE {TITER}
CCP IGA+IGG SERPL IA-ACNC: 5 UNITS (ref 0–19)
RHEUMATOID FACT SERPL-ACNC: <10 IU/ML

## 2021-11-30 RX ORDER — ALPRAZOLAM 1 MG/1
1 TABLET ORAL 4 TIMES DAILY PRN
Qty: 120 TABLET | Refills: 0 | Status: SHIPPED | OUTPATIENT
Start: 2021-11-30 | End: 2021-12-29 | Stop reason: SDUPTHER

## 2021-12-01 ENCOUNTER — TELEPHONE (OUTPATIENT)
Dept: BEHAVIORAL/MENTAL HEALTH CLINIC | Facility: CLINIC | Age: 41
End: 2021-12-01

## 2021-12-01 LAB
ALBUMIN SERPL-MCNC: 4.3 G/DL (ref 3.8–4.8)
ALBUMIN/GLOB SERPL: 2.4 {RATIO} (ref 1.2–2.2)
ALP SERPL-CCNC: 65 IU/L (ref 44–121)
ALT SERPL-CCNC: 29 IU/L (ref 0–32)
AST SERPL-CCNC: 28 IU/L (ref 0–40)
B BURGDOR IGG+IGM SER-ACNC: <0.91 ISR (ref 0–0.9)
B MICROTI IGG TITR SER: NORMAL {TITER}
B MICROTI IGM TITR SER: NORMAL {TITER}
BILIRUB SERPL-MCNC: <0.2 MG/DL (ref 0–1.2)
BUN SERPL-MCNC: 18 MG/DL (ref 6–24)
BUN/CREAT SERPL: 23 (ref 9–23)
CALCIUM SERPL-MCNC: 8.9 MG/DL (ref 8.7–10.2)
CCP IGA+IGG SERPL IA-ACNC: <1 UNITS (ref 0–19)
CHLORIDE SERPL-SCNC: 105 MMOL/L (ref 96–106)
CO2 SERPL-SCNC: 21 MMOL/L (ref 20–29)
CREAT SERPL-MCNC: 0.78 MG/DL (ref 0.57–1)
CRP SERPL-MCNC: 3 MG/L (ref 0–10)
ERYTHROCYTE [DISTWIDTH] IN BLOOD BY AUTOMATED COUNT: 11.8 % (ref 11.7–15.4)
ERYTHROCYTE [SEDIMENTATION RATE] IN BLOOD BY WESTERGREN METHOD: 5 MM/HR (ref 0–32)
FERRITIN SERPL-MCNC: 155 NG/ML (ref 15–150)
GLOBULIN SER-MCNC: 1.8 G/DL (ref 1.5–4.5)
GLUCOSE SERPL-MCNC: 93 MG/DL (ref 65–99)
HCT VFR BLD AUTO: 38.8 % (ref 34–46.6)
HGB BLD-MCNC: 13.4 G/DL (ref 11.1–15.9)
IRON SATN MFR SERPL: 14 % (ref 15–55)
IRON SERPL-MCNC: 32 UG/DL (ref 27–159)
MCH RBC QN AUTO: 32.8 PG (ref 26.6–33)
MCHC RBC AUTO-ENTMCNC: 34.5 G/DL (ref 31.5–35.7)
MCV RBC AUTO: 95 FL (ref 79–97)
PLATELET # BLD AUTO: 230 X10E3/UL (ref 150–450)
POTASSIUM SERPL-SCNC: 4 MMOL/L (ref 3.5–5.2)
PROT SERPL-MCNC: 6.1 G/DL (ref 6–8.5)
RBC # BLD AUTO: 4.09 X10E6/UL (ref 3.77–5.28)
SL AMB EGFR AFRICAN AMERICAN: 108 ML/MIN/1.73
SL AMB EGFR NON AFRICAN AMERICAN: 93 ML/MIN/1.73
SODIUM SERPL-SCNC: 140 MMOL/L (ref 134–144)
TIBC SERPL-MCNC: 221 UG/DL (ref 250–450)
TSH SERPL DL<=0.005 MIU/L-ACNC: 0.73 UIU/ML (ref 0.45–4.5)
UIBC SERPL-MCNC: 189 UG/DL (ref 131–425)
WBC # BLD AUTO: 6.3 X10E3/UL (ref 3.4–10.8)

## 2021-12-02 ENCOUNTER — TELEPHONE (OUTPATIENT)
Dept: FAMILY MEDICINE CLINIC | Facility: CLINIC | Age: 41
End: 2021-12-02

## 2021-12-02 DIAGNOSIS — R79.89 ELEVATED FERRITIN LEVEL: Primary | ICD-10-CM

## 2021-12-06 LAB — HFE GENE MUT ANL BLD/T: NORMAL

## 2021-12-09 ENCOUNTER — TELEPHONE (OUTPATIENT)
Dept: HEMATOLOGY ONCOLOGY | Facility: CLINIC | Age: 41
End: 2021-12-09

## 2021-12-16 ENCOUNTER — TELEPHONE (OUTPATIENT)
Dept: HEMATOLOGY ONCOLOGY | Facility: CLINIC | Age: 41
End: 2021-12-16

## 2021-12-21 ENCOUNTER — TELEPHONE (OUTPATIENT)
Dept: HEMATOLOGY ONCOLOGY | Facility: CLINIC | Age: 41
End: 2021-12-21

## 2021-12-27 ENCOUNTER — TELEPHONE (OUTPATIENT)
Dept: HEMATOLOGY ONCOLOGY | Facility: CLINIC | Age: 41
End: 2021-12-27

## 2021-12-29 DIAGNOSIS — F41.1 GAD (GENERALIZED ANXIETY DISORDER): ICD-10-CM

## 2021-12-29 DIAGNOSIS — F31.81 BIPOLAR II DISORDER (HCC): ICD-10-CM

## 2021-12-29 DIAGNOSIS — F90.2 ATTENTION DEFICIT HYPERACTIVITY DISORDER (ADHD), COMBINED TYPE: ICD-10-CM

## 2021-12-29 RX ORDER — ALPRAZOLAM 1 MG/1
1 TABLET ORAL 4 TIMES DAILY PRN
Qty: 120 TABLET | Refills: 0 | Status: SHIPPED | OUTPATIENT
Start: 2021-12-29 | End: 2022-01-28 | Stop reason: SDUPTHER

## 2021-12-29 RX ORDER — DEXTROAMPHETAMINE SACCHARATE, AMPHETAMINE ASPARTATE, DEXTROAMPHETAMINE SULFATE AND AMPHETAMINE SULFATE 2.5; 2.5; 2.5; 2.5 MG/1; MG/1; MG/1; MG/1
10 TABLET ORAL
Qty: 60 TABLET | Refills: 0 | Status: SHIPPED | OUTPATIENT
Start: 2021-12-29 | End: 2022-01-28 | Stop reason: SDUPTHER

## 2021-12-29 RX ORDER — LAMOTRIGINE 200 MG/1
200 TABLET ORAL
Qty: 30 TABLET | Refills: 3 | Status: SHIPPED | OUTPATIENT
Start: 2021-12-29 | End: 2022-02-23 | Stop reason: SDUPTHER

## 2022-01-12 ENCOUNTER — CONSULT (OUTPATIENT)
Dept: HEMATOLOGY ONCOLOGY | Facility: MEDICAL CENTER | Age: 42
End: 2022-01-12
Payer: COMMERCIAL

## 2022-01-12 VITALS
TEMPERATURE: 97.6 F | HEART RATE: 81 BPM | SYSTOLIC BLOOD PRESSURE: 122 MMHG | DIASTOLIC BLOOD PRESSURE: 70 MMHG | OXYGEN SATURATION: 95 %

## 2022-01-12 DIAGNOSIS — Z14.8 HEMOCHROMATOSIS CARRIER: ICD-10-CM

## 2022-01-12 DIAGNOSIS — E61.1 LOW IRON: ICD-10-CM

## 2022-01-12 DIAGNOSIS — E83.110 HEREDITARY HEMOCHROMATOSIS (HCC): Primary | ICD-10-CM

## 2022-01-12 DIAGNOSIS — R79.89 ELEVATED FERRITIN: ICD-10-CM

## 2022-01-12 PROBLEM — E83.119 HEMOCHROMATOSIS: Status: ACTIVE | Noted: 2022-01-12

## 2022-01-12 PROCEDURE — 99244 OFF/OP CNSLTJ NEW/EST MOD 40: CPT | Performed by: INTERNAL MEDICINE

## 2022-01-12 NOTE — PROGRESS NOTES
Isabel Monsivais   1980  [facility] HEMATOLOGY ONCOLOGY SPECIALISTS [place] [id number] [facility] [id number]  HEMATOLOGY/ONCOLOGY CONSULTATION REPORT    DISCUSSION/SUMMARY:    27-year-old female with a number of medical issues recently found to have an abnormal hemochromatosis mutation analysis  Patient has a slightly elevated ferritin level, normal iron level, decreased TIBC and a minimally decreased iron saturation  WBC, differential, H/H, MCV and platelet count are all within normal limits  Renal function and liver function tests are also normal   Clinically there are no concerning hematology findings  We discussed options  Ms Ugo Heath understands that she has two genetic mutations on her hemochromatosis gene  The hematology parameters are actually quite good, minimally abnormal   No additional workup/treatment is needed at this time other than surveillance  We discussed surveillance - Ms Ugo Heath routinely follows with Konstantin Beasley CNP  Patient feels comfortable with her PCP monitoring the hematology parameters for now  If there is significant progression of abnormal trends, patient can be referred back to Hematology  It may be that in the future, patient will be required to donate blood - clearly not now  The medical issues including body aches and depression are not related to the HFE gene mutations (at this time)  Patient does not need to alter her diet; obviously should have a healthy in well-balanced diet  Patient should avoid vitamins that have ++ iron and ++ vitamin-C  Patient understands that her 2 children are at least carriers  Patient will also speak to her siblings about being checked  As far as testing, patient should have a CBC/differential, CMP, iron panel 1-2 times a year  Alpha fetoprotein should also be drawn once a year  Ms Ugo Heath knows to call the hematology/oncology office if there are any other questions or concerns      Carefully review your medication list and verify that the list is accurate and up-to-date  Please call the hematology/oncology office if there are medications missing from the list, medications on the list that you are not currently taking or if there is a dosage or instruction that is different from how you're taking that medication  Patient goals and areas of care: follow up with PCP  Barriers to care: none  Patient is able to self-care   ______________________________________________________________________________________    Chief Complaint   Patient presents with    Consult     Hemochromatosis, elevated ferritin     History of Present Illness:  70-year-old female with a number of medical problems (chronic constipation, chronic fatigue, bipolar disorder, generalized body aches) referred for recent abnormal HFE gene mutation results and an elevated ferritin level  Presently Ms  Cecille Ayan states feeling +/-  Patient still has significant body aches, limits activities  No fevers or signs of infection recently  Appetite is okay, weight is stable  No persistent headaches, dizziness or syncopal episodes  No respiratory issues  No recent  or gyn issues  Patient has received her COVID vaccine  Review of Systems   Constitutional: Positive for fatigue  HENT: Negative  Eyes: Negative  Respiratory: Negative  Cardiovascular: Negative  Gastrointestinal: Negative  Endocrine: Negative  Genitourinary: Negative  Musculoskeletal: Positive for arthralgias  Skin: Negative  Allergic/Immunologic: Negative  Neurological: Negative  Hematological: Negative  Psychiatric/Behavioral: Positive for dysphoric mood  All other systems reviewed and are negative      Patient Active Problem List   Diagnosis    Sepsis ([address] )    Abdominal pain    Dilated cbd, acquired    Leukocytosis    Hypokalemia    Actinic keratosis    Allergic rhinitis    Chronic idiopathic constipation    Overactive bladder    Gastroesophageal reflux disease    Herpes labialis    Bipolar II disorder (HCC)    Panic disorder without agoraphobia with moderate panic attacks    Attention deficit hyperactivity disorder (ADHD), predominantly inattentive type    RANJIT (generalized anxiety disorder)    Numbness and tingling in both hands    Shortness of breath    High risk medication use    Vitamin D deficiency    Other fatigue    Alopecia     Past Medical History:   Diagnosis Date    Abdominal pain     ADHD     Allergic rhinitis     Anxiety     panic attacks    Anxiety     Bipolar disorder (HCC)     Bladder distention     came to the ED on 10/9/2020-greene in to drain then removed    Chronic constipation     Colon polyp     Contact lens/glasses fitting     and glasses    Depression     Depression     GERD (gastroesophageal reflux disease)     Psychiatric disorder     anxiety     Rectal bleeding      Past Surgical History:   Procedure Laterality Date    APPENDECTOMY      BREAST SURGERY      augmentation silicone    COLONOSCOPY N/A 1/24/2018    Procedure: COLONOSCOPY WITH HEMORRHOID BANDING;  Surgeon: Brando Ledezma MD;  Location: [place] [address] GI LAB; Service: Gastroenterology    ENDOMETRIAL ABLATION  2016    [place] SIGMOIDOSCOPY FLX DX W/COLLJ SPEC BR/WA IF PFRMD N/A 11/14/2018    Procedure: FLEXIBLE SIGMOIDOSCOPY WITH APC; Surgeon: Brando Ledezma MD;  Location: [facility] GI LAB;   Service: Gastroenterology    TUBAL LIGATION      UPPER GASTROINTESTINAL ENDOSCOPY       Ob gyn: No breast pathology, patient underwent ablation approximately 6 years ago, no menstrual periods recently    Family History   Problem Relation Age of Onset    No Known Problems Mother     No Known Problems Father     Bipolar disorder Sister     No Known Problems Brother     Anxiety disorder Daughter     Depression Daughter     No Known Problems Son    Family history:  No known familial or genetic diseases, no history of hemochromatosis, 2 children ages 11 and 24 in good general health, patient is [place] [facility] ancestry    Social History     Socioeconomic History    Marital status: /Civil Union     Spouse name: Not on file    Number of children: 2    Years of education: Not on file    Highest education level: Not on file   Occupational History    Not on file   Tobacco Use    Smoking status: Former Smoker     Types: Cigarettes     Quit date:      Years since quittin 0    Smokeless tobacco: Never Used   Vaping Use    Vaping Use: Every day    Substances: Nicotine   Substance and Sexual Activity    Alcohol use: Yes     Comment: occassional - wine 2 x month    Drug use: No    Sexual activity: Yes     Partners: Male     Birth control/protection: Female Sterilization     Comment: ablation   Other Topics Concern    Not on file   Social History Narrative    Not on file     Social Determinants of Health     Financial Resource Strain: Not on file   Food Insecurity: Not on file   Transportation Needs: Not on file   Physical Activity: Not on file   Stress: Not on file   Social Connections: Not on file   Intimate Partner Violence: Not on file   Housing Stability: Not on file   Social history:  Patient discontinued tobacco use approximately 7 years ago, no drug or alcohol abuse, no toxic exposure    Current Outpatient Medications:     acetaminophen (TYLENOL) 325 mg tablet, Take 650 mg by mouth every 6 (six) hours as needed, Disp: , Rfl:     albuterol (Proventil HFA) 90 mcg/act inhaler, Inhale 2 puffs every 6 (six) hours as needed for wheezing or shortness of breath, Disp: 6 7 g, Rfl: 2    ALPRAZolam (XANAX) 1 mg tablet, Take 1 tablet (1 mg total) by mouth 4 (four) times a day as needed for anxiety, Disp: 120 tablet, Rfl: 0    amphetamine-dextroamphetamine (ADDERALL, 10MG,) 10 mg tablet, Take 1 tablet (10 mg total) by mouth 2 (two) times a day Max Daily Amount: 20 mg, Disp: 60 tablet, Rfl: 0    eszopiclone (LUNESTA) 3 MG tablet, Take 1 tablet (3 mg total) by mouth daily at bedtime as needed for sleep Take immediately before bedtime, Disp: 30 tablet, Rfl: 0    fluticasone (FLONASE) 50 mcg/act nasal spray, 2 sprays into each nostril daily, Disp: 16 g, Rfl: 2    fluticasone-vilanterol (Breo Ellipta) 100-25 mcg/inh inhaler, Inhale 1 puff daily Rinse mouth after use , Disp: 60 blister, Rfl: 2    lamoTRIgine (LaMICtal) 200 MG tablet, Take 1 tablet (200 mg total) by mouth daily at bedtime, Disp: 30 tablet, Rfl: 3    mometasone (ELOCON) 0 1 % lotion, , Disp: , Rfl:     Vortioxetine HBr (Trintellix) 20 MG tablet, Take 1 tablet (20 mg total) by mouth daily, Disp: 30 tablet, Rfl: 3    Allergies   Allergen Reactions    Doxycycline Other (See Comments)     unknown       Vitals:    01/12/22 1437   BP: 122/70   Pulse: 81   Temp: 97 6 °F (36 4 °C)   SpO2: 95%     Physical Exam  Constitutional:       Appearance: She is well-developed  Comments: Well-nourished female, no respiratory distress, no signs of pain   HENT:      Head: Normocephalic and atraumatic  Right Ear: External ear normal       Left Ear: External ear normal    Eyes:      Conjunctiva/sclera: Conjunctivae normal       Pupils: Pupils are equal, round, and reactive to light  Cardiovascular:      Rate and Rhythm: Normal rate and regular rhythm  Heart sounds: Normal heart sounds  Pulmonary:      Effort: Pulmonary effort is normal       Breath sounds: Normal breath sounds  Comments: Clear bilaterally  Abdominal:      General: Bowel sounds are normal       Palpations: Abdomen is soft  Comments: +bowel sounds, nontender, soft, no past splenomegaly, no guarding   Musculoskeletal:         General: Normal range of motion  Cervical back: Normal range of motion and neck supple  Skin:     General: Skin is warm        Comments: Warm, moist, good color, no petechiae or ecchymoses, no purpura   Neurological:      Mental Status: She is alert and oriented to person, place, and time       Deep Tendon Reflexes: Reflexes are normal and symmetric  Psychiatric:         Behavior: Behavior normal          Thought Content: Thought content normal          Judgment: Judgment normal      Extremities:  No lower extremity edema bilaterally, no cords, pulses are 1+  Lymphatics:  No adenopathy in the neck, supraclavicular region, axilla bilaterally    Labs    11/29/2021 WBC = 6 3 hemoglobin = 13 4 hematocrit = 38 8 MCV = 95 platelet = 814 ferritin = 155 TIBC = 221 iron = 32 iron saturation = 14% BUN = 18 creatinine = 0 78 calcium = 8 9 alkaline phosphatase = 65 total protein = 6 1 AST = 28 ALT = 29 total bilirubin <0 2 TSH = 0 727    Imaging    07/13/2021 CT scan abdomen pelvis with contrast   Impression stated findings compatible with constipation  No evidence of bowel obstruction, colitis, diverticulitis or appendicitis      Pathology    11/29/2021 HFE gene mutation analysis: [id number] identified

## 2022-01-18 ENCOUNTER — TELEPHONE (OUTPATIENT)
Dept: GASTROENTEROLOGY | Facility: CLINIC | Age: 42
End: 2022-01-18

## 2022-01-18 NOTE — TELEPHONE ENCOUNTER
Patient left message asking for a call to schedule an appointment  I returned her call and had to leave message for her to call back

## 2022-01-19 ENCOUNTER — OFFICE VISIT (OUTPATIENT)
Dept: GASTROENTEROLOGY | Facility: CLINIC | Age: 42
End: 2022-01-19
Payer: COMMERCIAL

## 2022-01-19 VITALS
BODY MASS INDEX: 23.39 KG/M2 | HEIGHT: 63 IN | HEART RATE: 85 BPM | WEIGHT: 132 LBS | DIASTOLIC BLOOD PRESSURE: 71 MMHG | SYSTOLIC BLOOD PRESSURE: 105 MMHG

## 2022-01-19 DIAGNOSIS — K62.5 RECTAL BLEEDING: ICD-10-CM

## 2022-01-19 DIAGNOSIS — R11.0 NAUSEA: Primary | ICD-10-CM

## 2022-01-19 PROCEDURE — 3008F BODY MASS INDEX DOCD: CPT | Performed by: INTERNAL MEDICINE

## 2022-01-19 PROCEDURE — 99214 OFFICE O/P EST MOD 30 MIN: CPT | Performed by: INTERNAL MEDICINE

## 2022-01-19 PROCEDURE — 1036F TOBACCO NON-USER: CPT | Performed by: INTERNAL MEDICINE

## 2022-01-19 RX ORDER — ONDANSETRON 4 MG/1
4 TABLET, FILM COATED ORAL EVERY 8 HOURS PRN
Qty: 30 TABLET | Refills: 1 | Status: SHIPPED | OUTPATIENT
Start: 2022-01-19

## 2022-01-19 NOTE — H&P (VIEW-ONLY)
[facility] - Outpatient Follow-up Note  Hunter Grover 35 y o  female MRN: [id number]  Encounter: [id number]          ASSESSMENT AND PLAN:      1  Nausea    - Celiac Disease Panel; Future  - ondansetron (ZOFRAN) 4 mg tablet; Take 1 tablet (4 mg total) by mouth every 8 (eight) hours as needed for nausea or vomiting  Dispense: 30 tablet; Refill: 1  - EGD; Future    2  Rectal bleeding    - CBC; Future  - Ambulatory Referral to Colorectal Surgery; Future    ______________________________________________________________________    SUBJECTIVE:  Patient has been following with Danish Trevizo for number of years for treatment of persistent nausea and rectal bleeding  Returns today with continued symptoms  Has undergone multiple colonoscopies and hemorrhoidal banding on a number of occasions which provides temporary benefit but symptoms invariably return  Has undergone evaluation in the past including CT scan of the abdomen and pelvis, gastric emptying scan, hepatobiliary scan with no significant pathology  Reports bright red blood dripping into toilet with brown stool    REVIEW OF SYSTEMS:    ROS       Historical Information   Past Medical History:   Diagnosis Date    Abdominal pain     ADHD     Allergic rhinitis     Anxiety     panic attacks    Anxiety     Bipolar disorder (HCC)     Bladder distention     came to the ED on 10/9/2020-greene in to drain then removed    Chronic constipation     Colon polyp     Contact lens/glasses fitting     and glasses    Depression     Depression     GERD (gastroesophageal reflux disease)     Psychiatric disorder     anxiety     Rectal bleeding      Past Surgical History:   Procedure Laterality Date    APPENDECTOMY      BREAST SURGERY      augmentation silicone    COLONOSCOPY N/A 1/24/2018    Procedure: COLONOSCOPY WITH HEMORRHOID BANDING;  Surgeon: Ahmet Colon MD;  Location: [place] [address] GI LAB;   Service: Gastroenterology    ENDOMETRIAL ABLATION 2016    [place] SIGMOIDOSCOPY FLX DX W/COLLJ SPEC BR/WA IF PFRMD N/A 2018    Procedure: FLEXIBLE SIGMOIDOSCOPY WITH APC; Surgeon: Arthor Homans, MD;  Location: [facility] GI LAB; Service: Gastroenterology    TUBAL LIGATION      UPPER GASTROINTESTINAL ENDOSCOPY       Social History   Social History     Substance and Sexual Activity   Alcohol Use Yes    Comment: occassional - wine 2 x month     Social History     Substance and Sexual Activity   Drug Use No     Social History     Tobacco Use   Smoking Status Former Smoker    Types: Cigarettes    Quit date:     Years since quittin 0   Smokeless Tobacco Never Used     Family History   Problem Relation Age of Onset    No Known Problems Mother     No Known Problems Father     Bipolar disorder Sister     No Known Problems Brother     Anxiety disorder Daughter     Depression Daughter     No Known Problems Son        Meds/Allergies       Current Outpatient Medications:     albuterol (Proventil HFA) 90 mcg/act inhaler    ALPRAZolam (XANAX) 1 mg tablet    amphetamine-dextroamphetamine (ADDERALL, 10MG,) 10 mg tablet    fluticasone (FLONASE) 50 mcg/act nasal spray    lamoTRIgine (LaMICtal) 200 MG tablet    mometasone (ELOCON) 0 1 % lotion    acetaminophen (TYLENOL) 325 mg tablet    eszopiclone (LUNESTA) 3 MG tablet    fluticasone-vilanterol (Breo Ellipta) 100-25 mcg/inh inhaler    ondansetron (ZOFRAN) 4 mg tablet    Vortioxetine HBr (Trintellix) 20 MG tablet    Allergies   Allergen Reactions    Doxycycline Other (See Comments)     unknown           Objective     Blood pressure 105/71, pulse 85, height 5' 3" (1 6 m), weight 59 9 kg (132 lb), not currently breastfeeding  Body mass index is 23 38 kg/m²  PHYSICAL EXAM:      Physical Exam  Vitals and nursing note reviewed  Constitutional:       General: She is not in acute distress  Appearance: She is not ill-appearing  HENT:      Head: Normocephalic and atraumatic     Eyes:      General: No scleral icterus  Extraocular Movements: Extraocular movements intact  Cardiovascular:      Rate and Rhythm: Normal rate and regular rhythm  Pulmonary:      Effort: Pulmonary effort is normal  No respiratory distress  Abdominal:      General: There is no distension  Palpations: Abdomen is soft  There is no mass  Tenderness: There is no abdominal tenderness  There is no guarding or rebound  Hernia: No hernia is present  Musculoskeletal:      Right lower leg: No edema  Left lower leg: No edema  Skin:     General: Skin is warm and dry  Coloration: Skin is not cyanotic  Findings: No erythema  Neurological:      General: No focal deficit present  Mental Status: She is alert and oriented to person, place, and time  Psychiatric:         Mood and Affect: Mood normal          Behavior: Behavior normal           Lab Results:   No visits with results within 1 Day(s) from this visit  Latest known visit with results is:   Orders Only on 11/29/2021   Component Date Value    Rheumatoid Factor (RF) 11/29/2021 <10 0     ANTI-CCP AB, IGG/IGA 11/29/2021 5     Antinuclear Antibodies, * 11/29/2021 Negative          Radiology Results:   No results found

## 2022-01-19 NOTE — PROGRESS NOTES
[facility] Gastroenterology Specialists - Outpatient Follow-up Note  Kristen oRndon 35 y o  female MRN: [id number]  Encounter: [id number]          ASSESSMENT AND PLAN:      1  Nausea    - Celiac Disease Panel; Future  - ondansetron (ZOFRAN) 4 mg tablet; Take 1 tablet (4 mg total) by mouth every 8 (eight) hours as needed for nausea or vomiting  Dispense: 30 tablet; Refill: 1  - EGD; Future    2  Rectal bleeding    - CBC; Future  - Ambulatory Referral to Colorectal Surgery; Future    ______________________________________________________________________    SUBJECTIVE:  Patient has been following with Laura Backer for number of years for treatment of persistent nausea and rectal bleeding  Returns today with continued symptoms  Has undergone multiple colonoscopies and hemorrhoidal banding on a number of occasions which provides temporary benefit but symptoms invariably return  Has undergone evaluation in the past including CT scan of the abdomen and pelvis, gastric emptying scan, hepatobiliary scan with no significant pathology  Reports bright red blood dripping into toilet with brown stool    REVIEW OF SYSTEMS:    ROS       Historical Information   Past Medical History:   Diagnosis Date    Abdominal pain     ADHD     Allergic rhinitis     Anxiety     panic attacks    Anxiety     Bipolar disorder (HCC)     Bladder distention     came to the ED on 10/9/2020-greene in to drain then removed    Chronic constipation     Colon polyp     Contact lens/glasses fitting     and glasses    Depression     Depression     GERD (gastroesophageal reflux disease)     Psychiatric disorder     anxiety     Rectal bleeding      Past Surgical History:   Procedure Laterality Date    APPENDECTOMY      BREAST SURGERY      augmentation silicone    COLONOSCOPY N/A 1/24/2018    Procedure: COLONOSCOPY WITH HEMORRHOID BANDING;  Surgeon: Graham Munoz MD;  Location: [facility] GI LAB;   Service: Gastroenterology    ENDOMETRIAL ABLATION 2016    [place] SIGMOIDOSCOPY FLX DX W/COLLJ SPEC BR/WA IF PFRMD N/A 2018    Procedure: FLEXIBLE SIGMOIDOSCOPY WITH APC; Surgeon: Rosa Isela Donnelly MD;  Location: [facility] GI LAB; Service: Gastroenterology    TUBAL LIGATION      UPPER GASTROINTESTINAL ENDOSCOPY       Social History   Social History     Substance and Sexual Activity   Alcohol Use Yes    Comment: occassional - wine 2 x month     Social History     Substance and Sexual Activity   Drug Use No     Social History     Tobacco Use   Smoking Status Former Smoker    Types: Cigarettes    Quit date:     Years since quittin 0   Smokeless Tobacco Never Used     Family History   Problem Relation Age of Onset    No Known Problems Mother     No Known Problems Father     Bipolar disorder Sister     No Known Problems Brother     Anxiety disorder Daughter     Depression Daughter     No Known Problems Son        Meds/Allergies       Current Outpatient Medications:     albuterol (Proventil HFA) 90 mcg/act inhaler    ALPRAZolam (XANAX) 1 mg tablet    amphetamine-dextroamphetamine (ADDERALL, 10MG,) 10 mg tablet    fluticasone (FLONASE) 50 mcg/act nasal spray    lamoTRIgine (LaMICtal) 200 MG tablet    mometasone (ELOCON) 0 1 % lotion    acetaminophen (TYLENOL) 325 mg tablet    eszopiclone (LUNESTA) 3 MG tablet    fluticasone-vilanterol (Breo Ellipta) 100-25 mcg/inh inhaler    ondansetron (ZOFRAN) 4 mg tablet    Vortioxetine HBr (Trintellix) 20 MG tablet    Allergies   Allergen Reactions    Doxycycline Other (See Comments)     unknown           Objective     Blood pressure 105/71, pulse 85, height 5' 3" (1 6 m), weight 59 9 kg (132 lb), not currently breastfeeding  Body mass index is 23 38 kg/m²  PHYSICAL EXAM:      Physical Exam  Vitals and nursing note reviewed  Constitutional:       General: She is not in acute distress  Appearance: She is not ill-appearing  HENT:      Head: Normocephalic and atraumatic     Eyes:      General: No scleral icterus  Extraocular Movements: Extraocular movements intact  Cardiovascular:      Rate and Rhythm: Normal rate and regular rhythm  Pulmonary:      Effort: Pulmonary effort is normal  No respiratory distress  Abdominal:      General: There is no distension  Palpations: Abdomen is soft  There is no mass  Tenderness: There is no abdominal tenderness  There is no guarding or rebound  Hernia: No hernia is present  Musculoskeletal:      Right lower leg: No edema  Left lower leg: No edema  Skin:     General: Skin is warm and dry  Coloration: Skin is not cyanotic  Findings: No erythema  Neurological:      General: No focal deficit present  Mental Status: She is alert and oriented to person, place, and time  Psychiatric:         Mood and Affect: Mood normal          Behavior: Behavior normal           Lab Results:   No visits with results within 1 Day(s) from this visit  Latest known visit with results is:   Orders Only on 11/29/2021   Component Date Value    Rheumatoid Factor (RF) 11/29/2021 <10 0     ANTI-CCP AB, IGG/IGA 11/29/2021 5     Antinuclear Antibodies, * 11/29/2021 Negative          Radiology Results:   No results found

## 2022-01-19 NOTE — PATIENT INSTRUCTIONS
Scheduled date of EGD(as of today): January 31 2022  Physician performing EGD: Dr Patrick Payne  Location of EGD: [address] [facility]   Instructions reviewed with patient by: Shay Orellana   Clearances: None

## 2022-01-28 DIAGNOSIS — F41.1 GAD (GENERALIZED ANXIETY DISORDER): ICD-10-CM

## 2022-01-28 DIAGNOSIS — F90.2 ATTENTION DEFICIT HYPERACTIVITY DISORDER (ADHD), COMBINED TYPE: ICD-10-CM

## 2022-01-28 RX ORDER — DEXTROAMPHETAMINE SACCHARATE, AMPHETAMINE ASPARTATE, DEXTROAMPHETAMINE SULFATE AND AMPHETAMINE SULFATE 2.5; 2.5; 2.5; 2.5 MG/1; MG/1; MG/1; MG/1
10 TABLET ORAL
Qty: 60 TABLET | Refills: 0 | Status: SHIPPED | OUTPATIENT
Start: 2022-01-28 | End: 2022-02-23 | Stop reason: SDUPTHER

## 2022-01-28 RX ORDER — ALPRAZOLAM 1 MG/1
1 TABLET ORAL 4 TIMES DAILY PRN
Qty: 120 TABLET | Refills: 0 | Status: SHIPPED | OUTPATIENT
Start: 2022-01-28 | End: 2022-02-23 | Stop reason: SDUPTHER

## 2022-01-28 NOTE — TELEPHONE ENCOUNTER
Yasmin Starkey is a patient of Dr Sindhu Cervantes, and has called in to request refills for Zanax and adderall     Her next schedule apt with Dr Sindhu Cervantes is 3/21

## 2022-01-31 ENCOUNTER — ANESTHESIA EVENT (OUTPATIENT)
Dept: GASTROENTEROLOGY | Facility: AMBULATORY SURGERY CENTER | Age: 42
End: 2022-01-31

## 2022-01-31 ENCOUNTER — ANESTHESIA (OUTPATIENT)
Dept: GASTROENTEROLOGY | Facility: AMBULATORY SURGERY CENTER | Age: 42
End: 2022-01-31

## 2022-01-31 ENCOUNTER — HOSPITAL ENCOUNTER (OUTPATIENT)
Dept: GASTROENTEROLOGY | Facility: AMBULATORY SURGERY CENTER | Age: 42
Discharge: HOME | End: 2022-01-31
Payer: COMMERCIAL

## 2022-01-31 VITALS
DIASTOLIC BLOOD PRESSURE: 60 MMHG | TEMPERATURE: 98.1 F | RESPIRATION RATE: 18 BRPM | WEIGHT: 122 LBS | BODY MASS INDEX: 21.62 KG/M2 | OXYGEN SATURATION: 98 % | HEIGHT: 63 IN | SYSTOLIC BLOOD PRESSURE: 92 MMHG | HEART RATE: 75 BPM

## 2022-01-31 DIAGNOSIS — R11.0 NAUSEA: ICD-10-CM

## 2022-01-31 DIAGNOSIS — K25.9 GASTRIC EROSION, UNSPECIFIED CHRONICITY: Primary | ICD-10-CM

## 2022-01-31 PROCEDURE — 88305 TISSUE EXAM BY PATHOLOGIST: CPT | Performed by: SPECIALIST

## 2022-01-31 PROCEDURE — 43239 EGD BIOPSY SINGLE/MULTIPLE: CPT | Performed by: INTERNAL MEDICINE

## 2022-01-31 PROCEDURE — 88342 IMHCHEM/IMCYTCHM 1ST ANTB: CPT | Performed by: SPECIALIST

## 2022-01-31 PROCEDURE — 00731 ANES UPR GI NDSC PX NOS: CPT | Performed by: NURSE ANESTHETIST, CERTIFIED REGISTERED

## 2022-01-31 RX ORDER — SODIUM CHLORIDE 9 MG/ML
30 INJECTION, SOLUTION INTRAVENOUS CONTINUOUS
Status: DISCONTINUED | OUTPATIENT
Start: 2022-01-31 | End: 2022-02-04 | Stop reason: HOSPADM

## 2022-01-31 RX ORDER — OMEPRAZOLE 20 MG/1
20 CAPSULE, DELAYED RELEASE ORAL DAILY
Qty: 30 CAPSULE | Refills: 3 | Status: SHIPPED | OUTPATIENT
Start: 2022-01-31

## 2022-01-31 RX ORDER — LIDOCAINE HYDROCHLORIDE 10 MG/ML
INJECTION, SOLUTION EPIDURAL; INFILTRATION; INTRACAUDAL; PERINEURAL AS NEEDED
Status: DISCONTINUED | OUTPATIENT
Start: 2022-01-31 | End: 2022-01-31

## 2022-01-31 RX ORDER — PROPOFOL 10 MG/ML
INJECTION, EMULSION INTRAVENOUS AS NEEDED
Status: DISCONTINUED | OUTPATIENT
Start: 2022-01-31 | End: 2022-01-31

## 2022-01-31 RX ADMIN — PROPOFOL 30 MG: 10 INJECTION, EMULSION INTRAVENOUS at 11:10

## 2022-01-31 RX ADMIN — PROPOFOL 20 MG: 10 INJECTION, EMULSION INTRAVENOUS at 11:12

## 2022-01-31 RX ADMIN — PROPOFOL 50 MG: 10 INJECTION, EMULSION INTRAVENOUS at 11:11

## 2022-01-31 RX ADMIN — LIDOCAINE HYDROCHLORIDE 50 MG: 10 INJECTION, SOLUTION EPIDURAL; INFILTRATION; INTRACAUDAL; PERINEURAL at 11:07

## 2022-01-31 RX ADMIN — SODIUM CHLORIDE: 9 INJECTION, SOLUTION INTRAVENOUS at 10:55

## 2022-01-31 RX ADMIN — PROPOFOL 200 MG: 10 INJECTION, EMULSION INTRAVENOUS at 11:07

## 2022-01-31 RX ADMIN — PROPOFOL 50 MG: 10 INJECTION, EMULSION INTRAVENOUS at 11:09

## 2022-01-31 NOTE — INTERVAL H&P NOTE
H&P reviewed  After examining the patient I find no changes in the patients condition since the H&P had been written      Vitals:    01/31/22 1043   BP: 136/85   Pulse: 71   Resp: 18   Temp: 98 1 °F (36 7 °C)   SpO2: 100%

## 2022-01-31 NOTE — ANESTHESIA PREPROCEDURE EVALUATION
Procedure:  EGD    Relevant Problems   GI/HEPATIC   (+) Gastroesophageal reflux disease      NEURO/PSYCH   (+) RANJIT (generalized anxiety disorder)   (+) Numbness and tingling in both hands   (+) Panic disorder without agoraphobia with moderate panic attacks      PULMONARY   (+) Shortness of breath        Physical Exam    Airway    Mallampati score: I  TM Distance: >3 FB  Neck ROM: full     Dental   No notable dental hx     Cardiovascular  Rhythm: regular, Rate: normal, Cardiovascular exam normal    Pulmonary  Pulmonary exam normal Breath sounds clear to auscultation,     Other Findings        Anesthesia Plan  ASA Score- 2     Anesthesia Type- IV sedation with anesthesia with ASA Monitors  Additional Monitors:   Airway Plan:     Comment: Nasal cannula  Plan Factors-Exercise tolerance (METS): >4 METS  Chart reviewed  EKG reviewed  Imaging results reviewed  Existing labs reviewed  Patient summary reviewed  Patient is not a current smoker  Patient not instructed to abstain from smoking on day of procedure  Patient did not smoke on day of surgery  Obstructive sleep apnea risk education given perioperatively  Induction- intravenous  Postoperative Plan-     Informed Consent- Anesthetic plan and risks discussed with patient  I personally reviewed this patient with the CRNA  Discussed and agreed on the Anesthesia Plan with the CRNA  Karrie Nissen

## 2022-01-31 NOTE — DISCHARGE INSTRUCTIONS
Upper Endoscopy   WHAT YOU NEED TO KNOW:   An upper endoscopy is also called an upper gastrointestinal (GI) endoscopy, or an esophagogastroduodenoscopy (EGD)  It is a procedure to examine the inside of your esophagus, stomach, and duodenum (first part of the small intestine) with a scope  You may feel bloated, gassy, or have some abdominal discomfort after your procedure  Your throat may be sore for 24 to 36 hours  You may burp or pass gas from air that is still inside your body  DISCHARGE INSTRUCTIONS:   Seek care immediately if:    You have sudden, severe abdominal pain   You have problems swallowing   You have a large amount of black, sticky bowel movements or blood in your bowel movements   You have sudden trouble breathing   You feel weak, lightheaded, or faint or your heart beats faster than normal for you  Contact your healthcare provider if:    You have a fever and chills   You have nausea or are vomiting   Your abdomen is bloated or feels full and hard   You have abdominal pain   You have black, sticky bowel movements or blood in your bowel movements   You have not had a bowel movement for 3 days after your procedure   You have rash or hives   You have questions or concerns about your procedure  Activity:    Do not lift, strain, or run for 24 hours after your procedure   Rest after your procedure  You have been given medicine to relax you  Do not drive or make important decisions until the day after your procedure  Return to your normal activity as directed   Relieve gas and discomfort from bloating by lying on your right side with a heating pad on your abdomen  You may need to take short walks to help the gas move out  Eat small meals until bloating is relieved  Follow up with your healthcare provider as directed: Write down your questions so you remember to ask them during your visits       If you take a blood thinner, please review the specific instructions from your endoscopist about when you should resume it  These can be found in the Recommendation and Your Medication list sections of this After Visit Summary  Omeprazole (By mouth)   Omeprazole (fl-[id number])  Treats heartburn, a damaged esophagus, stomach ulcers, gastroesophageal reflux disease (GERD), conditions that cause your stomach to make too much acid  This medicine is a proton pump inhibitor (PPI)  Brand Name(s): First-Omeprazole, [facility] Omeprazole, [facility] Sense Omeprazole, PriLOSEC, PriLOSEC OTC, [facility] Omeprazole Magnesium, [facility] Omeprazole, [facility]Care Omeprazole   There may be other brand names for this medicine  When This Medicine Should Not Be Used: This medicine is not right for everyone  Do not use it if you had an allergic reaction to omeprazole or similar medicines  How to Use This Medicine:   Delayed Release Capsule, Packet, Powder for Suspension, Delayed Release Tablet  · Your doctor will tell you how much medicine to use  Do not use more than directed  · This medicine should come with a Medication Guide  Ask your pharmacist for a copy if you do not have one  · Follow the instructions on the medicine label if you are using this medicine without a prescription  If you are using the over-the-counter medicine, do not take it for more than 14 days or use the treatment more often than every 4 months unless your doctor tells you to  · Take this medicine before meals and for the full time of treatment, even if you begin to feel better after a few days  · Capsule or tablet:   ? Swallow whole  Do not crush or chew it  ? If you cannot swallow the capsule, you may open it and pour the medicine into a small amount of applesauce  Stir this mixture well and swallow it without chewing  To help make sure you get the full dose, drink a full glass of water    · Oral packet:   ? Mix the contents of a 2 5-milligram (mg) packet with 5 milliliters (mL) of water or mix the contents of a 10-mg packet with 15 mL of water  Do not use other liquids or food  ? Stir well  Let the mixture thicken for 2 to 3 minutes  ? Stir again and drink the medicine within 30 minutes  ? If there is any medicine left, add more water, stir, and drink immediately  § To prepare the oral packet for a feeding tube:   § Add 5 mL of water to a catheter-tipped syringe  Then add the contents of a 2 5-mg packet (or use 15 mL of water for the 10-mg packet)  § Shake the syringe right away  Let the mixture thicken for 2 to 3 minutes  § Shake the syringe once more  Give the medicine through the tube within 30 minutes  § Refill the syringe with an equal amount of water and shake it  Use the water to flush the tube to make sure all the medicine is given  · Missed dose: Take a dose as soon as you remember  If it is almost time for your next dose, wait until then and take a regular dose  Do not take extra medicine to make up for a missed dose  · Store the medicine in a closed container at room temperature, away from heat, moisture, and direct light  Drugs and Foods to Avoid:   Ask your doctor or pharmacist before using any other medicine, including over-the-counter medicines, vitamins, and herbal products  · Do not use omeprazole if you are also using medicines that contain rilpivirine  · Some medicines can affect how omeprazole works  Tell your doctor if you are using any of the following:  ? Amoxicillin, atazanavir, cilostazol, citalopram, clarithromycin, clopidogrel, cyclosporine, dasatinib, digoxin, disulfiram, erlotinib, itraconazole, ketoconazole, methotrexate, mycophenolate mofetil, nelfinavir, nilotinib, phenytoin, rifampin, saquinavir, Elizabeth's wort, tacrolimus, voriconazole  ? Benzodiazepine medicine (including diazepam)  ? Blood thinner (including warfarin)  ? Diuretic (water pill)  ?  Iron supplements  Warnings While Using This Medicine:   · Tell your doctor if you are pregnant or breastfeeding, or if you have kidney disease, liver disease, lupus, or osteoporosis  · This medicine may cause the following problems:   ? Kidney problems, including acute tubulointerstitial nephritis  ? Increased risk of broken bones in the hip, wrist, or spine (more likely if used several times per day or longer than 1 year)  ? Lupus  ? Fundic gland polyps (abnormal growth in the upper part of your stomach)  · This medicine can cause diarrhea  Call your doctor if the diarrhea becomes severe, does not stop, or is bloody  Do not take any medicine to stop diarrhea until you have talked to your doctor  Diarrhea can occur 2 months or more after you stop taking this medicine  · Tell any doctor or dentist who treats you that you are using this medicine  This medicine may affect certain medical test results  · Your doctor will do lab tests at regular visits to check on the effects of this medicine  Keep all appointments  · Keep all medicine out of the reach of children  Never share your medicine with anyone    Possible Side Effects While Using This Medicine:   Call your doctor right away if you notice any of these side effects:  · Allergic reaction: Itching or hives, swelling in your face or hands, swelling or tingling in your mouth or throat, chest tightness, trouble breathing  · Blistering, peeling, red skin rash  · Fever, swelling in the body, unusual weight gain, change in how much or how often you urinate, blood in the urine  · Joint pain, rash on your cheeks or arms that gets worse in the sun  · Seizures, dizziness, uneven heartbeat, muscle cramps or twitching  · Severe diarrhea, stomach cramps  · Stomach pain, nausea, vomiting, loss of appetite, weight loss  · Unusual tiredness or weakness  If you notice these less serious side effects, talk with your doctor:   · Headache  · Mild diarrhea or stomach pain  If you notice other side effects that you think are caused by this medicine, tell your doctor  Call your doctor for medical advice about side effects  You may report side effects to FDA at 5-137-FDA-2744    © Copyright [employer] 2021 Information is for End User's use only and may not be sold, redistributed or otherwise used for commercial purposes  The above information is an  only  It is not intended as medical advice for individual conditions or treatments  Talk to your doctor, nurse or pharmacist before following any medical regimen to see if it is safe and effective for you  Gastritis   WHAT YOU NEED TO KNOW:   What is gastritis? Gastritis is inflammation or irritation of the lining of your stomach  What increases my risk for gastritis? · Infection with bacteria, a virus, or a parasite    · NSAIDs, aspirin, or steroid medicine    · Use of tobacco products or alcohol    · Trauma such as an injury to your stomach or intestine    · Autoimmune disorders such as diabetes, thyroid disease, or Crohn disease    · Stress    · Age older than 60 years    · Illegal drugs, such as cocaine    What are the signs and symptoms of gastritis? · Stomach pain, burning, or tenderness when you press on it    · Stomach fullness or tightness    · Nausea or vomiting    · Loss of appetite, or feeling full quickly when you eat    · Bad breath    · Fatigue or feeling more tired than usual    · Heartburn    How is gastritis diagnosed? Your healthcare provider will ask about your signs and symptoms and examine you  You may need any of the following:  · Blood tests  may be used to show an infection, dehydration, or anemia (low red blood cell levels)  · A bowel movement sample  may be tested for blood or the germ that may be causing your gastritis  · A breath test  may show if H pylori is causing your gastritis  You will be given a liquid to drink  Then you will breathe into a bag  Your healthcare provider will measure the amount of carbon dioxide in your breath   Extra amounts of carbon dioxide may mean you have an H pylori infection  · An endoscopy  may be used to look for irritation or bleeding in your stomach  Your healthcare provider will use an endoscope (tube with a light and camera on the end) during the procedure  He or she may take a sample from your stomach to be tested  How is gastritis treated? Your symptoms may go away without treatment  Treatment will depend on what is causing your gastritis  Your healthcare provider may recommend changes to the medicines you take  Medicines may be given to help treat a bacterial infection or decrease stomach acid  How can I manage or prevent gastritis? · Do not smoke  Nicotine and other chemicals in cigarettes and cigars can make your symptoms worse and cause lung damage  Ask your healthcare provider for information if you currently smoke and need help to quit  E-cigarettes or smokeless tobacco still contain nicotine  Talk to your healthcare provider before you use these products  · Do not drink alcohol  Alcohol can prevent healing and make your gastritis worse  Talk to your healthcare provider if you need help to stop drinking  · Do not take NSAIDs or aspirin unless directed  These and similar medicines can cause irritation of your stomach lining  If your healthcare provider says it is okay to take NSAIDs, take them with food  · Do not eat foods that cause irritation  Foods such as oranges and salsa can cause burning or pain  Eat a variety of healthy foods  Examples include fruits (not citrus), vegetables, low-fat dairy products, beans, whole-grain breads, and lean meats and fish  Try to eat small meals, and drink water with your meals  Do not eat for at least 3 hours before you go to bed  · Find ways to relax and decrease stress  Stress can increase stomach acid and make gastritis worse  Activities such as yoga, meditation, or listening to music can help you relax   Spend time with friends, or do things you enjoy  Call 911 for any of the following:   · You develop chest pain or shortness of breath  When should I seek immediate care? · You vomit blood  · You have black or bloody bowel movements  · You have severe stomach or back pain  When should I contact my healthcare provider? · You have a fever  · You have new or worsening symptoms, even after treatment  · You have questions or concerns about your condition or care  CARE AGREEMENT:   You have the right to help plan your care  Learn about your health condition and how it may be treated  Discuss treatment options with your healthcare providers to decide what care you want to receive  You always have the right to refuse treatment  The above information is an  only  It is not intended as medical advice for individual conditions or treatments  Talk to your doctor, nurse or pharmacist before following any medical regimen to see if it is safe and effective for you  © Copyright [address] 2021 Information is for End User's use only and may not be sold, redistributed or otherwise used for commercial purposes   All illustrations and images included in CareNotes® are the copyrighted property of [employer]  or [address] [place] [address]

## 2022-02-02 PROBLEM — J45.30 MILD PERSISTENT ASTHMA WITHOUT COMPLICATION: Status: ACTIVE | Noted: 2022-02-02

## 2022-02-02 PROBLEM — Z72.89 CURRENT EVERY DAY VAPING: Status: ACTIVE | Noted: 2022-02-02

## 2022-02-07 ENCOUNTER — TELEPHONE (OUTPATIENT)
Dept: GASTROENTEROLOGY | Facility: CLINIC | Age: 42
End: 2022-02-07

## 2022-02-07 NOTE — TELEPHONE ENCOUNTER
----- Message from Eren Blanco LPN sent at 4/8/3516 10:26 AM EST -----  Patient aware via hipaa  Educated  Please call patient to schedule f/u appt   Thank you

## 2022-02-07 NOTE — TELEPHONE ENCOUNTER
Left message for patient to call and schedule f/u ov to EGD with Dr Patrick Payne  I will call again in 1 wk if she doesn't return call to schedule

## 2022-02-09 ENCOUNTER — TELEPHONE (OUTPATIENT)
Dept: PULMONOLOGY | Facility: CLINIC | Age: 42
End: 2022-02-09

## 2022-02-09 NOTE — TELEPHONE ENCOUNTER
Pt missed her follow up appointment with Deniz Tello on 2/9/22   LM for Pt to call back to reschedule

## 2022-02-14 NOTE — TELEPHONE ENCOUNTER
Left another message for patient to call and schedule  Will call again in 2 wks if she doesn't return call

## 2022-02-23 DIAGNOSIS — F31.81 BIPOLAR II DISORDER (HCC): ICD-10-CM

## 2022-02-23 DIAGNOSIS — F90.2 ATTENTION DEFICIT HYPERACTIVITY DISORDER (ADHD), COMBINED TYPE: ICD-10-CM

## 2022-02-23 DIAGNOSIS — F41.1 GAD (GENERALIZED ANXIETY DISORDER): ICD-10-CM

## 2022-02-24 RX ORDER — DEXTROAMPHETAMINE SACCHARATE, AMPHETAMINE ASPARTATE, DEXTROAMPHETAMINE SULFATE AND AMPHETAMINE SULFATE 2.5; 2.5; 2.5; 2.5 MG/1; MG/1; MG/1; MG/1
10 TABLET ORAL
Qty: 60 TABLET | Refills: 0 | Status: SHIPPED | OUTPATIENT
Start: 2022-02-24 | End: 2022-03-30 | Stop reason: SDUPTHER

## 2022-02-24 RX ORDER — LAMOTRIGINE 200 MG/1
200 TABLET ORAL
Qty: 30 TABLET | Refills: 3 | Status: SHIPPED | OUTPATIENT
Start: 2022-02-24 | End: 2022-06-02 | Stop reason: SDUPTHER

## 2022-02-24 RX ORDER — ALPRAZOLAM 1 MG/1
1 TABLET ORAL 4 TIMES DAILY PRN
Qty: 120 TABLET | Refills: 0 | Status: SHIPPED | OUTPATIENT
Start: 2022-02-24 | End: 2022-03-30 | Stop reason: SDUPTHER

## 2022-02-28 NOTE — TELEPHONE ENCOUNTER
I spoke with Shannon Alcocer and they informed me pt did get her Omeprazole filled on 01-31-22 and there are 3 refills remaining  I left a msg for pt to call back so I can inform her

## 2022-03-01 ENCOUNTER — OFFICE VISIT (OUTPATIENT)
Dept: FAMILY MEDICINE CLINIC | Facility: CLINIC | Age: 42
End: 2022-03-01
Payer: COMMERCIAL

## 2022-03-01 VITALS
BODY MASS INDEX: 21.79 KG/M2 | OXYGEN SATURATION: 99 % | TEMPERATURE: 98 F | HEART RATE: 85 BPM | SYSTOLIC BLOOD PRESSURE: 110 MMHG | DIASTOLIC BLOOD PRESSURE: 70 MMHG | HEIGHT: 63 IN | WEIGHT: 123 LBS | RESPIRATION RATE: 18 BRPM

## 2022-03-01 DIAGNOSIS — J01.90 ACUTE SINUSITIS, RECURRENCE NOT SPECIFIED, UNSPECIFIED LOCATION: Primary | ICD-10-CM

## 2022-03-01 DIAGNOSIS — R35.0 FREQUENT URINATION: ICD-10-CM

## 2022-03-01 DIAGNOSIS — J45.30 MILD PERSISTENT ASTHMA WITHOUT COMPLICATION: ICD-10-CM

## 2022-03-01 DIAGNOSIS — F90.0 ATTENTION DEFICIT HYPERACTIVITY DISORDER (ADHD), PREDOMINANTLY INATTENTIVE TYPE: ICD-10-CM

## 2022-03-01 DIAGNOSIS — N89.8 VAGINAL ITCHING: ICD-10-CM

## 2022-03-01 DIAGNOSIS — F31.81 BIPOLAR II DISORDER (HCC): ICD-10-CM

## 2022-03-01 DIAGNOSIS — K21.9 GASTROESOPHAGEAL REFLUX DISEASE WITHOUT ESOPHAGITIS: ICD-10-CM

## 2022-03-01 PROBLEM — A41.9 SEPSIS (HCC): Status: RESOLVED | Noted: 2017-02-08 | Resolved: 2022-03-01

## 2022-03-01 LAB
SL AMB  POCT GLUCOSE, UA: NORMAL
SL AMB LEUKOCYTE ESTERASE,UA: NORMAL
SL AMB POCT BILIRUBIN,UA: NORMAL
SL AMB POCT BLOOD,UA: NORMAL
SL AMB POCT CLARITY,UA: CLEAR
SL AMB POCT COLOR,UA: NORMAL
SL AMB POCT KETONES,UA: NORMAL
SL AMB POCT NITRITE,UA: NORMAL
SL AMB POCT PH,UA: 6
SL AMB POCT SPECIFIC GRAVITY,UA: 1.02
SL AMB POCT URINE PROTEIN: NORMAL
SL AMB POCT UROBILINOGEN: 0.2

## 2022-03-01 PROCEDURE — 99214 OFFICE O/P EST MOD 30 MIN: CPT | Performed by: NURSE PRACTITIONER

## 2022-03-01 PROCEDURE — 81003 URINALYSIS AUTO W/O SCOPE: CPT | Performed by: NURSE PRACTITIONER

## 2022-03-01 PROCEDURE — 3008F BODY MASS INDEX DOCD: CPT | Performed by: NURSE PRACTITIONER

## 2022-03-01 PROCEDURE — 1036F TOBACCO NON-USER: CPT | Performed by: NURSE PRACTITIONER

## 2022-03-01 PROCEDURE — 3725F SCREEN DEPRESSION PERFORMED: CPT | Performed by: NURSE PRACTITIONER

## 2022-03-01 RX ORDER — CEFUROXIME AXETIL 250 MG/1
250 TABLET ORAL EVERY 12 HOURS SCHEDULED
Qty: 20 TABLET | Refills: 0 | Status: SHIPPED | OUTPATIENT
Start: 2022-03-01 | End: 2022-03-11

## 2022-03-01 RX ORDER — FLUCONAZOLE 150 MG/1
150 TABLET ORAL
Qty: 2 TABLET | Refills: 0 | Status: SHIPPED | OUTPATIENT
Start: 2022-03-01 | End: 2022-03-04

## 2022-03-01 NOTE — PROGRESS NOTES
Assessment/Plan:    1  Acute sinusitis, recurrence not specified, unspecified location  -     cefuroxime (CEFTIN) 250 mg tablet; Take 1 tablet (250 mg total) by mouth every 12 (twelve) hours for 10 days  -     fluconazole (DIFLUCAN) 150 mg tablet; Take 1 tablet (150 mg total) by mouth every third day for 3 days    2  Vaginal itching    3  Bipolar II disorder ([address] )  Comments:  managed by psychatrist    4  Mild persistent asthma without complication    5  Gastroesophageal reflux disease without esophagitis  Comments:  managed by gastro    6  Attention deficit hyperactivity disorder (ADHD), predominantly inattentive type  Comments:  managed by psychatrist    7  Frequent urination  Comments:  urine dip is normal and not enough specimen to send urine culture  Orders:  -     POCT urine dip auto non-scope          BMI Counseling: Body mass index is 21 79 kg/m²  Discussed the patient's BMI with her  Patient Instructions: Take medication with food  It is important that you take the entire course of antibiotics prescribed  May also take a probiotic of your choice to maintain healthy GI gagan  Can take some probiotic and yogurt with the medication  Supportive care discussed and advised  Advised to RTO for any worsening and no improvement  Follow up for no improvement and worsening of conditions  Patient advised and educated when to see immediate medical care  Return if symptoms worsen or fail to improve  Future Appointments   Date Time Provider [facility]   3/16/2022  9:20 AM Vishal Steele MD GI  [facility]   3/21/2022  2:00 PM Dyana Burch, PhD [facility]           Subjective:      Patient ID: Raman Anthony is a 35 y o  female      Chief Complaint   Patient presents with    head and chest congestion     started on Friday  [id number]    Cough         Vitals:  /70   Pulse 85   Temp 98 °F (36 7 °C)   Resp 18   Ht 5' 3" (1 6 m)   Wt 55 8 kg (123 lb)   SpO2 99%   BMI 21 79 kg/m²     HPI  Patient stated that started with sore throat couple of days ago and progressed to fatigue and sinus pressure  Denies fever, chills and sob  Tried OTC but no relief  Also having vaginal itching and thinks has yeast infection as prone to get them and denies any STD concern  Also having frequent urination and would like urine to be tested too  PHQ-2/9 Depression Screening    Little interest or pleasure in doing things: 3 - nearly every day  Feeling down, depressed, or hopeless: 3 - nearly every day  Trouble falling or staying asleep, or sleeping too much: 3 - nearly every day  Feeling tired or having little energy: 3 - nearly every day  Poor appetite or overeatin - more than half the days  Feeling bad about yourself - or that you are a failure or have let yourself or your family down: 3 - nearly every day  Trouble concentrating on things, such as reading the newspaper or watching television: 3 - nearly every day  Moving or speaking so slowly that other people could have noticed  Or the opposite - being so fidgety or restless that you have been moving around a lot more than usual: 3 - nearly every day  Thoughts that you would be better off dead, or of hurting yourself in some way: 0 - not at all  PHQ-2 Score: 6  PHQ-2 Interpretation: POSITIVE depression screen  PHQ-9 Score: 23   PHQ-9 Interpretation: Severe depression          Depression Screening Follow-up Plan: Patient's depression screening was positive with a PHQ-2 score of 6  Their PHQ-9 score was 23  Continue regular follow-up with their psychologist/therapist/psychiatrist who is managing their mental health condition(s)  The following portions of the patient's history were reviewed and updated as appropriate: allergies, current medications, past family history, past medical history, past social history, past surgical history and problem list       Review of Systems   Constitutional: Positive for fatigue   Negative for chills, diaphoresis, fever and unexpected weight change  HENT: Positive for sinus pressure and sore throat  Negative for congestion, dental problem, drooling, ear discharge, ear pain, facial swelling, hearing loss, mouth sores, nosebleeds, postnasal drip, rhinorrhea, sinus pain, sneezing, tinnitus, trouble swallowing and voice change  Respiratory: Negative  Negative for cough, chest tightness, shortness of breath and wheezing  Cardiovascular: Negative  Gastrointestinal: Negative for abdominal pain, constipation, diarrhea, nausea and vomiting  Genitourinary: Positive for frequency  Negative for decreased urine volume, difficulty urinating, dysuria, flank pain, genital sores, hematuria and urgency  Skin: Negative  Neurological: Negative for dizziness, light-headedness and headaches  Objective:    Social History     Tobacco Use   Smoking Status Former Smoker    Types: Cigarettes    Quit date:     Years since quittin 1   Smokeless Tobacco Never Used       Allergies:    Allergies   Allergen Reactions    Doxycycline Other (See Comments)     unknown         Current Outpatient Medications   Medication Sig Dispense Refill    albuterol (Proventil HFA) 90 mcg/act inhaler Inhale 2 puffs every 6 (six) hours as needed for wheezing or shortness of breath 6 7 g 2    ALPRAZolam (XANAX) 1 mg tablet Take 1 tablet (1 mg total) by mouth 4 (four) times a day as needed for anxiety 120 tablet 0    amphetamine-dextroamphetamine (ADDERALL, 10MG,) 10 mg tablet Take 1 tablet (10 mg total) by mouth 2 (two) times a day Max Daily Amount: 20 mg 60 tablet 0    fluticasone (FLONASE) 50 mcg/act nasal spray 2 sprays into each nostril daily 16 g 2    lamoTRIgine (LaMICtal) 200 MG tablet Take 1 tablet (200 mg total) by mouth daily at bedtime 30 tablet 3    mometasone (ELOCON) 0 1 % lotion       omeprazole (PriLOSEC) 20 mg delayed release capsule Take 1 capsule (20 mg total) by mouth daily 30 capsule 3    ondansetron (ZOFRAN) 4 mg tablet Take 1 tablet (4 mg total) by mouth every 8 (eight) hours as needed for nausea or vomiting 30 tablet 1    cefuroxime (CEFTIN) 250 mg tablet Take 1 tablet (250 mg total) by mouth every 12 (twelve) hours for 10 days 20 tablet 0    fluconazole (DIFLUCAN) 150 mg tablet Take 1 tablet (150 mg total) by mouth every third day for 3 days 2 tablet 0    fluticasone-vilanterol (Breo Ellipta) 100-25 mcg/inh inhaler Inhale 1 puff daily Rinse mouth after use  (Patient not taking: Reported on 1/19/2022 ) 60 blister 2     No current facility-administered medications for this visit  Physical Exam  Vitals reviewed  Constitutional:       Appearance: Normal appearance  She is well-developed  HENT:      Head: Normocephalic  Right Ear: Tympanic membrane, ear canal and external ear normal       Left Ear: Tympanic membrane, ear canal and external ear normal       Nose: Mucosal edema present  Right Sinus: Maxillary sinus tenderness present  No frontal sinus tenderness  Left Sinus: Maxillary sinus tenderness present  No frontal sinus tenderness  Mouth/Throat:      Mouth: No oral lesions  Pharynx: No oropharyngeal exudate or posterior oropharyngeal erythema  Cardiovascular:      Rate and Rhythm: Normal rate and regular rhythm  Heart sounds: Normal heart sounds  Pulmonary:      Effort: Pulmonary effort is normal       Breath sounds: Normal breath sounds  Musculoskeletal:         General: Normal range of motion  Cervical back: Neck supple  Lymphadenopathy:      Cervical:      Right cervical: No superficial or posterior cervical adenopathy  Left cervical: No superficial or posterior cervical adenopathy  Skin:     General: Skin is warm and dry  Neurological:      Mental Status: She is alert and oriented to person, place, and time  Psychiatric:         Behavior: Behavior normal          Thought Content:  Thought content normal          Judgment: Judgment normal              Recent Results (from the past 24 hour(s))   POCT urine dip auto non-scope    Collection Time: 03/01/22  3:45 PM   Result Value Ref Range     COLOR,UA monique     CLARITY,UA clear     SPECIFIC GRAVITY,UA 1 025      PH,UA 6 0     LEUKOCYTE ESTERASE,UA neg     NITRITE,UA neg     GLUCOSE, UA neg     KETONES,UA neg     BILIRUBIN,UA neg     BLOOD,UA neg     POCT URINE PROTEIN neg     SL AMB POCT UROBILINOGEN 0 2              ROSE Fried

## 2022-03-03 ENCOUNTER — TELEPHONE (OUTPATIENT)
Dept: PSYCHIATRY | Facility: CLINIC | Age: 42
End: 2022-03-03

## 2022-03-03 NOTE — TELEPHONE ENCOUNTER
Pt is having really bad panic attacks  Is reliving things from the past and worrying about the future( things that haven't happened yet)  Has an appt on 3/21/22, but can't wait that long, would like a phone call if possible

## 2022-03-22 NOTE — TELEPHONE ENCOUNTER
Patient called and results discussed for PAP and Affirm vaginal swab and no further action needed at this time  bed rails

## 2022-03-30 ENCOUNTER — TELEMEDICINE (OUTPATIENT)
Dept: PSYCHIATRY | Facility: CLINIC | Age: 42
End: 2022-03-30
Payer: COMMERCIAL

## 2022-03-30 DIAGNOSIS — F41.0 PANIC DISORDER WITHOUT AGORAPHOBIA WITH MODERATE PANIC ATTACKS: ICD-10-CM

## 2022-03-30 DIAGNOSIS — F90.2 ATTENTION DEFICIT HYPERACTIVITY DISORDER (ADHD), COMBINED TYPE: ICD-10-CM

## 2022-03-30 DIAGNOSIS — F41.1 GAD (GENERALIZED ANXIETY DISORDER): ICD-10-CM

## 2022-03-30 DIAGNOSIS — F90.0 ATTENTION DEFICIT HYPERACTIVITY DISORDER (ADHD), PREDOMINANTLY INATTENTIVE TYPE: Primary | ICD-10-CM

## 2022-03-30 DIAGNOSIS — R53.83 OTHER FATIGUE: ICD-10-CM

## 2022-03-30 DIAGNOSIS — Z79.899 HIGH RISK MEDICATION USE: ICD-10-CM

## 2022-03-30 DIAGNOSIS — F31.81 BIPOLAR II DISORDER (HCC): ICD-10-CM

## 2022-03-30 PROCEDURE — 90833 PSYTX W PT W E/M 30 MIN: CPT | Performed by: NURSE PRACTITIONER

## 2022-03-30 PROCEDURE — 99214 OFFICE O/P EST MOD 30 MIN: CPT | Performed by: NURSE PRACTITIONER

## 2022-03-30 RX ORDER — BUSPIRONE HYDROCHLORIDE 10 MG/1
10 TABLET ORAL 2 TIMES DAILY
Qty: 60 TABLET | Refills: 3 | Status: SHIPPED | OUTPATIENT
Start: 2022-03-30 | End: 2022-06-27 | Stop reason: SDUPTHER

## 2022-03-30 RX ORDER — ALPRAZOLAM 1 MG/1
1 TABLET ORAL 4 TIMES DAILY PRN
Qty: 120 TABLET | Refills: 0 | Status: SHIPPED | OUTPATIENT
Start: 2022-03-30 | End: 2022-05-03 | Stop reason: SDUPTHER

## 2022-03-30 RX ORDER — DEXTROAMPHETAMINE SACCHARATE, AMPHETAMINE ASPARTATE, DEXTROAMPHETAMINE SULFATE AND AMPHETAMINE SULFATE 2.5; 2.5; 2.5; 2.5 MG/1; MG/1; MG/1; MG/1
10 TABLET ORAL
Qty: 60 TABLET | Refills: 0 | Status: SHIPPED | OUTPATIENT
Start: 2022-03-30 | End: 2022-05-09 | Stop reason: SDUPTHER

## 2022-03-30 NOTE — BH TREATMENT PLAN
TREATMENT PLAN (Medication Management Only)         [facility]     Name and Date of Nasir Schwartz UAYJW 3 y o  1980  Date of Treatment Plan: July 21, 2020, October 20, 2020, August 25, 2021, March 30, 2022  Diagnosis/Diagnoses:    1  Bipolar II disorder ([address] )    2  Panic disorder without agoraphobia with moderate panic attacks    3  Attention deficit hyperactivity disorder (ADHD), predominantly inattentive type       Strengths/Personal Resources for Self-Care: supportive family, taking medications as prescribed, ability to communicate needs, ability to listen, motivation for treatment  Area/Areas of need (in own words): anxiety symptoms, depressive symptoms  1          Long Term Goal: improve control of anxiety  Target Date: 6 months -9/30/2022  Person/Persons responsible for completion of goal: Isabel and chasity  2          Short Term Objective (s) - How will we reach this goal?:   A  Provider new recommended medication/dosage changes and/or continue medication(s): continue current medications as prescribed (Buspar)  Ashlyn mckenna structure in her day  C  Create realistic expectations  Target Date: 6 months -9/30/2022Person/Persons Responsible for Completion of Goal: Isabel and provider  Progress Towards Goals: progressing slowly  Treatment Modality: medication management every 1-3 month  Review due 180 days from date of this plan: 6 months -9/30/2022  Expected length of service: ongoing treatment  My Physician/PA/NP and I have developed this plan together and I agree to work on the goals and objectives   I understand the treatment goals that were developed for my treatment

## 2022-04-02 NOTE — PSYCH
Virtual Regular Visit    Problem List Items Addressed This Visit        Other    Attention deficit hyperactivity disorder (ADHD), predominantly inattentive type - Primary    Relevant Medications    ALPRAZolam (XANAX) 1 mg tablet    amphetamine-dextroamphetamine (ADDERALL, 10MG,) 10 mg tablet    busPIRone (BUSPAR) 10 mg tablet    Bipolar II disorder (HCC)    Relevant Medications    ALPRAZolam (XANAX) 1 mg tablet    amphetamine-dextroamphetamine (ADDERALL, 10MG,) 10 mg tablet    busPIRone (BUSPAR) 10 mg tablet    RANJIT (generalized anxiety disorder)    Relevant Medications    ALPRAZolam (XANAX) 1 mg tablet    amphetamine-dextroamphetamine (ADDERALL, 10MG,) 10 mg tablet    busPIRone (BUSPAR) 10 mg tablet    High risk medication use    Other fatigue    Panic disorder without agoraphobia with moderate panic attacks    Relevant Medications    ALPRAZolam (XANAX) 1 mg tablet    amphetamine-dextroamphetamine (ADDERALL, 10MG,) 10 mg tablet    busPIRone (BUSPAR) 10 mg tablet      Other Visit Diagnoses     Attention deficit hyperactivity disorder (ADHD), combined type        Relevant Medications    ALPRAZolam (XANAX) 1 mg tablet    amphetamine-dextroamphetamine (ADDERALL, 10MG,) 10 mg tablet    busPIRone (BUSPAR) 10 mg tablet        Reason for visit is   Chief Complaint   Patient presents with    Mood Swings    Anxiety    ADHD    Depression     Encounter provider Concepción Dowling, PhD    Provider located at [address] [place] [address]  [address]  [place] [address]  [phone]    Recent Visits  Date Type Provider Dept   03/30/22 Telemedicine Concepción Dowling, PhD Lloyd Black recent visits within past 7 days and meeting all other requirements  Future Appointments  No visits were found meeting these conditions    Showing future appointments within next 150 days and meeting all other requirements       After connecting through televideo, the patient was identified by name and date of birth  Meagan Mancera was informed that this is a telemedicine visit and that the visit is being conducted through [address] [employer] and patient was informed that this is a secure, HIPAA-compliant platform  She agrees to proceed  which may not be secure and therefore, might not be HIPAA-compliant  My office door was closed  No one else was in the room  She acknowledged consent and understanding of privacy and security of the video platform  The patient has agreed to participate and understands they can discontinue the visit at any time  SUBJECTIVE:    Meagan Mancera is a 35 y o  female with a history of RANJIT, panic, Bipolar disorder seen for medication management and mood assessment  Alf Pickard reports her anxiety is very high, daughter due soon to deliver son, work stress is reported; having to go into the city more frequently, worried about 5 yo son  He is demonstrating anxiety  She reports appetite is fair, trouble sleeping  Denies panic attacks  Depression continues, denies SI  Takes medication as prescribed  Family are supportive      HPI ROS Appetite Changes and Sleep: decreased appetite and decreased energy    Review Of Systems:     Mood Anxiety and Depression   Behavior Normal    Thought Content Normal   General Normal    Personality Normal   Other Psych Symptoms ADHD   Constitutional As Noted in HPI   ENT As Noted in HPI   Cardiovascular As Noted in HPI   Respiratory As Noted in HPI   Gastrointestinal As Noted in HPI   Genitourinary As Noted in HPI   Musculoskeletal As Noted in HPI   Integumentary As Noted in HPI   Neurological As Noted in HPI   Endocrine Normal    Other Symptoms Normal        Substance Abuse History:    Social History     Substance and Sexual Activity   Drug Use No       Family Psychiatric History:     Family History   Problem Relation Age of Onset    No Known Problems Mother     No Known Problems Father     Bipolar disorder Sister     No Known Problems Brother     Anxiety disorder Daughter     Depression Daughter     No Known Problems Son        Social History     Socioeconomic History    Marital status: /Civil Union     Spouse name: Not on file    Number of children: 2    Years of education: Not on file    Highest education level: Not on file   Occupational History    Not on file   Tobacco Use    Smoking status: Former Smoker     Types: Cigarettes     Quit date:      Years since quittin 2    Smokeless tobacco: Never Used   Vaping Use    Vaping Use: Every day    Substances: Nicotine, Flavoring   Substance and Sexual Activity    Alcohol use: Yes     Comment: occassional - wine 2 x month    Drug use: No    Sexual activity: Yes     Partners: Male     Birth control/protection: Female Sterilization     Comment: ablation   Other Topics Concern    Not on file   Social History Narrative    Not on file     Social Determinants of Health     Financial Resource Strain: Not on file   Food Insecurity: Not on file   Transportation Needs: Not on file   Physical Activity: Not on file   Stress: Not on file   Social Connections: Not on file   Intimate Partner Violence: Not on file   Housing Stability: Not on file       Past Medical History:   Diagnosis Date    Abdominal pain     ADHD     Allergic rhinitis     Anxiety     panic attacks    Anxiety     Asthma     Bipolar disorder ([facility] [address] )     Bladder distention     came to the ED on 10/9/2020-greene in to drain then removed    Chronic constipation     Colon polyp     Contact lens/glasses fitting     and glasses    Depression     Depression     GERD (gastroesophageal reflux disease)     Psychiatric disorder     anxiety     Rectal bleeding     Sepsis ([facility] [address] ) 2017       Past Surgical History:   Procedure Laterality Date    APPENDECTOMY      BREAST SURGERY      augmentation silicone    COLONOSCOPY N/A 2018    Procedure: COLONOSCOPY WITH HEMORRHOID BANDING;  Surgeon: Sheldon Carolina Moses Metz MD;  Location: [place] [address] GI LAB; Service: Gastroenterology    ENDOMETRIAL ABLATION  2016    CO SIGMOIDOSCOPY FLX DX W/COLLJ SPEC BR/WA IF PFRMD N/A 11/14/2018    Procedure: FLEXIBLE SIGMOIDOSCOPY WITH APC; Surgeon: Lasha Mccollum MD;  [facility]; Service: Gastroenterology    UPPER GASTROINTESTINAL ENDOSCOPY         Current Outpatient Medications   Medication Sig Dispense Refill    albuterol (Proventil HFA) 90 mcg/act inhaler Inhale 2 puffs every 6 (six) hours as needed for wheezing or shortness of breath 6 7 g 2    ALPRAZolam (XANAX) 1 mg tablet Take 1 tablet (1 mg total) by mouth 4 (four) times a day as needed for anxiety 120 tablet 0    amphetamine-dextroamphetamine (ADDERALL, 10MG,) 10 mg tablet Take 1 tablet (10 mg total) by mouth 2 (two) times a day Max Daily Amount: 20 mg 60 tablet 0    busPIRone (BUSPAR) 10 mg tablet Take 1 tablet (10 mg total) by mouth 2 (two) times a day 60 tablet 3    fluticasone (FLONASE) 50 mcg/act nasal spray 2 sprays into each nostril daily 16 g 2    fluticasone-vilanterol (Breo Ellipta) 100-25 mcg/inh inhaler Inhale 1 puff daily Rinse mouth after use  (Patient not taking: Reported on 1/19/2022 ) 60 blister 2    lamoTRIgine (LaMICtal) 200 MG tablet Take 1 tablet (200 mg total) by mouth daily at bedtime 30 tablet 3    mometasone (ELOCON) 0 1 % lotion       omeprazole (PriLOSEC) 20 mg delayed release capsule Take 1 capsule (20 mg total) by mouth daily 30 capsule 3    ondansetron (ZOFRAN) 4 mg tablet Take 1 tablet (4 mg total) by mouth every 8 (eight) hours as needed for nausea or vomiting 30 tablet 1     No current facility-administered medications for this visit          Allergies   Allergen Reactions    Doxycycline Other (See Comments)     unknown       The following portions of the patient's history were reviewed and updated as appropriate: allergies, current medications, past family history, past medical history, past social history, past surgical history and problem list     OBJECTIVE:     Mental Status Examination:    Appearance adequate hygiene and grooming, restless and fidgety and good eye contact    Mood anxious and mood appropriate   Affect affect appropriate    Speech a normal rate   Thought Processes normal thought processes   Hallucinations no hallucinations present    Thought Content no delusions   Abnormal Thoughts no suicidal thoughts  and no homicidal thoughts    Orientation  oriented to person and place and time   Remote Memory short term memory intact and long term memory intact   Attention Span concentration impaired   Intellect Appears to be of Average Intelligence   Insight Insight intact   Judgement judgment was intact   Muscle Strength Muscle strength and tone were normal   Language no difficulty naming common objects   Fund of Knowledge displays adequate knowledge of current events   Pain none   Pain Scale 0       Laboratory Results: No results found for this or any previous visit  Assessment/Plan:       Diagnoses and all orders for this visit:    Attention deficit hyperactivity disorder (ADHD), predominantly inattentive type    Bipolar II disorder (HCC)    High risk medication use    Other fatigue    Panic disorder without agoraphobia with moderate panic attacks  -     busPIRone (BUSPAR) 10 mg tablet; Take 1 tablet (10 mg total) by mouth 2 (two) times a day    RANJIT (generalized anxiety disorder)  -     ALPRAZolam (XANAX) 1 mg tablet; Take 1 tablet (1 mg total) by mouth 4 (four) times a day as needed for anxiety    Attention deficit hyperactivity disorder (ADHD), combined type  -     amphetamine-dextroamphetamine (ADDERALL, 10MG,) 10 mg tablet; Take 1 tablet (10 mg total) by mouth 2 (two) times a day Max Daily Amount: 20 mg          Treatment Recommendations- Risks Benefits      Immediate Medical/Psychiatric/Psychotherapy Treatments and Any Precautions:  reviewed medication continue with treatment plan, Buspar 10 mg BID to start   She will call with her response    Risks, Benefits And Possible Side Effects Of Medications:  {PSYCH RISK, BENEFITS AND POSSIBLE SIDE EFFECTS (Optional):58334    Controlled Medication Discussion: She is aware of safe use and storage of medication     Psychotherapy Provided: 35 min  Supportive therapy  Mood assessment  Coping skills, ask  to help more  Medication evaluation and education      Goals discussed in session: reduce anxiety       Treatment Plan:    Completed and signed during the session: Yes - Treatment Plan done but not signed at time of office visit due to:  Plan reviewed by video and verbal consent given due to [address] distancing  Enacted 7/21/2020, updated 10/20/2020, 8/25/2021, 3/30/2022      Gemma Lentz, PhD 04/02/22

## 2022-05-03 DIAGNOSIS — F41.1 GAD (GENERALIZED ANXIETY DISORDER): ICD-10-CM

## 2022-05-03 RX ORDER — ALPRAZOLAM 1 MG/1
1 TABLET ORAL 4 TIMES DAILY PRN
Qty: 120 TABLET | Refills: 0 | Status: SHIPPED | OUTPATIENT
Start: 2022-05-03 | End: 2022-06-02 | Stop reason: SDUPTHER

## 2022-05-03 NOTE — TELEPHONE ENCOUNTER
----- Message from [address] [place] [address] sent at 5/3/2022 10:31 AM EDT -----  Regarding: refill  ALPRAZolam Robbie Narayan) 1 mg tablet    [id number] - [place], [phone] [place] [address] [address] [place] [address]    Next appt 6/27/22 SAUD

## 2022-05-09 DIAGNOSIS — F90.2 ATTENTION DEFICIT HYPERACTIVITY DISORDER (ADHD), COMBINED TYPE: ICD-10-CM

## 2022-05-09 RX ORDER — DEXTROAMPHETAMINE SACCHARATE, AMPHETAMINE ASPARTATE, DEXTROAMPHETAMINE SULFATE AND AMPHETAMINE SULFATE 2.5; 2.5; 2.5; 2.5 MG/1; MG/1; MG/1; MG/1
10 TABLET ORAL
Qty: 60 TABLET | Refills: 0 | Status: SHIPPED | OUTPATIENT
Start: 2022-05-09 | End: 2022-06-02 | Stop reason: SDUPTHER

## 2022-05-09 NOTE — TELEPHONE ENCOUNTER
----- Message from [address] [place] [address] sent at 5/9/2022  3:51 PM EDT -----  Regarding: refills  amphetamine-dextroamphetamine (ADDERALL, 10MG,) 10 mg tablet      [id number] - [facility], [phone] [facility] [address] [address] [place] [address]    Next appt 6/27/22

## 2022-05-13 ENCOUNTER — APPOINTMENT (OUTPATIENT)
Dept: RADIOLOGY | Facility: CLINIC | Age: 42
End: 2022-05-13
Payer: COMMERCIAL

## 2022-05-13 ENCOUNTER — OFFICE VISIT (OUTPATIENT)
Dept: OBGYN CLINIC | Facility: CLINIC | Age: 42
End: 2022-05-13
Payer: COMMERCIAL

## 2022-05-13 VITALS
HEIGHT: 63 IN | SYSTOLIC BLOOD PRESSURE: 122 MMHG | DIASTOLIC BLOOD PRESSURE: 72 MMHG | HEART RATE: 96 BPM | WEIGHT: 123 LBS | BODY MASS INDEX: 21.79 KG/M2

## 2022-05-13 DIAGNOSIS — M25.561 RIGHT KNEE PAIN, UNSPECIFIED CHRONICITY: ICD-10-CM

## 2022-05-13 DIAGNOSIS — M25.562 LEFT KNEE PAIN, UNSPECIFIED CHRONICITY: ICD-10-CM

## 2022-05-13 DIAGNOSIS — M23.91 INTERNAL DERANGEMENT OF RIGHT KNEE: ICD-10-CM

## 2022-05-13 DIAGNOSIS — M25.561 RIGHT KNEE PAIN, UNSPECIFIED CHRONICITY: Primary | ICD-10-CM

## 2022-05-13 PROCEDURE — 73562 X-RAY EXAM OF KNEE 3: CPT

## 2022-05-13 PROCEDURE — 73560 X-RAY EXAM OF KNEE 1 OR 2: CPT

## 2022-05-13 PROCEDURE — 3008F BODY MASS INDEX DOCD: CPT | Performed by: PHYSICIAN ASSISTANT

## 2022-05-13 PROCEDURE — 1036F TOBACCO NON-USER: CPT | Performed by: PHYSICIAN ASSISTANT

## 2022-05-13 PROCEDURE — 99214 OFFICE O/P EST MOD 30 MIN: CPT | Performed by: PHYSICIAN ASSISTANT

## 2022-05-13 RX ORDER — KETOCONAZOLE 20 MG/ML
SHAMPOO TOPICAL
COMMUNITY
Start: 2022-04-14

## 2022-05-13 NOTE — PROGRESS NOTES
Assessment/Plan:  1  Right knee pain, unspecified chronicity  XR knee 3 vw right non injury   2  Left knee pain, unspecified chronicity  XR knee 1 or 2 vw left   3  Internal derangement of right knee  MRI knee right  wo contrast     Given the patient's media knee pain, mechanical symptoms, and positive medial Josh's test, I do have concern for medial meniscus tear  As she does do a lot of deep knee squats, explain this puts her at high risk for this  I am ordering an MRI to evaluate for this  She will follow up after the MRI to discuss the results and how to proceed  We did briefly discuss a knee arthroscopy should a tear be found  Subjective:   Desirae Henderson is a 38 y o  female who presents today for evaluation of her right knee  She notes that this has been bothering her intermittently for many months, but has gotten significantly worse over the last 3 weeks  She denies any specific injury prior to the onset of her symptoms, however she does a lot of body building and lifting, including frequent deep knee squats  She localizes her pain to the medial aspect of the knee, and this is worse with activity and better with rest   She also notes intermittent swelling  She complains of frequent popping and catching about the knee, which is quite painful  She notes good sensation of the right lower extremity and good range of motion  Review of Systems   Constitutional: Negative  Negative for chills and fever  HENT: Negative  Negative for ear pain and sore throat  Eyes: Negative  Negative for pain and redness  Respiratory: Negative  Negative for shortness of breath and wheezing  Cardiovascular: Negative for chest pain and palpitations  Gastrointestinal: Negative  Negative for abdominal pain and blood in stool  Endocrine: Negative  Negative for polydipsia and polyuria  Genitourinary: Negative  Negative for difficulty urinating and dysuria     Musculoskeletal:        As noted in HPI Skin: Negative  Negative for pallor and rash  Neurological: Negative  Negative for dizziness and numbness  Hematological: Negative  Negative for adenopathy  Does not bruise/bleed easily  Psychiatric/Behavioral: Negative  Negative for confusion and suicidal ideas  Past Medical History:   Diagnosis Date    Abdominal pain     ADHD     Allergic rhinitis     Anxiety     panic attacks    Anxiety     Asthma     Bipolar disorder (HCC)     Bladder distention     came to the ED on 10/9/2020-greene in to drain then removed    Chronic constipation     Colon polyp     Contact lens/glasses fitting     and glasses    Depression     Depression     GERD (gastroesophageal reflux disease)     Psychiatric disorder     anxiety     Rectal bleeding     Sepsis ([facility] [address] ) 2017       Past Surgical History:   Procedure Laterality Date    APPENDECTOMY      BREAST SURGERY      augmentation silicone    COLONOSCOPY N/A 2018    Procedure: COLONOSCOPY WITH HEMORRHOID BANDING;  Surgeon: Kavya Castro MD;  Location: [place] [address] GI LAB; Service: Gastroenterology    ENDOMETRIAL ABLATION  2016    MO SIGMOIDOSCOPY FLX DX W/COLLJ SPEC BR/WA IF PFRMD N/A 2018    Procedure: FLEXIBLE SIGMOIDOSCOPY WITH APC; Surgeon: Kavya Castro MD;  Location: [facility] GI LAB;   Service: Gastroenterology    UPPER GASTROINTESTINAL ENDOSCOPY         Family History   Problem Relation Age of Onset    No Known Problems Mother     No Known Problems Father     Bipolar disorder Sister     No Known Problems Brother     Anxiety disorder Daughter     Depression Daughter     No Known Problems Son        Social History     Occupational History    Not on file   Tobacco Use    Smoking status: Former Smoker     Types: Cigarettes     Quit date:      Years since quittin 3    Smokeless tobacco: Never Used   Vaping Use    Vaping Use: Every day    Substances: Nicotine, Flavoring   Substance and Sexual Activity    Alcohol use: Yes     Comment: occassional - wine 2 x month    Drug use: No    Sexual activity: Yes     Partners: Male     Birth control/protection: Female Sterilization     Comment: ablation         Current Outpatient Medications:     albuterol (Proventil HFA) 90 mcg/act inhaler, Inhale 2 puffs every 6 (six) hours as needed for wheezing or shortness of breath, Disp: 6 7 g, Rfl: 2    ALPRAZolam (XANAX) 1 mg tablet, Take 1 tablet (1 mg total) by mouth 4 (four) times a day as needed for anxiety, Disp: 120 tablet, Rfl: 0    amphetamine-dextroamphetamine (ADDERALL, 10MG,) 10 mg tablet, Take 1 tablet (10 mg total) by mouth 2 (two) times a day Max Daily Amount: 20 mg, Disp: 60 tablet, Rfl: 0    busPIRone (BUSPAR) 10 mg tablet, Take 1 tablet (10 mg total) by mouth 2 (two) times a day, Disp: 60 tablet, Rfl: 3    fluticasone (FLONASE) 50 mcg/act nasal spray, 2 sprays into each nostril daily, Disp: 16 g, Rfl: 2    ketoconazole (NIZORAL) 2 % shampoo, , Disp: , Rfl:     lamoTRIgine (LaMICtal) 200 MG tablet, Take 1 tablet (200 mg total) by mouth daily at bedtime, Disp: 30 tablet, Rfl: 3    mometasone (ELOCON) 0 1 % lotion, , Disp: , Rfl:     omeprazole (PriLOSEC) 20 mg delayed release capsule, Take 1 capsule (20 mg total) by mouth daily, Disp: 30 capsule, Rfl: 3    ondansetron (ZOFRAN) 4 mg tablet, Take 1 tablet (4 mg total) by mouth every 8 (eight) hours as needed for nausea or vomiting, Disp: 30 tablet, Rfl: 1    fluticasone-vilanterol (Breo Ellipta) 100-25 mcg/inh inhaler, Inhale 1 puff daily Rinse mouth after use  (Patient not taking: Reported on 1/19/2022 ), Disp: 60 blister, Rfl: 2    Allergies   Allergen Reactions    Doxycycline Other (See Comments)     unknown       Objective:  Vitals:    05/13/22 0803   BP: 122/72   Pulse: 96       Right Knee Exam     Tenderness   The patient is experiencing tenderness in the medial joint line      Range of Motion   Extension: 0   Flexion: 120     Tests   Josh:  Medial - positive Varus: negative Valgus: negative  Lachman:  Anterior - negative      Drawer:  Anterior - negative    Posterior - negative    Other   Erythema: absent  Sensation: normal  Pulse: present  Swelling: none  Effusion: no effusion present          Observations     Right Knee   Negative for effusion  Physical Exam  Constitutional:       General: She is not in acute distress  Appearance: She is well-developed  HENT:      Head: Normocephalic and atraumatic  Eyes:      General: No scleral icterus  Conjunctiva/sclera: Conjunctivae normal    Neck:      Vascular: No JVD  Cardiovascular:      Rate and Rhythm: Normal rate  Pulmonary:      Effort: Pulmonary effort is normal  No respiratory distress  Musculoskeletal:      Right knee: No effusion  Instability Tests: Medial Josh test positive  Skin:     General: Skin is warm  Neurological:      Mental Status: She is alert and oriented to person, place, and time        Coordination: Coordination normal          I have personally reviewed pertinent films in PACS and my interpretation is as follows:  X-rays right knee:  No acute osseous abnormality

## 2022-06-02 DIAGNOSIS — F41.1 GAD (GENERALIZED ANXIETY DISORDER): ICD-10-CM

## 2022-06-02 DIAGNOSIS — F90.2 ATTENTION DEFICIT HYPERACTIVITY DISORDER (ADHD), COMBINED TYPE: ICD-10-CM

## 2022-06-02 DIAGNOSIS — F31.81 BIPOLAR II DISORDER (HCC): ICD-10-CM

## 2022-06-04 RX ORDER — ALPRAZOLAM 1 MG/1
1 TABLET ORAL 4 TIMES DAILY PRN
Qty: 120 TABLET | Refills: 0 | Status: SHIPPED | OUTPATIENT
Start: 2022-06-04 | End: 2022-06-27 | Stop reason: SDUPTHER

## 2022-06-04 RX ORDER — LAMOTRIGINE 200 MG/1
200 TABLET ORAL
Qty: 30 TABLET | Refills: 3 | Status: SHIPPED | OUTPATIENT
Start: 2022-06-04 | End: 2022-06-27 | Stop reason: SDUPTHER

## 2022-06-04 RX ORDER — DEXTROAMPHETAMINE SACCHARATE, AMPHETAMINE ASPARTATE, DEXTROAMPHETAMINE SULFATE AND AMPHETAMINE SULFATE 2.5; 2.5; 2.5; 2.5 MG/1; MG/1; MG/1; MG/1
10 TABLET ORAL
Qty: 60 TABLET | Refills: 0 | Status: SHIPPED | OUTPATIENT
Start: 2022-06-04 | End: 2022-06-27 | Stop reason: SDUPTHER

## 2022-06-27 ENCOUNTER — TELEMEDICINE (OUTPATIENT)
Dept: PSYCHIATRY | Facility: CLINIC | Age: 42
End: 2022-06-27
Payer: COMMERCIAL

## 2022-06-27 DIAGNOSIS — F32.89 OTHER DEPRESSION: Primary | ICD-10-CM

## 2022-06-27 DIAGNOSIS — F41.0 PANIC DISORDER WITHOUT AGORAPHOBIA WITH MODERATE PANIC ATTACKS: ICD-10-CM

## 2022-06-27 DIAGNOSIS — F41.1 GAD (GENERALIZED ANXIETY DISORDER): ICD-10-CM

## 2022-06-27 DIAGNOSIS — F90.0 ATTENTION DEFICIT HYPERACTIVITY DISORDER (ADHD), PREDOMINANTLY INATTENTIVE TYPE: ICD-10-CM

## 2022-06-27 DIAGNOSIS — F31.81 BIPOLAR II DISORDER (HCC): ICD-10-CM

## 2022-06-27 DIAGNOSIS — F90.2 ATTENTION DEFICIT HYPERACTIVITY DISORDER (ADHD), COMBINED TYPE: ICD-10-CM

## 2022-06-27 PROCEDURE — 90833 PSYTX W PT W E/M 30 MIN: CPT | Performed by: NURSE PRACTITIONER

## 2022-06-27 PROCEDURE — 99214 OFFICE O/P EST MOD 30 MIN: CPT | Performed by: NURSE PRACTITIONER

## 2022-06-27 PROCEDURE — 1036F TOBACCO NON-USER: CPT | Performed by: NURSE PRACTITIONER

## 2022-06-27 RX ORDER — BUSPIRONE HYDROCHLORIDE 10 MG/1
10 TABLET ORAL 2 TIMES DAILY
Qty: 60 TABLET | Refills: 3 | Status: SHIPPED | OUTPATIENT
Start: 2022-06-27

## 2022-06-27 RX ORDER — DEXTROAMPHETAMINE SACCHARATE, AMPHETAMINE ASPARTATE, DEXTROAMPHETAMINE SULFATE AND AMPHETAMINE SULFATE 2.5; 2.5; 2.5; 2.5 MG/1; MG/1; MG/1; MG/1
10 TABLET ORAL
Qty: 60 TABLET | Refills: 0 | Status: SHIPPED | OUTPATIENT
Start: 2022-06-27 | End: 2022-08-08 | Stop reason: SDUPTHER

## 2022-06-27 RX ORDER — ALPRAZOLAM 1 MG/1
1 TABLET ORAL 4 TIMES DAILY PRN
Qty: 120 TABLET | Refills: 0 | Status: SHIPPED | OUTPATIENT
Start: 2022-06-27 | End: 2022-08-08 | Stop reason: SDUPTHER

## 2022-06-27 RX ORDER — OFLOXACIN 3 MG/ML
SOLUTION/ DROPS OPHTHALMIC
COMMUNITY
Start: 2022-06-23

## 2022-06-27 RX ORDER — LAMOTRIGINE 200 MG/1
200 TABLET ORAL
Qty: 30 TABLET | Refills: 3 | Status: SHIPPED | OUTPATIENT
Start: 2022-06-27

## 2022-06-27 RX ORDER — BUPROPION HYDROCHLORIDE 150 MG/1
150 TABLET ORAL DAILY
Qty: 30 TABLET | Refills: 3 | Status: SHIPPED | OUTPATIENT
Start: 2022-06-27

## 2022-06-27 NOTE — PSYCH
Virtual Regular Visit    Problem List Items Addressed This Visit    None       Reason for visit is No chief complaint on file  Encounter provider Kimberly Jensen, PhD    Provider located at [address] [place] [address]  [address]  [place] [address]  [phone]    Recent Visits  No visits were found meeting these conditions  Showing recent visits within past 7 days and meeting all other requirements  Future Appointments  No visits were found meeting these conditions  Showing future appointments within next 150 days and meeting all other requirements       After connecting through [employer], the patient was identified by name and date of birth  Rachel Tai was informed that this is a telemedicine visit and that the visit is being conducted through [address] [place] [address] Now and patient was informed that this is a secure, HIPAA-compliant platform  She agrees to proceed  which may not be secure and therefore, might not be HIPAA-compliant  My office door was closed  No one else was in the room  She acknowledged consent and understanding of privacy and security of the video platform  The patient has agreed to participate and understands they can discontinue the visit at any time  SUBJECTIVE:    Rachel Tai is a 35 y o  female with a history of RANJIT, panic, Bipolar disorder seen for medication management and mood assessment  Shahrzad Aguayo reports her anxiety is very high, daughter delivered her son; having to go into the city more frequently, worried about 5 yo son  He is demonstrating anxiety  She reports appetite is fair, trouble sleeping  Denies panic attacks  Depression continues, denies SI  Takes medication as prescribed  Family are supportive   She works three jobs and can not rest  Denies jomar, hypomanic episodes      HPI ROS Appetite Changes and Sleep: normal appetite and increased energy    Review Of Systems:     Mood Anxiety and Depression   Behavior Normal Thought Content Normal   General Normal    Personality Normal   Other Psych Symptoms Normal   Constitutional As Noted in HPI   ENT As Noted in HPI   Cardiovascular As Noted in HPI   Respiratory As Noted in HPI   Gastrointestinal As Noted in HPI   Genitourinary As Noted in HPI   Musculoskeletal As Noted in HPI   Integumentary As Noted in HPI   Neurological As Noted in HPI   Endocrine Normal    Other Symptoms Normal        Substance Abuse History:    Social History     Substance and Sexual Activity   Drug Use No       Family Psychiatric History:     Family History   Problem Relation Age of Onset    No Known Problems Mother     No Known Problems Father     Bipolar disorder Sister     No Known Problems Brother     Anxiety disorder Daughter     Depression Daughter     No Known Problems Son        Social History     Socioeconomic History    Marital status: /Civil Union     Spouse name: Not on file    Number of children: 2    Years of education: Not on file    Highest education level: Not on file   Occupational History    Not on file   Tobacco Use    Smoking status: Former Smoker     Types: Cigarettes     Quit date:      Years since quittin 4    Smokeless tobacco: Never Used   Vaping Use    Vaping Use: Every day    Substances: Nicotine, Flavoring   Substance and Sexual Activity    Alcohol use: Yes     Comment: occassional - wine 2 x month    Drug use: No    Sexual activity: Yes     Partners: Male     Birth control/protection: Female Sterilization     Comment: ablation   Other Topics Concern    Not on file   Social History Narrative    Not on file     Social Determinants of Health     Financial Resource Strain: Not on file   Food Insecurity: Not on file   Transportation Needs: Not on file   Physical Activity: Not on file   Stress: Not on file   Social Connections: Not on file   Intimate Partner Violence: Not on file   Housing Stability: Not on file       Past Medical History: Diagnosis Date    Abdominal pain     ADHD     Allergic rhinitis     Anxiety     panic attacks    Anxiety     Asthma     Bipolar disorder (HCC)     Bladder distention     came to the ED on 10/9/2020-greene in to drain then removed    Chronic constipation     Colon polyp     Contact lens/glasses fitting     and glasses    Depression     Depression     GERD (gastroesophageal reflux disease)     Psychiatric disorder     anxiety     Rectal bleeding     Sepsis ([address] ) 2/8/2017       Past Surgical History:   Procedure Laterality Date    APPENDECTOMY      BREAST SURGERY      augmentation silicone    COLONOSCOPY N/A 1/24/2018    Procedure: COLONOSCOPY WITH HEMORRHOID BANDING;  Surgeon: Kev Ag MD;  Location: [facility] GI LAB; Service: Gastroenterology    ENDOMETRIAL ABLATION  2016    NV SIGMOIDOSCOPY FLX DX W/COLLJ SPEC BR/WA IF PFRMD N/A 11/14/2018    Procedure: FLEXIBLE SIGMOIDOSCOPY WITH APC; Surgeon: Kev Ag MD;  Location: [facility] GI LAB; Service: Gastroenterology    UPPER GASTROINTESTINAL ENDOSCOPY         Current Outpatient Medications   Medication Sig Dispense Refill    albuterol (Proventil HFA) 90 mcg/act inhaler Inhale 2 puffs every 6 (six) hours as needed for wheezing or shortness of breath 6 7 g 2    ALPRAZolam (XANAX) 1 mg tablet Take 1 tablet (1 mg total) by mouth 4 (four) times a day as needed for anxiety 120 tablet 0    amphetamine-dextroamphetamine (ADDERALL, 10MG,) 10 mg tablet Take 1 tablet (10 mg total) by mouth 2 (two) times a day Max Daily Amount: 20 mg 60 tablet 0    busPIRone (BUSPAR) 10 mg tablet Take 1 tablet (10 mg total) by mouth 2 (two) times a day 60 tablet 3    fluticasone (FLONASE) 50 mcg/act nasal spray 2 sprays into each nostril daily 16 g 2    fluticasone-vilanterol (Breo Ellipta) 100-25 mcg/inh inhaler Inhale 1 puff daily Rinse mouth after use   (Patient not taking: Reported on 1/19/2022 ) 60 blister 2    ketoconazole (NIZORAL) 2 % shampoo       lamoTRIgine (LaMICtal) 200 MG tablet Take 1 tablet (200 mg total) by mouth daily at bedtime 30 tablet 3    mometasone (ELOCON) 0 1 % lotion       omeprazole (PriLOSEC) 20 mg delayed release capsule Take 1 capsule (20 mg total) by mouth daily 30 capsule 3    ondansetron (ZOFRAN) 4 mg tablet Take 1 tablet (4 mg total) by mouth every 8 (eight) hours as needed for nausea or vomiting 30 tablet 1     No current facility-administered medications for this visit  Allergies   Allergen Reactions    Doxycycline Other (See Comments)     unknown       The following portions of the patient's history were reviewed and updated as appropriate: allergies, current medications, past family history, past medical history, past surgical history and problem list     OBJECTIVE:     Mental Status Examination:    Appearance calm and cooperative , adequate hygiene and grooming and good eye contact    Mood depressed and anxious   Affect affect appropriate    Speech a normal rate   Thought Processes normal thought processes   Hallucinations no hallucinations present    Thought Content no delusions   Abnormal Thoughts no suicidal thoughts  and no homicidal thoughts    Orientation  oriented to person and place and time   Remote Memory short term memory intact and long term memory intact   Attention Span concentration intact   Intellect Appears to be of Average Intelligence   Insight Insight intact   Judgement judgment was intact   Muscle Strength Muscle strength and tone were normal   Language no difficulty naming common objects   Fund of Knowledge displays adequate knowledge of current events   Pain none   Pain Scale 0       Laboratory Results: No results found for this or any previous visit  Assessment/Plan:       There are no diagnoses linked to this encounter        Treatment Recommendations- Risks Benefits      Immediate Medical/Psychiatric/Psychotherapy Treatments and Any Precautions:  reviewed medication continue with treatment plan, resume Wellbutrin 150 mg xl    Risks, Benefits And Possible Side Effects Of Medications:  {PSYCH RISK, BENEFITS AND POSSIBLE SIDE EFFECTS (Optional):62382    Controlled Medication Discussion: she is aware of safe use and storage of medication     Psychotherapy Provided: 30 min  Supportive therapy  Medication evaluation  Reviewed past medication with her  Wellbutrin agreed upon  Coping skills,  Encouraged therapy      Goals discussed in session: obtain counselor, work only 2 jobs       Treatment Plan:    Completed and signed during the session: Not applicable - Treatment Plan not due at this session enacted July 21, 2020, updated October 20, 2020, August 25, 2021, March 30, 2022      Hortencia Dowling, PhD 06/27/22

## 2022-07-02 DIAGNOSIS — R09.82 POST-NASAL DRIP: ICD-10-CM

## 2022-07-05 RX ORDER — FLUTICASONE PROPIONATE 50 MCG
SPRAY, SUSPENSION (ML) NASAL
Qty: 16 G | Refills: 2 | Status: SHIPPED | OUTPATIENT
Start: 2022-07-05 | End: 2022-10-06

## 2022-07-08 ENCOUNTER — TELEPHONE (OUTPATIENT)
Dept: BEHAVIORAL/MENTAL HEALTH CLINIC | Facility: CLINIC | Age: 42
End: 2022-07-08

## 2022-07-08 NOTE — TELEPHONE ENCOUNTER
Isabel called and said she got herself into a pickle and now needs a doctors note. She said she spoke to you about something last week

## 2022-08-08 DIAGNOSIS — F41.1 GAD (GENERALIZED ANXIETY DISORDER): ICD-10-CM

## 2022-08-08 DIAGNOSIS — F90.2 ATTENTION DEFICIT HYPERACTIVITY DISORDER (ADHD), COMBINED TYPE: ICD-10-CM

## 2022-08-08 RX ORDER — ALPRAZOLAM 1 MG/1
1 TABLET ORAL 4 TIMES DAILY PRN
Qty: 120 TABLET | Refills: 0 | Status: SHIPPED | OUTPATIENT
Start: 2022-08-08 | End: 2022-09-09 | Stop reason: SDUPTHER

## 2022-08-08 RX ORDER — DEXTROAMPHETAMINE SACCHARATE, AMPHETAMINE ASPARTATE, DEXTROAMPHETAMINE SULFATE AND AMPHETAMINE SULFATE 2.5; 2.5; 2.5; 2.5 MG/1; MG/1; MG/1; MG/1
10 TABLET ORAL
Qty: 60 TABLET | Refills: 0 | Status: SHIPPED | OUTPATIENT
Start: 2022-08-08 | End: 2022-09-09 | Stop reason: SDUPTHER

## 2022-08-08 NOTE — TELEPHONE ENCOUNTER
Request for refills -[facility]    amphetamine-dextroamphetamine (ADDERALL, 10MG,) 10 mg tablet    [address] [place] [address]   Phone:  [phone]  Fax:  [phone]      Next Visit 9/14/2022 Dr Chadwick Friedman

## 2022-08-24 ENCOUNTER — TELEMEDICINE (OUTPATIENT)
Dept: PSYCHIATRY | Facility: CLINIC | Age: 42
End: 2022-08-24
Payer: COMMERCIAL

## 2022-08-24 DIAGNOSIS — F31.81 BIPOLAR II DISORDER (HCC): ICD-10-CM

## 2022-08-24 DIAGNOSIS — R53.83 OTHER FATIGUE: ICD-10-CM

## 2022-08-24 DIAGNOSIS — F41.0 PANIC DISORDER WITHOUT AGORAPHOBIA WITH MODERATE PANIC ATTACKS: ICD-10-CM

## 2022-08-24 DIAGNOSIS — F41.1 GAD (GENERALIZED ANXIETY DISORDER): ICD-10-CM

## 2022-08-24 DIAGNOSIS — F90.0 ATTENTION DEFICIT HYPERACTIVITY DISORDER (ADHD), PREDOMINANTLY INATTENTIVE TYPE: Primary | ICD-10-CM

## 2022-08-24 PROCEDURE — 90833 PSYTX W PT W E/M 30 MIN: CPT | Performed by: NURSE PRACTITIONER

## 2022-08-24 PROCEDURE — 99214 OFFICE O/P EST MOD 30 MIN: CPT | Performed by: NURSE PRACTITIONER

## 2022-08-25 NOTE — PSYCH
Virtual Regular Visit    Problem List Items Addressed This Visit        Other    Attention deficit hyperactivity disorder (ADHD), predominantly inattentive type - Primary    Bipolar II disorder ([facility] [address] )    RANJIT (generalized anxiety disorder)    Other fatigue    Panic disorder without agoraphobia with moderate panic attacks        Reason for visit is   Chief Complaint   Patient presents with    Anxiety    ADHD    Depression    Medication Management     Encounter provider Bob Vyas PhD    Provider located at [address] [place] [address]  [address]  [place] [address]  [phone]    Recent Visits  Date Type Provider Dept   08/24/22 Telemedicine Bob Vyas PhD [facility]   Showing recent visits within past 7 days and meeting all other requirements  Future Appointments  No visits were found meeting these conditions  Showing future appointments within next 150 days and meeting all other requirements       After connecting through [employer], the patient was identified by name and date of birth  Naomi Benedict was informed that this is a telemedicine visit and that the visit is being conducted through Telephone which may not be secure and therefore, might not be HIPAA-compliant  My office door was closed  No one else was in the room  She acknowledged consent and understanding of privacy and security of the video platform  The patient has agreed to participate and understands they can discontinue the visit at any time  SUBJECTIVE:    Naomi Benedict is a 38 y o  female with a history of ADHD, RANJIT, bipolar disorder, depression  seen for medication management and mood assessment  Isabel could not connect to the [employer] and a telephone session was conducted  She reports having conflict in marriage and they are planning to sell the house  Financial strain is constant   is asking for a divorce   She is upset and sleep cycle has not been restful; however, has "caught up" on sleep yesterday  Continues to work two jobs and perform most of the tasks at home  Takes medication as prescribed  Reports depression and anxiety       HPI ROS Appetite Changes and Sleep: normal appetite and decreased energy    Review Of Systems:     Mood Anxiety, Depression and Emotional Lability   Behavior Normal    Thought Content Normal   General Relationship Problems, Emotional Problems and Sleep Disturbances   Personality Normal   Other Psych Symptoms ADHD   Constitutional As Noted in HPI   ENT As Noted in HPI   Cardiovascular As Noted in HPI   Respiratory As Noted in HPI   Gastrointestinal As Noted in HPI   Genitourinary As Noted in HPI   Musculoskeletal As Noted in HPI   Integumentary As Noted in HPI   Neurological As Noted in HPI   Endocrine Normal    Other Symptoms Normal        Substance Abuse History:    Social History     Substance and Sexual Activity   Drug Use No       Family Psychiatric History:     Family History   Problem Relation Age of Onset    No Known Problems Mother     No Known Problems Father     Bipolar disorder Sister     No Known Problems Brother     Anxiety disorder Daughter     Depression Daughter     No Known Problems Son        Social History     Socioeconomic History    Marital status: /Civil Union     Spouse name: Not on file    Number of children: 2    Years of education: Not on file    Highest education level: Not on file   Occupational History    Not on file   Tobacco Use    Smoking status: Former Smoker     Types: Cigarettes     Quit date:      Years since quittin 6    Smokeless tobacco: Never Used   Vaping Use    Vaping Use: Every day    Substances: Nicotine, Flavoring   Substance and Sexual Activity    Alcohol use: Yes     Comment: occassional - wine 2 x month    Drug use: No    Sexual activity: Yes     Partners: Male     Birth control/protection: Female Sterilization     Comment: ablation   Other Topics Concern    Not on file   Social History Narrative    Not on file     Social Determinants of Health     Financial Resource Strain: Not on file   Food Insecurity: Not on file   Transportation Needs: Not on file   Physical Activity: Not on file   Stress: Not on file   Social Connections: Not on file   Intimate Partner Violence: Not on file   Housing Stability: Not on file       Past Medical History:   Diagnosis Date    Abdominal pain     ADHD     Allergic rhinitis     Anxiety     panic attacks    Anxiety     Asthma     Bipolar disorder ([facility] [address] )     Bladder distention     came to the ED on 10/9/2020-greene in to drain then removed    Chronic constipation     Colon polyp     Contact lens/glasses fitting     and glasses    Depression     Depression     GERD (gastroesophageal reflux disease)     Psychiatric disorder     anxiety     Rectal bleeding     Sepsis ([facility] [address] ) 2/8/2017       Past Surgical History:   Procedure Laterality Date    APPENDECTOMY      BREAST SURGERY      augmentation silicone    COLONOSCOPY N/A 1/24/2018    Procedure: COLONOSCOPY WITH HEMORRHOID BANDING;  Surgeon: Leo Calderon MD;  Location: [facility] GI LAB; Service: Gastroenterology    ENDOMETRIAL ABLATION  2016    IN SIGMOIDOSCOPY FLX DX W/COLLJ SPEC BR/WA IF PFRMD N/A 11/14/2018    Procedure: FLEXIBLE SIGMOIDOSCOPY WITH APC; Surgeon: Leo Calderon MD;  Location: [facility];   Service: Gastroenterology    UPPER GASTROINTESTINAL ENDOSCOPY         Current Outpatient Medications   Medication Sig Dispense Refill    albuterol (Proventil HFA) 90 mcg/act inhaler Inhale 2 puffs every 6 (six) hours as needed for wheezing or shortness of breath 6 7 g 2    ALPRAZolam (XANAX) 1 mg tablet Take 1 tablet (1 mg total) by mouth 4 (four) times a day as needed for anxiety 120 tablet 0    amphetamine-dextroamphetamine (ADDERALL, 10MG,) 10 mg tablet Take 1 tablet (10 mg total) by mouth 2 (two) times a day Max Daily Amount: 20 mg 60 tablet 0    buPROPion (Wellbutrin XL) 150 mg 24 hr tablet Take 1 tablet (150 mg total) by mouth daily 30 tablet 3    busPIRone (BUSPAR) 10 mg tablet Take 1 tablet (10 mg total) by mouth 2 (two) times a day 60 tablet 3    fluticasone (FLONASE) 50 mcg/act nasal spray USE 2 SPRAYS IN EACH NOSTRIL DAILY 16 g 2    fluticasone-vilanterol (Breo Ellipta) 100-25 mcg/inh inhaler Inhale 1 puff daily Rinse mouth after use  (Patient not taking: Reported on 1/19/2022 ) 60 blister 2    ketoconazole (NIZORAL) 2 % shampoo       lamoTRIgine (LaMICtal) 200 MG tablet Take 1 tablet (200 mg total) by mouth daily at bedtime 30 tablet 3    mometasone (ELOCON) 0 1 % lotion       ofloxacin (OCUFLOX) 0 3 % ophthalmic solution       omeprazole (PriLOSEC) 20 mg delayed release capsule Take 1 capsule (20 mg total) by mouth daily 30 capsule 3    ondansetron (ZOFRAN) 4 mg tablet Take 1 tablet (4 mg total) by mouth every 8 (eight) hours as needed for nausea or vomiting 30 tablet 1     No current facility-administered medications for this visit          Allergies   Allergen Reactions    Doxycycline Other (See Comments)     unknown       The following portions of the patient's history were reviewed and updated as appropriate: allergies, current medications, past family history, past medical history, past social history, past surgical history and problem list     OBJECTIVE:     Mental Status Examination:    Appearance phone   Mood depressed and anxious   Affect phone   Speech a normal rate   Thought Processes normal thought processes   Hallucinations no hallucinations present    Thought Content no delusions   Abnormal Thoughts no suicidal thoughts  and no homicidal thoughts    Orientation  oriented to person and place and time   Remote Memory short term memory intact and long term memory intact   Attention Span concentration impaired   Intellect Appears to be of Average Intelligence   Insight Insight intact   Judgement judgment was intact   Muscle Strength Muscle strength and tone were normal   Language no difficulty naming common objects   Fund of Knowledge displays adequate knowledge of current events   Pain none   Pain Scale 0       Laboratory Results: No results found for this or any previous visit  Assessment/Plan:       Diagnoses and all orders for this visit:    Attention deficit hyperactivity disorder (ADHD), predominantly inattentive type    Bipolar II disorder (HCC)    RANJIT (generalized anxiety disorder)    Other fatigue    Panic disorder without agoraphobia with moderate panic attacks          Treatment Recommendations- Risks Benefits      Immediate Medical/Psychiatric/Psychotherapy Treatments and Any Precautions:  reviewed medication continue with treatment plan, discussed over extending self with two jobs and tasks at home, support provided, sleep schedule discussed and if she continues to have sleep problems to call undersigned      Risks, Benefits And Possible Side Effects Of Medications:  {PSYCH RISK, BENEFITS AND POSSIBLE SIDE EFFECTS (Optional):25032    Controlled Medication Discussion: she is aware of safe use and storage of medication     Psychotherapy Provided: 20 min  Supportive therapy  Medication evaluation  Mood assessment  Coping skills   Sleep hygiene    Goals discussed in session: improve sleep cycle, reduce tasks and allow for relaxation time     Treatment Plan:    Completed and signed during the session: Not applicable - Treatment Plan not due at this session enacted July 21, 2020, updated October 20, 2020, August 25, 2021, March 30, 2022        Kaley Abbasi, PhD 08/25/22

## 2022-09-09 DIAGNOSIS — F90.2 ATTENTION DEFICIT HYPERACTIVITY DISORDER (ADHD), COMBINED TYPE: ICD-10-CM

## 2022-09-09 DIAGNOSIS — F41.0 PANIC DISORDER WITHOUT AGORAPHOBIA WITH MODERATE PANIC ATTACKS: ICD-10-CM

## 2022-09-09 DIAGNOSIS — F41.1 GAD (GENERALIZED ANXIETY DISORDER): ICD-10-CM

## 2022-09-09 DIAGNOSIS — F32.89 OTHER DEPRESSION: ICD-10-CM

## 2022-09-09 RX ORDER — DEXTROAMPHETAMINE SACCHARATE, AMPHETAMINE ASPARTATE, DEXTROAMPHETAMINE SULFATE AND AMPHETAMINE SULFATE 2.5; 2.5; 2.5; 2.5 MG/1; MG/1; MG/1; MG/1
10 TABLET ORAL
Qty: 60 TABLET | Refills: 0 | Status: SHIPPED | OUTPATIENT
Start: 2022-09-09 | End: 2022-10-13 | Stop reason: SDUPTHER

## 2022-09-09 RX ORDER — BUPROPION HYDROCHLORIDE 150 MG/1
150 TABLET ORAL DAILY
Qty: 30 TABLET | Refills: 3 | Status: SHIPPED | OUTPATIENT
Start: 2022-09-09

## 2022-09-09 RX ORDER — BUSPIRONE HYDROCHLORIDE 10 MG/1
10 TABLET ORAL 2 TIMES DAILY
Qty: 60 TABLET | Refills: 3 | Status: SHIPPED | OUTPATIENT
Start: 2022-09-09

## 2022-09-09 RX ORDER — ALPRAZOLAM 1 MG/1
1 TABLET ORAL 4 TIMES DAILY PRN
Qty: 120 TABLET | Refills: 0 | Status: SHIPPED | OUTPATIENT
Start: 2022-09-09 | End: 2022-10-13 | Stop reason: SDUPTHER

## 2022-09-12 ENCOUNTER — OFFICE VISIT (OUTPATIENT)
Dept: FAMILY MEDICINE CLINIC | Facility: CLINIC | Age: 42
End: 2022-09-12
Payer: COMMERCIAL

## 2022-09-12 VITALS
RESPIRATION RATE: 18 BRPM | HEIGHT: 63 IN | HEART RATE: 75 BPM | DIASTOLIC BLOOD PRESSURE: 80 MMHG | WEIGHT: 125 LBS | SYSTOLIC BLOOD PRESSURE: 130 MMHG | OXYGEN SATURATION: 99 % | TEMPERATURE: 97.8 F | BODY MASS INDEX: 22.15 KG/M2

## 2022-09-12 DIAGNOSIS — J45.30 MILD PERSISTENT ASTHMA WITHOUT COMPLICATION: ICD-10-CM

## 2022-09-12 DIAGNOSIS — H60.502 ACUTE OTITIS EXTERNA OF LEFT EAR, UNSPECIFIED TYPE: Primary | ICD-10-CM

## 2022-09-12 PROCEDURE — 99213 OFFICE O/P EST LOW 20 MIN: CPT | Performed by: NURSE PRACTITIONER

## 2022-09-12 PROCEDURE — 3725F SCREEN DEPRESSION PERFORMED: CPT | Performed by: NURSE PRACTITIONER

## 2022-09-12 RX ORDER — METHYLPREDNISOLONE 4 MG/1
TABLET ORAL
Qty: 21 TABLET | Refills: 0 | Status: SHIPPED | OUTPATIENT
Start: 2022-09-12

## 2022-09-12 RX ORDER — CEPHALEXIN 500 MG/1
500 CAPSULE ORAL EVERY 12 HOURS SCHEDULED
Qty: 20 CAPSULE | Refills: 0 | Status: SHIPPED | OUTPATIENT
Start: 2022-09-12 | End: 2022-09-22

## 2022-09-12 NOTE — PATIENT INSTRUCTIONS
Take medication with food  It is important that you take the entire course of antibiotics prescribed  May also take a probiotic of your choice to maintain healthy GI gagan  Can take some probiotic and yogurt with the medication  Take prednisone with food in morning and do not take any NSAID's while taking prednisone  Supportive care discussed and advised  Advised to RTO for any worsening and no improvement  Follow up for no improvement and worsening of conditions  Patient advised and educated when to see immediate medical care  Methyl Aminolevulinate (On the skin)   Methyl Aminolevulinate (METH-il c-cwb-xrf-rup-de-AJK-ate)  Used with photodynamic therapy (light treatment) to treat skin growths on the face and scalp called actinic keratosis (AK)  Brand Name(s):   There may be other brand names for this medicine  When This Medicine Should Not Be Used: You should not use this medicine if you have had an allergic reaction to methyl aminolevulinate, porphyrins (such as porfimer, verteporfin, Photofrin®, or Visudyne®), peanut oil, or almond oil  You should not use this medicine if you have a history of skin sensitivity to sunlight (photosensitivity)  How to Use This Medicine:   Cream  Use this medicine only on your skin  Rinse it off right away if it gets on a cut or scrape  Do not get the medicine in your eyes, nose, or mouth  This medicine will be applied in a clinic or doctor's office  Drugs and Foods to Avoid:   Ask your doctor or pharmacist before using any other medicine, including over-the-counter medicines, vitamins, and herbal products  Do not put cosmetics or skin care products on the treated skin  Warnings While Using This Medicine:   Make sure your doctor knows if you are pregnant or breastfeeding, or if you have a history of bleeding problems  This medicine may make your skin more sensitive to sunlight  Wear sunscreen  Do not use sunlamps or tanning beds    Possible Side Effects While Using This Medicine:   Call your doctor right away if you notice any of these side effects: Allergic reaction: Itching or hives, swelling in your face or hands, swelling or tingling in your mouth or throat, chest tightness, trouble breathing  Severe redness, swelling, burning, or stinging of your skin  Skin bleeding, blisters, crusting, peeling, or ulcers  Swelling of your eyelid  If you notice these less serious side effects, talk with your doctor:   Mild redness, itching, pain, warmth, tightness, or darkening of your skin  If you notice other side effects that you think are caused by this medicine, tell your doctor  Call your doctor for medical advice about side effects  You may report side effects to FDA at 7-226-FDA-5193    © Copyright [employer] 2022 Information is for End User's use only and may not be sold, redistributed or otherwise used for commercial purposes  The above information is an  only  It is not intended as medical advice for individual conditions or treatments  Talk to your doctor, nurse or pharmacist before following any medical regimen to see if it is safe and effective for you  Cephalexin (By mouth)   Cephalexin (ayg-h-FXW-in)  Treats infections  This medicine is a cephalosporin antibiotic  Brand Name(s): Keflex   There may be other brand names for this medicine  When This Medicine Should Not Be Used: This medicine is not right for everyone  Do not use this medicine if you had an allergic reaction to cephalexin or another cephalosporin medicine  How to Use This Medicine:   Capsule, Tablet, Tablet for Suspension, Liquid  Your doctor will tell you how much medicine to use  Do not use more than directed  Read and follow the patient instructions that come with this medicine  Talk to your doctor or pharmacist if you have any questions  You may take your medicine with food or milk to avoid stomach upset  Oral liquid: Shake well just before use   Measure the oral liquid medicine with a marked measuring spoon, oral syringe, or medicine cup  Take all of the medicine in your prescription to clear up your infection, even if you feel better after the first few doses  Missed dose: Take a dose as soon as you remember  If it is almost time for your next dose, wait until then and take a regular dose  Do not take extra medicine to make up for a missed dose  Capsule or tablet: Store at room temperature away from heat, moisture, and direct light  Oral liquid: Store in the refrigerator for 14 days  After 14 days, throw away any unused medicine  Do not freeze  Drugs and Foods to Avoid:   Ask your doctor or pharmacist before using any other medicine, including over-the-counter medicines, vitamins, and herbal products  Some medicines and foods can affect how cephalexin works  Tell your doctor if you are also using  Metformin  Probenecid  Warnings While Using This Medicine:   Tell your doctor if you are pregnant or breastfeeding, or if you have kidney disease, liver disease, or a history of digestive problems, such as colitis  Tell your doctor if you are allergic to penicillin  This medicine can cause diarrhea  Call your doctor if the diarrhea becomes severe, does not stop, or is bloody  Do not take any medicine to stop diarrhea until you have talked to your doctor  Diarrhea can occur 2 months or more after you stop taking this medicine  Tell any doctor or dentist who treats you that you are using this medicine  This medicine may affect certain medical test results  Call your doctor if your symptoms do not improve or if they get worse  Keep all medicine out of the reach of children  Never share your medicine with anyone  Possible Side Effects While Using This Medicine:   Call your doctor right away if you notice any of these side effects:   Allergic reaction: Itching or hives, swelling in your face or hands, swelling or tingling in your mouth or throat, chest tightness, trouble breathing  Blistering, peeling, red skin rash  Severe diarrhea, especially if bloody or ongoing  Severe stomach pain, vomiting  If you notice these less serious side effects, talk with your doctor:   Mild diarrhea or nausea  If you notice other side effects that you think are caused by this medicine, tell your doctor  Call your doctor for medical advice about side effects  You may report side effects to FDA at 9-498-FDA-6989    © Copyright [employer] 2022 Information is for End User's use only and may not be sold, redistributed or otherwise used for commercial purposes  The above information is an  only  It is not intended as medical advice for individual conditions or treatments  Talk to your doctor, nurse or pharmacist before following any medical regimen to see if it is safe and effective for you

## 2022-09-12 NOTE — PROGRESS NOTES
Assessment/Plan:    1  Acute otitis externa of left ear, unspecified type  -     cephalexin (KEFLEX) 500 mg capsule; Take 1 capsule (500 mg total) by mouth every 12 (twelve) hours for 10 days    2  Mild persistent asthma without complication  -     methylPREDNISolone 4 MG tablet therapy pack; Use as directed on package  -     fluticasone-vilanterol (Breo Ellipta) 100-25 mcg/inh inhaler; Inhale 1 puff daily Rinse mouth after use  Patient Instructions: Take medication with food  It is important that you take the entire course of antibiotics prescribed  May also take a probiotic of your choice to maintain healthy GI gagan  Can take some probiotic and yogurt with the medication  Take prednisone with food in morning and do not take any NSAID's while taking prednisone  Supportive care discussed and advised  Advised to RTO for any worsening and no improvement  Follow up for no improvement and worsening of conditions  Patient advised and educated when to see immediate medical care  Return if symptoms worsen or fail to improve  Future Appointments   Date Time Provider Billie Osborn   9/27/2022  2:30 PM Kari Marcos, PhD [facility]           Subjective:      Patient ID: Layla Nichols is a 38 y o  female  Chief Complaint   Patient presents with    Earache    Sore Throat     SOB last few days  [id number]         Vitals:  /80   Pulse 75   Temp 97 8 °F (36 6 °C)   Resp 18   Ht 5' 3" (1 6 m)   Wt 56 7 kg (125 lb)   SpO2 99%   BMI 22 14 kg/m²     HPI  Patient stated that noticed sore on left earlobe and got swollen as thinks had bug bite and then progressed to left ear pain inside of ear  Denies fever, chills  Has h/o asthma and ran out of her Ezzard Primer and feels sob at times from couple of days  Does vapoing and advised on cessation   Counseled to schedule mammogram and follow with gynecologist          PHQ-2/9 Depression Screening    Little interest or pleasure in doing things: 3 - nearly every day  Feeling down, depressed, or hopeless: 3 - nearly every day  Trouble falling or staying asleep, or sleeping too much: 3 - nearly every day  Feeling tired or having little energy: 2 - more than half the days  Poor appetite or overeating: 3 - nearly every day  Feeling bad about yourself - or that you are a failure or have let yourself or your family down: 0 - not at all  Trouble concentrating on things, such as reading the newspaper or watching television: 3 - nearly every day  Moving or speaking so slowly that other people could have noticed  Or the opposite - being so fidgety or restless that you have been moving around a lot more than usual: 2 - more than half the days  Thoughts that you would be better off dead, or of hurting yourself in some way: 0 - not at all  PHQ-2 Score: 6  PHQ-2 Interpretation: POSITIVE depression screen  PHQ-9 Score: 19   PHQ-9 Interpretation: Moderately severe depression        Depression Screening Follow-up Plan: Patient's depression screening was positive with a PHQ-2 score of 6  Their PHQ-9 score was 19  Continue regular follow-up with their psychologist/therapist/psychiatrist who is managing their mental health condition(s)  The following portions of the patient's history were reviewed and updated as appropriate: allergies, current medications, past family history, past medical history, past social history, past surgical history and problem list       Review of Systems   Constitutional: Negative for chills, diaphoresis, fatigue, fever and unexpected weight change  HENT: Positive for ear pain  Negative for congestion, dental problem, drooling, ear discharge, facial swelling, hearing loss, mouth sores, nosebleeds, postnasal drip, rhinorrhea, sinus pressure, sinus pain, sneezing, sore throat, tinnitus, trouble swallowing and voice change  Respiratory: Positive for shortness of breath  Negative for cough, chest tightness and wheezing  Cardiovascular: Negative  Gastrointestinal: Negative for abdominal pain, constipation, diarrhea, nausea and vomiting  Skin: Negative  Neurological: Negative for dizziness, light-headedness and headaches  Objective:    Social History     Tobacco Use   Smoking Status Former Smoker    Types: Cigarettes    Quit date:     Years since quittin 7   Smokeless Tobacco Never Used       Allergies: Allergies   Allergen Reactions    Doxycycline Other (See Comments)     unknown         Current Outpatient Medications   Medication Sig Dispense Refill    albuterol (Proventil HFA) 90 mcg/act inhaler Inhale 2 puffs every 6 (six) hours as needed for wheezing or shortness of breath 6 7 g 2    ALPRAZolam (XANAX) 1 mg tablet Take 1 tablet (1 mg total) by mouth 4 (four) times a day as needed for anxiety 120 tablet 0    amphetamine-dextroamphetamine (ADDERALL, 10MG,) 10 mg tablet Take 1 tablet (10 mg total) by mouth 2 (two) times a day Max Daily Amount: 20 mg 60 tablet 0    buPROPion (Wellbutrin XL) 150 mg 24 hr tablet Take 1 tablet (150 mg total) by mouth daily 30 tablet 3    busPIRone (BUSPAR) 10 mg tablet Take 1 tablet (10 mg total) by mouth 2 (two) times a day 60 tablet 3    cephalexin (KEFLEX) 500 mg capsule Take 1 capsule (500 mg total) by mouth every 12 (twelve) hours for 10 days 20 capsule 0    fluticasone (FLONASE) 50 mcg/act nasal spray USE 2 SPRAYS IN EACH NOSTRIL DAILY 16 g 2    fluticasone-vilanterol (Breo Ellipta) 100-25 mcg/inh inhaler Inhale 1 puff daily Rinse mouth after use   60 blister 2    ketoconazole (NIZORAL) 2 % shampoo       lamoTRIgine (LaMICtal) 200 MG tablet Take 1 tablet (200 mg total) by mouth daily at bedtime 30 tablet 3    methylPREDNISolone 4 MG tablet therapy pack Use as directed on package 21 tablet 0    mometasone (ELOCON) 0 1 % lotion  (Patient not taking: Reported on 2022)      ofloxacin (OCUFLOX) 0 3 % ophthalmic solution  (Patient not taking: Reported on 2022)      omeprazole (PriLOSEC) 20 mg delayed release capsule Take 1 capsule (20 mg total) by mouth daily (Patient not taking: Reported on 9/12/2022) 30 capsule 3    ondansetron (ZOFRAN) 4 mg tablet Take 1 tablet (4 mg total) by mouth every 8 (eight) hours as needed for nausea or vomiting (Patient not taking: Reported on 9/12/2022) 30 tablet 1     No current facility-administered medications for this visit  Physical Exam  Vitals reviewed  Constitutional:       Appearance: Normal appearance  She is well-developed  HENT:      Head: Normocephalic  Right Ear: Tympanic membrane, ear canal and external ear normal       Left Ear: Tympanic membrane, ear canal and external ear normal       Ears:        Nose: Nose normal       Right Sinus: No maxillary sinus tenderness or frontal sinus tenderness  Left Sinus: No maxillary sinus tenderness or frontal sinus tenderness  Mouth/Throat:      Mouth: No oral lesions  Pharynx: No oropharyngeal exudate or posterior oropharyngeal erythema  Cardiovascular:      Rate and Rhythm: Normal rate and regular rhythm  Heart sounds: Normal heart sounds  Pulmonary:      Effort: Pulmonary effort is normal       Breath sounds: Normal breath sounds  Musculoskeletal:         General: Normal range of motion  Cervical back: Neck supple  Lymphadenopathy:      Cervical:      Right cervical: No superficial or posterior cervical adenopathy  Left cervical: No superficial or posterior cervical adenopathy  Skin:     General: Skin is warm and dry  Neurological:      Mental Status: She is alert and oriented to person, place, and time  Psychiatric:         Behavior: Behavior normal          Thought Content:  Thought content normal          Judgment: Judgment normal                      Guyann Goodpasture, CRNP

## 2022-10-06 DIAGNOSIS — R09.82 POST-NASAL DRIP: ICD-10-CM

## 2022-10-06 RX ORDER — FLUTICASONE PROPIONATE 50 MCG
SPRAY, SUSPENSION (ML) NASAL
Qty: 9.9 ML | Refills: 2 | Status: SHIPPED | OUTPATIENT
Start: 2022-10-06

## 2022-10-11 ENCOUNTER — TELEPHONE (OUTPATIENT)
Dept: PSYCHIATRY | Facility: CLINIC | Age: 42
End: 2022-10-11

## 2022-10-11 NOTE — TELEPHONE ENCOUNTER
Medication Refill Request     Name of Medication ALPRAZolam (XANAX) 1 mg tablet  Dose/Frequency 1 mg/ 4 x daily  Quantity 120   Verified pharmacy   []  Verified ordering Provider   []  Does patient have enough for the next 3 days? Yes [] No [x]  Does patient have a follow-up appointment scheduled? Yes [x] No []   If so when is appointment: 10/28/22  Medication Refill Request     Name of Medication Adderall  Dose/Frequency 10 mg/1 tab 2x a day  Quantity 60  Verified pharmacy   [x]  Verified ordering Provider   [x]  Does patient have enough for the next 3 days? Yes [] No [x]  Does patient have a follow-up appointment scheduled? Yes [x] No []   If so when is appointment: 10/28/22  Medication Refill Request     Name of Medication busPIRone (BUSPAR)   Dose/Frequency 10 mg/ 1 tab 2x day   Quantity 60  Verified pharmacy   [x]  Verified ordering Provider   [x]  Does patient have enough for the next 3 days? Yes [] No [x]  Does patient have a follow-up appointment scheduled?  Yes [x] No []   If so when is appointment: 10/28/22

## 2022-10-13 DIAGNOSIS — F31.81 BIPOLAR II DISORDER (HCC): ICD-10-CM

## 2022-10-13 DIAGNOSIS — F90.2 ATTENTION DEFICIT HYPERACTIVITY DISORDER (ADHD), COMBINED TYPE: ICD-10-CM

## 2022-10-13 DIAGNOSIS — F41.1 GAD (GENERALIZED ANXIETY DISORDER): ICD-10-CM

## 2022-10-13 RX ORDER — ALPRAZOLAM 1 MG/1
1 TABLET ORAL 4 TIMES DAILY PRN
Qty: 120 TABLET | Refills: 0 | Status: SHIPPED | OUTPATIENT
Start: 2022-10-13

## 2022-10-13 RX ORDER — LAMOTRIGINE 200 MG/1
200 TABLET ORAL
Qty: 30 TABLET | Refills: 3 | Status: SHIPPED | OUTPATIENT
Start: 2022-10-13

## 2022-10-13 RX ORDER — DEXTROAMPHETAMINE SACCHARATE, AMPHETAMINE ASPARTATE, DEXTROAMPHETAMINE SULFATE AND AMPHETAMINE SULFATE 2.5; 2.5; 2.5; 2.5 MG/1; MG/1; MG/1; MG/1
10 TABLET ORAL
Qty: 60 TABLET | Refills: 0 | Status: SHIPPED | OUTPATIENT
Start: 2022-10-13

## 2022-10-13 NOTE — TELEPHONE ENCOUNTER
Medication Refill Request   Name of Medication Xanax 1 mg   Dose/Frequency 1qid prn  Quantity 120  Verified pharmacy   [x]  Verified ordering Provider   [x]  Does patient have enough for the next 3 days? Yes [] No [x]  Does patient have a follow-up appointment scheduled? Yes [x] No []   If so when is appointment:10/25/22    Medication Refill Request   Name of Medication Adderall 10 mg  Dose/Frequency 1bid (morning and afternoon)  Quantity 60  Verified pharmacy   [x]  Verified ordering Provider   [x]  Does patient have enough for the next 3 days? Yes [] No [x]  Does patient have a follow-up appointment scheduled? Yes [x] No []   If so when is appointment:10/25/22    Medication Refill Request   Name of Medication Lamictal 200 mg  Dose/Frequency 1qd hs  Quantity 30  Verified pharmacy   [x]  Verified ordering Provider   [x]  Does patient have enough for the next 3 days? Yes [] No [x]  Does patient have a follow-up appointment scheduled?  Yes [x] No []   If so when is appointment: 10/25/22

## 2022-10-16 ENCOUNTER — OFFICE VISIT (OUTPATIENT)
Dept: URGENT CARE | Facility: CLINIC | Age: 42
End: 2022-10-16
Payer: COMMERCIAL

## 2022-10-16 VITALS
OXYGEN SATURATION: 99 % | TEMPERATURE: 97.8 F | BODY MASS INDEX: 24.1 KG/M2 | WEIGHT: 136 LBS | HEIGHT: 63 IN | DIASTOLIC BLOOD PRESSURE: 79 MMHG | HEART RATE: 77 BPM | RESPIRATION RATE: 16 BRPM | SYSTOLIC BLOOD PRESSURE: 144 MMHG

## 2022-10-16 DIAGNOSIS — U07.1 COVID-19: Primary | ICD-10-CM

## 2022-10-16 PROCEDURE — 99213 OFFICE O/P EST LOW 20 MIN: CPT | Performed by: PHYSICIAN ASSISTANT

## 2022-10-16 NOTE — LETTER
[facility] [address] [address] [phone]  Dept: [phone]    October 16, 2022    Patient: Wilmar Meza  YOB: 1980    Wilmar Meza was seen and evaluated at our [facility]  She is Covid positive, and may return to work on 10/20/2022, as this is 5 days from the onset of symptoms  Upon return, they must then adhere to strict masking for an additional 5 days      Sincerely,    Elvin Braun PA-C

## 2022-10-16 NOTE — PROGRESS NOTES
[facility]        NAME: Sonal Sotelo is a 38 y o  female  : 1980    MRN: [id number]  DATE: 2022  TIME: 9:36 AM    Assessment and Plan   COVID-19 [U07 1]  1  COVID-19       Patient Instructions   Acute viral illness, COVID  Self isolation x 5 days from symptom start and 5 days masking as long as major symptoms improved and fever free for 24 hrs without medication  Recommend mucinex for cough/congestion  Can also use flonase nasal spray  Also recommend daily zinc, vitamin D and a multivitamin  Recommend check in with PCP to make aware  Rest, fluids and supportive care  May benefit from a cool mist humidifier on night stand  Tylenol/ibuprofen as needed for pain/fever  Any worsening of symptoms or respiratory distress go to ER    Follow up with PCP in 3-5 days  Proceed to  ER if symptoms worsen  Follow up with PCP in 3-5 days  Proceed to  ER if symptoms worsen  Chief Complaint     Chief Complaint   Patient presents with   • URI     Pt presents with worsening URI s/s with recent positive COVID 19 result  History of Present Illness       Liu Bustamante is a 67-year-old female who presents to clinic complaining of cough and shortness of breath x2 days  She states yesterday she started with sore throat, left ear pain, cough, nasal congestion, rhinorrhea  Then today she noticed increasing shortness of breath  She tested for COVID at home yesterday and was positive  She denies any fever, chills, sinus fever pressure, nausea, vomiting, diarrhea, loss of taste or smell, recent travel, or exposure to anyone known COVID positive  Review of Systems   Review of Systems   Constitutional: Positive for fatigue  Negative for chills and fever  HENT: Positive for congestion, ear pain, rhinorrhea and sore throat  Negative for sinus pressure and sinus pain  Respiratory: Positive for cough and shortness of breath  Gastrointestinal: Positive for nausea   Negative for diarrhea and vomiting  Musculoskeletal: Negative for myalgias  Neurological: Negative for light-headedness           Current Medications       Current Outpatient Medications:   •  albuterol (Proventil HFA) 90 mcg/act inhaler, Inhale 2 puffs every 6 (six) hours as needed for wheezing or shortness of breath, Disp: 6 7 g, Rfl: 2  •  ALPRAZolam (XANAX) 1 mg tablet, Take 1 tablet (1 mg total) by mouth 4 (four) times a day as needed for anxiety, Disp: 120 tablet, Rfl: 0  •  amphetamine-dextroamphetamine (ADDERALL, 10MG,) 10 mg tablet, Take 1 tablet (10 mg total) by mouth 2 (two) times a day Max Daily Amount: 20 mg, Disp: 60 tablet, Rfl: 0  •  buPROPion (Wellbutrin XL) 150 mg 24 hr tablet, Take 1 tablet (150 mg total) by mouth daily, Disp: 30 tablet, Rfl: 3  •  busPIRone (BUSPAR) 10 mg tablet, Take 1 tablet (10 mg total) by mouth 2 (two) times a day, Disp: 60 tablet, Rfl: 3  •  fluticasone (FLONASE) 50 mcg/act nasal spray, USE 2 SPRAYS IN EACH NOSTRIL DAILY, Disp: 9 9 mL, Rfl: 2  •  fluticasone-vilanterol (Breo Ellipta) 100-25 mcg/inh inhaler, Inhale 1 puff daily Rinse mouth after use , Disp: 60 blister, Rfl: 2  •  ketoconazole (NIZORAL) 2 % shampoo, , Disp: , Rfl:   •  lamoTRIgine (LaMICtal) 200 MG tablet, Take 1 tablet (200 mg total) by mouth daily at bedtime, Disp: 30 tablet, Rfl: 3  •  methylPREDNISolone 4 MG tablet therapy pack, Use as directed on package, Disp: 21 tablet, Rfl: 0  •  mometasone (ELOCON) 0 1 % lotion, , Disp: , Rfl:   •  ofloxacin (OCUFLOX) 0 3 % ophthalmic solution, , Disp: , Rfl:   •  omeprazole (PriLOSEC) 20 mg delayed release capsule, Take 1 capsule (20 mg total) by mouth daily (Patient not taking: Reported on 9/12/2022), Disp: 30 capsule, Rfl: 3  •  ondansetron (ZOFRAN) 4 mg tablet, Take 1 tablet (4 mg total) by mouth every 8 (eight) hours as needed for nausea or vomiting (Patient not taking: Reported on 9/12/2022), Disp: 30 tablet, Rfl: 1    Current Allergies     Allergies as of 10/16/2022 - Reviewed 10/16/2022   Allergen Reaction Noted   • Doxycycline Other (See Comments) 05/01/2019            The following portions of the patient's history were reviewed and updated as appropriate: allergies, current medications, past family history, past medical history, past social history, past surgical history and problem list      Past Medical History:   Diagnosis Date   • Abdominal pain    • ADHD    • Allergic rhinitis    • Anxiety     panic attacks   • Anxiety    • Asthma    • Bipolar disorder ([facility] [address] )    • Bladder distention     came to the ED on 10/9/2020-greene in to drain then removed   • Chronic constipation    • Colon polyp    • Contact lens/glasses fitting     and glasses   • Depression    • Depression    • GERD (gastroesophageal reflux disease)    • Psychiatric disorder     anxiety    • Rectal bleeding    • Sepsis ([facility] [address] ) 2/8/2017       Past Surgical History:   Procedure Laterality Date   • APPENDECTOMY     • BREAST SURGERY      augmentation silicone   • COLONOSCOPY N/A 1/24/2018    Procedure: COLONOSCOPY WITH HEMORRHOID BANDING;  Surgeon: Leo Calderon MD;  Location: [place] [address] GI LAB; Service: Gastroenterology   • ENDOMETRIAL ABLATION  2016   • UT SIGMOIDOSCOPY FLX DX W/COLLJ SPEC BR/WA IF PFRMD N/A 11/14/2018    Procedure: FLEXIBLE SIGMOIDOSCOPY WITH APC; Surgeon: Leo Calderon MD;  Location: [facility]; Service: Gastroenterology   • UPPER GASTROINTESTINAL ENDOSCOPY         Family History   Problem Relation Age of Onset   • No Known Problems Mother    • No Known Problems Father    • Bipolar disorder Sister    • No Known Problems Brother    • Anxiety disorder Daughter    • Depression Daughter    • No Known Problems Son          Medications have been verified  Objective   /79   Pulse 77   Temp 97 8 °F (36 6 °C)   Resp 16   Ht 5' 3" (1 6 m)   Wt 61 7 kg (136 lb)   SpO2 99%   BMI 24 09 kg/m²   No LMP recorded  Patient has had an ablation         Physical Exam     Physical Exam  Vitals and nursing note reviewed  Constitutional:       General: She is not in acute distress  Appearance: Normal appearance  She is ill-appearing  HENT:      Right Ear: Tympanic membrane, ear canal and external ear normal       Left Ear: Tympanic membrane, ear canal and external ear normal       Nose: Congestion present  Mouth/Throat:      Mouth: Mucous membranes are moist       Pharynx: Posterior oropharyngeal erythema present  No oropharyngeal exudate  Cardiovascular:      Rate and Rhythm: Normal rate and regular rhythm  Heart sounds: Normal heart sounds  Pulmonary:      Effort: Pulmonary effort is normal  No respiratory distress  Breath sounds: Normal breath sounds  No stridor  No wheezing, rhonchi or rales  Lymphadenopathy:      Cervical: No cervical adenopathy  Neurological:      Mental Status: She is alert and oriented to person, place, and time     Psychiatric:         Mood and Affect: Mood normal          Behavior: Behavior normal

## 2022-10-16 NOTE — PATIENT INSTRUCTIONS
Acute viral illness, COVID  Self isolation x 5 days from symptom start and 5 days masking as long as major symptoms improved and fever free for 24 hrs without medication  Recommend mucinex for cough/congestion  Can also use flonase nasal spray  Also recommend daily zinc, vitamin D and a multivitamin  Recommend check in with PCP to make aware  Rest, fluids and supportive care  May benefit from a cool mist humidifier on night stand  Tylenol/ibuprofen as needed for pain/fever  Any worsening of symptoms or respiratory distress go to ER    Follow up with PCP in 3-5 days  Proceed to  ER if symptoms worsen

## 2022-10-25 ENCOUNTER — OFFICE VISIT (OUTPATIENT)
Dept: URGENT CARE | Facility: CLINIC | Age: 42
End: 2022-10-25

## 2022-10-25 VITALS
WEIGHT: 133 LBS | BODY MASS INDEX: 23.57 KG/M2 | RESPIRATION RATE: 20 BRPM | OXYGEN SATURATION: 97 % | HEIGHT: 63 IN | TEMPERATURE: 97.6 F | HEART RATE: 99 BPM

## 2022-10-25 DIAGNOSIS — J01.00 ACUTE NON-RECURRENT MAXILLARY SINUSITIS: ICD-10-CM

## 2022-10-25 DIAGNOSIS — U07.1 COVID: Primary | ICD-10-CM

## 2022-10-25 RX ORDER — PREDNISONE 10 MG/1
40 TABLET ORAL DAILY
Qty: 16 TABLET | Refills: 0 | Status: SHIPPED | OUTPATIENT
Start: 2022-10-25 | End: 2022-10-29

## 2022-10-25 RX ORDER — FLUCONAZOLE 150 MG/1
150 TABLET ORAL ONCE
Qty: 2 TABLET | Refills: 0 | Status: SHIPPED | OUTPATIENT
Start: 2022-10-25 | End: 2022-10-25

## 2022-10-25 RX ORDER — BROMPHENIRAMINE MALEATE, PSEUDOEPHEDRINE HYDROCHLORIDE, AND DEXTROMETHORPHAN HYDROBROMIDE 2; 30; 10 MG/5ML; MG/5ML; MG/5ML
5 SYRUP ORAL 4 TIMES DAILY PRN
Qty: 118 ML | Refills: 0 | Status: SHIPPED | OUTPATIENT
Start: 2022-10-25

## 2022-10-25 RX ORDER — AMOXICILLIN AND CLAVULANATE POTASSIUM 875; 125 MG/1; MG/1
1 TABLET, FILM COATED ORAL EVERY 12 HOURS SCHEDULED
Qty: 14 TABLET | Refills: 0 | Status: SHIPPED | OUTPATIENT
Start: 2022-10-25 | End: 2022-11-01

## 2022-10-25 NOTE — LETTER
October 25, 2022     Patient: Phu Laird  YOB: 1980  Date of Visit: 10/25/2022      To Whom it May Concern:    Arletha Leyden is under my professional care  Adan Templeton was seen in my office on 10/25/2022  Adan Templeton may return to work on either 10/28 or 10/31 depending on how she feels and if she is 24 hours fever free  If you have any questions or concerns, please don't hesitate to call           Sincerely,          Tabatha Salgado PA-C        CC: No Recipients

## 2022-10-25 NOTE — PROGRESS NOTES
[facility]        NAME: Hayde Naylor is a 38 y o  female  : 1980    MRN: [id number]  DATE: 2022  TIME: 10:03 AM    Assessment and Plan   COVID [U07 1]  1  COVID  brompheniramine-pseudoephedrine-DM 30-2-10 MG/5ML syrup   2  Acute non-recurrent maxillary sinusitis  amoxicillin-clavulanate (AUGMENTIN) 875-125 mg per tablet    predniSONE 10 mg tablet         Patient Instructions   Patient Instructions   Begin the steroid and antibotic   Use the bromfed as needed           Follow up with PCP in 3-5 days  Proceed to  ER if symptoms worsen  Chief Complaint     Chief Complaint   Patient presents with   • COVID-19     Covid positive 10/16  symptoms have increased since then has cough non productive, nasal congestion, back pain, chills and sweating  trouble breathing  History of Present Illness        The pt is a 41-year old female presenting to the office with cough, congestion, chills and difficulty breathing x 8 days  She was seen on 10/16 and tested positive for COVID 19, but is still not feeling well and has been unable to work  Her worst symptom is the congestion and sinus pain  Review of Systems   Review of Systems   Constitutional: Positive for chills and fatigue  Negative for activity change, appetite change, diaphoresis and fever  HENT: Positive for ear pain, rhinorrhea, sinus pressure and voice change  Negative for congestion, ear discharge, hearing loss, sinus pain and sore throat  Eyes: Negative for pain, discharge and itching  Respiratory: Positive for cough and shortness of breath  Negative for chest tightness  Cardiovascular: Negative for chest pain and palpitations  Gastrointestinal: Negative for diarrhea, nausea and vomiting  Musculoskeletal: Negative for arthralgias and myalgias  Skin: Negative for color change and pallor  Neurological: Negative for headaches           Current Medications       Current Outpatient Medications:   •  albuterol (Proventil HFA) 90 mcg/act inhaler, Inhale 2 puffs every 6 (six) hours as needed for wheezing or shortness of breath, Disp: 6 7 g, Rfl: 2  •  ALPRAZolam (XANAX) 1 mg tablet, Take 1 tablet (1 mg total) by mouth 4 (four) times a day as needed for anxiety, Disp: 120 tablet, Rfl: 0  •  amoxicillin-clavulanate (AUGMENTIN) 875-125 mg per tablet, Take 1 tablet by mouth every 12 (twelve) hours for 7 days, Disp: 14 tablet, Rfl: 0  •  amphetamine-dextroamphetamine (ADDERALL, 10MG,) 10 mg tablet, Take 1 tablet (10 mg total) by mouth 2 (two) times a day Max Daily Amount: 20 mg, Disp: 60 tablet, Rfl: 0  •  brompheniramine-pseudoephedrine-DM 30-2-10 MG/5ML syrup, Take 5 mL by mouth 4 (four) times a day as needed for allergies, Disp: 118 mL, Rfl: 0  •  buPROPion (Wellbutrin XL) 150 mg 24 hr tablet, Take 1 tablet (150 mg total) by mouth daily, Disp: 30 tablet, Rfl: 3  •  busPIRone (BUSPAR) 10 mg tablet, Take 1 tablet (10 mg total) by mouth 2 (two) times a day, Disp: 60 tablet, Rfl: 3  •  fluticasone (FLONASE) 50 mcg/act nasal spray, USE 2 SPRAYS IN EACH NOSTRIL DAILY, Disp: 9 9 mL, Rfl: 2  •  ketoconazole (NIZORAL) 2 % shampoo, , Disp: , Rfl:   •  lamoTRIgine (LaMICtal) 200 MG tablet, Take 1 tablet (200 mg total) by mouth daily at bedtime, Disp: 30 tablet, Rfl: 3  •  predniSONE 10 mg tablet, Take 4 tablets (40 mg total) by mouth daily for 4 days, Disp: 16 tablet, Rfl: 0  •  fluticasone-vilanterol (Breo Ellipta) 100-25 mcg/inh inhaler, Inhale 1 puff daily Rinse mouth after use , Disp: 60 blister, Rfl: 2  •  methylPREDNISolone 4 MG tablet therapy pack, Use as directed on package (Patient not taking: Reported on 10/25/2022), Disp: 21 tablet, Rfl: 0  •  mometasone (ELOCON) 0 1 % lotion, , Disp: , Rfl:   •  ofloxacin (OCUFLOX) 0 3 % ophthalmic solution, , Disp: , Rfl:   •  omeprazole (PriLOSEC) 20 mg delayed release capsule, Take 1 capsule (20 mg total) by mouth daily (Patient not taking: Reported on 9/12/2022), Disp: 30 capsule, Rfl: 3  •  ondansetron (ZOFRAN) 4 mg tablet, Take 1 tablet (4 mg total) by mouth every 8 (eight) hours as needed for nausea or vomiting (Patient not taking: Reported on 9/12/2022), Disp: 30 tablet, Rfl: 1    Current Allergies     Allergies as of 10/25/2022 - Reviewed 10/25/2022   Allergen Reaction Noted   • Doxycycline Other (See Comments) 05/01/2019            The following portions of the patient's history were reviewed and updated as appropriate: allergies, current medications, past family history, past medical history, past social history, past surgical history and problem list      Past Medical History:   Diagnosis Date   • Abdominal pain    • ADHD    • Allergic rhinitis    • Anxiety     panic attacks   • Anxiety    • Asthma    • Bipolar disorder ([facility] [address] )    • Bladder distention     came to the ED on 10/9/2020-greene in to drain then removed   • Chronic constipation    • Colon polyp    • Contact lens/glasses fitting     and glasses   • Depression    • Depression    • GERD (gastroesophageal reflux disease)    • Psychiatric disorder     anxiety    • Rectal bleeding    • Sepsis ([facility] [address] ) 2/8/2017       Past Surgical History:   Procedure Laterality Date   • APPENDECTOMY     • BREAST SURGERY      augmentation silicone   • COLONOSCOPY N/A 1/24/2018    Procedure: COLONOSCOPY WITH HEMORRHOID BANDING;  Surgeon: Olivia Menezes MD;  Location: [facility]; Service: Gastroenterology   • ENDOMETRIAL ABLATION  2016   • SD SIGMOIDOSCOPY FLX DX W/COLLJ SPEC BR/WA IF PFRMD N/A 11/14/2018    Procedure: FLEXIBLE SIGMOIDOSCOPY WITH APC; Surgeon: Olivia Menezes MD;  Location: [facility];   Service: Gastroenterology   • UPPER GASTROINTESTINAL ENDOSCOPY         Family History   Problem Relation Age of Onset   • No Known Problems Mother    • No Known Problems Father    • Bipolar disorder Sister    • No Known Problems Brother    • Anxiety disorder Daughter    • Depression Daughter    • No Known Problems Son          Medications have been verified  Objective   Pulse 99   Temp 97 6 °F (36 4 °C)   Resp 20   Ht 5' 3" (1 6 m)   Wt 60 3 kg (133 lb)   SpO2 97%   BMI 23 56 kg/m²        Physical Exam     Physical Exam  Vitals and nursing note reviewed  Constitutional:       General: She is not in acute distress  Appearance: Normal appearance  She is well-developed and normal weight  She is not ill-appearing, toxic-appearing or diaphoretic  HENT:      Head: Normocephalic and atraumatic  Right Ear: Tympanic membrane, ear canal and external ear normal  No drainage, swelling or tenderness  No middle ear effusion  There is no impacted cerumen  Tympanic membrane is not erythematous  Left Ear: Tympanic membrane, ear canal and external ear normal  No drainage, swelling or tenderness  No middle ear effusion  There is no impacted cerumen  Tympanic membrane is not erythematous  Nose: Rhinorrhea present  No congestion  Mouth/Throat:      Mouth: Mucous membranes are moist  No oral lesions  Pharynx: Oropharynx is clear  Uvula midline  Posterior oropharyngeal erythema present  No pharyngeal swelling, oropharyngeal exudate or uvula swelling  Tonsils: No tonsillar exudate or tonsillar abscesses  0 on the right  0 on the left  Eyes:      Extraocular Movements:      Right eye: Normal extraocular motion  Left eye: Normal extraocular motion  Conjunctiva/sclera: Conjunctivae normal       Pupils: Pupils are equal, round, and reactive to light  Cardiovascular:      Rate and Rhythm: Normal rate and regular rhythm  Heart sounds: Normal heart sounds  No murmur heard  No friction rub  No gallop  Pulmonary:      Effort: Pulmonary effort is normal  No respiratory distress  Breath sounds: Normal breath sounds  No stridor  No wheezing, rhonchi or rales  Chest:      Chest wall: No tenderness  Musculoskeletal:         General: Normal range of motion  Skin:     General: Skin is warm and dry        Capillary Refill: Capillary refill takes less than 2 seconds  Neurological:      Mental Status: She is alert

## 2022-11-09 ENCOUNTER — OFFICE VISIT (OUTPATIENT)
Dept: FAMILY MEDICINE CLINIC | Facility: CLINIC | Age: 42
End: 2022-11-09

## 2022-11-09 VITALS
SYSTOLIC BLOOD PRESSURE: 122 MMHG | HEIGHT: 63 IN | WEIGHT: 136 LBS | HEART RATE: 89 BPM | TEMPERATURE: 98.2 F | RESPIRATION RATE: 20 BRPM | DIASTOLIC BLOOD PRESSURE: 80 MMHG | OXYGEN SATURATION: 99 % | BODY MASS INDEX: 24.1 KG/M2

## 2022-11-09 DIAGNOSIS — J01.90 ACUTE SINUSITIS, RECURRENCE NOT SPECIFIED, UNSPECIFIED LOCATION: Primary | ICD-10-CM

## 2022-11-09 DIAGNOSIS — R06.02 SHORTNESS OF BREATH: ICD-10-CM

## 2022-11-09 DIAGNOSIS — J45.30 MILD PERSISTENT ASTHMA WITHOUT COMPLICATION: ICD-10-CM

## 2022-11-09 DIAGNOSIS — Z12.31 ENCOUNTER FOR SCREENING MAMMOGRAM FOR MALIGNANT NEOPLASM OF BREAST: ICD-10-CM

## 2022-11-09 DIAGNOSIS — N89.8 VAGINAL ITCHING: ICD-10-CM

## 2022-11-09 RX ORDER — FLUCONAZOLE 150 MG/1
150 TABLET ORAL ONCE
Qty: 1 TABLET | Refills: 0 | Status: SHIPPED | OUTPATIENT
Start: 2022-11-09 | End: 2022-11-09

## 2022-11-09 RX ORDER — BENZONATATE 200 MG/1
200 CAPSULE ORAL 3 TIMES DAILY PRN
Qty: 20 CAPSULE | Refills: 0 | Status: SHIPPED | OUTPATIENT
Start: 2022-11-09

## 2022-11-09 RX ORDER — ALBUTEROL SULFATE 90 UG/1
2 AEROSOL, METERED RESPIRATORY (INHALATION) EVERY 6 HOURS PRN
Qty: 6.7 G | Refills: 2 | Status: SHIPPED | OUTPATIENT
Start: 2022-11-09

## 2022-11-09 RX ORDER — AZITHROMYCIN 250 MG/1
TABLET, FILM COATED ORAL
Qty: 6 TABLET | Refills: 0 | Status: SHIPPED | OUTPATIENT
Start: 2022-11-09 | End: 2022-11-14

## 2022-11-09 RX ORDER — PREDNISONE 20 MG/1
40 TABLET ORAL DAILY
Qty: 10 TABLET | Refills: 0 | Status: SHIPPED | OUTPATIENT
Start: 2022-11-09 | End: 2022-11-14

## 2022-11-09 NOTE — PATIENT INSTRUCTIONS
Prednisone (By mouth)   Prednisone (PRED-ni-sone)  Treats many diseases and conditions, especially problems related to inflammation  This medicine is a corticosteroid  Brand Name(s): Man, predniSONE Intensol   There may be other brand names for this medicine  When This Medicine Should Not Be Used: This medicine is not right for everyone  Do not use if you had an allergic reaction to prednisone or if you are pregnant  How to Use This Medicine:   Liquid, Tablet, Delayed Release Tablet  Take your medicine as directed  Your dose may need to be changed several times to find what works best for you  It is best to take this medicine with food or milk  Swallow the delayed-release tablet whole  Do not crush, break, or chew it  Measure the oral liquid medicine with a marked measuring spoon, oral syringe, or medicine cup  Missed dose: Take a dose as soon as you remember  If it is almost time for your next dose, wait until then and take a regular dose  Do not take extra medicine to make up for a missed dose  Store the medicine in a closed container at room temperature, away from heat, moisture, and direct light  Do not freeze the oral liquid  Drugs and Foods to Avoid:   Ask your doctor or pharmacist before using any other medicine, including over-the-counter medicines, vitamins, and herbal products  Tell your doctor if you use any of the following:  Aminoglutethimide, amphotericin B, carbamazepine, cholestyramine, cyclosporine, digoxin, isoniazid, ketoconazole, phenobarbital, phenytoin, or rifampin  Blood thinner, such as warfarin  NSAID pain or arthritis medicine, such as aspirin, diclofenac, ibuprofen, naproxen, celecoxib  Diuretic (water pill)  Diabetes medicine  Macrolide antibiotic, such as azithromycin, clarithromycin, erythromycin  Estrogen, including birth control pills or hormone replacement therapy  This medicine may interfere with vaccines   Ask your doctor before you get a flu shot or any other vaccines  Warnings While Using This Medicine: It is not safe to take this medicine during pregnancy  It could harm an unborn baby  Tell your doctor right away if you become pregnant  Tell your doctor if you are breastfeeding or if you have kidney problems, heart failure, high blood pressure, a recent heart attack, diabetes, glaucoma, osteoporosis, or thyroid problems  Tell your doctor about any infection you have  Also tell your doctor if you have had mental or emotional problems (such as depression) or stomach or bowel problems (such as an ulcer or diverticulitis)  This medicine may cause the following problems:  Mood or behavior changes  Higher blood pressure, retaining water, changes in salt or potassium levels in your body  Cataracts or glaucoma (with long-term use)  Weak bones or osteoporosis (with long-term use)  Slow growth in children (with long-term use)  Muscle problems (with high doses, especially if you have myasthenia gravis or similar nerve and muscle problems)  Do not stop using this medicine suddenly  Your doctor will need to slowly decrease your dose before you stop it completely  This medicine could cause you to get infections more easily  Tell your doctor right away if you are exposed to chicken pox, measles, or other serious infection  Tell your doctor if you had a serious infection in the past, such as tuberculosis or herpes  Tell your doctor about any extra stress or anxiety in your life  Your dose might need to be changed for a short time  Tell any doctor or dentist who treats you that you are using this medicine  This medicine may affect certain medical test results  Keep all medicine out of the reach of children  Never share your medicine with anyone  Possible Side Effects While Using This Medicine:   Call your doctor right away if you notice any of these side effects:   Allergic reaction: Itching or hives, swelling in your face or hands, swelling or tingling in your mouth or throat, chest tightness, trouble breathing  Dark freckles, skin color changes, coldness, weakness, tiredness, nausea, vomiting, weight loss  Depression, unusual thoughts, feelings, or behaviors, trouble sleeping  Fever, chills, cough, sore throat, and body aches  Muscle pain or weakness  Rapid weight gain, swelling in your hands, ankles, or feet  Severe stomach pain, nausea, vomiting, or red or black stools  Skin changes or growths  Trouble seeing, eye pain, headache  If you notice these less serious side effects, talk with your doctor: Increased appetite  Round, puffy face  Weight gain around your neck, upper back, breast, face, or waist  If you notice other side effects that you think are caused by this medicine, tell your doctor  Call your doctor for medical advice about side effects  You may report side effects to FDA at 7-544-FDA-2359    © Copyright [place] 2022 Information is for End User's use only and may not be sold, redistributed or otherwise used for commercial purposes  The above information is an  only  It is not intended as medical advice for individual conditions or treatments  Talk to your doctor, nurse or pharmacist before following any medical regimen to see if it is safe and effective for you  Azithromycin (By mouth)   Azithromycin ([id number])  Treats infections  This medicine is a macrolide antibiotic  Brand Name(s): Zithromax, Zithromax Tri-Robinson, Zithromax Z-Robinson, Zmax   There may be other brand names for this medicine  When This Medicine Should Not Be Used: This medicine is not right for everyone  Do not use it if you had an allergic reaction to azithromycin, erythromycin, or similar medicines, or if you have a history of liver problems caused by azithromycin  How to Use This Medicine:   Capsule, Liquid, Packet, Powder, Tablet  Your doctor will tell you how much medicine to use  Do not use more than directed    Take all of the medicine in your prescription to clear up your infection, even if you feel better after the first few doses  Multiple dose (Zithromax® oral liquid or tablets): You may take this medicine with or without food  Shake the bottle well before you measure the medicine  Measure the oral liquid medicine with a marked measuring spoon, oral syringe, or medicine cup  Single dose (Zmax® extended-release oral liquid or powder):   Liquid:   Take this medicine on an empty stomach at least 1 hour before you eat, or 2 hours after you eat  Call your doctor right away if you vomit within 1 hour after you take the medicine  You must take the liquid within 12 hours after the pharmacist gives it to you  Shake the bottle well before you measure the medicine  Measure your dose with a marked measuring spoon, cup, or syringe  Powder:   Open 1 packet and pour all of the medicine into a glass with about 2 ounces (¼ cup) of water  Mix well and drink the medicine right away  Pour another 2 ounces of water into the same glass, and drink the remaining medicine  Read and follow the patient instructions that come with this medicine  Talk to your doctor or pharmacist if you have any questions  Missed dose: If you are taking multiple doses, take the dose as soon as you remember  If it is almost time for your next dose, wait until then to take a regular dose  Do not use extra medicine to make up for a missed dose  Store the medicine in a closed container at room temperature, away from heat, moisture, and direct light  Extended-release oral liquid: Do not refrigerate or freeze  Oral liquid for 1 dose only: Store at room temperature  Do not store in the refrigerator or allow the medicine to freeze  Oral liquid for multiple doses: Store at room temperature or in the refrigerator  Use it within 10 days of filling the prescription    Drugs and Foods to Avoid:   Ask your doctor or pharmacist before using any other medicine, including over-the-counter medicines, vitamins, and herbal products  Some medicines can affect how azithromycin works  Tell your doctor if you are also using any of the following:  Colchicine, cyclosporine, digoxin, nelfinavir, phenytoin  Blood thinner (including warfarin)  Ergot medicine  Medicine for a heart rhythm problem (including amiodarone, dofetilide, procainamide, quinidine, sotalol)  Zithromax® for multiple doses: Do not take an antacid that contains magnesium or aluminum at the same time you take Zithromax®  An antacid will affect how the medicine works  Antacids will not affect Zmax® for single dose  Warnings While Using This Medicine:   Tell your doctor if you are pregnant or breastfeeding, or if you have kidney disease, liver disease, heart disease, heart rhythm problems, heart failure, or myasthenia gravis  Tell your doctor if anyone in your family has heart rhythm problems  This medicine may cause the following problems:   Serious skin reactions, including Hargrove-Jovanny syndrome, acute generalized exanthematous pustulosis, toxic epidermal necrolysis, and drug reaction with eosinophilia and systemic symptoms (DRESS)  Liver problems  Infantile hypertrophic pyloric stenosis  Heart rhythm problems, including QT prolongation  Increased risk of serious heart or blood vessel problems  This medicine can cause diarrhea  Call your doctor if the diarrhea becomes severe, does not stop, or is bloody  Do not take any medicine to stop diarrhea until you have talked to your doctor  Diarrhea can occur 2 months or more after you stop taking this medicine  It may occur 2 months or more after you stop using this medicine  Call your doctor if your symptoms do not improve or if they get worse  Your doctor will do lab tests at regular visits to check on the effects of this medicine  Keep all appointments  Keep all medicine out of the reach of children  Never share your medicine with anyone    Possible Side Effects While Using This Medicine:   Call your doctor right away if you notice any of these side effects: Allergic reaction: Itching or hives, swelling in your face or hands, swelling or tingling in your mouth or throat, chest tightness, trouble breathing  Blistering, peeling, red skin rash  Dark urine, pale stools, nausea, vomiting, loss of appetite, stomach pain, yellow skin or eyes  Fainting, dizziness, lightheadedness  Fast, pounding, or uneven heartbeat, blurred vision, chest pain, trouble breathing, tiredness or weakness  Feeling irritable or vomits after feeding (in babies)  Severe diarrhea that may contain blood, stomach cramps, fever  If you notice these less serious side effects, talk with your doctor:   Mild diarrhea, nausea, vomiting, stomach pain  If you notice other side effects that you think are caused by this medicine, tell your doctor  Call your doctor for medical advice about side effects  You may report side effects to FDA at 1-604-FDA-3936    © Copyright [place] [facility] 2022 Information is for End User's use only and may not be sold, redistributed or otherwise used for commercial purposes  The above information is an  only  It is not intended as medical advice for individual conditions or treatments  Talk to your doctor, nurse or pharmacist before following any medical regimen to see if it is safe and effective for you

## 2022-11-09 NOTE — PROGRESS NOTES
Assessment/Plan:    1  Acute sinusitis, recurrence not specified, unspecified location  -     predniSONE 20 mg tablet; Take 2 tablets (40 mg total) by mouth daily for 5 days  -     azithromycin (ZITHROMAX) 250 mg tablet; Take 500mg on day 1, 250mg on days 2-5  -     benzonatate (TESSALON) 200 MG capsule; Take 1 capsule (200 mg total) by mouth 3 (three) times a day as needed for cough    2  Vaginal itching  -     fluconazole (DIFLUCAN) 150 mg tablet; Take 1 tablet (150 mg total) by mouth once for 1 dose    3  Shortness of breath  -     albuterol (Proventil HFA) 90 mcg/act inhaler; Inhale 2 puffs every 6 (six) hours as needed for wheezing or shortness of breath    4  Mild persistent asthma without complication  -     albuterol (Proventil HFA) 90 mcg/act inhaler; Inhale 2 puffs every 6 (six) hours as needed for wheezing or shortness of breath    5  Encounter for screening mammogram for malignant neoplasm of breast  -     Mammo screening bilateral w 3d & cad; Future; Expected date: 11/09/2022          Patient Instructions: Take medication with food  It is important that you take the entire course of antibiotics prescribed  May also take a probiotic of your choice to maintain healthy GI gagan  Can take some probiotic and yogurt with the medication  Take prednisone with food in morning and do not take any NSAID's while taking prednisone  Return if symptoms worsen or fail to improve  Future Appointments   Date Time Provider Billie Osborn   11/21/2022 12:30 PM Chyna Zuñiga, PhD [facility]           Subjective:      Patient ID: Elva Junior is a 38 y o  female      Chief Complaint   Patient presents with   • COVID-19     10/16/2022  positive  rmklpn   • sinus drainage   • Headache   • Fatigue   • Cough   • Sore Throat   • Shortness of Breath   • chest congestion           Vitals:  /80   Pulse 89   Temp 98 2 °F (36 8 °C)   Resp 20   Ht 5' 3" (1 6 m)   Wt 61 7 kg (136 lb)   SpO2 99%   BMI 24 09 kg/m²   Wt Readings from Last 3 Encounters:   22 61 7 kg (136 lb)   10/25/22 60 3 kg (133 lb)   10/16/22 61 7 kg (136 lb)      HPI  Patient tested positive for covid-19 on 10/16/2022 and then got worse and was seen in urgent care on 10/25/2022 and was treated with augmentin and steroid pack which helped slightly and finished treatment couple of days ago and her sinus pressure, cough and congestion is worse  Denies fever, chills and sob  Stated that her asthma might be acting up and wants another round of antibiotics and prednisone as not feeling good at all      The following portions of the patient's history were reviewed and updated as appropriate: allergies, current medications, past family history, past medical history, past social history, past surgical history and problem list       Review of Systems   Constitutional: Negative for chills, diaphoresis, fatigue, fever and unexpected weight change  HENT: Positive for congestion and sinus pressure  Negative for dental problem, drooling, ear discharge, ear pain, facial swelling, hearing loss, mouth sores, nosebleeds, postnasal drip, rhinorrhea, sinus pain, sneezing, sore throat, tinnitus, trouble swallowing and voice change  Respiratory: Positive for cough  Negative for chest tightness, shortness of breath and wheezing  Cardiovascular: Negative  Gastrointestinal: Negative for abdominal pain, constipation, diarrhea, nausea and vomiting  Musculoskeletal: Positive for back pain  Skin: Negative  Neurological: Negative for dizziness, light-headedness and headaches  Objective:    Social History     Tobacco Use   Smoking Status Former Smoker   • Types: Cigarettes   • Start date:    • Quit date:    • Years since quittin 8   Smokeless Tobacco Never Used       Allergies:    Allergies   Allergen Reactions   • Doxycycline Other (See Comments)     unknown         Current Outpatient Medications   Medication Sig Dispense Refill   • albuterol (Proventil HFA) 90 mcg/act inhaler Inhale 2 puffs every 6 (six) hours as needed for wheezing or shortness of breath 6 7 g 2   • ALPRAZolam (XANAX) 1 mg tablet Take 1 tablet (1 mg total) by mouth 4 (four) times a day as needed for anxiety 120 tablet 0   • amphetamine-dextroamphetamine (ADDERALL, 10MG,) 10 mg tablet Take 1 tablet (10 mg total) by mouth 2 (two) times a day Max Daily Amount: 20 mg 60 tablet 0   • azithromycin (ZITHROMAX) 250 mg tablet Take 500mg on day 1, 250mg on days 2-5 6 tablet 0   • benzonatate (TESSALON) 200 MG capsule Take 1 capsule (200 mg total) by mouth 3 (three) times a day as needed for cough 20 capsule 0   • buPROPion (Wellbutrin XL) 150 mg 24 hr tablet Take 1 tablet (150 mg total) by mouth daily 30 tablet 3   • busPIRone (BUSPAR) 10 mg tablet Take 1 tablet (10 mg total) by mouth 2 (two) times a day 60 tablet 3   • fluconazole (DIFLUCAN) 150 mg tablet Take 1 tablet (150 mg total) by mouth once for 1 dose 1 tablet 0   • fluticasone (FLONASE) 50 mcg/act nasal spray USE 2 SPRAYS IN EACH NOSTRIL DAILY 9 9 mL 2   • fluticasone-vilanterol (Breo Ellipta) 100-25 mcg/inh inhaler Inhale 1 puff daily Rinse mouth after use   60 blister 2   • ketoconazole (NIZORAL) 2 % shampoo      • lamoTRIgine (LaMICtal) 200 MG tablet Take 1 tablet (200 mg total) by mouth daily at bedtime 30 tablet 3   • predniSONE 20 mg tablet Take 2 tablets (40 mg total) by mouth daily for 5 days 10 tablet 0   • brompheniramine-pseudoephedrine-DM 30-2-10 MG/5ML syrup Take 5 mL by mouth 4 (four) times a day as needed for allergies (Patient not taking: Reported on 11/9/2022) 118 mL 0   • methylPREDNISolone 4 MG tablet therapy pack Use as directed on package (Patient not taking: Reported on 10/25/2022) 21 tablet 0   • mometasone (ELOCON) 0 1 % lotion  (Patient not taking: Reported on 9/12/2022)     • ofloxacin (OCUFLOX) 0 3 % ophthalmic solution  (Patient not taking: Reported on 9/12/2022)     • omeprazole (PriLOSEC) 20 mg delayed release capsule Take 1 capsule (20 mg total) by mouth daily (Patient not taking: Reported on 9/12/2022) 30 capsule 3   • ondansetron (ZOFRAN) 4 mg tablet Take 1 tablet (4 mg total) by mouth every 8 (eight) hours as needed for nausea or vomiting (Patient not taking: Reported on 9/12/2022) 30 tablet 1     No current facility-administered medications for this visit  Physical Exam  Vitals reviewed  Constitutional:       Appearance: Normal appearance  She is well-developed  HENT:      Head: Normocephalic  Right Ear: Tympanic membrane, ear canal and external ear normal       Left Ear: Tympanic membrane, ear canal and external ear normal       Nose: Mucosal edema present  Right Sinus: Maxillary sinus tenderness present  No frontal sinus tenderness  Left Sinus: Maxillary sinus tenderness present  No frontal sinus tenderness  Mouth/Throat:      Mouth: No oral lesions  Pharynx: No oropharyngeal exudate or posterior oropharyngeal erythema  Cardiovascular:      Rate and Rhythm: Normal rate and regular rhythm  Heart sounds: Normal heart sounds  Pulmonary:      Effort: Pulmonary effort is normal       Breath sounds: Normal breath sounds  Musculoskeletal:         General: Normal range of motion  Cervical back: Neck supple  Lymphadenopathy:      Cervical:      Right cervical: No superficial or posterior cervical adenopathy  Left cervical: No superficial or posterior cervical adenopathy  Skin:     General: Skin is warm and dry  Neurological:      Mental Status: She is alert and oriented to person, place, and time  Psychiatric:         Behavior: Behavior normal          Thought Content:  Thought content normal          Judgment: Judgment normal                      ROSE Ly

## 2022-11-12 DIAGNOSIS — F41.1 GAD (GENERALIZED ANXIETY DISORDER): ICD-10-CM

## 2022-11-12 DIAGNOSIS — F32.89 OTHER DEPRESSION: ICD-10-CM

## 2022-11-12 RX ORDER — BUPROPION HYDROCHLORIDE 150 MG/1
TABLET ORAL
Qty: 30 TABLET | Refills: 3 | Status: SHIPPED | OUTPATIENT
Start: 2022-11-12 | End: 2022-11-13

## 2022-11-13 DIAGNOSIS — F41.1 GAD (GENERALIZED ANXIETY DISORDER): ICD-10-CM

## 2022-11-13 DIAGNOSIS — F32.89 OTHER DEPRESSION: ICD-10-CM

## 2022-11-13 RX ORDER — BUPROPION HYDROCHLORIDE 150 MG/1
TABLET ORAL
Qty: 30 TABLET | Refills: 3 | Status: SHIPPED | OUTPATIENT
Start: 2022-11-13

## 2022-11-14 ENCOUNTER — TELEPHONE (OUTPATIENT)
Dept: FAMILY MEDICINE CLINIC | Facility: CLINIC | Age: 42
End: 2022-11-14

## 2022-11-14 DIAGNOSIS — R05.3 PERSISTENT COUGH: Primary | ICD-10-CM

## 2022-11-14 NOTE — TELEPHONE ENCOUNTER
Pt states she still does not feel well, still coughing  Been going on for weeks  Would like a call back  Offered her an appt but declined

## 2022-11-15 ENCOUNTER — TELEPHONE (OUTPATIENT)
Dept: FAMILY MEDICINE CLINIC | Facility: CLINIC | Age: 42
End: 2022-11-15

## 2022-11-15 ENCOUNTER — APPOINTMENT (OUTPATIENT)
Dept: RADIOLOGY | Facility: CLINIC | Age: 42
End: 2022-11-15

## 2022-11-15 DIAGNOSIS — R05.3 PERSISTENT COUGH: ICD-10-CM

## 2022-11-15 DIAGNOSIS — F90.2 ATTENTION DEFICIT HYPERACTIVITY DISORDER (ADHD), COMBINED TYPE: ICD-10-CM

## 2022-11-15 DIAGNOSIS — F41.1 GAD (GENERALIZED ANXIETY DISORDER): ICD-10-CM

## 2022-11-15 DIAGNOSIS — F41.0 PANIC DISORDER WITHOUT AGORAPHOBIA WITH MODERATE PANIC ATTACKS: ICD-10-CM

## 2022-11-15 RX ORDER — ALPRAZOLAM 1 MG/1
1 TABLET ORAL 4 TIMES DAILY PRN
Qty: 120 TABLET | Refills: 0 | Status: SHIPPED | OUTPATIENT
Start: 2022-11-15

## 2022-11-15 RX ORDER — DEXTROAMPHETAMINE SACCHARATE, AMPHETAMINE ASPARTATE, DEXTROAMPHETAMINE SULFATE AND AMPHETAMINE SULFATE 2.5; 2.5; 2.5; 2.5 MG/1; MG/1; MG/1; MG/1
10 TABLET ORAL
Qty: 60 TABLET | Refills: 0 | Status: SHIPPED | OUTPATIENT
Start: 2022-11-15

## 2022-11-15 RX ORDER — BUSPIRONE HYDROCHLORIDE 10 MG/1
10 TABLET ORAL 2 TIMES DAILY
Qty: 60 TABLET | Refills: 3 | Status: SHIPPED | OUTPATIENT
Start: 2022-11-15 | End: 2022-11-21

## 2022-11-15 NOTE — TELEPHONE ENCOUNTER
Please advise patient that post covid-19 could have lingering cough for couple of weeks and I ordered chest xray and she can do that to make sure she does not have pneumonia   Orma Plants

## 2022-11-15 NOTE — TELEPHONE ENCOUNTER
If it is in left upper quadrant of abdomen, could be muscular from pulling or coughing and take advil and if no improvement then needs to follow back in office as has spleen and pancreas and stomach at that area and will do further testing based on examination and location of pain   ROSE Cason

## 2022-11-15 NOTE — TELEPHONE ENCOUNTER
----- Message from [employer] sent at 11/15/2022 12:43 PM EST -----  Please let patient know that her chest xray is normal and continue with mucinex and Robitussin over the counter   Jonathan Blount

## 2022-11-15 NOTE — TELEPHONE ENCOUNTER
Patient stated that she has been having pain left upper quadrant I told her that it might be due to the excess coughing but that I will double check with Stephanie Bose Fret

## 2022-11-16 ENCOUNTER — OFFICE VISIT (OUTPATIENT)
Dept: FAMILY MEDICINE CLINIC | Facility: CLINIC | Age: 42
End: 2022-11-16

## 2022-11-16 VITALS
DIASTOLIC BLOOD PRESSURE: 74 MMHG | SYSTOLIC BLOOD PRESSURE: 116 MMHG | RESPIRATION RATE: 16 BRPM | HEART RATE: 102 BPM | BODY MASS INDEX: 23.39 KG/M2 | TEMPERATURE: 97.9 F | WEIGHT: 132 LBS | HEIGHT: 63 IN | OXYGEN SATURATION: 99 %

## 2022-11-16 DIAGNOSIS — R10.12 LEFT UPPER QUADRANT ABDOMINAL PAIN: Primary | ICD-10-CM

## 2022-11-16 DIAGNOSIS — R10.814 LEFT LOWER QUADRANT ABDOMINAL TENDERNESS WITHOUT REBOUND TENDERNESS: ICD-10-CM

## 2022-11-16 DIAGNOSIS — H10.31 ACUTE BACTERIAL CONJUNCTIVITIS OF RIGHT EYE: ICD-10-CM

## 2022-11-16 DIAGNOSIS — R53.83 OTHER FATIGUE: ICD-10-CM

## 2022-11-16 DIAGNOSIS — N89.8 VAGINAL ITCHING: ICD-10-CM

## 2022-11-16 RX ORDER — FLUCONAZOLE 150 MG/1
150 TABLET ORAL ONCE
Qty: 1 TABLET | Refills: 0 | Status: SHIPPED | OUTPATIENT
Start: 2022-11-16 | End: 2022-11-16

## 2022-11-16 RX ORDER — MOXIFLOXACIN 5 MG/ML
1 SOLUTION/ DROPS OPHTHALMIC 3 TIMES DAILY
Qty: 3 ML | Refills: 0 | Status: SHIPPED | OUTPATIENT
Start: 2022-11-16 | End: 2022-11-23

## 2022-11-16 NOTE — LETTER
November 16, 2022     Patient: Jovita Sheikh  YOB: 1980  Date of Visit: 11/16/2022      To Whom it May Concern:    Eugenia Cordoba is under my professional care  Ami Guzman was seen in my office on 11/16/2022  Patient is currently under some testing and will wait for results until she can return to work    If you have any questions or concerns, please don't hesitate to call           Sincerely,          ROSE Little        CC: No Recipients

## 2022-11-16 NOTE — PROGRESS NOTES
Assessment/Plan:    1  Left upper quadrant abdominal pain    2  Left lower quadrant abdominal tenderness without rebound tenderness  -     US abdomen complete; Future; Expected date: 11/16/2022    3  Acute bacterial conjunctivitis of right eye  -     moxifloxacin (VIGAMOX) 0 5 % ophthalmic solution; Administer 1 drop to both eyes 3 (three) times a day for 7 days    4  Other fatigue  -     TSH, 3rd generation with Free T4 reflex; Future  -     CBC and Platelet; Future  -     Comprehensive metabolic panel; Future  -     Fe+TIBC+David; Future  -     EBV acute panel; Future  -     Cytomegalovirus (CMV) Ab, IgM; Future  -     Lyme Antibody Profile with reflex to WB; Future  -     TSH, 3rd generation with Free T4 reflex  -     CBC and Platelet  -     Comprehensive metabolic panel  -     Fe+TIBC+David  -     EBV acute panel  -     Cytomegalovirus (CMV) Ab, IgM  -     Lyme Antibody Profile with reflex to WB    5  Vaginal itching  -     fluconazole (DIFLUCAN) 150 mg tablet; Take 1 tablet (150 mg total) by mouth once for 1 dose              Patient Instructions:  Supportive care discussed and advised  Advised to RTO for any worsening and no improvement  Follow up for no improvement and worsening of conditions  Patient advised and educated when to see immediate medical care  Return if symptoms worsen or fail to improve  Future Appointments   Date Time Provider Billie Osborn   11/21/2022 12:30 PM Morgan Torres, PhD [facility]           Subjective:      Patient ID: Mayra Carrillo is a 38 y o  female  Chief Complaint   Patient presents with   • Abdominal Pain     ULQ possibly from coughing  Mz cma   • Cough   • Fatigue     Extreme fatigue     • Conjunctivitis     Right eye - could not open this morning         Vitals:  /74   Pulse 102   Temp 97 9 °F (36 6 °C)   Resp 16   Ht 5' 3" (1 6 m)   Wt 59 9 kg (132 lb)   SpO2 99%   BMI 23 38 kg/m²   Wt Readings from Last 3 Encounters:   11/16/22 59 9 kg (132 lb)   11/09/22 61 7 kg (136 lb)   10/25/22 60 3 kg (133 lb)      HPI  Patient had covid-19 about a month ago and stated that still feeling very fatigue and getting sore throat and cough  Now from last couple of days having left upper quadrant abdominal pain  Chest xray recently done and was normal  Stated that now having redness and discharge from right eye  Denies fever, chills and sob  Had mono in past and worried about that and concerned about her fatigue  Lifts weights and advise to stop that  Having some vaginal discharge as gets vaginal yeast infections secondary to antibiotic use        The following portions of the patient's history were reviewed and updated as appropriate: allergies, current medications, past family history, past medical history, past social history, past surgical history and problem list       Review of Systems   Constitutional: Positive for fatigue  Negative for appetite change, chills, diaphoresis, fever and unexpected weight change  HENT: Positive for sore throat  Negative for congestion, dental problem, drooling, ear discharge, ear pain, facial swelling, hearing loss, mouth sores, nosebleeds, postnasal drip, rhinorrhea, sinus pressure, sinus pain, sneezing, tinnitus, trouble swallowing and voice change  Eyes: Positive for discharge and redness  Negative for visual disturbance  Respiratory: Negative  Negative for cough, chest tightness, shortness of breath and wheezing  Cardiovascular: Negative  Gastrointestinal: Positive for abdominal pain  Negative for anal bleeding, blood in stool, constipation, diarrhea, nausea, rectal pain and vomiting  Genitourinary: Positive for vaginal discharge  As noted in HPI     Musculoskeletal: Negative for arthralgias  Skin: Negative  Neurological: Negative for dizziness, light-headedness and headaches           Objective:    Social History     Tobacco Use   Smoking Status Former   • Types: Cigarettes   • Start date: 1995 • Quit date:    • Years since quittin 8   Smokeless Tobacco Never       Allergies: Allergies   Allergen Reactions   • Doxycycline Other (See Comments)     unknown         Current Outpatient Medications   Medication Sig Dispense Refill   • albuterol (Proventil HFA) 90 mcg/act inhaler Inhale 2 puffs every 6 (six) hours as needed for wheezing or shortness of breath 6 7 g 2   • ALPRAZolam (XANAX) 1 mg tablet Take 1 tablet (1 mg total) by mouth 4 (four) times a day as needed for anxiety 120 tablet 0   • amphetamine-dextroamphetamine (ADDERALL, 10MG,) 10 mg tablet Take 1 tablet (10 mg total) by mouth 2 (two) times a day Max Daily Amount: 20 mg 60 tablet 0   • benzonatate (TESSALON) 200 MG capsule Take 1 capsule (200 mg total) by mouth 3 (three) times a day as needed for cough 20 capsule 0   • buPROPion (WELLBUTRIN XL) 150 mg 24 hr tablet TAKE ONE TABLET BY MOUTH EVERY DAY 30 tablet 3   • busPIRone (BUSPAR) 10 mg tablet Take 1 tablet (10 mg total) by mouth 2 (two) times a day 60 tablet 3   • fluconazole (DIFLUCAN) 150 mg tablet Take 1 tablet (150 mg total) by mouth once for 1 dose 1 tablet 0   • fluticasone (FLONASE) 50 mcg/act nasal spray USE 2 SPRAYS IN EACH NOSTRIL DAILY 9 9 mL 2   • fluticasone-vilanterol (Breo Ellipta) 100-25 mcg/inh inhaler Inhale 1 puff daily Rinse mouth after use   60 blister 2   • ketoconazole (NIZORAL) 2 % shampoo      • lamoTRIgine (LaMICtal) 200 MG tablet Take 1 tablet (200 mg total) by mouth daily at bedtime 30 tablet 3   • moxifloxacin (VIGAMOX) 0 5 % ophthalmic solution Administer 1 drop to both eyes 3 (three) times a day for 7 days 3 mL 0   • brompheniramine-pseudoephedrine-DM 30-2-10 MG/5ML syrup Take 5 mL by mouth 4 (four) times a day as needed for allergies (Patient not taking: Reported on 2022) 118 mL 0   • methylPREDNISolone 4 MG tablet therapy pack Use as directed on package (Patient not taking: Reported on 10/25/2022) 21 tablet 0   • mometasone (ELOCON) 0 1 % lotion (Patient not taking: Reported on 9/12/2022)     • ofloxacin (OCUFLOX) 0 3 % ophthalmic solution  (Patient not taking: Reported on 9/12/2022)     • omeprazole (PriLOSEC) 20 mg delayed release capsule Take 1 capsule (20 mg total) by mouth daily (Patient not taking: Reported on 9/12/2022) 30 capsule 3   • ondansetron (ZOFRAN) 4 mg tablet Take 1 tablet (4 mg total) by mouth every 8 (eight) hours as needed for nausea or vomiting (Patient not taking: Reported on 9/12/2022) 30 tablet 1     No current facility-administered medications for this visit  Physical Exam  Vitals reviewed  Constitutional:       Appearance: Normal appearance  She is well-developed and well-nourished  HENT:      Head: Normocephalic  Right Ear: Tympanic membrane, ear canal and external ear normal       Left Ear: Tympanic membrane, ear canal and external ear normal       Nose: Nose normal       Right Sinus: No maxillary sinus tenderness or frontal sinus tenderness  Left Sinus: No maxillary sinus tenderness or frontal sinus tenderness  Mouth/Throat:      Mouth: No oral lesions  Pharynx: No oropharyngeal exudate or posterior oropharyngeal erythema  Eyes:      General: Lids are normal          Right eye: Discharge present  Left eye: No discharge  Conjunctiva/sclera:      Right eye: Right conjunctiva is injected  Left eye: Left conjunctiva is not injected  Cardiovascular:      Rate and Rhythm: Normal rate and regular rhythm  Heart sounds: Normal heart sounds  Pulmonary:      Effort: Pulmonary effort is normal       Breath sounds: Normal breath sounds  Abdominal:      General: Bowel sounds are normal       Palpations: Abdomen is soft  Tenderness: There is abdominal tenderness in the left upper quadrant  There is no CVA tenderness  Musculoskeletal:         General: Normal range of motion  Cervical back: Neck supple     Lymphadenopathy:      Cervical:      Right cervical: No superficial or posterior cervical adenopathy  Left cervical: No superficial or posterior cervical adenopathy  Skin:     General: Skin is warm and dry  Neurological:      Mental Status: She is alert and oriented to person, place, and time  Psychiatric:         Mood and Affect: Mood and affect normal          Behavior: Behavior normal          Thought Content:  Thought content normal          Judgment: Judgment normal                      ROSE Ruiz

## 2022-11-16 NOTE — PATIENT INSTRUCTIONS
Fatigue   WHAT YOU NEED TO KNOW:   Fatigue is mental and physical exhaustion that does not get better with rest  Fatigue may make daily activities difficult or cause extreme sleepiness  It is normal to feel tired sometimes, but long-term fatigue may be a sign of serious illness  DISCHARGE INSTRUCTIONS:   Return to the emergency department if:   You have chest pain  You have difficulty breathing  Contact your healthcare provider if:   You have a cough that gets worse, or does not go away  You see blood in your urine or bowel movement  You have numbness or tingling around your mouth or in an arm or leg  You faint, feel dizzy, or have vision changes  You have swelling in your lymph nodes  You are a woman and have vaginal bleeding that is not normal for you, or is not expected  You lose weight without trying, or you have trouble eating  You feel weak or have muscle pain  You have pain or swelling in your joints  You have questions or concerns about your condition or care  Follow up with your healthcare provider as directed: You may need more tests  Your healthcare provider may also refer you to a specialist  Write down your questions so you remember to ask them during your visits  Manage fatigue:   Keep a fatigue diary  Include anything that makes you feel more tired or less tired  Bring the diary with you to follow-up visits with your provider  Exercise as directed  Exercise can help you feel more alert  Exercise can also help you manage stress or relieve depression  Try to get at least 30 minutes of exercise most days of the week  Keep a regular sleep schedule  Go to bed and wake up at the same times every day  Limit naps to 1 hour each day  A nap can improve fatigue, but a long nap may make it harder to go to sleep at night  Plan and limit your activities  Limit the number of activities such as shopping and cleaning you do each day   If possible, try to spread out your trips throughout the week  Plan ahead so you are not rushing to get something done  Only do activities that you have the energy to complete  Take breaks between activities  Ask for help if you need it  Another person may be able to drive you or help with daily activities  Eat a variety of healthy foods  Healthy foods include fruits, vegetables, whole-grain breads, low-fat dairy products, beans, lean meats, and fish  Good nutrition can help manage fatigue  Limit caffeine and alcohol  These can make it difficult to fall or stay asleep  Women should limit alcohol to 1 drink a day  Men should limit alcohol to 2 drinks a day  A drink of alcohol is 12 ounces of beer, 5 ounces of wine, or 1½ ounces of liquor  Ask our healthcare provider how much caffeine is safe for you  Do not smoke  Nicotine and other chemicals in cigarettes and cigars can cause lung damage and increase fatigue  Ask your healthcare provider for information if you currently smoke and need help to quit  E-cigarettes or smokeless tobacco still contain nicotine  Talk to your healthcare provider before you use these products  © Copyright [facility] 2022 Information is for End User's use only and may not be sold, redistributed or otherwise used for commercial purposes  All illustrations and images included in CareNotes® are the copyrighted property of [employer]  or [facility] Ramona Owen  The above information is an  only  It is not intended as medical advice for individual conditions or treatments  Talk to your doctor, nurse or pharmacist before following any medical regimen to see if it is safe and effective for you

## 2022-11-17 LAB
ALBUMIN SERPL-MCNC: 4.4 G/DL (ref 3.8–4.8)
ALBUMIN/GLOB SERPL: 2.1 {RATIO} (ref 1.2–2.2)
ALP SERPL-CCNC: 47 IU/L (ref 44–121)
ALT SERPL-CCNC: 23 IU/L (ref 0–32)
AST SERPL-CCNC: 23 IU/L (ref 0–40)
B BURGDOR IGG+IGM SER QL IA: NEGATIVE
BILIRUB SERPL-MCNC: 0.5 MG/DL (ref 0–1.2)
BUN SERPL-MCNC: 14 MG/DL (ref 6–24)
BUN/CREAT SERPL: 14 (ref 9–23)
CALCIUM SERPL-MCNC: 9.7 MG/DL (ref 8.7–10.2)
CHLORIDE SERPL-SCNC: 102 MMOL/L (ref 96–106)
CMV IGM SERPL IA-ACNC: <30 AU/ML (ref 0–29.9)
CO2 SERPL-SCNC: 26 MMOL/L (ref 20–29)
CREAT SERPL-MCNC: 0.99 MG/DL (ref 0.57–1)
EBV NA IGG SER IA-ACNC: 100 U/ML (ref 0–17.9)
EBV VCA IGG SER IA-ACNC: 377 U/ML (ref 0–17.9)
EBV VCA IGM SER IA-ACNC: <36 U/ML (ref 0–35.9)
EGFR: 72 ML/MIN/1.73
ERYTHROCYTE [DISTWIDTH] IN BLOOD BY AUTOMATED COUNT: 11.8 % (ref 11.7–15.4)
FERRITIN SERPL-MCNC: 265 NG/ML (ref 15–150)
GLOBULIN SER-MCNC: 2.1 G/DL (ref 1.5–4.5)
GLUCOSE SERPL-MCNC: 102 MG/DL (ref 70–99)
HCT VFR BLD AUTO: 43.4 % (ref 34–46.6)
HGB BLD-MCNC: 14.8 G/DL (ref 11.1–15.9)
INTERPRETATION: ABNORMAL
IRON SATN MFR SERPL: 60 % (ref 15–55)
IRON SERPL-MCNC: 175 UG/DL (ref 27–159)
MCH RBC QN AUTO: 31.8 PG (ref 26.6–33)
MCHC RBC AUTO-ENTMCNC: 34.1 G/DL (ref 31.5–35.7)
MCV RBC AUTO: 93 FL (ref 79–97)
PLATELET # BLD AUTO: 385 X10E3/UL (ref 150–450)
POTASSIUM SERPL-SCNC: 4.6 MMOL/L (ref 3.5–5.2)
PROT SERPL-MCNC: 6.5 G/DL (ref 6–8.5)
RBC # BLD AUTO: 4.65 X10E6/UL (ref 3.77–5.28)
SODIUM SERPL-SCNC: 141 MMOL/L (ref 134–144)
TIBC SERPL-MCNC: 292 UG/DL (ref 250–450)
TSH SERPL DL<=0.005 MIU/L-ACNC: 0.59 UIU/ML (ref 0.45–4.5)
UIBC SERPL-MCNC: 117 UG/DL (ref 131–425)
WBC # BLD AUTO: 11.9 X10E3/UL (ref 3.4–10.8)

## 2022-11-18 DIAGNOSIS — R05.3 PERSISTENT COUGH: Primary | ICD-10-CM

## 2022-11-18 RX ORDER — CEFUROXIME AXETIL 500 MG/1
500 TABLET ORAL EVERY 12 HOURS SCHEDULED
Qty: 14 TABLET | Refills: 0 | Status: SHIPPED | OUTPATIENT
Start: 2022-11-18 | End: 2022-11-25

## 2022-11-21 ENCOUNTER — TELEMEDICINE (OUTPATIENT)
Dept: PSYCHIATRY | Facility: CLINIC | Age: 42
End: 2022-11-21

## 2022-11-21 DIAGNOSIS — F41.0 PANIC DISORDER WITHOUT AGORAPHOBIA WITH MODERATE PANIC ATTACKS: ICD-10-CM

## 2022-11-21 DIAGNOSIS — R53.83 OTHER FATIGUE: ICD-10-CM

## 2022-11-21 DIAGNOSIS — Z79.899 HIGH RISK MEDICATION USE: ICD-10-CM

## 2022-11-21 DIAGNOSIS — F90.0 ATTENTION DEFICIT HYPERACTIVITY DISORDER (ADHD), PREDOMINANTLY INATTENTIVE TYPE: Primary | ICD-10-CM

## 2022-11-21 DIAGNOSIS — F41.1 GAD (GENERALIZED ANXIETY DISORDER): ICD-10-CM

## 2022-11-21 DIAGNOSIS — F31.81 BIPOLAR II DISORDER (HCC): ICD-10-CM

## 2022-11-21 NOTE — PSYCH
Virtual Regular Visit    Problem List Items Addressed This Visit        Other    Attention deficit hyperactivity disorder (ADHD), predominantly inattentive type - Primary    Bipolar II disorder ([facility] [address] )    RANJIT (generalized anxiety disorder)    High risk medication use    Other fatigue    Panic disorder without agoraphobia with moderate panic attacks     Reason for visit is   Chief Complaint   Patient presents with   • Mood Swings   • Agitation   • Depression   • Medication Management     Encounter provider Ez Bryant, PhD    Provider located at [address] [place] [address]  [address]  [place] [address] [phone]    Recent Visits  Date Type Provider Dept   11/16/22 Office Visit Manoj AustinJeremy viera recent visits within past 7 days and meeting all other requirements  Today's Visits  Date Type Provider Dept   11/21/22 Telemedicine Ez Bryant, PhD  [facility]   Showing today's visits and meeting all other requirements  Future Appointments  No visits were found meeting these conditions  Showing future appointments within next 150 days and meeting all other requirements       After connecting through [place], the patient was identified by name and date of birth  Lori Murillo was informed that this is a telemedicine visit and that the visit is being conducted through the [address] Now platform  She agrees to proceed  which may not be secure and therefore, might not be HIPAA-compliant  My office door was closed  No one else was in the room  She acknowledged consent and understanding of privacy and security of the video platform  The patient has agreed to participate and understands they can discontinue the visit at any time  SUBJECTIVE:    Lori Murillo is a 38 y o  female with a history of Bipolar, anxiety, depression, OCD seen for medication management and mood assessment  Isabel reports marital problems, health problems, issues regarding focus and concentration  Appetite is fair, sleep pattern erratic  Takes medication as prescribed  Works many hours, 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 and receives little help from family      HPI ROS Appetite Changes and Sleep: decreased appetite and decreased energy    Review Of Systems:     Mood Anxiety, Depression and Emotional Lability   Behavior Compulsive Behavior cleaning   Thought Content Disturbing Thoughts, Feelings   General Relationship Problems, Emotional Problems and Sleep Disturbances   Personality Normal   Other Psych Symptoms ADHD   Constitutional As Noted in HPI   ENT As Noted in HPI   Cardiovascular As Noted in HPI   Respiratory As Noted in HPI   Gastrointestinal As Noted in HPI   Genitourinary Negative and As Noted in HPI   Musculoskeletal As Noted in HPI   Integumentary As Noted in HPI   Neurological As Noted in HPI   Endocrine Normal    Other Symptoms Normal        Substance Abuse History:    Social History     Substance and Sexual Activity   Drug Use No       Family Psychiatric History:     Family History   Problem Relation Age of Onset   • No Known Problems Mother    • No Known Problems Father    • Bipolar disorder Sister    • No Known Problems Brother    • Anxiety disorder Daughter    • Depression Daughter    • No Known Problems Son        Social History     Socioeconomic History   • Marital status: /Civil Union     Spouse name: Not on file   • Number of children: 2   • Years of education: Not on file   • Highest education level: Not on file   Occupational History   • Not on file   Tobacco Use   • Smoking status: Former     Types: Cigarettes     Start date:      Quit date:      Years since quittin 8   • Smokeless tobacco: Never   Vaping Use   • Vaping Use: Every day   • Substances: Nicotine, Flavoring   Substance and Sexual Activity   • Alcohol use: Yes     Comment: occassional - wine 2 x month   • Drug use: No   • Sexual activity: Yes     Partners: Male     Birth control/protection: Female Sterilization     Comment: ablation   Other Topics Concern   • Not on file   Social History Narrative   • Not on file     Social Determinants of Health     Financial Resource Strain: Not on file   Food Insecurity: Not on file   Transportation Needs: Not on file   Physical Activity: Not on file   Stress: Not on file   Social Connections: Not on file   Intimate Partner Violence: Not on file   Housing Stability: Not on file       Past Medical History:   Diagnosis Date   • Abdominal pain    • ADHD    • Allergic rhinitis    • Anxiety     panic attacks   • Anxiety    • Asthma    • Bipolar disorder ([facility] [address] )    • Bladder distention     came to the ED on 10/9/2020-greene in to drain then removed   • Chronic constipation    • Colon polyp    • Contact lens/glasses fitting     and glasses   • Depression    • Depression    • GERD (gastroesophageal reflux disease)    • Psychiatric disorder     anxiety    • Rectal bleeding    • [facility] 2/8/2017       Past Surgical History:   Procedure Laterality Date   • APPENDECTOMY     • BREAST SURGERY      augmentation silicone   • COLONOSCOPY N/A 1/24/2018    Procedure: COLONOSCOPY WITH HEMORRHOID BANDING;  Surgeon: Sj Nunez MD;  Location: [facility] GI LAB; Service: Gastroenterology   • ENDOMETRIAL ABLATION  2016   • UT SIGMOIDOSCOPY FLX DX W/COLLJ SPEC BR/WA IF PFRMD N/A 11/14/2018    Procedure: FLEXIBLE SIGMOIDOSCOPY WITH APC; Surgeon: Sj Nunez MD;  Location: [facility] GI LAB;   Service: Gastroenterology   • UPPER GASTROINTESTINAL ENDOSCOPY         Current Outpatient Medications   Medication Sig Dispense Refill   • albuterol (Proventil HFA) 90 mcg/act inhaler Inhale 2 puffs every 6 (six) hours as needed for wheezing or shortness of breath 6 7 g 2   • ALPRAZolam (XANAX) 1 mg tablet Take 1 tablet (1 mg total) by mouth 4 (four) times a day as needed for anxiety 120 tablet 0   • amphetamine-dextroamphetamine (ADDERALL, 10MG,) 10 mg tablet Take 1 tablet (10 mg total) by mouth 2 (two) times a day Max Daily Amount: 20 mg 60 tablet 0   • benzonatate (TESSALON) 200 MG capsule Take 1 capsule (200 mg total) by mouth 3 (three) times a day as needed for cough 20 capsule 0   • brompheniramine-pseudoephedrine-DM 30-2-10 MG/5ML syrup Take 5 mL by mouth 4 (four) times a day as needed for allergies (Patient not taking: Reported on 11/9/2022) 118 mL 0   • buPROPion (WELLBUTRIN XL) 150 mg 24 hr tablet TAKE ONE TABLET BY MOUTH EVERY DAY 30 tablet 3   • cefuroxime (CEFTIN) 500 mg tablet Take 1 tablet (500 mg total) by mouth every 12 (twelve) hours for 7 days 14 tablet 0   • fluticasone (FLONASE) 50 mcg/act nasal spray USE 2 SPRAYS IN EACH NOSTRIL DAILY 9 9 mL 2   • fluticasone-vilanterol (Breo Ellipta) 100-25 mcg/inh inhaler Inhale 1 puff daily Rinse mouth after use  60 blister 2   • ketoconazole (NIZORAL) 2 % shampoo      • lamoTRIgine (LaMICtal) 200 MG tablet Take 1 tablet (200 mg total) by mouth daily at bedtime 30 tablet 3   • methylPREDNISolone 4 MG tablet therapy pack Use as directed on package (Patient not taking: Reported on 10/25/2022) 21 tablet 0   • mometasone (ELOCON) 0 1 % lotion  (Patient not taking: Reported on 9/12/2022)     • moxifloxacin (VIGAMOX) 0 5 % ophthalmic solution Administer 1 drop to both eyes 3 (three) times a day for 7 days 3 mL 0   • ofloxacin (OCUFLOX) 0 3 % ophthalmic solution  (Patient not taking: Reported on 9/12/2022)     • omeprazole (PriLOSEC) 20 mg delayed release capsule Take 1 capsule (20 mg total) by mouth daily (Patient not taking: Reported on 9/12/2022) 30 capsule 3   • ondansetron (ZOFRAN) 4 mg tablet Take 1 tablet (4 mg total) by mouth every 8 (eight) hours as needed for nausea or vomiting (Patient not taking: Reported on 9/12/2022) 30 tablet 1     No current facility-administered medications for this visit          Allergies   Allergen Reactions   • Doxycycline Other (See Comments)     unknown       The following portions of the patient's history were reviewed and updated as appropriate: allergies, current medications, past family history, past medical history, past social history, past surgical history and problem list     OBJECTIVE:     Mental Status Examination:    Appearance adequate hygiene and grooming, restless and fidgety and good eye contact    Mood irritable and anxious   Affect affect appropriate    Speech a normal rate   Thought Processes normal thought processes   Hallucinations no hallucinations present    Thought Content no delusions   Abnormal Thoughts no suicidal thoughts  and no homicidal thoughts    Orientation  oriented to person and place and time   Remote Memory short term memory intact and long term memory intact   Attention Span concentration impaired   Intellect Appears to be of Average Intelligence   Insight Limited insight   Judgement judgment was limited   Muscle Strength Muscle strength and tone were normal   Language no difficulty naming common objects   Fund of Knowledge displays adequate knowledge of current events   Pain none   Pain Scale 0       Laboratory Results: No results found for this or any previous visit  Assessment/Plan:       Diagnoses and all orders for this visit:    Attention deficit hyperactivity disorder (ADHD), predominantly inattentive type    Bipolar II disorder (HCC)    RANJIT (generalized anxiety disorder)    Other fatigue    Panic disorder without agoraphobia with moderate panic attacks    High risk medication use          Treatment Recommendations- Risks Benefits      Immediate Medical/Psychiatric/Psychotherapy Treatments and Any Precautions:  reviewed medication continue with treatment plan discontinue Buspar due to [facility] not taking   Adderall increase to 15 mg BID when due    Risks, Benefits And Possible Side Effects Of Medications:  {PSYCH RISK, BENEFITS AND POSSIBLE SIDE EFFECTS (Optional):28859    Controlled Medication Discussion: she is aware of safe use and storage of medication Psychotherapy Provided: 45 min  Supportive therapy  Medication evaluation  Mood assessment  Copings skills  Encouraged therapy  Therapy referral    Goals discussed in session:mood stabilization      Treatment Plan:    Completed and signed during the session: Yes - Treatment Plan done but not signed at time of office visit due to:  Plan reviewed by video and verbal consent given due to [address] distancing enacted July 21, 2020, October 20, 2020, August 25, 2021, March 30, 2022, November 21, 2022        Rody Ghotra, PhD 11/21/22

## 2022-11-21 NOTE — BH TREATMENT PLAN
TREATMENT PLAN (Medication Management Only)         [facility]     Name and Date of Shirley Maya WTMKX 70 y o  1980  Date of Treatment Plan: July 21, 2020, October 20, 2020, August 25, 2021, March 30, 2022, November 21, 2022  Diagnosis/Diagnoses:    1  Bipolar II disorder ([facility] [address] )    2  Panic disorder without agoraphobia with moderate panic attacks    3  Attention deficit hyperactivity disorder (ADHD), predominantly inattentive type       Strengths/Personal Resources for Self-Care: supportive family, taking medications as prescribed, ability to communicate needs, ability to listen, motivation for treatment  Area/Areas of need (in own words): anxiety symptoms, depressive symptoms  1          Long Term Goal: improve control of anxiety  Target Date: 6 months -5/21/2023  Person/Persons responsible for completion of goal: Isabel and provider  2          Short Term Objective (s) - How will we reach this goal?:   A  Provider new recommended medication/dosage changes and/or continue medication(s):   B  Create a structure in her day and self care  C  Create realistic expectations  Target Date: 6 months -5/21/2023  Person/Persons Responsible for Completion of Goal: Isabel and provider  Progress Towards Goals: progressing slowly  Treatment Modality: medication management every 1-3 month  Review due 180 days from date of this plan: 6 months -5/21/2023  Expected length of service: ongoing treatment  My Physician/PA/NP and I have developed this plan together and I agree to work on the goals and objectives  I understand the treatment goals that were developed for my treatment

## 2022-12-12 DIAGNOSIS — J45.30 MILD PERSISTENT ASTHMA WITHOUT COMPLICATION: ICD-10-CM

## 2022-12-12 RX ORDER — FLUTICASONE FUROATE AND VILANTEROL TRIFENATATE 100; 25 UG/1; UG/1
POWDER RESPIRATORY (INHALATION)
Qty: 60 EACH | Refills: 2 | Status: SHIPPED | OUTPATIENT
Start: 2022-12-12

## 2022-12-14 DIAGNOSIS — F90.0 ATTENTION DEFICIT HYPERACTIVITY DISORDER (ADHD), PREDOMINANTLY INATTENTIVE TYPE: Primary | ICD-10-CM

## 2022-12-14 RX ORDER — DEXTROAMPHETAMINE SACCHARATE, AMPHETAMINE ASPARTATE, DEXTROAMPHETAMINE SULFATE AND AMPHETAMINE SULFATE 3.75; 3.75; 3.75; 3.75 MG/1; MG/1; MG/1; MG/1
15 TABLET ORAL 2 TIMES DAILY
Qty: 60 TABLET | Refills: 0 | Status: SHIPPED | OUTPATIENT
Start: 2022-12-14

## 2023-01-02 ENCOUNTER — HOSPITAL ENCOUNTER (EMERGENCY)
Facility: HOSPITAL | Age: 43
Discharge: HOME | End: 2023-01-03
Attending: EMERGENCY MEDICINE

## 2023-01-02 DIAGNOSIS — R51.9 ACUTE HEADACHE: Primary | ICD-10-CM

## 2023-01-02 LAB
EXT PREGNANCY TEST URINE: NEGATIVE
EXT. CONTROL: NORMAL
FLUAV RNA RESP QL NAA+PROBE: NEGATIVE
FLUBV RNA RESP QL NAA+PROBE: NEGATIVE
RSV RNA RESP QL NAA+PROBE: NEGATIVE
SARS-COV-2 RNA RESP QL NAA+PROBE: NEGATIVE

## 2023-01-02 NOTE — Clinical Note
Elena Trenton was seen and treated in our emergency department on 1/2/2023  Diagnosis:     Maikol Mathews  may return to work on return date  She may return on this date: 01/06/2023         If you have any questions or concerns, please don't hesitate to call        Naomi Gagnon MD    ______________________________           _______________          _______________  Hospital Representative                              Date                                Time

## 2023-01-03 ENCOUNTER — APPOINTMENT (EMERGENCY)
Dept: RADIOLOGY | Facility: HOSPITAL | Age: 43
End: 2023-01-03

## 2023-01-03 VITALS
DIASTOLIC BLOOD PRESSURE: 53 MMHG | WEIGHT: 134 LBS | RESPIRATION RATE: 19 BRPM | HEART RATE: 88 BPM | TEMPERATURE: 99.3 F | OXYGEN SATURATION: 99 % | SYSTOLIC BLOOD PRESSURE: 110 MMHG | HEIGHT: 63 IN | BODY MASS INDEX: 23.74 KG/M2

## 2023-01-03 LAB
ALBUMIN SERPL BCP-MCNC: 4 G/DL (ref 3.5–5)
ALP SERPL-CCNC: 60 U/L (ref 46–116)
ALT SERPL W P-5'-P-CCNC: 29 U/L (ref 12–78)
ANION GAP SERPL CALCULATED.3IONS-SCNC: 5 MMOL/L (ref 4–13)
AST SERPL W P-5'-P-CCNC: 22 U/L (ref 5–45)
BASOPHILS # BLD AUTO: 0.07 THOUSANDS/ÂΜL (ref 0–0.1)
BASOPHILS NFR BLD AUTO: 1 % (ref 0–1)
BILIRUB SERPL-MCNC: 0.26 MG/DL (ref 0.2–1)
BUN SERPL-MCNC: 20 MG/DL (ref 5–25)
CALCIUM SERPL-MCNC: 9 MG/DL (ref 8.3–10.1)
CHLORIDE SERPL-SCNC: 105 MMOL/L (ref 96–108)
CO2 SERPL-SCNC: 29 MMOL/L (ref 21–32)
CREAT SERPL-MCNC: 0.85 MG/DL (ref 0.6–1.3)
EOSINOPHIL # BLD AUTO: 0.13 THOUSAND/ÂΜL (ref 0–0.61)
EOSINOPHIL NFR BLD AUTO: 1 % (ref 0–6)
ERYTHROCYTE [DISTWIDTH] IN BLOOD BY AUTOMATED COUNT: 13 % (ref 11.6–15.1)
GFR SERPL CREATININE-BSD FRML MDRD: 83 ML/MIN/1.73SQ M
GLUCOSE SERPL-MCNC: 94 MG/DL (ref 65–140)
HCT VFR BLD AUTO: 41.7 % (ref 34.8–46.1)
HGB BLD-MCNC: 14.1 G/DL (ref 11.5–15.4)
IMM GRANULOCYTES # BLD AUTO: 0.04 THOUSAND/UL (ref 0–0.2)
IMM GRANULOCYTES NFR BLD AUTO: 0 % (ref 0–2)
LYMPHOCYTES # BLD AUTO: 3.91 THOUSANDS/ÂΜL (ref 0.6–4.47)
LYMPHOCYTES NFR BLD AUTO: 29 % (ref 14–44)
MAGNESIUM SERPL-MCNC: 1.9 MG/DL (ref 1.6–2.6)
MCH RBC QN AUTO: 33.3 PG (ref 26.8–34.3)
MCHC RBC AUTO-ENTMCNC: 33.8 G/DL (ref 31.4–37.4)
MCV RBC AUTO: 98 FL (ref 82–98)
MONOCYTES # BLD AUTO: 0.97 THOUSAND/ÂΜL (ref 0.17–1.22)
MONOCYTES NFR BLD AUTO: 7 % (ref 4–12)
NEUTROPHILS # BLD AUTO: 8.43 THOUSANDS/ÂΜL (ref 1.85–7.62)
NEUTS SEG NFR BLD AUTO: 62 % (ref 43–75)
NRBC BLD AUTO-RTO: 0 /100 WBCS
PLATELET # BLD AUTO: 336 THOUSANDS/UL (ref 149–390)
PMV BLD AUTO: 9.1 FL (ref 8.9–12.7)
POTASSIUM SERPL-SCNC: 3.7 MMOL/L (ref 3.5–5.3)
PROT SERPL-MCNC: 6.8 G/DL (ref 6.4–8.4)
RBC # BLD AUTO: 4.24 MILLION/UL (ref 3.81–5.12)
SODIUM SERPL-SCNC: 139 MMOL/L (ref 135–147)
WBC # BLD AUTO: 13.55 THOUSAND/UL (ref 4.31–10.16)

## 2023-01-03 RX ORDER — DIPHENHYDRAMINE HYDROCHLORIDE 50 MG/ML
25 INJECTION INTRAMUSCULAR; INTRAVENOUS ONCE
Status: COMPLETED | OUTPATIENT
Start: 2023-01-03 | End: 2023-01-03

## 2023-01-03 RX ORDER — METOCLOPRAMIDE HYDROCHLORIDE 5 MG/ML
10 INJECTION INTRAMUSCULAR; INTRAVENOUS ONCE
Status: COMPLETED | OUTPATIENT
Start: 2023-01-03 | End: 2023-01-03

## 2023-01-03 RX ADMIN — METOCLOPRAMIDE 10 MG: 5 INJECTION, SOLUTION INTRAMUSCULAR; INTRAVENOUS at 00:26

## 2023-01-03 RX ADMIN — SODIUM CHLORIDE 1000 ML: 0.9 INJECTION, SOLUTION INTRAVENOUS at 00:25

## 2023-01-03 RX ADMIN — DIPHENHYDRAMINE HYDROCHLORIDE 25 MG: 50 INJECTION, SOLUTION INTRAMUSCULAR; INTRAVENOUS at 00:25

## 2023-01-03 NOTE — ED PROVIDER NOTES
History  Chief Complaint   Patient presents with   • Fever - 9 weeks to 76 years     Started yesterday with headache, dizziness some blurred vision     Patient is a 77-year-old female seen in the emergency department with concern for headache, dizziness, transient blurry vision  Patient notes that headache began yesterday associated with some nausea as well  Patient denies history of similar headaches in the past, but states that she feels a great deal of pressure in her head  Patient notes waxing and waning symptoms  Patient states that she tried Tylenol at home, without significant provement of symptoms noted  Patient notes no measured fever, chest pain, shortness of breath, cough, abdominal pain, weakness, numbness, tingling  Patient notes no other definite clear exacerbating or alleviating factors for her symptoms  Prior to Admission Medications   Prescriptions Last Dose Informant Patient Reported? Taking?    ALPRAZolam (XANAX) 1 mg tablet   No No   Sig: Take 1 tablet (1 mg total) by mouth 4 (four) times a day as needed for anxiety   Breo Ellipta 100-25 MCG/ACT inhaler   No No   Sig: INHALE ONE PUFF BY MOUTH EVERY DAY - RINSE MOUTH AFTER USE   albuterol (Proventil HFA) 90 mcg/act inhaler   No No   Sig: Inhale 2 puffs every 6 (six) hours as needed for wheezing or shortness of breath   amphetamine-dextroamphetamine (ADDERALL, 15MG,) 15 MG tablet   No No   Sig: Take 1 tablet (15 mg total) by mouth 2 (two) times a day Max Daily Amount: 30 mg   benzonatate (TESSALON) 200 MG capsule   No No   Sig: Take 1 capsule (200 mg total) by mouth 3 (three) times a day as needed for cough   brompheniramine-pseudoephedrine-DM 30-2-10 MG/5ML syrup   No No   Sig: Take 5 mL by mouth 4 (four) times a day as needed for allergies   Patient not taking: Reported on 11/9/2022   buPROPion (WELLBUTRIN XL) 150 mg 24 hr tablet   No No   Sig: TAKE ONE TABLET BY MOUTH EVERY DAY   fluticasone (FLONASE) 50 mcg/act nasal spray   No No Sig: USE 2 SPRAYS IN EACH NOSTRIL DAILY   ketoconazole (NIZORAL) 2 % shampoo   Yes No   lamoTRIgine (LaMICtal) 200 MG tablet   No No   Sig: Take 1 tablet (200 mg total) by mouth daily at bedtime   methylPREDNISolone 4 MG tablet therapy pack   No No   Sig: Use as directed on package   Patient not taking: Reported on 10/25/2022   mometasone (ELOCON) 0 1 % lotion   Yes No   Patient not taking: Reported on 9/12/2022   ofloxacin (OCUFLOX) 0 3 % ophthalmic solution   Yes No   Patient not taking: Reported on 9/12/2022   omeprazole (PriLOSEC) 20 mg delayed release capsule   No No   Sig: Take 1 capsule (20 mg total) by mouth daily   Patient not taking: Reported on 9/12/2022   ondansetron (ZOFRAN) 4 mg tablet   No No   Sig: Take 1 tablet (4 mg total) by mouth every 8 (eight) hours as needed for nausea or vomiting   Patient not taking: Reported on 9/12/2022      Facility-Administered Medications: None       Past Medical History:   Diagnosis Date   • Abdominal pain    • ADHD    • Allergic rhinitis    • Anxiety     panic attacks   • Anxiety    • Asthma    • Bipolar disorder (HCC)    • Bladder distention     came to the ED on 10/9/2020-greene in to drain then removed   • Chronic constipation    • Colon polyp    • Contact lens/glasses fitting     and glasses   • Depression    • Depression    • GERD (gastroesophageal reflux disease)    • Psychiatric disorder     anxiety    • Rectal bleeding    • Sepsis ([facility] [address] ) 2/8/2017       Past Surgical History:   Procedure Laterality Date   • APPENDECTOMY     • BREAST SURGERY      augmentation silicone   • COLONOSCOPY N/A 1/24/2018    Procedure: COLONOSCOPY WITH HEMORRHOID BANDING;  Surgeon: Mikayla Nielsen MD;  Location: [facility]; Service: Gastroenterology   • ENDOMETRIAL ABLATION  2016   • WI SIGMOIDOSCOPY FLX DX W/COLLJ SPEC BR/WA IF PFRMD N/A 11/14/2018    Procedure: FLEXIBLE SIGMOIDOSCOPY WITH APC; Surgeon: Mikayla Nielsen MD;  Location: [facility];   Service: Gastroenterology   • UPPER GASTROINTESTINAL ENDOSCOPY         Family History   Problem Relation Age of Onset   • No Known Problems Mother    • No Known Problems Father    • Bipolar disorder Sister    • No Known Problems Brother    • Anxiety disorder Daughter    • Depression Daughter    • No Known Problems Son      I have reviewed and agree with the history as documented  E-Cigarette/Vaping   • E-Cigarette Use Current Every Day User    • Comments uses daily      E-Cigarette/Vaping Substances   • Nicotine Yes    • THC No    • CBD No    • Flavoring Yes    • Other No    • Unknown No      Social History     Tobacco Use   • Smoking status: Former     Types: Cigarettes     Start date:      Quit date:      Years since quittin 0   • Smokeless tobacco: Never   Vaping Use   • Vaping Use: Every day   • Substances: Nicotine, Flavoring   Substance Use Topics   • Alcohol use: Yes     Comment: occassional - wine 2 x month   • Drug use: No       Review of Systems   Constitutional: Negative for chills and fever  HENT: Negative for ear pain and sore throat  Eyes: Negative for pain and visual disturbance  Respiratory: Negative for cough and shortness of breath  Cardiovascular: Negative for chest pain and palpitations  Gastrointestinal: Positive for nausea  Negative for abdominal pain and diarrhea  Genitourinary: Negative for decreased urine volume and difficulty urinating  Musculoskeletal: Negative for arthralgias and neck stiffness  Skin: Negative for color change and rash  Neurological: Positive for dizziness and headaches  Negative for weakness and numbness  Psychiatric/Behavioral: Negative for agitation and confusion  All other systems reviewed and are negative  Physical Exam  Physical Exam  Vitals and nursing note reviewed  Constitutional:       General: She is not in acute distress  Appearance: She is well-developed  HENT:      Head: Normocephalic and atraumatic        Right Ear: External ear normal  Left Ear: External ear normal       Nose: Nose normal       Mouth/Throat:      Pharynx: Oropharynx is clear  Eyes:      General: No scleral icterus  Conjunctiva/sclera: Conjunctivae normal    Cardiovascular:      Rate and Rhythm: Normal rate and regular rhythm  Heart sounds: No murmur heard  Pulmonary:      Effort: Pulmonary effort is normal  No respiratory distress  Breath sounds: Normal breath sounds  Abdominal:      General: There is no distension  Palpations: Abdomen is soft  Tenderness: There is no abdominal tenderness  Musculoskeletal:         General: No swelling, deformity or signs of injury  Cervical back: Normal range of motion and neck supple  Skin:     General: Skin is warm and dry  Neurological:      General: No focal deficit present  Mental Status: She is alert  Cranial Nerves: No cranial nerve deficit  Sensory: No sensory deficit  Psychiatric:         Mood and Affect: Mood normal          Thought Content:  Thought content normal          Vital Signs  ED Triage Vitals [01/02/23 2208]   Temperature Pulse Respirations Blood Pressure SpO2   99 3 °F (37 4 °C) 90 (!) 24 126/94 100 %      Temp Source Heart Rate Source Patient Position - Orthostatic VS BP Location FiO2 (%)   Tympanic Monitor Sitting Left arm --      Pain Score       6           Vitals:    01/02/23 2208 01/03/23 0045 01/03/23 0200   BP: 126/94 120/72 110/53   Pulse: 90 92 88   Patient Position - Orthostatic VS: Sitting Lying Sitting         Visual Acuity  Visual Acuity    Flowsheet Row Most Recent Value   L Pupil Size (mm) 3   R Pupil Size (mm) 3          ED Medications  Medications   metoclopramide (REGLAN) injection 10 mg (10 mg Intravenous Given 1/3/23 0026)   diphenhydrAMINE (BENADRYL) injection 25 mg (25 mg Intravenous Given 1/3/23 0025)   sodium chloride 0 9 % bolus 1,000 mL (0 mL Intravenous Stopped 1/3/23 0322)       Diagnostic Studies  Results Reviewed     Procedure Component Value Units Date/Time    Comprehensive metabolic panel [[id number]] Collected: 01/03/23 0025    Lab Status: Final result Specimen: Blood from Arm, Right Updated: 01/03/23 0054     Sodium 139 mmol/L      Potassium 3 7 mmol/L      Chloride 105 mmol/L      CO2 29 mmol/L      ANION GAP 5 mmol/L      BUN 20 mg/dL      Creatinine 0 85 mg/dL      Glucose 94 mg/dL      Calcium 9 0 mg/dL      AST 22 U/L      ALT 29 U/L      Alkaline Phosphatase 60 U/L      Total Protein 6 8 g/dL      Albumin 4 0 g/dL      Total Bilirubin 0 26 mg/dL      eGFR 83 ml/min/1 73sq m     Narrative:      National Kidney Disease Foundation guidelines for Chronic Kidney Disease (CKD):   •  Stage 1 with normal or high GFR (GFR > 90 mL/min/1 73 square meters)  •  Stage 2 Mild CKD (GFR = 60-89 mL/min/1 73 square meters)  •  Stage 3A Moderate CKD (GFR = 45-59 mL/min/1 73 square meters)  •  Stage 3B Moderate CKD (GFR = 30-44 mL/min/1 73 square meters)  •  Stage 4 Severe CKD (GFR = 15-29 mL/min/1 73 square meters)  •  Stage 5 End Stage CKD (GFR <15 mL/min/1 73 square meters)  Note: GFR calculation is accurate only with a steady state creatinine    Magnesium [[id number]]  (Normal) Collected: 01/03/23 0025    Lab Status: Final result Specimen: Blood from Arm, Right Updated: 01/03/23 0054     Magnesium 1 9 mg/dL     CBC and differential [[id number]]  (Abnormal) Collected: 01/03/23 0025    Lab Status: Final result Specimen: Blood from Arm, Right Updated: 01/03/23 0038     WBC 13 55 Thousand/uL      RBC 4 24 Million/uL      Hemoglobin 14 1 g/dL      Hematocrit 41 7 %      MCV 98 fL      MCH 33 3 pg      MCHC 33 8 g/dL      RDW 13 0 %      MPV 9 1 fL      Platelets 498 Thousands/uL      nRBC 0 /100 WBCs      Neutrophils Relative 62 %      Immat GRANS % 0 %      Lymphocytes Relative 29 %      Monocytes Relative 7 %      Eosinophils Relative 1 %      Basophils Relative 1 %      Neutrophils Absolute 8 43 Thousands/µL      Immature Grans Absolute 0 04 Thousand/uL Lymphocytes Absolute 3 91 Thousands/µL      Monocytes Absolute 0 97 Thousand/µL      Eosinophils Absolute 0 13 Thousand/µL      Basophils Absolute 0 07 Thousands/µL     COVID/FLU/RSV [[id number]]  (Normal) Collected: 01/02/23 2312    Lab Status: Final result Specimen: Nares from Nose Updated: 01/02/23 6748     SARS-CoV-2 Negative     INFLUENZA A PCR Negative     INFLUENZA B PCR Negative     RSV PCR Negative    Narrative:      FOR PEDIATRIC PATIENTS - copy/paste COVID Guidelines URL to browser: [email or web address]  [employer]    SARS-CoV-2 assay is a Nucleic Acid Amplification assay intended for the  qualitative detection of nucleic acid from SARS-CoV-2 in nasopharyngeal  swabs  Results are for the presumptive identification of SARS-CoV-2 RNA  Positive results are indicative of infection with SARS-CoV-2, the virus  causing COVID-19, but do not rule out bacterial infection or co-infection  with other viruses  Laboratories within the [place] and its  territories are required to report all positive results to the appropriate  public health authorities  Negative results do not preclude SARS-CoV-2  infection and should not be used as the sole basis for treatment or other  patient management decisions  Negative results must be combined with  clinical observations, patient history, and epidemiological information  This test has not been FDA cleared or approved  This test has been authorized by FDA under an Emergency Use Authorization  (EUA)  This test is only authorized for the duration of time the  declaration that circumstances exist justifying the authorization of the  emergency use of an in vitro diagnostic tests for detection of SARS-CoV-2  virus and/or diagnosis of COVID-19 infection under section 564(b)(1) of  the Act, 21 U  S C  693JNM-7(X)(4), unless the authorization is terminated  or revoked sooner   The test has been validated but independent review by FDA  and CLIA is pending  Test performed using [employer] GeneXpert: This RT-PCR assay targets N2,  a region unique to SARS-CoV-2  A conserved region in the E-gene was chosen  for pan-Sarbecovirus detection which includes SARS-CoV-2  According to CMS-2020-01-R, this platform meets the definition of high-throughput technology  POCT pregnancy, urine [[id number]]  (Normal) Resulted: 01/02/23 2354    Lab Status: Final result Updated: 01/02/23 2354     EXT Preg Test, Ur Negative     Control Valid                 CT head without contrast   Final Result by Francesco Loewry DO (01/03 0111)      No acute intracranial abnormality  Workstation performed: [id number]                    Procedures  Procedures         ED Course  ED Course as of 01/03/23 0324   Tue Jan 03, 2023   0244 CT head-    IMPRESSION:     No acute intracranial abnormality                                   SBIRT 22yo+    Flowsheet Row Most Recent Value   SBIRT (23 yo +)    In order to provide better care to our patients, we are screening all of our patients for alcohol and drug use  Would it be okay to ask you these screening questions? No Filed at: 01/02/2023 4996                    Medical Decision Making  Patient is a 78-year-old female seen in the emergency department with concern for acute headache, nausea, dizziness  Patient was treated with medication for symptom control, with good effect  COVID-19/influenza/RSV tests were negative  Urine pregnancy test was negative  CT head was obtained, which showed no acute intracranial abnormality  Laboratory evaluation remarkable for elevated white blood cell count of 13 55  Evaluation is not consistent with subarachnoid hemorrhage or meningitis  Patient symptoms are suggestive of possible viral illness versus tension headache versus migraine headache  Plan to have patient follow up with PCP/outpatient providers  Patient stable for discharge home    Discharge instructions were reviewed with patient  Amount and/or Complexity of Data Reviewed  Labs: ordered  Radiology: ordered  Disposition  Final diagnoses:   Acute headache     Time reflects when diagnosis was documented in both MDM as applicable and the Disposition within this note     Time User Action Codes Description Comment    1/3/2023 12:05 AM Paulina Bautista Add [R51 9] Acute headache       ED Disposition     ED Disposition   Discharge    Condition   Stable    Date/Time   Tue Stevan 3, 2023  3:13 AM    Comment   Wilmar Meza discharge to home/self care  Follow-up Information     Follow up With Specialties Details Why Contact Info    Oksana JohnsonKam, [address] Nieves, Nurse Practitioner Call in 1 day  [address] [phone]            Patient's Medications   Discharge Prescriptions    No medications on file       No discharge procedures on file      PDMP Review       Value Time User    PDMP Reviewed  Yes 12/14/2022  7:28 AM Junior Reed PhD          ED Provider  Electronically Signed by           Sulaiman Workman MD  01/03/23 7694

## 2023-01-03 NOTE — DISCHARGE INSTRUCTIONS
Follow up with your primary care provider, and return to the emergency department for new or worsening symptoms  CT BRAIN - WITHOUT CONTRAST     INDICATION:   headache  COMPARISON:  10/16/2019     TECHNIQUE:  CT examination of the brain was performed  In addition to axial images, sagittal and coronal 2D reformatted images were created and submitted for interpretation  Radiation dose length product (DLP) for this visit:  886 13 mGy-cm   This examination, like all CT scans performed in the [facility], was performed utilizing techniques to minimize radiation dose exposure, including the use of iterative   reconstruction and automated exposure control  IMAGE QUALITY:  Diagnostic  FINDINGS:     PARENCHYMA:  No intracranial mass, mass effect or midline shift  No CT signs of acute infarction  No acute parenchymal hemorrhage  VENTRICLES AND EXTRA-AXIAL SPACES:  Normal for the patient's age  VISUALIZED ORBITS AND PARANASAL SINUSES:  Normal visualized orbits  Normal visualized paranasal sinuses  CALVARIUM AND EXTRACRANIAL SOFT TISSUES:  Normal      IMPRESSION:     No acute intracranial abnormality

## 2023-01-03 NOTE — ED NOTES
Patient provided with apple juice and hilario crackers      [facility]  01/03/23 4310

## 2023-01-09 DIAGNOSIS — F41.9 ANXIETY: Primary | ICD-10-CM

## 2023-01-09 RX ORDER — BUSPIRONE HYDROCHLORIDE 10 MG/1
TABLET ORAL
Qty: 60 TABLET | Refills: 3 | Status: SHIPPED | OUTPATIENT
Start: 2023-01-09

## 2023-01-10 DIAGNOSIS — F90.0 ATTENTION DEFICIT HYPERACTIVITY DISORDER (ADHD), PREDOMINANTLY INATTENTIVE TYPE: ICD-10-CM

## 2023-01-10 DIAGNOSIS — F41.1 GAD (GENERALIZED ANXIETY DISORDER): ICD-10-CM

## 2023-01-10 NOTE — TELEPHONE ENCOUNTER
Patient uses [facility] [id number], [address] [address]  [phone]    Medication Refill Request     Name of Medication Alprazolam 1 mg  Dose/Frequency 1qid prn anxiety  Quantity 120  Verified pharmacy   [x]  Verified ordering Provider   [x]  Does patient have enough for the next 3 days? Yes [] No [x]  Does patient have a follow-up appointment scheduled? Yes [x] No []   If so when is appointment: 1/1712023  Medication Refill Request     Name of Medication Adderall 15mg  Dose/Frequency 1bid   Quantity 60  Verified pharmacy   [x]  Verified ordering Provider   [x]  Does patient have enough for the next 3 days? Yes [] No [x]  Does patient have a follow-up appointment scheduled?  Yes [x] No []   If so when is appointment: 1/17/2023

## 2023-01-11 RX ORDER — DEXTROAMPHETAMINE SACCHARATE, AMPHETAMINE ASPARTATE, DEXTROAMPHETAMINE SULFATE AND AMPHETAMINE SULFATE 3.75; 3.75; 3.75; 3.75 MG/1; MG/1; MG/1; MG/1
15 TABLET ORAL 2 TIMES DAILY
Qty: 60 TABLET | Refills: 0 | Status: SHIPPED | OUTPATIENT
Start: 2023-01-11

## 2023-01-11 RX ORDER — ALPRAZOLAM 1 MG/1
1 TABLET ORAL 4 TIMES DAILY PRN
Qty: 120 TABLET | Refills: 0 | Status: SHIPPED | OUTPATIENT
Start: 2023-01-11

## 2023-01-12 ENCOUNTER — APPOINTMENT (EMERGENCY)
Dept: RADIOLOGY | Facility: HOSPITAL | Age: 43
End: 2023-01-12

## 2023-01-12 ENCOUNTER — HOSPITAL ENCOUNTER (EMERGENCY)
Facility: HOSPITAL | Age: 43
Discharge: HOME | End: 2023-01-12
Attending: EMERGENCY MEDICINE

## 2023-01-12 VITALS
OXYGEN SATURATION: 99 % | SYSTOLIC BLOOD PRESSURE: 125 MMHG | RESPIRATION RATE: 20 BRPM | DIASTOLIC BLOOD PRESSURE: 78 MMHG | TEMPERATURE: 98.3 F | WEIGHT: 135 LBS | HEART RATE: 95 BPM | BODY MASS INDEX: 23.91 KG/M2

## 2023-01-12 DIAGNOSIS — N39.0 UTI (URINARY TRACT INFECTION): Primary | ICD-10-CM

## 2023-01-12 LAB
AMORPH PHOS CRY URNS QL MICRO: ABNORMAL /HPF
ANION GAP SERPL CALCULATED.3IONS-SCNC: 8 MMOL/L (ref 4–13)
BACTERIA UR QL AUTO: ABNORMAL /HPF
BASOPHILS # BLD AUTO: 0.05 THOUSANDS/ÂΜL (ref 0–0.1)
BASOPHILS NFR BLD AUTO: 1 % (ref 0–1)
BILIRUB UR QL STRIP: NEGATIVE
BUN SERPL-MCNC: 14 MG/DL (ref 5–25)
CALCIUM SERPL-MCNC: 9.3 MG/DL (ref 8.3–10.1)
CHLORIDE SERPL-SCNC: 106 MMOL/L (ref 96–108)
CLARITY UR: ABNORMAL
CO2 SERPL-SCNC: 30 MMOL/L (ref 21–32)
COLOR UR: ABNORMAL
CREAT SERPL-MCNC: 0.77 MG/DL (ref 0.6–1.3)
EOSINOPHIL # BLD AUTO: 0.08 THOUSAND/ÂΜL (ref 0–0.61)
EOSINOPHIL NFR BLD AUTO: 1 % (ref 0–6)
ERYTHROCYTE [DISTWIDTH] IN BLOOD BY AUTOMATED COUNT: 13.2 % (ref 11.6–15.1)
EXT PREGNANCY TEST URINE: NEGATIVE
EXT. CONTROL: NORMAL
GFR SERPL CREATININE-BSD FRML MDRD: 94 ML/MIN/1.73SQ M
GLUCOSE SERPL-MCNC: 90 MG/DL (ref 65–140)
GLUCOSE UR STRIP-MCNC: NEGATIVE MG/DL
HCT VFR BLD AUTO: 41.5 % (ref 34.8–46.1)
HGB BLD-MCNC: 13.8 G/DL (ref 11.5–15.4)
HGB UR QL STRIP.AUTO: NEGATIVE
IMM GRANULOCYTES # BLD AUTO: 0.04 THOUSAND/UL (ref 0–0.2)
IMM GRANULOCYTES NFR BLD AUTO: 0 % (ref 0–2)
KETONES UR STRIP-MCNC: NEGATIVE MG/DL
LEUKOCYTE ESTERASE UR QL STRIP: ABNORMAL
LYMPHOCYTES # BLD AUTO: 2.63 THOUSANDS/ÂΜL (ref 0.6–4.47)
LYMPHOCYTES NFR BLD AUTO: 24 % (ref 14–44)
MCH RBC QN AUTO: 33.5 PG (ref 26.8–34.3)
MCHC RBC AUTO-ENTMCNC: 33.3 G/DL (ref 31.4–37.4)
MCV RBC AUTO: 101 FL (ref 82–98)
MONOCYTES # BLD AUTO: 0.88 THOUSAND/ÂΜL (ref 0.17–1.22)
MONOCYTES NFR BLD AUTO: 8 % (ref 4–12)
NEUTROPHILS # BLD AUTO: 7.4 THOUSANDS/ÂΜL (ref 1.85–7.62)
NEUTS SEG NFR BLD AUTO: 66 % (ref 43–75)
NITRITE UR QL STRIP: NEGATIVE
NON-SQ EPI CELLS URNS QL MICRO: ABNORMAL /HPF
NRBC BLD AUTO-RTO: 0 /100 WBCS
PH UR STRIP.AUTO: 8 [PH]
PLATELET # BLD AUTO: 307 THOUSANDS/UL (ref 149–390)
PMV BLD AUTO: 8.9 FL (ref 8.9–12.7)
POTASSIUM SERPL-SCNC: 4.1 MMOL/L (ref 3.5–5.3)
PROT UR STRIP-MCNC: NEGATIVE MG/DL
RBC # BLD AUTO: 4.12 MILLION/UL (ref 3.81–5.12)
RBC #/AREA URNS AUTO: ABNORMAL /HPF
SODIUM SERPL-SCNC: 144 MMOL/L (ref 135–147)
SP GR UR STRIP.AUTO: 1.01 (ref 1–1.03)
UROBILINOGEN UR QL STRIP.AUTO: 0.2 E.U./DL
WBC # BLD AUTO: 11.08 THOUSAND/UL (ref 4.31–10.16)
WBC #/AREA URNS AUTO: ABNORMAL /HPF

## 2023-01-12 RX ORDER — CEPHALEXIN 500 MG/1
500 CAPSULE ORAL 2 TIMES DAILY
Qty: 10 CAPSULE | Refills: 0 | Status: SHIPPED | OUTPATIENT
Start: 2023-01-12 | End: 2023-01-17

## 2023-01-12 RX ORDER — PHENAZOPYRIDINE HYDROCHLORIDE 100 MG/1
200 TABLET, FILM COATED ORAL ONCE
Status: COMPLETED | OUTPATIENT
Start: 2023-01-12 | End: 2023-01-12

## 2023-01-12 RX ORDER — CEFTRIAXONE 1 G/50ML
1000 INJECTION, SOLUTION INTRAVENOUS ONCE
Status: COMPLETED | OUTPATIENT
Start: 2023-01-12 | End: 2023-01-12

## 2023-01-12 RX ORDER — KETOROLAC TROMETHAMINE 30 MG/ML
15 INJECTION, SOLUTION INTRAMUSCULAR; INTRAVENOUS ONCE
Status: COMPLETED | OUTPATIENT
Start: 2023-01-12 | End: 2023-01-12

## 2023-01-12 RX ADMIN — PHENAZOPYRIDINE 200 MG: 100 TABLET ORAL at 18:41

## 2023-01-12 RX ADMIN — KETOROLAC TROMETHAMINE 15 MG: 30 INJECTION, SOLUTION INTRAMUSCULAR; INTRAVENOUS at 18:41

## 2023-01-12 RX ADMIN — CEFTRIAXONE 1000 MG: 1 INJECTION, SOLUTION INTRAVENOUS at 19:58

## 2023-01-12 RX ADMIN — SODIUM CHLORIDE 1000 ML: 0.9 INJECTION, SOLUTION INTRAVENOUS at 18:37

## 2023-01-15 NOTE — ED PROVIDER NOTES
History  Chief Complaint   Patient presents with   • Flank Pain     States started yesterday with pain lower back , more on left and LLQ and states urine trickling out  States 2 days ago had nausea and sense of urgency to void     Patient presents for evaluation of left flank pain  Patient reports urinary urgency and dysuria associated with nausea and left-sided flank pain for 2 days  Denies any fever or chills  History provided by:  Patient   used: No    Flank Pain  Associated symptoms: dysuria    Associated symptoms: no chest pain, no chills, no cough, no fever, no hematuria, no shortness of breath, no sore throat and no vomiting        Prior to Admission Medications   Prescriptions Last Dose Informant Patient Reported? Taking?    ALPRAZolam (XANAX) 1 mg tablet   No No   Sig: Take 1 tablet (1 mg total) by mouth 4 (four) times a day as needed for anxiety   Breo Ellipta 100-25 MCG/ACT inhaler   No No   Sig: INHALE ONE PUFF BY MOUTH EVERY DAY - RINSE MOUTH AFTER USE   albuterol (Proventil HFA) 90 mcg/act inhaler   No No   Sig: Inhale 2 puffs every 6 (six) hours as needed for wheezing or shortness of breath   amphetamine-dextroamphetamine (ADDERALL, 15MG,) 15 MG tablet   No No   Sig: Take 1 tablet (15 mg total) by mouth 2 (two) times a day Max Daily Amount: 30 mg   benzonatate (TESSALON) 200 MG capsule   No No   Sig: Take 1 capsule (200 mg total) by mouth 3 (three) times a day as needed for cough   brompheniramine-pseudoephedrine-DM 30-2-10 MG/5ML syrup   No No   Sig: Take 5 mL by mouth 4 (four) times a day as needed for allergies   Patient not taking: Reported on 11/9/2022   buPROPion (WELLBUTRIN XL) 150 mg 24 hr tablet   No No   Sig: TAKE ONE TABLET BY MOUTH EVERY DAY   busPIRone (BUSPAR) 10 mg tablet   No No   Sig: TAKE ONE TABLET BY MOUTH TWICE A DAY (GENERIC FOR BUSPAR)   fluticasone (FLONASE) 50 mcg/act nasal spray   No No   Sig: USE 2 SPRAYS IN EACH NOSTRIL DAILY   ketoconazole (NIZORAL) 2 % shampoo   Yes No   lamoTRIgine (LaMICtal) 200 MG tablet   No No   Sig: Take 1 tablet (200 mg total) by mouth daily at bedtime   methylPREDNISolone 4 MG tablet therapy pack   No No   Sig: Use as directed on package   Patient not taking: Reported on 10/25/2022   mometasone (ELOCON) 0 1 % lotion   Yes No   Patient not taking: Reported on 9/12/2022   ofloxacin (OCUFLOX) 0 3 % ophthalmic solution   Yes No   Patient not taking: Reported on 9/12/2022   omeprazole (PriLOSEC) 20 mg delayed release capsule   No No   Sig: Take 1 capsule (20 mg total) by mouth daily   Patient not taking: Reported on 9/12/2022   ondansetron (ZOFRAN) 4 mg tablet   No No   Sig: Take 1 tablet (4 mg total) by mouth every 8 (eight) hours as needed for nausea or vomiting   Patient not taking: Reported on 9/12/2022      Facility-Administered Medications: None       Past Medical History:   Diagnosis Date   • Abdominal pain    • ADHD    • Allergic rhinitis    • Anxiety     panic attacks   • Anxiety    • Asthma    • Bipolar disorder (HCC)    • Bladder distention     came to the ED on 10/9/2020-greene in to drain then removed   • Chronic constipation    • Colon polyp    • Contact lens/glasses fitting     and glasses   • Depression    • Depression    • GERD (gastroesophageal reflux disease)    • Psychiatric disorder     anxiety    • Rectal bleeding    • Sepsis ([facility] [address] ) 2/8/2017       Past Surgical History:   Procedure Laterality Date   • APPENDECTOMY     • BREAST SURGERY      augmentation silicone   • COLONOSCOPY N/A 1/24/2018    Procedure: COLONOSCOPY WITH HEMORRHOID BANDING;  Surgeon: Roro Ramírez MD;  Location: [facility]; Service: Gastroenterology   • ENDOMETRIAL ABLATION  2016   • DE SIGMOIDOSCOPY FLX DX W/COLLJ SPEC BR/WA IF PFRMD N/A 11/14/2018    Procedure: FLEXIBLE SIGMOIDOSCOPY WITH APC; Surgeon: Roro Ramírez MD;  Location: [facility];   Service: Gastroenterology   • UPPER GASTROINTESTINAL ENDOSCOPY         Family History Problem Relation Age of Onset   • No Known Problems Mother    • No Known Problems Father    • Bipolar disorder Sister    • No Known Problems Brother    • Anxiety disorder Daughter    • Depression Daughter    • No Known Problems Son      I have reviewed and agree with the history as documented  E-Cigarette/Vaping   • E-Cigarette Use Current Every Day User    • Comments uses daily      E-Cigarette/Vaping Substances   • Nicotine Yes    • THC No    • CBD No    • Flavoring Yes    • Other No    • Unknown No      Social History     Tobacco Use   • Smoking status: Former     Types: Cigarettes     Start date:      Quit date:      Years since quittin 0   • Smokeless tobacco: Never   Vaping Use   • Vaping Use: Every day   • Substances: Nicotine, Flavoring   Substance Use Topics   • Alcohol use: Yes     Comment: occassional - wine 2 x month   • Drug use: No       Review of Systems   Constitutional: Negative for chills and fever  HENT: Negative for ear pain and sore throat  Eyes: Negative for pain and visual disturbance  Respiratory: Negative for cough and shortness of breath  Cardiovascular: Negative for chest pain and palpitations  Gastrointestinal: Negative for abdominal pain and vomiting  Genitourinary: Positive for dysuria, flank pain and urgency  Negative for hematuria  Musculoskeletal: Negative for arthralgias and back pain  Skin: Negative for color change and rash  Neurological: Negative for seizures and syncope  All other systems reviewed and are negative  Physical Exam  Physical Exam  Vitals and nursing note reviewed  Constitutional:       General: She is not in acute distress  HENT:      Head: Atraumatic  Right Ear: External ear normal       Left Ear: External ear normal       Nose: Nose normal       Mouth/Throat:      Mouth: Mucous membranes are moist       Pharynx: Oropharynx is clear  Eyes:      General: No scleral icterus       Conjunctiva/sclera: Conjunctivae normal    Cardiovascular:      Rate and Rhythm: Normal rate and regular rhythm  Pulmonary:      Effort: Pulmonary effort is normal  No respiratory distress  Breath sounds: Normal breath sounds  Abdominal:      General: Abdomen is flat  Bowel sounds are normal  There is no distension  Palpations: Abdomen is soft  Tenderness: There is no abdominal tenderness  There is left CVA tenderness  There is no right CVA tenderness, guarding or rebound  Musculoskeletal:         General: No deformity  Normal range of motion  Skin:     Capillary Refill: Capillary refill takes less than 2 seconds  Findings: No rash  Neurological:      General: No focal deficit present  Mental Status: She is alert and oriented to person, place, and time           Vital Signs  ED Triage Vitals [01/12/23 1728]   Temperature Pulse Respirations Blood Pressure SpO2   98 3 °F (36 8 °C) 95 20 125/78 99 %      Temp Source Heart Rate Source Patient Position - Orthostatic VS BP Location FiO2 (%)   Tympanic Monitor Sitting Left arm --      Pain Score       7           Vitals:    01/12/23 1728   BP: 125/78   Pulse: 95   Patient Position - Orthostatic VS: Sitting         Visual Acuity      ED Medications  Medications   sodium chloride 0 9 % bolus 1,000 mL (0 mL Intravenous Stopped 1/12/23 2017)   ketorolac (TORADOL) injection 15 mg (15 mg Intravenous Given 1/12/23 1841)   phenazopyridine (PYRIDIUM) tablet 200 mg (200 mg Oral Given 1/12/23 1841)   cefTRIAXone (ROCEPHIN) IVPB (premix in dextrose) 1,000 mg 50 mL (0 mg Intravenous Stopped 1/12/23 2026)       Diagnostic Studies  Results Reviewed     Procedure Component Value Units Date/Time    Basic metabolic panel [[id number]] Collected: 01/12/23 1837    Lab Status: Final result Specimen: Blood from Arm, Left Updated: 01/12/23 1853     Sodium 144 mmol/L      Potassium 4 1 mmol/L      Chloride 106 mmol/L      CO2 30 mmol/L      ANION GAP 8 mmol/L      BUN 14 mg/dL      Creatinine 0 77 mg/dL      Glucose 90 mg/dL      Calcium 9 3 mg/dL      eGFR 94 ml/min/1 73sq m     Narrative:      National Kidney Disease Foundation guidelines for Chronic Kidney Disease (CKD):   •  Stage 1 with normal or high GFR (GFR > 90 mL/min/1 73 square meters)  •  Stage 2 Mild CKD (GFR = 60-89 mL/min/1 73 square meters)  •  Stage 3A Moderate CKD (GFR = 45-59 mL/min/1 73 square meters)  •  Stage 3B Moderate CKD (GFR = 30-44 mL/min/1 73 square meters)  •  Stage 4 Severe CKD (GFR = 15-29 mL/min/1 73 square meters)  •  Stage 5 End Stage CKD (GFR <15 mL/min/1 73 square meters)  Note: GFR calculation is accurate only with a steady state creatinine    Urine Microscopic [[id number]]  (Abnormal) Collected: 01/12/23 1803    Lab Status: Final result Specimen: Urine, Clean Catch Updated: 01/12/23 1842     RBC, UA 0-1 /hpf      WBC, UA 1-2 /hpf      Epithelial Cells Innumerable /hpf      Bacteria, UA Occasional /hpf      AMORPH PHOSPATES Innumerable /hpf     CBC and differential [[id number]]  (Abnormal) Collected: 01/12/23 1837    Lab Status: Final result Specimen: Blood from Arm, Left Updated: 01/12/23 1842     WBC 11 08 Thousand/uL      RBC 4 12 Million/uL      Hemoglobin 13 8 g/dL      Hematocrit 41 5 %       fL      MCH 33 5 pg      MCHC 33 3 g/dL      RDW 13 2 %      MPV 8 9 fL      Platelets 116 Thousands/uL      nRBC 0 /100 WBCs      Neutrophils Relative 66 %      Immat GRANS % 0 %      Lymphocytes Relative 24 %      Monocytes Relative 8 %      Eosinophils Relative 1 %      Basophils Relative 1 %      Neutrophils Absolute 7 40 Thousands/µL      Immature Grans Absolute 0 04 Thousand/uL      Lymphocytes Absolute 2 63 Thousands/µL      Monocytes Absolute 0 88 Thousand/µL      Eosinophils Absolute 0 08 Thousand/µL      Basophils Absolute 0 05 Thousands/µL     UA w Reflex to Microscopic w Reflex to Culture [[id number]]  (Abnormal) Collected: 01/12/23 1803    Lab Status: Final result Specimen: Urine, Clean Catch Updated: 01/12/23 1811     Color, UA Light Yellow     Clarity, UA Cloudy     Specific Gravity, UA 1 015     pH, UA 8 0     Leukocytes, UA Small     Nitrite, UA Negative     Protein, UA Negative mg/dl      Glucose, UA Negative mg/dl      Ketones, UA Negative mg/dl      Urobilinogen, UA 0 2 E U /dl      Bilirubin, UA Negative     Occult Blood, UA Negative    POCT pregnancy, urine [[id number]]  (Normal) Resulted: 01/12/23 1804    Lab Status: Final result Updated: 01/12/23 1804     EXT Preg Test, Ur Negative     Control Valid                 CT renal stone study abdomen pelvis without contrast   Final Result by Miguel Wood DO (01/12 1953)   Limited exam performed without oral or IV contrast    No convincing evidence of renal colic  Stable dilatation of extrahepatic biliary ducts compared to July 2021  Workstation performed: [id number]                    Procedures  Procedures         ED Course                               SBIRT 20yo+    Flowsheet Row Most Recent Value   SBIRT (23 yo +)    In order to provide better care to our patients, we are screening all of our patients for alcohol and drug use  Would it be okay to ask you these screening questions? Yes Filed at: 01/12/2023 1953   Initial Alcohol Screen: US AUDIT-C     1  How often do you have a drink containing alcohol? 2 Filed at: 01/12/2023 1953   2  How many drinks containing alcohol do you have on a typical day you are drinking? 1 Filed at: 01/12/2023 1953   3a  Male UNDER 65: How often do you have five or more drinks on one occasion? 0 Filed at: 01/12/2023 1953   3b  FEMALE Any Age, or MALE 65+: How often do you have 4 or more drinks on one occassion? 0 Filed at: 01/12/2023 1953   Audit-C Score 3 Filed at: 01/12/2023 1953   JUDY: How many times in the past year have you    Used an illegal drug or used a prescription medication for non-medical reasons?  Never Filed at: 01/12/2023 1953                    Medical Decision Making  Pulse ox 99% on room air indicating adequate oxygenation  Lab work and CT scan results cussed with patient  Will treat for UTI with oral antibiotics as outpatient return if symptoms worsen  UTI (urinary tract infection): complicated acute illness or injury  Amount and/or Complexity of Data Reviewed  Labs: ordered  Radiology: ordered  Risk  Prescription drug management  Disposition  Final diagnoses:   UTI (urinary tract infection)     Time reflects when diagnosis was documented in both MDM as applicable and the Disposition within this note     Time User Action Codes Description Comment    1/12/2023  7:55 PM Paul, Jayy Hughes [N39 0] UTI (urinary tract infection)       ED Disposition     ED Disposition   Discharge    Condition   Stable    Date/Time   Thu Jan 12, 2023  7:55 PM    Comment   Sebastian Albert discharge to home/self care                 Follow-up Information     Follow up With Specialties Details Why Contact Info Additional Information    Maite Messina, [id number], Nurse Practitioner In 1 week  [address] [facility] [address]  [address] [facility] Emergency Department Emergency Medicine  If symptoms worsen [address] [facility] [address]  [address] [address] Emergency Department, [place], [place], [address]          Discharge Medication List as of 1/12/2023  7:56 PM      START taking these medications    Details   cephalexin (KEFLEX) 500 mg capsule Take 1 capsule (500 mg total) by mouth 2 (two) times a day for 5 days, Starting Thu 1/12/2023, Until Tue 1/17/2023, Normal         CONTINUE these medications which have NOT CHANGED    Details   albuterol (Proventil HFA) 90 mcg/act inhaler Inhale 2 puffs every 6 (six) hours as needed for wheezing or shortness of breath, Starting Wed 11/9/2022, Normal      ALPRAZolam (XANAX) 1 mg tablet Take 1 tablet (1 mg total) by mouth 4 (four) times a day as needed for anxiety, Starting Wed 1/11/2023, Normal      amphetamine-dextroamphetamine (ADDERALL, 15MG,) 15 MG tablet Take 1 tablet (15 mg total) by mouth 2 (two) times a day Max Daily Amount: 30 mg, Starting Wed 1/11/2023, Normal      benzonatate (TESSALON) 200 MG capsule Take 1 capsule (200 mg total) by mouth 3 (three) times a day as needed for cough, Starting Wed 11/9/2022, Normal      Breo Ellipta 100-25 MCG/ACT inhaler INHALE ONE PUFF BY MOUTH EVERY DAY - RINSE MOUTH AFTER USE, Normal      brompheniramine-pseudoephedrine-DM 30-2-10 MG/5ML syrup Take 5 mL by mouth 4 (four) times a day as needed for allergies, Starting Tue 10/25/2022, Normal      buPROPion (WELLBUTRIN XL) 150 mg 24 hr tablet TAKE ONE TABLET BY MOUTH EVERY DAY, Normal      busPIRone (BUSPAR) 10 mg tablet TAKE ONE TABLET BY MOUTH TWICE A DAY (GENERIC FOR BUSPAR), Normal      fluticasone (FLONASE) 50 mcg/act nasal spray USE 2 SPRAYS IN EACH NOSTRIL DAILY, Normal      ketoconazole (NIZORAL) 2 % shampoo Starting Thu 4/14/2022, Historical Med      lamoTRIgine (LaMICtal) 200 MG tablet Take 1 tablet (200 mg total) by mouth daily at bedtime, Starting Thu 10/13/2022, Normal      methylPREDNISolone 4 MG tablet therapy pack Use as directed on package, Normal      mometasone (ELOCON) 0 1 % lotion Starting Thu 9/16/2021, Historical Med      ofloxacin (OCUFLOX) 0 3 % ophthalmic solution Starting Thu 6/23/2022, Historical Med      omeprazole (PriLOSEC) 20 mg delayed release capsule Take 1 capsule (20 mg total) by mouth daily, Starting Mon 1/31/2022, Normal      ondansetron (ZOFRAN) 4 mg tablet Take 1 tablet (4 mg total) by mouth every 8 (eight) hours as needed for nausea or vomiting, Starting Wed 1/19/2022, Normal             No discharge procedures on file      PDMP Review       Value Time User    PDMP Reviewed  Yes 1/11/2023  7:18 AM Jose E Motta, PhD          ED Provider  Electronically Signed by           Margaret Ramirez DO  01/15/23 1107

## 2023-01-24 ENCOUNTER — TELEMEDICINE (OUTPATIENT)
Dept: PSYCHIATRY | Facility: CLINIC | Age: 43
End: 2023-01-24

## 2023-01-24 DIAGNOSIS — R53.83 OTHER FATIGUE: ICD-10-CM

## 2023-01-24 DIAGNOSIS — F41.1 GAD (GENERALIZED ANXIETY DISORDER): ICD-10-CM

## 2023-01-24 DIAGNOSIS — F90.0 ATTENTION DEFICIT HYPERACTIVITY DISORDER (ADHD), PREDOMINANTLY INATTENTIVE TYPE: ICD-10-CM

## 2023-01-24 DIAGNOSIS — F41.0 PANIC DISORDER WITHOUT AGORAPHOBIA WITH MODERATE PANIC ATTACKS: ICD-10-CM

## 2023-01-24 DIAGNOSIS — F31.81 BIPOLAR II DISORDER (HCC): ICD-10-CM

## 2023-01-24 DIAGNOSIS — F33.1 MODERATE EPISODE OF RECURRENT MAJOR DEPRESSIVE DISORDER (HCC): Primary | ICD-10-CM

## 2023-01-24 RX ORDER — BUPROPION HYDROCHLORIDE 300 MG/1
300 TABLET ORAL DAILY
Qty: 30 TABLET | Refills: 3 | Status: SHIPPED | OUTPATIENT
Start: 2023-01-24

## 2023-01-25 ENCOUNTER — TELEPHONE (OUTPATIENT)
Dept: PSYCHIATRY | Facility: CLINIC | Age: 43
End: 2023-01-25
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
## 2023-01-25 NOTE — TELEPHONE ENCOUNTER
Called patient regarding referral to be placed on proper wait list  LVM for patient to call intake dept ([phone]) back

## 2023-02-08 DIAGNOSIS — F31.81 BIPOLAR II DISORDER (HCC): ICD-10-CM

## 2023-02-08 RX ORDER — LAMOTRIGINE 200 MG/1
TABLET ORAL
Qty: 30 TABLET | Refills: 3 | Status: SHIPPED | OUTPATIENT
Start: 2023-02-08 | End: 2023-02-09

## 2023-02-09 DIAGNOSIS — F31.81 BIPOLAR II DISORDER (HCC): ICD-10-CM

## 2023-02-09 RX ORDER — LAMOTRIGINE 200 MG/1
TABLET ORAL
Qty: 30 TABLET | Refills: 3 | Status: SHIPPED | OUTPATIENT
Start: 2023-02-09

## 2023-02-10 DIAGNOSIS — F41.1 GAD (GENERALIZED ANXIETY DISORDER): ICD-10-CM

## 2023-02-10 DIAGNOSIS — F90.0 ATTENTION DEFICIT HYPERACTIVITY DISORDER (ADHD), PREDOMINANTLY INATTENTIVE TYPE: ICD-10-CM

## 2023-02-10 NOTE — TELEPHONE ENCOUNTER
Medication Refill Request     Name of Medication Alprazolam 1 mg  Dose/Frequency 1qid prn anxiety  Quantity 120  Verified pharmacy   [x]  Verified ordering Provider   [x]  Does patient have enough for the next 3 days? Yes [x] No []  Does patient have a follow-up appointment scheduled? Yes [x] No []   If so when is appointment: 3/8/2023    Medication Refill Request     Name of Medication Adderall 15 mg   Dose/Frequency 1bid  Quantity 60  Verified pharmacy   [x]  Verified ordering Provider   [x]  Does patient have enough for the next 3 days? Yes [] No [x]  Does patient have a follow-up appointment scheduled?  Yes [x] No []   If so when is appointment: 3/8/2023

## 2023-02-11 RX ORDER — ALPRAZOLAM 1 MG/1
1 TABLET ORAL 4 TIMES DAILY PRN
Qty: 120 TABLET | Refills: 0 | Status: SHIPPED | OUTPATIENT
Start: 2023-02-11

## 2023-02-11 RX ORDER — DEXTROAMPHETAMINE SACCHARATE, AMPHETAMINE ASPARTATE, DEXTROAMPHETAMINE SULFATE AND AMPHETAMINE SULFATE 3.75; 3.75; 3.75; 3.75 MG/1; MG/1; MG/1; MG/1
15 TABLET ORAL 2 TIMES DAILY
Qty: 60 TABLET | Refills: 0 | Status: SHIPPED | OUTPATIENT
Start: 2023-02-11

## 2023-02-16 ENCOUNTER — OFFICE VISIT (OUTPATIENT)
Dept: FAMILY MEDICINE CLINIC | Facility: CLINIC | Age: 43
End: 2023-02-16

## 2023-02-16 VITALS
WEIGHT: 136 LBS | SYSTOLIC BLOOD PRESSURE: 120 MMHG | DIASTOLIC BLOOD PRESSURE: 80 MMHG | BODY MASS INDEX: 24.09 KG/M2 | HEART RATE: 67 BPM | RESPIRATION RATE: 16 BRPM | OXYGEN SATURATION: 98 % | TEMPERATURE: 98.9 F

## 2023-02-16 DIAGNOSIS — N30.00 ACUTE CYSTITIS WITHOUT HEMATURIA: Primary | ICD-10-CM

## 2023-02-16 DIAGNOSIS — F31.81 BIPOLAR II DISORDER (HCC): ICD-10-CM

## 2023-02-16 DIAGNOSIS — R06.02 SHORTNESS OF BREATH: ICD-10-CM

## 2023-02-16 DIAGNOSIS — J45.30 MILD PERSISTENT ASTHMA WITHOUT COMPLICATION: ICD-10-CM

## 2023-02-16 DIAGNOSIS — R09.82 POST-NASAL DRIP: ICD-10-CM

## 2023-02-16 DIAGNOSIS — N89.8 VAGINAL ITCHING: ICD-10-CM

## 2023-02-16 DIAGNOSIS — N39.0 RECURRENT UTI: ICD-10-CM

## 2023-02-16 LAB
SL AMB  POCT GLUCOSE, UA: ABNORMAL
SL AMB LEUKOCYTE ESTERASE,UA: ABNORMAL
SL AMB POCT BILIRUBIN,UA: ABNORMAL
SL AMB POCT BLOOD,UA: ABNORMAL
SL AMB POCT CLARITY,UA: CLEAR
SL AMB POCT COLOR,UA: YELLOW
SL AMB POCT KETONES,UA: ABNORMAL
SL AMB POCT NITRITE,UA: ABNORMAL
SL AMB POCT PH,UA: 8
SL AMB POCT SPECIFIC GRAVITY,UA: 1.02
SL AMB POCT URINE PROTEIN: ABNORMAL
SL AMB POCT UROBILINOGEN: 0.2

## 2023-02-16 RX ORDER — ALBUTEROL SULFATE 90 UG/1
2 AEROSOL, METERED RESPIRATORY (INHALATION) EVERY 6 HOURS PRN
Qty: 6.7 G | Refills: 2 | Status: SHIPPED | OUTPATIENT
Start: 2023-02-16

## 2023-02-16 RX ORDER — FLUTICASONE PROPIONATE 50 MCG
2 SPRAY, SUSPENSION (ML) NASAL DAILY
Qty: 9.9 ML | Refills: 2 | Status: SHIPPED | OUTPATIENT
Start: 2023-02-16

## 2023-02-16 RX ORDER — FLUCONAZOLE 150 MG/1
TABLET ORAL
COMMUNITY
Start: 2022-11-16

## 2023-02-16 RX ORDER — FLUCONAZOLE 150 MG/1
150 TABLET ORAL
Qty: 1 TABLET | Refills: 0 | Status: SHIPPED | OUTPATIENT
Start: 2023-02-16 | End: 2023-02-20

## 2023-02-16 RX ORDER — FLUTICASONE FUROATE AND VILANTEROL 100; 25 UG/1; UG/1
1 POWDER RESPIRATORY (INHALATION) DAILY
Qty: 60 EACH | Refills: 2 | Status: SHIPPED | OUTPATIENT
Start: 2023-02-16

## 2023-02-16 RX ORDER — BUPROPION HYDROCHLORIDE 150 MG/1
TABLET ORAL
COMMUNITY
Start: 2023-02-08 | End: 2023-02-16

## 2023-02-16 RX ORDER — SULFAMETHOXAZOLE AND TRIMETHOPRIM 800; 160 MG/1; MG/1
1 TABLET ORAL 2 TIMES DAILY
Qty: 10 TABLET | Refills: 0 | Status: SHIPPED | OUTPATIENT
Start: 2023-02-16 | End: 2023-02-21

## 2023-02-16 RX ORDER — TOBRAMYCIN AND DEXAMETHASONE 3; 1 MG/ML; MG/ML
SUSPENSION/ DROPS OPHTHALMIC
COMMUNITY
Start: 2022-12-15 | End: 2023-02-16

## 2023-02-16 RX ORDER — BUSPIRONE HYDROCHLORIDE 10 MG/1
TABLET ORAL
COMMUNITY
Start: 2023-02-08

## 2023-02-16 NOTE — PROGRESS NOTES
Assessment/Plan:    1  Acute cystitis without hematuria  -     sulfamethoxazole-trimethoprim (BACTRIM DS) 800-160 mg per tablet; Take 1 tablet by mouth 2 (two) times a day for 5 days  -     POCT urine dip auto non-scope  -     Urine culture    2  Vaginal itching  -     fluconazole (DIFLUCAN) 150 mg tablet; Take 1 tablet (150 mg total) by mouth every third day for 2 doses    3  Recurrent UTI  -     US kidney and bladder; Future; Expected date: 02/16/2023  -     Ambulatory Referral to Urology; Future    4  Bipolar II disorder [facility])  Assessment & Plan:  Managed by psycharist      5  Mild persistent asthma without complication  -     Fluticasone Furoate-Vilanterol (Breo Ellipta) 100-25 mcg/actuation inhaler; Inhale 1 puff daily Rinse mouth after use  -     albuterol (Proventil HFA) 90 mcg/act inhaler; Inhale 2 puffs every 6 (six) hours as needed for wheezing or shortness of breath    6  Shortness of breath  -     albuterol (Proventil HFA) 90 mcg/act inhaler; Inhale 2 puffs every 6 (six) hours as needed for wheezing or shortness of breath    7  Post-nasal drip  -     fluticasone (FLONASE) 50 mcg/act nasal spray; 2 sprays into each nostril daily          Patient Instructions: Take antibiotics until finished with food  Drink plenty of fluids  Follow up advised for persistent urinary symptoms or if you develop flank pain, fevers, nausea, or vomiting  Please call the office if symptoms do not improve after completion of the antibiotics  Supportive care discussed and advised  Advised to RTO for any worsening and no improvement  Follow up for no improvement and worsening of conditions  Patient advised and educated when to see immediate medical care  Return if symptoms worsen or fail to improve  Future Appointments   Date Time Provider Billie Osborn   3/8/2023  9:30 AM Luis Junior, PhD [facility]           Subjective:      Patient ID: Julio Lowe is a 38 y o  female      Chief Complaint Patient presents with   • Urinary Tract Infection     Sas/cma         Vitals:  /80   Pulse 67   Temp 98 9 °F (37 2 °C)   Resp 16   Wt 61 7 kg (136 lb)   SpO2 98%   BMI 24 09 kg/m²     HPI  Patient stated that was seen in ER in January 2023 for UTI and stated that was treated with keflex and thinks that it did not get better and taking keflex from last 5 days but still having symptoms as dysuria, back pain, difficulty urinating and bloating and discomfort in lower stomach  Denies fever, chills and hemturia  Stated that from last couple of months stated that having recurrent UTIs  Patient does not smoke but does vape  Also prone to get vaginal yeast infections with antibiotic use  Needs refill on her asthma medications      PHQ-2/9 Depression Screening    Little interest or pleasure in doing things: 0 - not at all  Feeling down, depressed, or hopeless: 3 - nearly every day  Trouble falling or staying asleep, or sleeping too much: 2 - more than half the days  Feeling tired or having little energy: 3 - nearly every day  Poor appetite or overeating: 3 - nearly every day  Feeling bad about yourself - or that you are a failure or have let yourself or your family down: 3 - nearly every day  Trouble concentrating on things, such as reading the newspaper or watching television: 0 - not at all  Moving or speaking so slowly that other people could have noticed  Or the opposite - being so fidgety or restless that you have been moving around a lot more than usual: 0 - not at all  Thoughts that you would be better off dead, or of hurting yourself in some way: 0 - not at all  PHQ-2 Score: 3  PHQ-2 Interpretation: POSITIVE depression screen  PHQ-9 Score: 14   PHQ-9 Interpretation: Moderate depression        Depression Screening Follow-up Plan: Patient's depression screening was positive with a PHQ-2 score of 3  Their PHQ-9 score was 14   Continue regular follow-up with their psychologist/therapist/psychiatrist who is managing their mental health condition(s)  The following portions of the patient's history were reviewed and updated as appropriate: allergies, current medications, past family history, past medical history, past social history, past surgical history and problem list       Review of Systems   Constitutional: Negative for chills, diaphoresis, fatigue, fever and unexpected weight change  Respiratory: Negative  Cardiovascular: Negative  Gastrointestinal: Positive for abdominal pain  Negative for nausea and vomiting  Genitourinary: Positive for difficulty urinating and dysuria  Negative for decreased urine volume, flank pain, frequency, genital sores, hematuria and urgency  Musculoskeletal: Positive for back pain  Skin: Negative  Neurological: Negative for dizziness and headaches  Objective:    Social History     Tobacco Use   Smoking Status Former   • Packs/day: 1 00   • Types: Cigarettes   • Start date:    • Quit date:    • Years since quittin    Smokeless Tobacco Never       Allergies:    Allergies   Allergen Reactions   • Doxycycline Other (See Comments)     unknown         Current Outpatient Medications   Medication Sig Dispense Refill   • albuterol (Proventil HFA) 90 mcg/act inhaler Inhale 2 puffs every 6 (six) hours as needed for wheezing or shortness of breath 6 7 g 2   • ALPRAZolam (XANAX) 1 mg tablet Take 1 tablet (1 mg total) by mouth 4 (four) times a day as needed for anxiety 120 tablet 0   • amphetamine-dextroamphetamine (ADDERALL, 15MG,) 15 MG tablet Take 1 tablet (15 mg total) by mouth 2 (two) times a day Max Daily Amount: 30 mg 60 tablet 0   • buPROPion (Wellbutrin XL) 300 mg 24 hr tablet Take 1 tablet (300 mg total) by mouth daily 30 tablet 3   • busPIRone (BUSPAR) 10 mg tablet      • fluconazole (DIFLUCAN) 150 mg tablet Take 1 tablet (150 mg total) by mouth every third day for 2 doses 1 tablet 0   • fluticasone (FLONASE) 50 mcg/act nasal spray 2 sprays into each nostril daily 9 9 mL 2   • Fluticasone Furoate-Vilanterol (Breo Ellipta) 100-25 mcg/actuation inhaler Inhale 1 puff daily Rinse mouth after use  60 each 2   • ketoconazole (NIZORAL) 2 % shampoo      • lamoTRIgine (LaMICtal) 200 MG tablet TAKE ONE TABLET BY MOUTH EVERY DAY AT BEDTIME 30 tablet 3   • mometasone (ELOCON) 0 1 % lotion      • sulfamethoxazole-trimethoprim (BACTRIM DS) 800-160 mg per tablet Take 1 tablet by mouth 2 (two) times a day for 5 days 10 tablet 0   • benzonatate (TESSALON) 200 MG capsule Take 1 capsule (200 mg total) by mouth 3 (three) times a day as needed for cough (Patient not taking: Reported on 2/16/2023) 20 capsule 0   • fluconazole (DIFLUCAN) 150 mg tablet  (Patient not taking: Reported on 2/16/2023)     • ofloxacin (OCUFLOX) 0 3 % ophthalmic solution  (Patient not taking: Reported on 9/12/2022)     • ondansetron (ZOFRAN) 4 mg tablet Take 1 tablet (4 mg total) by mouth every 8 (eight) hours as needed for nausea or vomiting (Patient not taking: Reported on 9/12/2022) 30 tablet 1     No current facility-administered medications for this visit  Physical Exam  Vitals reviewed  Constitutional:       Appearance: She is well-developed  Cardiovascular:      Rate and Rhythm: Normal rate and regular rhythm  Heart sounds: Normal heart sounds  Pulmonary:      Effort: Pulmonary effort is normal       Breath sounds: Normal breath sounds  Abdominal:      General: Bowel sounds are normal       Palpations: Abdomen is soft  Tenderness: There is abdominal tenderness in the suprapubic area  Skin:     General: Skin is warm and dry  Neurological:      Mental Status: She is alert and oriented to person, place, and time  Psychiatric:         Behavior: Behavior normal          Thought Content:  Thought content normal          Judgment: Judgment normal              Recent Results (from the past 24 hour(s))   POCT urine dip auto non-scope    Collection Time: 02/16/23  3:30 PM   Result Value Ref Range     COLOR,UA YELLOW     CLARITY,UA CLEAR     SPECIFIC GRAVITY,UA 1 020      PH,UA 8 0     LEUKOCYTE ESTERASE,UA NEG     NITRITE,UA NEG     GLUCOSE, UA NEG     KETONES,UA NEG     BILIRUBIN,UA NEG     BLOOD,UA NEG     POCT URINE PROTEIN TRACE     SL AMB POCT UROBILINOGEN 0 2              Tiffany Garcia, ROSE

## 2023-02-16 NOTE — PATIENT INSTRUCTIONS
Sulfamethoxazole/Trimethoprim (By mouth)   Sulfamethoxazole (sul-fa-meth-OX-a-zole), Trimethoprim (trye-METH-oh-prim)  Treats or prevents infections  Brand Name(s): Bactrim, Bactrim DS, SMZ-TMP Pediatric, Sulfatrim Pediatric   There may be other brand names for this medicine  When This Medicine Should Not Be Used: This medicine is not right for everyone  Do not use it if you had an allergic reaction to trimethoprim, sulfamethoxazole, or any sulfa drug  Do not use this medicine if you are pregnant, if you have kidney disease, liver disease, anemia caused by low levels of folic acid, or if you have a history of drug-induced thrombocytopenia  How to Use This Medicine:   Liquid, Tablet  Your doctor will tell you how much medicine to use  Do not use more than directed  Oral liquid: Measure the oral liquid medicine with a marked measuring spoon, oral syringe, or medicine cup  Take all of the medicine in your prescription to clear up your infection, even if you feel better after the first few doses  Drink extra fluids so you will urinate more often and help prevent kidney problems  Missed dose: Take a dose as soon as you remember  If it is almost time for your next dose, wait until then and take a regular dose  Do not take extra medicine to make up for a missed dose  Store the medicine in a closed container at room temperature, away from heat, moisture, and direct light  Do not freeze the oral liquid  Drugs and Foods to Avoid:   Ask your doctor or pharmacist before using any other medicine, including over-the-counter medicines, vitamins, and herbal products  Do not use this medicine if you are also using dofetilide  Do not use this medicine for Pneumocystis jiroveci pneumonia (PCP) if you are also using leucovorin  Some medicines can affect how this medicine works   Tell your doctor if you also use the following:   Amantadine, cyclosporine, digoxin, indomethacin, memantine, methotrexate, phenytoin, procainamide, pyrimethamine, warfarin, zidovudine  Blood pressure medicine (including an ACE inhibitor)  Diabetes medicine (including glipizide, glyburide, metformin, pioglitazone, repaglinide, rosiglitazone)  Diuretic (water pill, including acetazolamide, hydrochlorothiazide)  Medicine to prevent seizures  Medicine to treat depression (including TCAs)  Warnings While Using This Medicine: It is not safe to take this medicine during pregnancy  It could harm an unborn baby  Tell your doctor right away if you become pregnant  Tell your doctor if you are breastfeeding, or if you have diabetes, malabsorption or malnutrition, folate deficiency, G6PD deficiency, porphyria, thyroid problems, or a history of alcoholism  Tell your doctor if you have asthma or severe allergies, especially if you are allergic to any medicines  It is important for your doctor to know if you have HIV or AIDS, because this medicine might work differently for you  This medicine may cause the following problems:   Serious skin reactions, including Hargrove-Jovanny syndrome, toxic epidermal necrolysis, drug reaction with eosinophilia and systemic symptoms (DRESS), acute generalized exanthematous pustulosis (AGEP), or acute febrile neutrophilic dermatosis (AFND)  Liver problems  Hypoglycemia (low blood sugar)  This medicine lowers the number of certain blood cells, so you may bleed or bruise more easily  Be careful to avoid injuries  This medicine can cause diarrhea  Call your doctor if the diarrhea becomes severe, does not stop, or is bloody  Do not take any medicine to stop diarrhea until you have talked to your doctor  Diarrhea can occur 2 months or more after you stop taking this medicine  Tell any doctor or dentist who treats you that you are using this medicine  This medicine may affect certain medical test results  Your doctor will do lab tests at regular visits to check on the effects of this medicine  Keep all appointments    Keep all medicine out of the reach of children  Never share your medicine with anyone  Possible Side Effects While Using This Medicine:   Call your doctor right away if you notice any of these side effects: Allergic reaction: Itching or hives, swelling in your face or hands, swelling or tingling in your mouth or throat, chest tightness, trouble breathing  Blistering, peeling, red skin rash  Change in how much or how often you urinate, painful urination, lower back or side pain, blood in the urine  Dark urine or pale stools, nausea, vomiting, loss of appetite, stomach pain, yellow skin or eyes  Chest pain, cough, or trouble breathing  Confusion, weakness, muscle twitching, seizures  Fainting, dizziness, lightheadedness  Fast, pounding, or uneven heartbeat  Numbness, tingling, or burning pain in your hands, arms, legs, or feet  Severe diarrhea, stomach pain, cramps, bloating  Skin rash, purple spots on your skin, or very pale or yellow skin  Sore throat, fever, muscle pain  Unusual bleeding, bruising, or weakness  If you notice these less serious side effects, talk with your doctor:   Headache  If you notice other side effects that you think are caused by this medicine, tell your doctor  Call your doctor for medical advice about side effects  You may report side effects to FDA at 1-297-FDA-8014    © Copyright [employer] [facility] 2022 Information is for End User's use only and may not be sold, redistributed or otherwise used for commercial purposes  The above information is an  only  It is not intended as medical advice for individual conditions or treatments  Talk to your doctor, nurse or pharmacist before following any medical regimen to see if it is safe and effective for you

## 2023-02-18 LAB
BACTERIA UR CULT: NORMAL
Lab: NO GROWTH

## 2023-02-21 ENCOUNTER — HOSPITAL ENCOUNTER (OUTPATIENT)
Dept: RADIOLOGY | Facility: HOSPITAL | Age: 43
Discharge: HOME | End: 2023-02-21

## 2023-02-21 DIAGNOSIS — N39.0 RECURRENT UTI: ICD-10-CM

## 2023-03-01 DIAGNOSIS — N39.0 RECURRENT UTI: Primary | ICD-10-CM

## 2023-03-08 ENCOUNTER — TELEMEDICINE (OUTPATIENT)
Dept: PSYCHIATRY | Facility: CLINIC | Age: 43
End: 2023-03-08

## 2023-03-08 DIAGNOSIS — F31.81 BIPOLAR II DISORDER (HCC): ICD-10-CM

## 2023-03-08 DIAGNOSIS — F41.1 GAD (GENERALIZED ANXIETY DISORDER): ICD-10-CM

## 2023-03-08 DIAGNOSIS — F90.0 ATTENTION DEFICIT HYPERACTIVITY DISORDER (ADHD), PREDOMINANTLY INATTENTIVE TYPE: Primary | ICD-10-CM

## 2023-03-08 DIAGNOSIS — F41.0 PANIC DISORDER WITHOUT AGORAPHOBIA WITH MODERATE PANIC ATTACKS: ICD-10-CM

## 2023-03-08 DIAGNOSIS — F33.1 MODERATE EPISODE OF RECURRENT MAJOR DEPRESSIVE DISORDER (HCC): ICD-10-CM

## 2023-03-08 DIAGNOSIS — F33.1 MODERATE EPISODE OF RECURRENT MAJOR DEPRESSIVE DISORDER (HCC): Primary | ICD-10-CM

## 2023-03-08 RX ORDER — BUSPIRONE HYDROCHLORIDE 10 MG/1
10 TABLET ORAL 2 TIMES DAILY
Qty: 60 TABLET | Refills: 3 | Status: SHIPPED | OUTPATIENT
Start: 2023-03-08

## 2023-03-08 RX ORDER — BUPROPION HYDROCHLORIDE 150 MG/1
150 TABLET ORAL DAILY
Qty: 30 TABLET | Refills: 3 | Status: SHIPPED | OUTPATIENT
Start: 2023-03-08

## 2023-03-08 RX ORDER — BUPROPION HYDROCHLORIDE 150 MG/1
TABLET ORAL
Qty: 30 TABLET | Refills: 3 | Status: SHIPPED | OUTPATIENT
Start: 2023-03-08 | End: 2023-03-08 | Stop reason: SDUPTHER

## 2023-03-08 NOTE — PSYCH
Virtual Regular Visit    Problem List Items Addressed This Visit        Other    Attention deficit hyperactivity disorder (ADHD), predominantly inattentive type - Primary    Bipolar II disorder ([facility] [address] )    RANJIT (generalized anxiety disorder)    Panic disorder without agoraphobia with moderate panic attacks     Reason for visit is   Chief Complaint   Patient presents with   • ADHD   • Anxiety   • Depression   • Medication Management   • Panic Attack   • Mood Swings     Encounter provider Kodi Hurst, PhD    Provider located at [address] [place] [address]  [address]  [place] [address]  [phone]    Recent Visits  No visits were found meeting these conditions  Showing recent visits within past 7 days and meeting all other requirements  Today's Visits  Date Type Provider Dept   03/08/23 Telemedicine Kodi Hurst, PhD [facility]   Showing today's visits and meeting all other requirements  Future Appointments  No visits were found meeting these conditions  Showing future appointments within next 150 days and meeting all other requirements       After connecting through [employer], the patient was identified by name and date of birth  Saad Lloyd was informed that this is a telemedicine visit and that the visit is being conducted through the [address] [employer] platform  She agrees to proceed  which may not be secure and therefore, might not be HIPAA-compliant  My office door was closed  No one else was in the room  She acknowledged consent and understanding of privacy and security of the video platform  The patient has agreed to participate and understands they can discontinue the visit at any time  SUBJECTIVE:    Saad Lloyd is a 38 y o  female with a history of ADHD, panic with anxiety, bipolar disorder seen for seen for medication management and mood assessment    Isabel reports being upset because her  is not helping her pay bills or working extra  Having some conflict because he does not want to work extra and he spends money she works extra and [employer]  She is trying to get him to obtain a therapist   She reports being stressed however is coping  Appetite is fair and she is sleeping  Takes medication as prescribed  HPI ROS Appetite Changes and Sleep: normal appetite and normal energy level    Review Of Systems:     Mood Anxiety and Depression   Behavior Normal    Thought Content Disturbing Thoughts, Feelings   General Relationship Problems, Emotional Problems and Sleep Disturbances   Personality Normal   Other Psych Symptoms ADHD   Constitutional As Noted in HPI   ENT As Noted in HPI   Cardiovascular As Noted in HPI   Respiratory As Noted in HPI   Gastrointestinal As Noted in HPI   Genitourinary As Noted in HPI   Musculoskeletal As Noted in HPI   Integumentary As Noted in HPI   Neurological As Noted in HPI   Endocrine Normal    Other Symptoms Normal        Substance Abuse History:    Social History     Substance and Sexual Activity   Drug Use No       Family Psychiatric History:     Family History   Problem Relation Age of Onset   • No Known Problems Mother    • No Known Problems Father    • Bipolar disorder Sister    • No Known Problems Brother    • Anxiety disorder Daughter    • Depression Daughter    • No Known Problems Son        Social History     Socioeconomic History   • Marital status: /Civil Union     Spouse name: Not on file   • Number of children: 2   • Years of education: Not on file   • Highest education level: Not on file   Occupational History   • Not on file   Tobacco Use   • Smoking status: Former     Packs/day: 1 00     Types: Cigarettes     Start date:      Quit date:      Years since quittin 1   • Smokeless tobacco: Never   Vaping Use   • Vaping Use: Every day   • Substances: Nicotine, Flavoring   Substance and Sexual Activity   • Alcohol use:  Yes     Alcohol/week: 2 0 standard drinks     Types: 2 Glasses of wine per week     Comment: occassional - wine 2 x month   • Drug use: No   • Sexual activity: Yes     Partners: Male     Birth control/protection: Female Sterilization     Comment: ablation   Other Topics Concern   • Not on file   Social History Narrative   • Not on file     Social Determinants of Health     Financial Resource Strain: Not on file   Food Insecurity: Not on file   Transportation Needs: Not on file   Physical Activity: Not on file   Stress: Not on file   Social Connections: Not on file   Intimate Partner Violence: Not on file   Housing Stability: Not on file       Past Medical History:   Diagnosis Date   • Abdominal pain    • ADHD    • Allergic rhinitis    • Anxiety     panic attacks   • Anxiety    • Asthma    • Bipolar disorder ([facility] [address] )    • Bladder distention     came to the ED on 10/9/2020-greene in to drain then removed   • Chronic constipation    • Colon polyp    • Contact lens/glasses fitting     and glasses   • Depression    • Depression    • GERD (gastroesophageal reflux disease)    • Psychiatric disorder     anxiety    • Rectal bleeding    • Sepsis ([facility] ) 2/8/2017       Past Surgical History:   Procedure Laterality Date   • APPENDECTOMY     • BREAST SURGERY      augmentation silicone   • COLONOSCOPY N/A 1/24/2018    Procedure: COLONOSCOPY WITH HEMORRHOID BANDING;  Surgeon: Milton Lou MD;  Location: [place] [address] GI LAB; Service: Gastroenterology   • ENDOMETRIAL ABLATION  2016   • WI SIGMOIDOSCOPY FLX DX W/COLLJ SPEC BR/WA IF PFRMD N/A 11/14/2018    Procedure: FLEXIBLE SIGMOIDOSCOPY WITH APC; Surgeon: Milton Lou MD;  Location: [facility];   Service: Gastroenterology   • UPPER GASTROINTESTINAL ENDOSCOPY         Current Outpatient Medications   Medication Sig Dispense Refill   • albuterol (Proventil HFA) 90 mcg/act inhaler Inhale 2 puffs every 6 (six) hours as needed for wheezing or shortness of breath 6 7 g 2   • ALPRAZolam (XANAX) 1 mg tablet Take 1 tablet (1 mg total) by mouth 4 (four) times a day as needed for anxiety 120 tablet 0   • amphetamine-dextroamphetamine (ADDERALL, 15MG,) 15 MG tablet Take 1 tablet (15 mg total) by mouth 2 (two) times a day Max Daily Amount: 30 mg 60 tablet 0   • benzonatate (TESSALON) 200 MG capsule Take 1 capsule (200 mg total) by mouth 3 (three) times a day as needed for cough (Patient not taking: Reported on 2/16/2023) 20 capsule 0   • buPROPion (WELLBUTRIN XL) 150 mg 24 hr tablet TAKE ONE TABLET BY MOUTH EVERY DAY 30 tablet 3   • buPROPion (Wellbutrin XL) 300 mg 24 hr tablet Take 1 tablet (300 mg total) by mouth daily 30 tablet 3   • busPIRone (BUSPAR) 10 mg tablet      • fluconazole (DIFLUCAN) 150 mg tablet  (Patient not taking: Reported on 2/16/2023)     • fluticasone (FLONASE) 50 mcg/act nasal spray 2 sprays into each nostril daily 9 9 mL 2   • Fluticasone Furoate-Vilanterol (Breo Ellipta) 100-25 mcg/actuation inhaler Inhale 1 puff daily Rinse mouth after use  60 each 2   • ketoconazole (NIZORAL) 2 % shampoo      • lamoTRIgine (LaMICtal) 200 MG tablet TAKE ONE TABLET BY MOUTH EVERY DAY AT BEDTIME 30 tablet 3   • mometasone (ELOCON) 0 1 % lotion      • ofloxacin (OCUFLOX) 0 3 % ophthalmic solution  (Patient not taking: Reported on 9/12/2022)     • ondansetron (ZOFRAN) 4 mg tablet Take 1 tablet (4 mg total) by mouth every 8 (eight) hours as needed for nausea or vomiting (Patient not taking: Reported on 9/12/2022) 30 tablet 1     No current facility-administered medications for this visit          Allergies   Allergen Reactions   • Doxycycline Other (See Comments)     unknown       The following portions of the patient's history were reviewed and updated as appropriate: allergies, current medications, past family history, past medical history, past social history, past surgical history and problem list     OBJECTIVE:     Mental Status Examination:    Appearance adequate hygiene and grooming, restless and fidgety and good eye contact    Mood anxious   Affect affect appropriate    Speech a normal rate   Thought Processes normal thought processes   Hallucinations no hallucinations present    Thought Content no delusions   Abnormal Thoughts no suicidal thoughts  and no homicidal thoughts    Orientation  oriented to person and place and time   Remote Memory short term memory intact and long term memory intact   Attention Span concentration impaired medication helps   Intellect Appears to be of Average Intelligence   Insight Insight intact   Judgement judgment was intact   Muscle Strength Muscle strength and tone were normal   Language no difficulty naming common objects   Fund of Knowledge displays adequate knowledge of current events   Pain none   Pain Scale 0       Laboratory Results: No results found for this or any previous visit      Assessment/Plan:       Diagnoses and all orders for this visit:    Attention deficit hyperactivity disorder (ADHD), predominantly inattentive type    Bipolar II disorder (HCC)    RANJIT (generalized anxiety disorder)    Panic disorder without agoraphobia with moderate panic attacks          Treatment Recommendations- Risks Benefits      Immediate Medical/Psychiatric/Psychotherapy Treatments and Any Precautions: Continue medication management and treatment plan    Risks, Benefits And Possible Side Effects Of Medications:  {PSYCH RISK, BENEFITS AND POSSIBLE SIDE EFFECTS (Optional):43697    Controlled Medication Discussion: aware of safe use and storege of medications     Psychotherapy Provided: 30 min  Supportive therapy  Medication evaluation  Mood assessment  Focus and concentration assessment    Goals discussed in session: increase coping skills         Treatment Plan:    Completed and signed during the session: Not applicable - Treatment Plan not due at this session        Catherine Nova, PhD 03/08/23

## 2023-03-13 DIAGNOSIS — F33.1 MODERATE EPISODE OF RECURRENT MAJOR DEPRESSIVE DISORDER (HCC): ICD-10-CM

## 2023-03-13 DIAGNOSIS — F41.1 GAD (GENERALIZED ANXIETY DISORDER): ICD-10-CM

## 2023-03-13 DIAGNOSIS — F90.0 ATTENTION DEFICIT HYPERACTIVITY DISORDER (ADHD), PREDOMINANTLY INATTENTIVE TYPE: ICD-10-CM

## 2023-03-13 NOTE — TELEPHONE ENCOUNTER
Medication Refill Request     Name of Medication 1qid prn anxiety  Dose/Frequency Xanax 1 mg  Quantity 120  Verified pharmacy   [x]  Verified ordering Provider   [x]  Does patient have enough for the next 3 days? Yes [] No [x]  Does patient have a follow-up appointment scheduled? Yes [] No [x]   If so when is appointment: Patient will call back to schedule an appointment    Medication Refill Request     Name of Medication Adderall 15 mg  Dose/Frequency 1bid  Quantity 60  Verified pharmacy   [x]  Verified ordering Provider   [x]  Does patient have enough for the next 3 days? Yes [] No [x]  Does patient have a follow-up appointment scheduled?  Yes [] No [x]   If so when is appointment: Patient will call back to schedule an appointment

## 2023-03-14 RX ORDER — ALPRAZOLAM 1 MG/1
1 TABLET ORAL 4 TIMES DAILY PRN
Qty: 120 TABLET | Refills: 0 | Status: SHIPPED | OUTPATIENT
Start: 2023-03-14

## 2023-03-14 RX ORDER — DEXTROAMPHETAMINE SACCHARATE, AMPHETAMINE ASPARTATE, DEXTROAMPHETAMINE SULFATE AND AMPHETAMINE SULFATE 3.75; 3.75; 3.75; 3.75 MG/1; MG/1; MG/1; MG/1
15 TABLET ORAL 2 TIMES DAILY
Qty: 60 TABLET | Refills: 0 | Status: SHIPPED | OUTPATIENT
Start: 2023-03-14

## 2023-04-24 ENCOUNTER — APPOINTMENT (EMERGENCY)
Dept: RADIOLOGY | Facility: HOSPITAL | Age: 43
End: 2023-04-24

## 2023-04-24 ENCOUNTER — HOSPITAL ENCOUNTER (EMERGENCY)
Facility: HOSPITAL | Age: 43
Discharge: HOME | End: 2023-04-24
Attending: EMERGENCY MEDICINE

## 2023-04-24 VITALS
RESPIRATION RATE: 20 BRPM | OXYGEN SATURATION: 96 % | WEIGHT: 135 LBS | BODY MASS INDEX: 23.91 KG/M2 | HEART RATE: 101 BPM | TEMPERATURE: 99 F | SYSTOLIC BLOOD PRESSURE: 117 MMHG | DIASTOLIC BLOOD PRESSURE: 82 MMHG

## 2023-04-24 DIAGNOSIS — R07.9 CHEST PAIN: Primary | ICD-10-CM

## 2023-04-24 DIAGNOSIS — J40 BRONCHITIS: ICD-10-CM

## 2023-04-24 LAB
ALBUMIN SERPL BCP-MCNC: 4.2 G/DL (ref 3.5–5)
ALP SERPL-CCNC: 26 U/L (ref 34–104)
ALT SERPL W P-5'-P-CCNC: 24 U/L (ref 7–52)
ANION GAP SERPL CALCULATED.3IONS-SCNC: 6 MMOL/L (ref 4–13)
APTT PPP: 26 SECONDS (ref 23–37)
AST SERPL W P-5'-P-CCNC: 22 U/L (ref 13–39)
BASOPHILS # BLD AUTO: 0.04 THOUSANDS/ΜL (ref 0–0.1)
BASOPHILS NFR BLD AUTO: 1 % (ref 0–1)
BILIRUB SERPL-MCNC: 0.42 MG/DL (ref 0.2–1)
BUN SERPL-MCNC: 15 MG/DL (ref 5–25)
CALCIUM SERPL-MCNC: 8.5 MG/DL (ref 8.4–10.2)
CARDIAC TROPONIN I PNL SERPL HS: <2 NG/L
CHLORIDE SERPL-SCNC: 106 MMOL/L (ref 96–108)
CO2 SERPL-SCNC: 26 MMOL/L (ref 21–32)
CREAT SERPL-MCNC: 0.8 MG/DL (ref 0.6–1.3)
D DIMER PPP FEU-MCNC: <0.27 UG/ML FEU
EOSINOPHIL # BLD AUTO: 0.11 THOUSAND/ΜL (ref 0–0.61)
EOSINOPHIL NFR BLD AUTO: 1 % (ref 0–6)
ERYTHROCYTE [DISTWIDTH] IN BLOOD BY AUTOMATED COUNT: 13.3 % (ref 11.6–15.1)
EXT PREGNANCY TEST URINE: NEGATIVE
EXT. CONTROL: NORMAL
FLUAV RNA RESP QL NAA+PROBE: NEGATIVE
FLUBV RNA RESP QL NAA+PROBE: NEGATIVE
GFR SERPL CREATININE-BSD FRML MDRD: 89 ML/MIN/1.73SQ M
GLUCOSE SERPL-MCNC: 129 MG/DL (ref 65–140)
HCT VFR BLD AUTO: 42.8 % (ref 34.8–46.1)
HGB BLD-MCNC: 14.1 G/DL (ref 11.5–15.4)
IMM GRANULOCYTES # BLD AUTO: 0.02 THOUSAND/UL (ref 0–0.2)
IMM GRANULOCYTES NFR BLD AUTO: 0 % (ref 0–2)
INR PPP: 0.97 (ref 0.84–1.19)
LIPASE SERPL-CCNC: 33 U/L (ref 11–82)
LYMPHOCYTES # BLD AUTO: 1.7 THOUSANDS/ΜL (ref 0.6–4.47)
LYMPHOCYTES NFR BLD AUTO: 21 % (ref 14–44)
MCH RBC QN AUTO: 33.2 PG (ref 26.8–34.3)
MCHC RBC AUTO-ENTMCNC: 32.9 G/DL (ref 31.4–37.4)
MCV RBC AUTO: 101 FL (ref 82–98)
MONOCYTES # BLD AUTO: 0.54 THOUSAND/ΜL (ref 0.17–1.22)
MONOCYTES NFR BLD AUTO: 7 % (ref 4–12)
NEUTROPHILS # BLD AUTO: 5.72 THOUSANDS/ΜL (ref 1.85–7.62)
NEUTS SEG NFR BLD AUTO: 70 % (ref 43–75)
NRBC BLD AUTO-RTO: 0 /100 WBCS
PLATELET # BLD AUTO: 300 THOUSANDS/UL (ref 149–390)
PMV BLD AUTO: 9.1 FL (ref 8.9–12.7)
POTASSIUM SERPL-SCNC: 4 MMOL/L (ref 3.5–5.3)
PROT SERPL-MCNC: 6.3 G/DL (ref 6.4–8.4)
PROTHROMBIN TIME: 13 SECONDS (ref 11.6–14.5)
RBC # BLD AUTO: 4.25 MILLION/UL (ref 3.81–5.12)
RSV RNA RESP QL NAA+PROBE: NEGATIVE
SARS-COV-2 RNA RESP QL NAA+PROBE: NEGATIVE
SODIUM SERPL-SCNC: 138 MMOL/L (ref 135–147)
WBC # BLD AUTO: 8.13 THOUSAND/UL (ref 4.31–10.16)

## 2023-04-24 RX ORDER — AZITHROMYCIN 250 MG/1
TABLET, FILM COATED ORAL
Qty: 6 TABLET | Refills: 0 | Status: SHIPPED | OUTPATIENT
Start: 2023-04-24 | End: 2023-04-28

## 2023-04-24 NOTE — ED PROVIDER NOTES
"History  Chief Complaint   Patient presents with   • Shortness of Breath     Since last Wednesday has had sob, exertional dyspnea, pain with inspiration and feels like she is in a \" fog\"  39 y/o female presenting with shortness of breath, chest and upper back discomfort x 6 days  Pain worsens with deep inhalation and with ambulation when she also experienced COLLINS  No calf pain or swelling  Pain is nonradiating  Patient relays she felt similar to when she had COVID before in the past however took two negative covid tests at home  Mild dry nonproductive cough  Past h/o smoking however currently vapes  Initially had generalized body aches  Feels as though she is in a fog  States that she does have a history of anxiety and this may be exacerbating some of her symptoms  Denies N/V/D, abdominal pain, changes in urination, fevers, wheezing  Differential includes but is not limited to acs, electrolyte abnormality, viral illness, bronchitis  Prior to Admission Medications   Prescriptions Last Dose Informant Patient Reported? Taking? ALPRAZolam (XANAX) 1 mg tablet   No No   Sig: Take 1 tablet (1 mg total) by mouth 4 (four) times a day as needed for anxiety   Fluticasone Furoate-Vilanterol (Breo Ellipta) 100-25 mcg/actuation inhaler   No No   Sig: Inhale 1 puff daily Rinse mouth after use     albuterol (Proventil HFA) 90 mcg/act inhaler   No No   Sig: Inhale 2 puffs every 6 (six) hours as needed for wheezing or shortness of breath   amphetamine-dextroamphetamine (ADDERALL, 15MG,) 15 MG tablet   No No   Sig: Take 1 tablet (15 mg total) by mouth 2 (two) times a day Max Daily Amount: 30 mg   benzonatate (TESSALON) 200 MG capsule   No No   Sig: Take 1 capsule (200 mg total) by mouth 3 (three) times a day as needed for cough   Patient not taking: Reported on 2/16/2023   buPROPion (WELLBUTRIN XL) 150 mg 24 hr tablet   No No   Sig: Take 1 tablet (150 mg total) by mouth daily   busPIRone (BUSPAR) 10 mg tablet   No No " Sig: Take 1 tablet (10 mg total) by mouth 2 (two) times a day   fluconazole (DIFLUCAN) 150 mg tablet   Yes No   Patient not taking: Reported on 2023   fluticasone (FLONASE) 50 mcg/act nasal spray   No No   Si sprays into each nostril daily   ketoconazole (NIZORAL) 2 % shampoo   Yes No   lamoTRIgine (LaMICtal) 200 MG tablet   No No   Sig: TAKE ONE TABLET BY MOUTH EVERY DAY AT BEDTIME   mometasone (ELOCON) 0 1 % lotion   Yes No   Patient not taking: Reported on 2023   ofloxacin (OCUFLOX) 0 3 % ophthalmic solution   Yes No   Patient not taking: Reported on 2022   ondansetron (ZOFRAN) 4 mg tablet   No No   Sig: Take 1 tablet (4 mg total) by mouth every 8 (eight) hours as needed for nausea or vomiting   Patient not taking: Reported on 2022      Facility-Administered Medications: None       Past Medical History:   Diagnosis Date   • Abdominal pain    • ADHD    • Allergic rhinitis    • Anxiety     panic attacks   • Anxiety    • Asthma    • Bipolar disorder (HCC)    • Bladder distention     came to the ED on 10/9/2020-greene in to drain then removed   • Chronic constipation    • Colon polyp    • Contact lens/glasses fitting     and glasses   • Depression    • Depression    • GERD (gastroesophageal reflux disease)    • Psychiatric disorder     anxiety    • Rectal bleeding    • Sepsis ([facility] [address] ) 2017       Past Surgical History:   Procedure Laterality Date   • APPENDECTOMY     • BREAST SURGERY      augmentation silicone   • COLONOSCOPY N/A 2018    Procedure: COLONOSCOPY WITH HEMORRHOID BANDING;  Surgeon: Matt Hamilton MD;  Location: [place] [address] GI LAB; Service: Gastroenterology   • ENDOMETRIAL ABLATION     • NJ SIGMOIDOSCOPY FLX DX W/COLLJ SPEC BR/WA IF PFRMD N/A 2018    Procedure: FLEXIBLE SIGMOIDOSCOPY WITH APC; Surgeon: Matt Hamilton MD;  Location: [facility] GI LAB;   Service: Gastroenterology   • UPPER GASTROINTESTINAL ENDOSCOPY         Family History   Problem Relation Age of Onset   • No Known Problems Mother    • No Known Problems Father    • Bipolar disorder Sister    • No Known Problems Brother    • Anxiety disorder Daughter    • Depression Daughter    • No Known Problems Son      I have reviewed and agree with the history as documented  E-Cigarette/Vaping   • E-Cigarette Use Current Every Day User    • Comments uses daily      E-Cigarette/Vaping Substances   • Nicotine Yes    • THC No    • CBD No    • Flavoring Yes    • Other No    • Unknown No      Social History     Tobacco Use   • Smoking status: Former     Packs/day:  00     Years: 15      Pack years: 15 00     Types: Cigarettes     Start date: 1995     Quit date: 6/15/2014     Years since quittin 8   • Smokeless tobacco: Never   Vaping Use   • Vaping Use: Every day   • Substances: Nicotine, Flavoring   Substance Use Topics   • Alcohol use: Yes     Alcohol/week: 2 0 standard drinks     Types: 2 Glasses of wine per week     Comment: occassional - wine 2 x month   • Drug use: No       Review of Systems   Constitutional: Negative  Negative for chills, fatigue and fever  HENT: Negative  Negative for congestion, postnasal drip, rhinorrhea and sore throat  Eyes: Negative  Respiratory: Positive for chest tightness and shortness of breath  Negative for apnea, cough, choking, wheezing and stridor  Cardiovascular: Positive for chest pain  Gastrointestinal: Negative  Negative for abdominal pain, diarrhea, nausea and vomiting  Endocrine: Negative  Genitourinary: Negative  Musculoskeletal: Positive for back pain  Negative for arthralgias, gait problem, joint swelling, myalgias, neck pain and neck stiffness  Skin: Negative  Neurological: Negative  Hematological: Negative  Psychiatric/Behavioral: Negative  All other systems reviewed and are negative  Physical Exam  Physical Exam  Vitals and nursing note reviewed  Constitutional:       General: She is not in acute distress       Appearance: Normal appearance  She is well-developed and normal weight  She is not diaphoretic  HENT:      Head: Normocephalic and atraumatic  Right Ear: External ear normal       Left Ear: External ear normal       Nose: Nose normal       Mouth/Throat:      Pharynx: No oropharyngeal exudate  Eyes:      General: No scleral icterus  Right eye: No discharge  Left eye: No discharge  Conjunctiva/sclera: Conjunctivae normal       Pupils: Pupils are equal, round, and reactive to light  Cardiovascular:      Rate and Rhythm: Regular rhythm  Tachycardia present  Pulses: Normal pulses  Heart sounds: Normal heart sounds  No murmur heard  No friction rub  No gallop  Comments: Patient anxious  Pulmonary:      Effort: Pulmonary effort is normal  No respiratory distress  Breath sounds: Normal breath sounds  No stridor  No wheezing, rhonchi or rales  Comments: Speaking full sentences without difficulty, clear breath sounds in all lung fields  Chest:      Chest wall: No tenderness  Abdominal:      General: Abdomen is flat  Bowel sounds are normal  There is no distension  Palpations: Abdomen is soft  There is no mass  Tenderness: There is no abdominal tenderness  There is no guarding or rebound  Hernia: No hernia is present  Musculoskeletal:         General: No swelling, tenderness, deformity or signs of injury  Normal range of motion  Cervical back: Normal range of motion and neck supple  Right lower leg: No edema  Left lower leg: No edema  Comments: No tenderness to the upper back and chest wall  Lymphadenopathy:      Cervical: No cervical adenopathy  Skin:     General: Skin is warm and dry  Capillary Refill: Capillary refill takes less than 2 seconds  Coloration: Skin is not pale  Findings: No erythema or rash  Neurological:      General: No focal deficit present        Mental Status: She is alert and oriented to person, place, and time     Psychiatric:      Comments: Mildly anxious          Vital Signs  ED Triage Vitals   Temperature Pulse Respirations Blood Pressure SpO2   04/24/23 1003 04/24/23 1003 04/24/23 1003 04/24/23 1003 04/24/23 1003   99 °F (37 2 °C) (!) 112 22 129/76 99 %      Temp src Heart Rate Source Patient Position - Orthostatic VS BP Location FiO2 (%)   -- 04/24/23 1100 04/24/23 1100 04/24/23 1100 --    Monitor Lying Left arm       Pain Score       04/24/23 1003       5           Vitals:    04/24/23 1003 04/24/23 1100 04/24/23 1130   BP: 129/76 112/60 122/85   Pulse: (!) 112 87 98   Patient Position - Orthostatic VS:  Lying Lying         Visual Acuity      ED Medications  Medications - No data to display    Diagnostic Studies  Results Reviewed     Procedure Component Value Units Date/Time    D-Dimer [[id number]]  (Normal) Collected: 04/24/23 1051    Lab Status: Final result Specimen: Blood from Arm, Right Updated: 04/24/23 1126     D-Dimer, Quant <0 27 ug/ml FEU     Protime-INR [[id number]]  (Normal) Collected: 04/24/23 1051    Lab Status: Final result Specimen: Blood from Arm, Right Updated: 04/24/23 1123     Protime 13 0 seconds      INR 0 97    APTT [[id number]]  (Normal) Collected: 04/24/23 1051    Lab Status: Final result Specimen: Blood from Arm, Right Updated: 04/24/23 1123     PTT 26 seconds     HS Troponin 0hr (reflex protocol) [[id number]]  (Normal) Collected: 04/24/23 1051    Lab Status: Final result Specimen: Blood from Arm, Right Updated: 04/24/23 1123     hs TnI 0hr <2 ng/L     Comprehensive metabolic panel [[id number]]  (Abnormal) Collected: 04/24/23 1051    Lab Status: Final result Specimen: Blood from Arm, Right Updated: 04/24/23 1116     Sodium 138 mmol/L      Potassium 4 0 mmol/L      Chloride 106 mmol/L      CO2 26 mmol/L      ANION GAP 6 mmol/L      BUN 15 mg/dL      Creatinine 0 80 mg/dL      Glucose 129 mg/dL      Calcium 8 5 mg/dL      AST 22 U/L      ALT 24 U/L      Alkaline Phosphatase 26 U/L Total Protein 6 3 g/dL      Albumin 4 2 g/dL      Total Bilirubin 0 42 mg/dL      eGFR 89 ml/min/1 73sq m     Narrative:      National Kidney Disease Foundation guidelines for Chronic Kidney Disease (CKD):   •  Stage 1 with normal or high GFR (GFR > 90 mL/min/1 73 square meters)  •  Stage 2 Mild CKD (GFR = 60-89 mL/min/1 73 square meters)  •  Stage 3A Moderate CKD (GFR = 45-59 mL/min/1 73 square meters)  •  Stage 3B Moderate CKD (GFR = 30-44 mL/min/1 73 square meters)  •  Stage 4 Severe CKD (GFR = 15-29 mL/min/1 73 square meters)  •  Stage 5 End Stage CKD (GFR <15 mL/min/1 73 square meters)  Note: GFR calculation is accurate only with a steady state creatinine    Lipase [[id number]]  (Normal) Collected: 04/24/23 1051    Lab Status: Final result Specimen: Blood from Arm, Right Updated: 04/24/23 1116     Lipase 33 u/L     POCT pregnancy, urine [[id number]]  (Normal) Resulted: 04/24/23 1101    Lab Status: Final result Updated: 04/24/23 1101     EXT Preg Test, Ur Negative     Control Valid    CBC and differential [[id number]]  (Abnormal) Collected: 04/24/23 1051    Lab Status: Final result Specimen: Blood from Arm, Right Updated: 04/24/23 1059     WBC 8 13 Thousand/uL      RBC 4 25 Million/uL      Hemoglobin 14 1 g/dL      Hematocrit 42 8 %       fL      MCH 33 2 pg      MCHC 32 9 g/dL      RDW 13 3 %      MPV 9 1 fL      Platelets 259 Thousands/uL      nRBC 0 /100 WBCs      Neutrophils Relative 70 %      Immat GRANS % 0 %      Lymphocytes Relative 21 %      Monocytes Relative 7 %      Eosinophils Relative 1 %      Basophils Relative 1 %      Neutrophils Absolute 5 72 Thousands/µL      Immature Grans Absolute 0 02 Thousand/uL      Lymphocytes Absolute 1 70 Thousands/µL      Monocytes Absolute 0 54 Thousand/µL      Eosinophils Absolute 0 11 Thousand/µL      Basophils Absolute 0 04 Thousands/µL     FLU/RSV/COVID - if FLU/RSV clinically relevant [[id number]] Collected: 04/24/23 1051    Lab Status:  In process Specimen: Ruth from Nose Updated: 04/24/23 1056                 XR chest 1 view portable    (Results Pending)              Procedures  Procedures         ED Course             HEART Risk Score    Flowsheet Row Most Recent Value   Heart Score Risk Calculator    History 0 Filed at: 04/24/2023 1209   ECG 0 Filed at: 04/24/2023 1209   Age 0 Filed at: 04/24/2023 1209   Risk Factors 0 Filed at: 04/24/2023 1209   Troponin 0 Filed at: 04/24/2023 1209   HEART Score 0 Filed at: 04/24/2023 1209                        SBIRT 22yo+    Flowsheet Row Most Recent Value   Initial Alcohol Screen: US AUDIT-C     1  How often do you have a drink containing alcohol? 0 Filed at: 04/24/2023 1008   2  How many drinks containing alcohol do you have on a typical day you are drinking? 0 Filed at: 04/24/2023 1008   3a  Male UNDER 65: How often do you have five or more drinks on one occasion? 0 Filed at: 04/24/2023 1008   3b  FEMALE Any Age, or MALE 65+: How often do you have 4 or more drinks on one occassion? 0 Filed at: 04/24/2023 1008   Audit-C Score 0 Filed at: 04/24/2023 1008   JUDY: How many times in the past year have you    Used an illegal drug or used a prescription medication for non-medical reasons? Never Filed at: 04/24/2023 1008                    Medical Decision Making  I had thorough conversation with patient regarding lab work and imaging studies  Troponin <2 and normal  Patient appears comfortable in no distress  Patient has had a cough with generalized body aches and fatigue, suspect viral illness, will treat for bronchitis  Patient is informed to return to the emergency department for worsening of symptoms such as difficulty breathing, high fevers, etc and was given proper education regarding their diagnosis and symptoms  Otherwise the patient is informed to follow up with their primary care doctor for re-evaluation  The patient verbalizes understanding and agrees with above assessment and plan  All questions were answered  Bronchitis: acute illness or injury  Chest pain: acute illness or injury  Amount and/or Complexity of Data Reviewed  Labs: ordered  Radiology: ordered  Risk  OTC drugs  Prescription drug management  Disposition  Final diagnoses:   Chest pain   Bronchitis     Time reflects when diagnosis was documented in both MDM as applicable and the Disposition within this note     Time User Action Codes Description Comment    4/24/2023 12:08 PM Giuseppe Lai Add [R07 9] Chest pain     4/24/2023 12:08 PM Giuseppe Lai Add [J40] Bronchitis       ED Disposition     ED Disposition   Discharge    Condition   Stable    Date/Time   Mon Apr 24, 2023 12:08 PM    Carlton 54 discharge to home/self care  Follow-up Information     Follow up With Specialties Details Why Contact Info Additional [address] Emergency Department Emergency Medicine Go to  If symptoms worsen such as difficulty breathing, high fevers, etc, otherwise please follow up with your family doctor [address] [place] [address] [address] [id number] [place] [address] Emergency Department, [place], [place], [address]          Patient's Medications   Discharge Prescriptions    AZITHROMYCIN (ZITHROMAX) 250 MG TABLET    Take 2 tablets today then 1 tablet daily x 4 days       Start Date: 4/24/2023 End Date: 4/28/2023       Order Dose: --       Quantity: 6 tablet    Refills: 0       No discharge procedures on file      PDMP Review       Value Time User    PDMP Reviewed  Yes 4/19/2023  6:01 PM Felix Kaur,           ED Provider  Electronically Signed by           Carmenza Betancur PA-C  04/24/23 8721

## 2023-04-24 NOTE — Clinical Note
Landen Jang was seen and treated in our emergency department on 4/24/2023  Diagnosis: bronchitis    Isabel  may return to work on return date  She may return on this date: 04/26/2023         If you have any questions or concerns, please don't hesitate to call        Jenna Oconnor DO    ______________________________           _______________          _______________  Hospital Representative                              Date                                Time

## 2023-04-26 LAB
ATRIAL RATE: 100 BPM
P AXIS: 54 DEGREES
PR INTERVAL: 136 MS
QRS AXIS: 71 DEGREES
QRSD INTERVAL: 86 MS
QT INTERVAL: 358 MS
QTC INTERVAL: 461 MS
T WAVE AXIS: 60 DEGREES
VENTRICULAR RATE: 100 BPM

## 2023-05-22 ENCOUNTER — OFFICE VISIT (OUTPATIENT)
Dept: FAMILY MEDICINE CLINIC | Facility: CLINIC | Age: 43
End: 2023-05-22

## 2023-05-22 VITALS
HEART RATE: 92 BPM | DIASTOLIC BLOOD PRESSURE: 78 MMHG | TEMPERATURE: 99.7 F | WEIGHT: 135 LBS | HEIGHT: 63 IN | RESPIRATION RATE: 16 BRPM | BODY MASS INDEX: 23.92 KG/M2 | SYSTOLIC BLOOD PRESSURE: 138 MMHG

## 2023-05-22 DIAGNOSIS — F90.0 ATTENTION DEFICIT HYPERACTIVITY DISORDER (ADHD), PREDOMINANTLY INATTENTIVE TYPE: ICD-10-CM

## 2023-05-22 DIAGNOSIS — F41.1 GAD (GENERALIZED ANXIETY DISORDER): ICD-10-CM

## 2023-05-22 DIAGNOSIS — F31.81 BIPOLAR II DISORDER (HCC): ICD-10-CM

## 2023-05-22 DIAGNOSIS — R60.9 SUBMANDIBULAR GLAND SWELLING: Primary | ICD-10-CM

## 2023-05-22 DIAGNOSIS — N89.8 VAGINAL ITCHING: ICD-10-CM

## 2023-05-22 RX ORDER — AMOXICILLIN AND CLAVULANATE POTASSIUM 875; 125 MG/1; MG/1
1 TABLET, FILM COATED ORAL EVERY 12 HOURS SCHEDULED
Qty: 20 TABLET | Refills: 0 | Status: SHIPPED | OUTPATIENT
Start: 2023-05-22 | End: 2023-06-01

## 2023-05-22 RX ORDER — ALPRAZOLAM 1 MG/1
1 TABLET ORAL 4 TIMES DAILY PRN
Qty: 120 TABLET | Refills: 0 | Status: SHIPPED | OUTPATIENT
Start: 2023-05-22

## 2023-05-22 RX ORDER — FLUCONAZOLE 150 MG/1
150 TABLET ORAL
Qty: 2 TABLET | Refills: 0 | Status: SHIPPED | OUTPATIENT
Start: 2023-05-22 | End: 2023-05-26

## 2023-05-22 RX ORDER — LAMOTRIGINE 200 MG/1
200 TABLET ORAL
Qty: 30 TABLET | Refills: 3 | Status: SHIPPED | OUTPATIENT
Start: 2023-05-22

## 2023-05-22 RX ORDER — DEXTROAMPHETAMINE SACCHARATE, AMPHETAMINE ASPARTATE, DEXTROAMPHETAMINE SULFATE AND AMPHETAMINE SULFATE 3.75; 3.75; 3.75; 3.75 MG/1; MG/1; MG/1; MG/1
15 TABLET ORAL 2 TIMES DAILY
Qty: 60 TABLET | Refills: 0 | Status: SHIPPED | OUTPATIENT
Start: 2023-05-22

## 2023-05-22 NOTE — TELEPHONE ENCOUNTER
Patient requests refills on medications:    Xanax 1 mg  Adderall 15 mg  Lamictal 200 mg    Pharmacy verified      Next appointment Dr Mitch Lino 6/13/2023

## 2023-05-22 NOTE — PROGRESS NOTES
"Assessment/Plan:    1  Submandibular gland swelling  -     amoxicillin-clavulanate (Augmentin) 875-125 mg per tablet; Take 1 tablet by mouth every 12 (twelve) hours for 10 days    2  Vaginal itching  -     fluconazole (DIFLUCAN) 150 mg tablet; Take 1 tablet (150 mg total) by mouth every third day for 2 doses          Patient Instructions: Take medication with food  It is important that you take the entire course of antibiotics prescribed  May also take a probiotic of your choice to maintain healthy GI gagan  Can take some probiotic and yogurt with the medication  Supportive care discussed and advised  Advised to RTO for any worsening and no improvement  Follow up for no improvement and worsening of conditions  Patient advised and educated when to see immediate medical care  Return if symptoms worsen or fail to improve  Future Appointments   Date Time Provider Billie Osborn   5/22/2023  3:45 PM ROSE Watts [facility]   6/13/2023  3:00 PM Anthony Mclaughlin, PhD [facility]           Subjective:      Patient ID: Bert Queen is a 37 y o  female  Chief Complaint   Patient presents with   • Sore Throat     C/O sore throat for 2 weeks with left neck tender, palpable lump noted [id number]         Vitals:  /78   Pulse 92   Temp 99 7 °F (37 6 °C)   Resp 16   Ht 5' 3\" (1 6 m)   Wt 61 2 kg (135 lb)   BMI 23 91 kg/m²     HPI  Patient stated that noticed lump on left side under chin area and stated that also feeling some sore throat and left ear pain  Denies fever, chills and sob  Not taking any OTC for symptoms   Gets vaginal yeast infection with antibiotic use      The following portions of the patient's history were reviewed and updated as appropriate: allergies, current medications, past family history, past medical history, past social history, past surgical history and problem list       Review of Systems   Constitutional: Negative for chills, diaphoresis, fatigue, " fever and unexpected weight change  HENT: Negative for congestion, dental problem, drooling, ear discharge, ear pain, facial swelling, hearing loss, mouth sores, nosebleeds, postnasal drip, rhinorrhea, sinus pressure, sinus pain, sneezing, sore throat, tinnitus, trouble swallowing and voice change  As noted in HPI     Respiratory: Negative for cough, chest tightness, shortness of breath and wheezing  Cardiovascular: Negative  Gastrointestinal: Negative for abdominal pain, constipation, diarrhea, nausea and vomiting  Skin: Negative  Neurological: Negative for dizziness, light-headedness and headaches  Objective:    Social History     Tobacco Use   Smoking Status Former   • Packs/day:  00   • Years: 15 00   • Pack years: 15 00   • Types: Cigarettes   • Start date: 1995   • Quit date: 6/15/2014   • Years since quittin 9   Smokeless Tobacco Never       Allergies: Allergies   Allergen Reactions   • Doxycycline Other (See Comments)     unknown         Current Outpatient Medications   Medication Sig Dispense Refill   • albuterol (Proventil HFA) 90 mcg/act inhaler Inhale 2 puffs every 6 (six) hours as needed for wheezing or shortness of breath 6 7 g 2   • amoxicillin-clavulanate (Augmentin) 875-125 mg per tablet Take 1 tablet by mouth every 12 (twelve) hours for 10 days 20 tablet 0   • buPROPion (WELLBUTRIN XL) 150 mg 24 hr tablet Take 1 tablet (150 mg total) by mouth daily 30 tablet 3   • busPIRone (BUSPAR) 10 mg tablet Take 1 tablet (10 mg total) by mouth 2 (two) times a day 60 tablet 3   • fluconazole (DIFLUCAN) 150 mg tablet Take 1 tablet (150 mg total) by mouth every third day for 2 doses 2 tablet 0   • fluticasone (FLONASE) 50 mcg/act nasal spray 2 sprays into each nostril daily 9 9 mL 2   • Fluticasone Furoate-Vilanterol (Breo Ellipta) 100-25 mcg/actuation inhaler Inhale 1 puff daily Rinse mouth after use   60 each 2   • ketoconazole (NIZORAL) 2 % shampoo PRN     • ALPRAZolam Laura Hand) 1 mg tablet Take 1 tablet (1 mg total) by mouth 4 (four) times a day as needed for anxiety 120 tablet 0   • amphetamine-dextroamphetamine (ADDERALL, 15MG,) 15 MG tablet Take 1 tablet (15 mg total) by mouth 2 (two) times a day Max Daily Amount: 30 mg 60 tablet 0   • benzonatate (TESSALON) 200 MG capsule Take 1 capsule (200 mg total) by mouth 3 (three) times a day as needed for cough (Patient not taking: Reported on 2/16/2023) 20 capsule 0   • lamoTRIgine (LaMICtal) 200 MG tablet Take 1 tablet (200 mg total) by mouth daily at bedtime 30 tablet 3   • mometasone (ELOCON) 0 1 % lotion  (Patient not taking: Reported on 4/14/2023)     • ofloxacin (OCUFLOX) 0 3 % ophthalmic solution  (Patient not taking: Reported on 9/12/2022)     • ondansetron (ZOFRAN) 4 mg tablet Take 1 tablet (4 mg total) by mouth every 8 (eight) hours as needed for nausea or vomiting (Patient not taking: Reported on 9/12/2022) 30 tablet 1     No current facility-administered medications for this visit  Physical Exam  Vitals reviewed  Constitutional:       Appearance: Normal appearance  She is well-developed  HENT:      Head: Normocephalic  Salivary Glands: Left salivary gland is diffusely enlarged (submandibular gland ) and tender  Right Ear: Tympanic membrane, ear canal and external ear normal       Left Ear: Tympanic membrane, ear canal and external ear normal       Nose: Nose normal       Right Sinus: No maxillary sinus tenderness or frontal sinus tenderness  Left Sinus: No maxillary sinus tenderness or frontal sinus tenderness  Mouth/Throat:      Mouth: No oral lesions  Pharynx: No oropharyngeal exudate or posterior oropharyngeal erythema  Cardiovascular:      Rate and Rhythm: Normal rate and regular rhythm  Heart sounds: Normal heart sounds  Pulmonary:      Effort: Pulmonary effort is normal       Breath sounds: Normal breath sounds  Musculoskeletal:         General: Normal range of motion  Cervical back: Neck supple  Lymphadenopathy:      Cervical:      Right cervical: No superficial or posterior cervical adenopathy  Left cervical: No superficial or posterior cervical adenopathy  Skin:     General: Skin is warm and dry  Neurological:      Mental Status: She is alert and oriented to person, place, and time  Psychiatric:         Behavior: Behavior normal          Thought Content:  Thought content normal          Judgment: Judgment normal                      ROSE Ma

## 2023-05-22 NOTE — PATIENT INSTRUCTIONS
Amoxicillin/Clavulanate Potassium (By mouth)   Amoxicillin (d-gwi-u-AISHA-in), Clavulanate Potassium (WUOU-yx-ff-sawyer cnz-FQV-po-um)  Treats infections  This medicine is a penicillin antibiotic  Brand Name(s): Augmentin, Augmentin ES-600, Augmentin XR   There may be other brand names for this medicine  When This Medicine Should Not Be Used: This medicine is not right for everyone  Do not use it if you had an allergic reaction to amoxicillin, clavulanate, or similar medicines, or if you have a history of liver problems  How to Use This Medicine:   Liquid, Tablet, Chewable Tablet, Long Acting Tablet  Your doctor will tell you how much medicine to use  Do not use more than directed  You may take this medicine with or without food  However, it is best to take this medicine at the start of a meal or snack to avoid an upset stomach  Chewable tablets: Chew the tablet completely before you swallow it  If you or your child are unable to swallow the tablets whole, you may use the oral liquid  Measure the oral liquid medicine with a marked measuring spoon, oral syringe, or medicine cup  Shake the oral liquid before using  Rinse the spoon or dropper after each use  Swallow the extended-release tablet whole  Do not crush, break, or chew it  Take all of the medicine in your prescription to clear up your infection, even if you feel better after the first few doses  Missed dose: Take a dose as soon as you remember  If it is almost time for your next dose, wait until then and take a regular dose  Do not take extra medicine to make up for a missed dose  Tablet, extended-release tablet, chewable tablet: Store the medicine in a closed container at room temperature, away from heat, moisture, and direct light  Oral liquid: Store in the refrigerator  Do not freeze  Throw away any unused oral liquid after 10 days    Drugs and Foods to Avoid:   Ask your doctor or pharmacist before using any other medicine, including over-the-counter medicines, vitamins, and herbal products  Some medicines can affect how this medicine works  Tell your doctor if you are also using allopurinol, probenecid, birth control pills, or a blood thinner (including warfarin)  Warnings While Using This Medicine:   Tell your doctor if you are pregnant or breastfeeding, or if you have kidney disease, liver disease, or mononucleosis (mono)  This medicine may cause the following problems:  Serious skin reactions, including Hargrove-Jovanny syndrome, toxic epidermal necrolysis, acute generalized exanthematous pustulosis (AGEP), and drug reaction with eosinophilia and systemic symptoms (DRESS)  Liver problems  Birth control pills may not work as well while you are taking this medicine  Use another form of birth control to prevent pregnancy  This medicine can cause diarrhea  Call your doctor if the diarrhea becomes severe, does not stop, or is bloody  Do not take any medicine to stop diarrhea until you have talked to your doctor  Diarrhea can occur 2 months or more after you stop taking this medicine  It may occur 2 months or more after you stop using this medicine  Tell any doctor or dentist who treats you that you are using this medicine  This medicine may affect certain medical test results  Call your doctor if your symptoms do not improve or if they get worse  The chewable tablet and oral liquid contain phenylalanine  Talk to your doctor before you use this medicine if you have phenylketonuria (PKU)  Your doctor will do lab tests at regular visits to check on the effects of this medicine  Keep all appointments  Keep all medicine out of the reach of children  Never share your medicine with anyone  Possible Side Effects While Using This Medicine:   Call your doctor right away if you notice any of these side effects:   Allergic reaction: Itching or hives, swelling in your face or hands, swelling or tingling in your mouth or throat, chest tightness, trouble breathing  Blistering, peeling, red skin rash  Bloody or watery diarrhea, stomach cramps, fever  Change in how much or how often you urinate, painful or difficult urination, lower back or side pain  Dark urine or pale stools, nausea, vomiting, loss of appetite, stomach pain, yellow eyes or skin  If you notice these less serious side effects, talk with your doctor:   Diaper rash  Mild diarrhea, nausea, vomiting  Tooth discoloration (in children)  If you notice other side effects that you think are caused by this medicine, tell your doctor  Call your doctor for medical advice about side effects  You may report side effects to FDA at 1-959-FDA-1481  © Copyright [employer] 2022 Information is for End User's use only and may not be sold, redistributed or otherwise used for commercial purposes  The above information is an  only  It is not intended as medical advice for individual conditions or treatments  Talk to your doctor, nurse or pharmacist before following any medical regimen to see if it is safe and effective for you

## 2023-06-05 ENCOUNTER — OFFICE VISIT (OUTPATIENT)
Dept: FAMILY MEDICINE CLINIC | Facility: CLINIC | Age: 43
End: 2023-06-05
Payer: COMMERCIAL

## 2023-06-05 VITALS
OXYGEN SATURATION: 99 % | HEIGHT: 63 IN | TEMPERATURE: 97.8 F | BODY MASS INDEX: 23.21 KG/M2 | DIASTOLIC BLOOD PRESSURE: 80 MMHG | HEART RATE: 89 BPM | SYSTOLIC BLOOD PRESSURE: 124 MMHG | WEIGHT: 131 LBS | RESPIRATION RATE: 18 BRPM

## 2023-06-05 DIAGNOSIS — R60.9 SUBMANDIBULAR GLAND SWELLING: Primary | ICD-10-CM

## 2023-06-05 PROCEDURE — 99213 OFFICE O/P EST LOW 20 MIN: CPT | Performed by: NURSE PRACTITIONER

## 2023-06-05 NOTE — PROGRESS NOTES
"Assessment/Plan:    1  Submandibular gland swelling  -     US head neck soft tissue; Future; Expected date: 06/05/2023            Patient Instructions:  Supportive care discussed and advised  Advised to RTO for any worsening and no improvement  Follow up for no improvement and worsening of conditions  Patient advised and educated when to see immediate medical care  Return if symptoms worsen or fail to improve  Future Appointments   Date Time Provider Billie Osborn   6/9/2023  3:00 PM [id number]  [facility]   6/13/2023  3:00 PM Mireille Marcelino, PhD [facility]           Subjective:      Patient ID: Shakir Dooley is a 37 y o  female  Chief Complaint   Patient presents with   • Follow-up     Glands feel like the swelling is not going down  Tender to touch  [id number]         Vitals:  /80   Pulse 89   Temp 97 8 °F (36 6 °C)   Resp 18   Ht 5' 3\" (1 6 m)   Wt 59 4 kg (131 lb)   SpO2 99%   BMI 23 21 kg/m²     HPI  Patient was seen couple of days ago with submandibular tenderness and swelling on left side  Stated that started initially about 4 weeks ago  Finished antibiotic but still no relief  Denies fever, chills and swallowing difficulty                  The following portions of the patient's history were reviewed and updated as appropriate: allergies, current medications, past family history, past medical history, past social history, past surgical history and problem list       Review of Systems   Constitutional: Negative for chills, diaphoresis, fatigue, fever and unexpected weight change  HENT: Negative for congestion, dental problem, drooling, ear discharge, ear pain, facial swelling, hearing loss, mouth sores, nosebleeds, postnasal drip, rhinorrhea, sinus pressure, sinus pain, sneezing, sore throat, tinnitus, trouble swallowing and voice change  As noted in HPI     Respiratory: Negative for cough, chest tightness, shortness of breath and wheezing      Cardiovascular: " Negative  Gastrointestinal: Negative for abdominal pain, constipation, diarrhea, nausea and vomiting  Skin: Negative  Neurological: Negative for dizziness, light-headedness and headaches  Objective:    Social History     Tobacco Use   Smoking Status Former   • Packs/day: 1 00   • Years: 15 00   • Total pack years: 15 00   • Types: Cigarettes   • Start date: 1995   • Quit date: 6/15/2014   • Years since quittin 9   Smokeless Tobacco Never       Allergies: Allergies   Allergen Reactions   • Doxycycline Other (See Comments)     unknown         Current Outpatient Medications   Medication Sig Dispense Refill   • albuterol (Proventil HFA) 90 mcg/act inhaler Inhale 2 puffs every 6 (six) hours as needed for wheezing or shortness of breath 6 7 g 2   • ALPRAZolam (XANAX) 1 mg tablet Take 1 tablet (1 mg total) by mouth 4 (four) times a day as needed for anxiety 120 tablet 0   • amphetamine-dextroamphetamine (ADDERALL, 15MG,) 15 MG tablet Take 1 tablet (15 mg total) by mouth 2 (two) times a day Max Daily Amount: 30 mg 60 tablet 0   • buPROPion (WELLBUTRIN XL) 150 mg 24 hr tablet Take 1 tablet (150 mg total) by mouth daily 30 tablet 3   • busPIRone (BUSPAR) 10 mg tablet Take 1 tablet (10 mg total) by mouth 2 (two) times a day 60 tablet 3   • fluticasone (FLONASE) 50 mcg/act nasal spray 2 sprays into each nostril daily 9 9 mL 2   • Fluticasone Furoate-Vilanterol (Breo Ellipta) 100-25 mcg/actuation inhaler Inhale 1 puff daily Rinse mouth after use   60 each 2   • ketoconazole (NIZORAL) 2 % shampoo PRN     • lamoTRIgine (LaMICtal) 200 MG tablet Take 1 tablet (200 mg total) by mouth daily at bedtime 30 tablet 3   • benzonatate (TESSALON) 200 MG capsule Take 1 capsule (200 mg total) by mouth 3 (three) times a day as needed for cough (Patient not taking: Reported on 2023) 20 capsule 0   • mometasone (ELOCON) 0 1 % lotion  (Patient not taking: Reported on 2023)     • ofloxacin (OCUFLOX) 0 3 % ophthalmic solution  (Patient not taking: Reported on 9/12/2022)     • ondansetron (ZOFRAN) 4 mg tablet Take 1 tablet (4 mg total) by mouth every 8 (eight) hours as needed for nausea or vomiting (Patient not taking: Reported on 9/12/2022) 30 tablet 1     No current facility-administered medications for this visit  Physical Exam  Vitals reviewed  Constitutional:       Appearance: Normal appearance  She is well-developed  HENT:      Head: Normocephalic  Salivary Glands: Right salivary gland is tender (right submandibular area is tender on palpatio and mildly swollen)  Right Ear: Tympanic membrane, ear canal and external ear normal       Left Ear: Tympanic membrane, ear canal and external ear normal       Nose: Nose normal       Right Sinus: No maxillary sinus tenderness or frontal sinus tenderness  Left Sinus: No maxillary sinus tenderness or frontal sinus tenderness  Mouth/Throat:      Mouth: No oral lesions  Pharynx: No oropharyngeal exudate or posterior oropharyngeal erythema  Cardiovascular:      Rate and Rhythm: Normal rate and regular rhythm  Heart sounds: Normal heart sounds  Pulmonary:      Effort: Pulmonary effort is normal       Breath sounds: Normal breath sounds  Musculoskeletal:         General: Normal range of motion  Cervical back: Neck supple  Lymphadenopathy:      Cervical:      Right cervical: No superficial or posterior cervical adenopathy  Left cervical: No superficial or posterior cervical adenopathy  Skin:     General: Skin is warm and dry  Neurological:      Mental Status: She is alert and oriented to person, place, and time  Psychiatric:         Behavior: Behavior normal          Thought Content:  Thought content normal          Judgment: Judgment normal                      ROSE Bradshaw

## 2023-06-14 ENCOUNTER — TELEMEDICINE (OUTPATIENT)
Dept: PSYCHIATRY | Facility: CLINIC | Age: 43
End: 2023-06-14
Payer: COMMERCIAL

## 2023-06-14 DIAGNOSIS — F33.1 MODERATE EPISODE OF RECURRENT MAJOR DEPRESSIVE DISORDER (HCC): ICD-10-CM

## 2023-06-14 DIAGNOSIS — F90.0 ATTENTION DEFICIT HYPERACTIVITY DISORDER (ADHD), PREDOMINANTLY INATTENTIVE TYPE: ICD-10-CM

## 2023-06-14 DIAGNOSIS — F31.81 BIPOLAR II DISORDER (HCC): ICD-10-CM

## 2023-06-14 DIAGNOSIS — F41.1 GAD (GENERALIZED ANXIETY DISORDER): ICD-10-CM

## 2023-06-14 DIAGNOSIS — R53.83 OTHER FATIGUE: ICD-10-CM

## 2023-06-14 DIAGNOSIS — F41.0 PANIC DISORDER WITHOUT AGORAPHOBIA WITH MODERATE PANIC ATTACKS: Primary | ICD-10-CM

## 2023-06-14 PROBLEM — E83.119 HEMOCHROMATOSIS: Status: RESOLVED | Noted: 2022-01-12 | Resolved: 2023-06-14

## 2023-06-14 PROCEDURE — 99212 OFFICE O/P EST SF 10 MIN: CPT | Performed by: NURSE PRACTITIONER

## 2023-06-14 RX ORDER — BUSPIRONE HYDROCHLORIDE 10 MG/1
10 TABLET ORAL 2 TIMES DAILY
Qty: 60 TABLET | Refills: 3 | Status: SHIPPED | OUTPATIENT
Start: 2023-06-14

## 2023-06-14 RX ORDER — ALPRAZOLAM 1 MG/1
1 TABLET ORAL 4 TIMES DAILY PRN
Qty: 120 TABLET | Refills: 0 | Status: SHIPPED | OUTPATIENT
Start: 2023-06-14

## 2023-06-14 RX ORDER — BUPROPION HYDROCHLORIDE 150 MG/1
150 TABLET ORAL DAILY
Qty: 30 TABLET | Refills: 3 | Status: SHIPPED | OUTPATIENT
Start: 2023-06-14

## 2023-06-14 RX ORDER — DEXTROAMPHETAMINE SACCHARATE, AMPHETAMINE ASPARTATE, DEXTROAMPHETAMINE SULFATE AND AMPHETAMINE SULFATE 3.75; 3.75; 3.75; 3.75 MG/1; MG/1; MG/1; MG/1
15 TABLET ORAL 2 TIMES DAILY
Qty: 60 TABLET | Refills: 0 | Status: SHIPPED | OUTPATIENT
Start: 2023-06-14

## 2023-06-14 RX ORDER — LAMOTRIGINE 200 MG/1
200 TABLET ORAL
Qty: 30 TABLET | Refills: 3 | Status: SHIPPED | OUTPATIENT
Start: 2023-06-14

## 2023-06-16 ENCOUNTER — HOSPITAL ENCOUNTER (OUTPATIENT)
Dept: RADIOLOGY | Facility: HOSPITAL | Age: 43
Discharge: HOME | End: 2023-06-16
Payer: COMMERCIAL

## 2023-06-16 DIAGNOSIS — R60.9 SUBMANDIBULAR GLAND SWELLING: ICD-10-CM

## 2023-06-16 PROCEDURE — 76536 US EXAM OF HEAD AND NECK: CPT

## 2023-06-21 ENCOUNTER — NURSE TRIAGE (OUTPATIENT)
Age: 43
End: 2023-06-21

## 2023-06-21 DIAGNOSIS — K62.5 RECTAL BLEEDING: Primary | ICD-10-CM

## 2023-06-21 DIAGNOSIS — K64.9 HEMORRHOIDS, UNSPECIFIED HEMORRHOID TYPE: ICD-10-CM

## 2023-06-21 RX ORDER — HYDROCORTISONE ACETATE 25 MG/1
25 SUPPOSITORY RECTAL 2 TIMES DAILY
Qty: 12 SUPPOSITORY | Refills: 0 | Status: CANCELLED | OUTPATIENT
Start: 2023-06-21

## 2023-06-21 RX ORDER — HYDROCORTISONE ACETATE 25 MG/1
25 SUPPOSITORY RECTAL 2 TIMES DAILY
Qty: 14 SUPPOSITORY | Refills: 2 | Status: SHIPPED | OUTPATIENT
Start: 2023-06-21 | End: 2023-06-27

## 2023-06-21 NOTE — TELEPHONE ENCOUNTER
Patient reports hx of internal hemorrhoids with banding in the past  In the last two weeks noticed BRBPR worsening over time  Denies constipation, diarrhea, lightheadedness, dizziness  Did not take temperature but had a cold over the weekend  She reports blood after BM dripping out of rectum with mild rectal discomfort  ED precautions given for lightheadedness/ dizziness, severe pain and fevers- she understood       Next available appt is Aug      Reason for Disposition  • The patient has continuous bleeding without systemic symptoms or blood mixed in the stool    Answer Questions - Hemorrhoids  When did your symptoms start:  2 weeks ago     Is there bright red blood when wiping or streaks in the stool: yes    How many occurrences have you had in the last 24 hours:     Have your symptoms: worsened    Do you feel this is a mild, moderate ,or severe amount of blood:  severe    Are you experiencing more diarrhea or constipation:  No     Do you have anything prescribed or over the counter for this: no     Are you experiencing any additional symptoms such as:   Abdominal pain-stabbing- rate: 5-6    Protocols used: SL AMB GI HEMORRHOID

## 2023-06-21 NOTE — RESULT ENCOUNTER NOTE
Patient informed   She stated she is still having discomfort and will follow up with ENT  Kizzy Nathan CMA

## 2023-06-21 NOTE — PSYCH
Virtual Regular Visit    Problem List Items Addressed This Visit        Other    Attention deficit hyperactivity disorder (ADHD), predominantly inattentive type    Relevant Medications    ALPRAZolam (XANAX) 1 mg tablet    amphetamine-dextroamphetamine (ADDERALL, 15MG,) 15 MG tablet    buPROPion (WELLBUTRIN XL) 150 mg 24 hr tablet    busPIRone (BUSPAR) 10 mg tablet    Bipolar II disorder (HCC)    Relevant Medications    ALPRAZolam (XANAX) 1 mg tablet    amphetamine-dextroamphetamine (ADDERALL, 15MG,) 15 MG tablet    buPROPion (WELLBUTRIN XL) 150 mg 24 hr tablet    busPIRone (BUSPAR) 10 mg tablet    lamoTRIgine (LaMICtal) 200 MG tablet    RANJIT (generalized anxiety disorder)    Relevant Medications    ALPRAZolam (XANAX) 1 mg tablet    amphetamine-dextroamphetamine (ADDERALL, 15MG,) 15 MG tablet    buPROPion (WELLBUTRIN XL) 150 mg 24 hr tablet    busPIRone (BUSPAR) 10 mg tablet    Other fatigue    Panic disorder without agoraphobia with moderate panic attacks - Primary    Relevant Medications    ALPRAZolam (XANAX) 1 mg tablet    amphetamine-dextroamphetamine (ADDERALL, 15MG,) 15 MG tablet    buPROPion (WELLBUTRIN XL) 150 mg 24 hr tablet    busPIRone (BUSPAR) 10 mg tablet   Other Visit Diagnoses     Moderate episode of recurrent major depressive disorder (HCC)        Relevant Medications    ALPRAZolam (XANAX) 1 mg tablet    amphetamine-dextroamphetamine (ADDERALL, 15MG,) 15 MG tablet    buPROPion (WELLBUTRIN XL) 150 mg 24 hr tablet    busPIRone (BUSPAR) 10 mg tablet        Reason for visit is   Chief Complaint   Patient presents with   • Anxiety   • Depression   • Mood Swings   • Medication Management     Encounter provider Brooks Benson, PhD    Provider located at [address] [place] [address]  [address] [place] [address]  [phone]    Recent Visits  Date Type Provider Dept   06/14/23 Telemedicine Brooks Benson, PhD [facility]   Showing recent visits within past 7 days and meeting all other requirements  Future Appointments  No visits were found meeting these conditions  Showing future appointments within next 150 days and meeting all other requirements       After connecting through [employer], the patient was identified by name and date of birth  Lito Vides was informed that this is a telemedicine visit and that the visit is being conducted through the [employer]  She agrees to proceed  which may not be secure and therefore, might not be HIPAA-compliant  My office door was closed  No one else was in the room  She acknowledged consent and understanding of privacy and security of the video platform  The patient has agreed to participate and understands they can discontinue the visit at any time  SUBJECTIVE:    Lito Vides is a 37 y o  female with a history of bipolar 2 disorder, ADHD, panic disorder, RANJIT seen for medication evaluation and mood assessment  Gentryjhonsabiha Cynthia reports she continues to be  from her , getting the house ready to be sold  She reports her medication helps with her mood and takes as prescribed  Normal appropriate anxiety is reported regarding marital problems  Her daughter is getting  and she expressed anxiety regarding family from [place] coming to the wedding  Denies frequent panic attacks and she is able to work full-time      HPI ROS Appetite Changes and Sleep: normal appetite and normal energy level    Review Of Systems:     Mood Anxiety and Depression   Behavior Normal    Thought Content Normal   General Relationship Problems, Emotional Problems and Sleep Disturbances   Personality Normal   Other Psych Symptoms ADHD   Constitutional As Noted in HPI   ENT As Noted in HPI   Cardiovascular As Noted in HPI   Respiratory As Noted in HPI   Gastrointestinal As Noted in HPI   Genitourinary As Noted in HPI   Musculoskeletal As Noted in HPI   Integumentary As Noted in HPI Neurological As Noted in HPI   Endocrine Normal    Other Symptoms Normal        Substance Abuse History:    Social History     Substance and Sexual Activity   Drug Use No       Family Psychiatric History:     Family History   Problem Relation Age of Onset   • No Known Problems Mother    • No Known Problems Father    • Bipolar disorder Sister    • No Known Problems Brother    • Anxiety disorder Daughter    • Depression Daughter    • No Known Problems Son        Social History     Socioeconomic History   • Marital status: /Civil Union     Spouse name: Not on file   • Number of children: 2   • Years of education: Not on file   • Highest education level: Not on file   Occupational History   • Not on file   Tobacco Use   • Smoking status: Former     Packs/day: 1 00     Years: 15      Total pack years: 15      Types: Cigarettes     Start date: 1995     Quit date: 6/15/2014     Years since quittin 0   • Smokeless tobacco: Never   Vaping Use   • Vaping Use: Every day   • Substances: Nicotine, Flavoring   Substance and Sexual Activity   • Alcohol use:  Yes     Alcohol/week: 2 0 standard drinks of alcohol     Types: 2 Glasses of wine per week     Comment: occassional - wine 2 x month   • Drug use: No   • Sexual activity: Yes     Partners: Male     Birth control/protection: Female Sterilization     Comment: ablation   Other Topics Concern   • Not on file   Social History Narrative   • Not on file     Social Determinants of Health     Financial Resource Strain: Not on file   Food Insecurity: Not on file   Transportation Needs: Not on file   Physical Activity: Not on file   Stress: Not on file   Social Connections: Not on file   Intimate Partner Violence: Not on file   Housing Stability: Not on file       Past Medical History:   Diagnosis Date   • Abdominal pain    • ADHD    • Allergic rhinitis    • Anxiety     panic attacks   • Anxiety    • Asthma    • Bipolar disorder ([facility] [address] )    • Bladder distention     came to the ED on 10/9/2020-greene in to drain then removed   • Chronic constipation    • Colon polyp    • Contact lens/glasses fitting     and glasses   • Depression    • Depression    • GERD (gastroesophageal reflux disease)    • Psychiatric disorder     anxiety    • Rectal bleeding    • Sepsis ([address] ) 2/8/2017       Past Surgical History:   Procedure Laterality Date   • APPENDECTOMY     • BREAST SURGERY      augmentation silicone   • COLONOSCOPY N/A 1/24/2018    Procedure: COLONOSCOPY WITH HEMORRHOID BANDING;  Surgeon: Jonathon Saleh MD;  Location: [facility]; Service: Gastroenterology   • ENDOMETRIAL ABLATION  2016   • MT SIGMOIDOSCOPY FLX DX W/COLLJ SPEC BR/WA IF PFRMD N/A 11/14/2018    Procedure: FLEXIBLE SIGMOIDOSCOPY WITH APC; Surgeon: Jonathon Saleh MD;  Location: [facility] GI [facility];   Service: Gastroenterology   • UPPER GASTROINTESTINAL ENDOSCOPY         Current Outpatient Medications   Medication Sig Dispense Refill   • ALPRAZolam (XANAX) 1 mg tablet Take 1 tablet (1 mg total) by mouth 4 (four) times a day as needed for anxiety 120 tablet 0   • amphetamine-dextroamphetamine (ADDERALL, 15MG,) 15 MG tablet Take 1 tablet (15 mg total) by mouth 2 (two) times a day Max Daily Amount: 30 mg 60 tablet 0   • buPROPion (WELLBUTRIN XL) 150 mg 24 hr tablet Take 1 tablet (150 mg total) by mouth daily 30 tablet 3   • busPIRone (BUSPAR) 10 mg tablet Take 1 tablet (10 mg total) by mouth 2 (two) times a day 60 tablet 3   • lamoTRIgine (LaMICtal) 200 MG tablet Take 1 tablet (200 mg total) by mouth daily at bedtime 30 tablet 3   • albuterol (Proventil HFA) 90 mcg/act inhaler Inhale 2 puffs every 6 (six) hours as needed for wheezing or shortness of breath 6 7 g 2   • benzonatate (TESSALON) 200 MG capsule Take 1 capsule (200 mg total) by mouth 3 (three) times a day as needed for cough (Patient not taking: Reported on 2/16/2023) 20 capsule 0   • fluticasone (FLONASE) 50 mcg/act nasal spray 2 sprays into each nostril daily 9 9 mL 2   • Fluticasone Furoate-Vilanterol (Breo Ellipta) 100-25 mcg/actuation inhaler Inhale 1 puff daily Rinse mouth after use  60 each 2   • ketoconazole (NIZORAL) 2 % shampoo PRN     • mometasone (ELOCON) 0 1 % lotion  (Patient not taking: Reported on 4/14/2023)     • ofloxacin (OCUFLOX) 0 3 % ophthalmic solution  (Patient not taking: Reported on 9/12/2022)     • ondansetron (ZOFRAN) 4 mg tablet Take 1 tablet (4 mg total) by mouth every 8 (eight) hours as needed for nausea or vomiting (Patient not taking: Reported on 9/12/2022) 30 tablet 1     No current facility-administered medications for this visit  Allergies   Allergen Reactions   • Doxycycline Other (See Comments)     unknown       The following portions of the patient's history were reviewed and updated as appropriate: allergies, current medications, past family history, past medical history, past social history, past surgical history and problem list     OBJECTIVE:     Mental Status Examination:    Appearance calm and cooperative , adequate hygiene and grooming and good eye contact    Mood anxious   Affect affect appropriate    Speech a normal rate   Thought Processes normal thought processes   Hallucinations no hallucinations present    Thought Content no delusions   Abnormal Thoughts no suicidal thoughts  and no homicidal thoughts    Orientation  oriented to person and place and time   Remote Memory short term memory intact and long term memory intact   Attention Span concentration intact with medication   Intellect Appears to be of Average Intelligence   Insight Insight intact   Judgement judgment was intact   Muscle Strength Muscle strength and tone were normal   Language no difficulty naming common objects   Fund of Knowledge displays adequate knowledge of current events   Pain none   Pain Scale 0       Laboratory Results: No results found for this or any previous visit      Assessment/Plan:       Diagnoses and all orders for this visit:    Panic disorder without agoraphobia with moderate panic attacks    RANJIT (generalized anxiety disorder)  -     ALPRAZolam (XANAX) 1 mg tablet; Take 1 tablet (1 mg total) by mouth 4 (four) times a day as needed for anxiety  -     busPIRone (BUSPAR) 10 mg tablet; Take 1 tablet (10 mg total) by mouth 2 (two) times a day    Attention deficit hyperactivity disorder (ADHD), predominantly inattentive type  -     amphetamine-dextroamphetamine (ADDERALL, 15MG,) 15 MG tablet; Take 1 tablet (15 mg total) by mouth 2 (two) times a day Max Daily Amount: 30 mg    Moderate episode of recurrent major depressive disorder (HCC)  -     buPROPion (WELLBUTRIN XL) 150 mg 24 hr tablet; Take 1 tablet (150 mg total) by mouth daily    Bipolar II disorder (HCC)  -     lamoTRIgine (LaMICtal) 200 MG tablet;  Take 1 tablet (200 mg total) by mouth daily at bedtime    Other fatigue          Treatment Recommendations- Risks Benefits      Immediate Medical/Psychiatric/Psychotherapy Treatments and Any Precautions:  reviewed medication continue with treatment plan    Risks, Benefits And Possible Side Effects Of Medications:  {PSYCH RISK, BENEFITS AND POSSIBLE SIDE EFFECTS (Optional):87667    Controlled Medication Discussion: She is aware of safe use and storage of medication     Psychotherapy Provided: 15 minutes  Supportive therapy  Medication evaluation  Mood assessment  Normalized and validated her feelings    Goals discussed in session: Maintain coping skills and mood      Treatment Plan:    Completed and signed during the session: Yes - Treatment Plan done but not signed at time of office visit due to:  Plan reviewed by video and verbal consent given due to COVID social distancing        Gabbi Del Valle, PhD 06/21/23

## 2023-06-21 NOTE — BH TREATMENT PLAN
[address]     Name and Date of Dejon Badillo ECHCQ 63 y o  1980  Date of Treatment Plan: July 21, 2020, October 20, 2020, August 25, 2021, March 30, 2022, November 21, 2022, June 14, 2023  Diagnosis/Diagnoses:    1  Bipolar II disorder ([address] )    2  Panic disorder without agoraphobia with moderate panic attacks    3  Attention deficit hyperactivity disorder (ADHD), predominantly inattentive type       Strengths/Personal Resources for Self-Care: supportive family, taking medications as prescribed, ability to communicate needs, ability to listen, motivation for treatment  Area/Areas of need (in own words): anxiety symptoms, depressive symptoms  1          Long Term Goal: improve control of anxiety  Target Date: 6 months -12/14/2023  Person/Persons responsible for completion of goal: Isabel and provider  2          Short Term Objective (s) - How will we reach this goal?:   A  Provider new recommended medication/dosage changes and/or continue medication(s):   B  Create a structure in her day and self care  C  Create realistic expectations  Target Date: 6 months -12/14/2023  Person/Persons Responsible for Completion of Goal: Isabel and provider  Progress Towards Goals: progressing slowly  Treatment Modality: medication management every 1-3 month  Review due 180 days from date of this plan: 6 months -12/14/2023  Expected length of service: ongoing treatment  My Physician/PA/NP and I have developed this plan together and I agree to work on the goals and objectives   I understand the treatment goals that were developed for my treatment

## 2023-06-23 ENCOUNTER — TELEPHONE (OUTPATIENT)
Age: 43
End: 2023-06-23

## 2023-06-23 ENCOUNTER — TRANSCRIBE ORDERS (OUTPATIENT)
Dept: GASTROENTEROLOGY | Facility: CLINIC | Age: 43
End: 2023-06-23

## 2023-06-26 ENCOUNTER — TELEPHONE (OUTPATIENT)
Age: 43
End: 2023-06-26

## 2023-06-27 DIAGNOSIS — K64.8 OTHER HEMORRHOIDS: Primary | ICD-10-CM

## 2023-06-27 RX ORDER — HYDROCORTISONE 25 MG/G
CREAM TOPICAL 2 TIMES DAILY
Qty: 28 G | Refills: 2 | Status: SHIPPED | OUTPATIENT
Start: 2023-06-27

## 2023-07-12 ENCOUNTER — OFFICE VISIT (OUTPATIENT)
Dept: FAMILY MEDICINE CLINIC | Facility: CLINIC | Age: 43
End: 2023-07-12
Payer: COMMERCIAL

## 2023-07-12 VITALS
HEART RATE: 112 BPM | SYSTOLIC BLOOD PRESSURE: 100 MMHG | DIASTOLIC BLOOD PRESSURE: 70 MMHG | BODY MASS INDEX: 24.27 KG/M2 | HEIGHT: 63 IN | WEIGHT: 137 LBS | RESPIRATION RATE: 16 BRPM | TEMPERATURE: 98.1 F

## 2023-07-12 DIAGNOSIS — J02.9 ACUTE PHARYNGITIS, UNSPECIFIED ETIOLOGY: Primary | ICD-10-CM

## 2023-07-12 DIAGNOSIS — N89.8 VAGINAL ITCHING: ICD-10-CM

## 2023-07-12 LAB — S PYO AG THROAT QL: NEGATIVE

## 2023-07-12 PROCEDURE — 87880 STREP A ASSAY W/OPTIC: CPT | Performed by: NURSE PRACTITIONER

## 2023-07-12 PROCEDURE — 99213 OFFICE O/P EST LOW 20 MIN: CPT | Performed by: NURSE PRACTITIONER

## 2023-07-12 RX ORDER — AMOXICILLIN 875 MG/1
875 TABLET, COATED ORAL 2 TIMES DAILY
Qty: 20 TABLET | Refills: 0 | Status: SHIPPED | OUTPATIENT
Start: 2023-07-12 | End: 2023-07-22

## 2023-07-12 RX ORDER — FLUCONAZOLE 150 MG/1
150 TABLET ORAL
Qty: 2 TABLET | Refills: 0 | Status: SHIPPED | OUTPATIENT
Start: 2023-07-12 | End: 2023-07-16

## 2023-07-12 NOTE — PROGRESS NOTES
Assessment/Plan:    1. Acute pharyngitis, unspecified etiology  -     amoxicillin (AMOXIL) 875 mg tablet; Take 1 tablet (875 mg total) by mouth 2 (two) times a day for 10 days  -     Ambulatory Referral to Otolaryngology; Future  -     POCT rapid strepA    2. Vaginal itching  -     fluconazole (DIFLUCAN) 150 mg tablet; Take 1 tablet (150 mg total) by mouth every third day for 2 doses            Patient Instructions: Take medication with food. It is important that you take the entire course of antibiotics prescribed. May also take a probiotic of your choice to maintain healthy GI gagan. Can take some probiotic and yogurt with the medication. Supportive care discussed and advised. Advised to RTO for any worsening and no improvement. Follow up for no improvement and worsening of conditions. Patient advised and educated when to see immediate medical care. Return if symptoms worsen or fail to improve. Future Appointments   Date Time Provider [address] [place] [address]   8/22/2023  1:00 PM ROSE Ascencio [facility]           Subjective:      Patient ID: Ricardo Ott is a 37 y.o. female. Chief Complaint   Patient presents with   • Sore Throat     Started 4 days ago Jmoyle LPN   • Nasal Congestion   • Cough         Vitals:  /70   Pulse (!) 112   Temp 98.1 °F (36.7 °C)   Resp 16   Ht 5' 3" (1.6 m)   Wt 62.1 kg (137 lb)   BMI 24.27 kg/m²     HPI  Patient stated that started with sore throat couple of days ago and tried supportive care but no relief. Denies fever, chills and sob. Discussed with patient that she is getting throat infections very frequently and should follow with ENT for further evaluation to r/o any other etiology leading to this.  Prone to get vaginal yeast with use of antibiotics      The following portions of the patient's history were reviewed and updated as appropriate: allergies, current medications, past family history, past medical history, past social history, past surgical history and problem list.      Review of Systems   Constitutional: Negative for chills, diaphoresis, fatigue, fever and unexpected weight change. HENT: Positive for sore throat. Negative for congestion, dental problem, drooling, ear discharge, ear pain, facial swelling, hearing loss, mouth sores, nosebleeds, postnasal drip, rhinorrhea, sinus pressure, sinus pain, sneezing, tinnitus, trouble swallowing and voice change. Respiratory: Negative for cough, chest tightness, shortness of breath and wheezing. Cardiovascular: Negative. Gastrointestinal: Negative for abdominal pain, constipation, diarrhea, nausea and vomiting. Skin: Negative. Neurological: Negative for dizziness, light-headedness and headaches. Objective:    Social History     Tobacco Use   Smoking Status Former   • Packs/day: 1.00   • Years: 15.00   • Total pack years: 15.00   • Types: Cigarettes   • Start date: 1995   • Quit date: 6/15/2014   • Years since quittin.0   Smokeless Tobacco Never       Allergies:    Allergies   Allergen Reactions   • Doxycycline Other (See Comments)     unknown         Current Outpatient Medications   Medication Sig Dispense Refill   • albuterol (Proventil HFA) 90 mcg/act inhaler Inhale 2 puffs every 6 (six) hours as needed for wheezing or shortness of breath 6.7 g 2   • ALPRAZolam (XANAX) 1 mg tablet Take 1 tablet (1 mg total) by mouth 4 (four) times a day as needed for anxiety 120 tablet 0   • amoxicillin (AMOXIL) 875 mg tablet Take 1 tablet (875 mg total) by mouth 2 (two) times a day for 10 days 20 tablet 0   • amphetamine-dextroamphetamine (ADDERALL, 15MG,) 15 MG tablet Take 1 tablet (15 mg total) by mouth 2 (two) times a day Max Daily Amount: 30 mg 60 tablet 0   • buPROPion (WELLBUTRIN XL) 150 mg 24 hr tablet Take 1 tablet (150 mg total) by mouth daily 30 tablet 3   • busPIRone (BUSPAR) 10 mg tablet Take 1 tablet (10 mg total) by mouth 2 (two) times a day 60 tablet 3   • fluconazole (DIFLUCAN) 150 mg tablet Take 1 tablet (150 mg total) by mouth every third day for 2 doses 2 tablet 0   • fluticasone (FLONASE) 50 mcg/act nasal spray 2 sprays into each nostril daily 9.9 mL 2   • Fluticasone Furoate-Vilanterol (Breo Ellipta) 100-25 mcg/actuation inhaler Inhale 1 puff daily Rinse mouth after use. 60 each 2   • hydrocortisone (ANUSOL-HC) 2.5 % rectal cream Apply topically 2 (two) times a day 28 g 2   • ketoconazole (NIZORAL) 2 % shampoo PRN     • lamoTRIgine (LaMICtal) 200 MG tablet Take 1 tablet (200 mg total) by mouth daily at bedtime 30 tablet 3   • benzonatate (TESSALON) 200 MG capsule Take 1 capsule (200 mg total) by mouth 3 (three) times a day as needed for cough (Patient not taking: Reported on 2/16/2023) 20 capsule 0   • mometasone (ELOCON) 0.1 % lotion  (Patient not taking: Reported on 4/14/2023)     • ofloxacin (OCUFLOX) 0.3 % ophthalmic solution  (Patient not taking: Reported on 9/12/2022)     • ondansetron (ZOFRAN) 4 mg tablet Take 1 tablet (4 mg total) by mouth every 8 (eight) hours as needed for nausea or vomiting (Patient not taking: Reported on 9/12/2022) 30 tablet 1     No current facility-administered medications for this visit. Physical Exam  Vitals reviewed. Constitutional:       Appearance: Normal appearance. She is well-developed. HENT:      Head: Normocephalic. Right Ear: Tympanic membrane, ear canal and external ear normal.      Left Ear: Tympanic membrane, ear canal and external ear normal.      Nose: Nose normal.      Right Sinus: No maxillary sinus tenderness or frontal sinus tenderness. Left Sinus: No maxillary sinus tenderness or frontal sinus tenderness. Mouth/Throat:      Mouth: No oral lesions. Pharynx: Posterior oropharyngeal erythema present. No oropharyngeal exudate. Cardiovascular:      Rate and Rhythm: Normal rate and regular rhythm. Heart sounds: Normal heart sounds.    Pulmonary:      Effort: Pulmonary effort is normal.      Breath sounds: Normal breath sounds. Musculoskeletal:         General: Normal range of motion. Cervical back: Neck supple. Lymphadenopathy:      Cervical:      Right cervical: No superficial or posterior cervical adenopathy. Left cervical: No superficial or posterior cervical adenopathy. Skin:     General: Skin is warm and dry. Neurological:      Mental Status: She is alert and oriented to person, place, and time. Psychiatric:         Behavior: Behavior normal.         Thought Content:  Thought content normal.         Judgment: Judgment normal.               Recent Results (from the past 24 hour(s))   POCT rapid strepA    Collection Time: 07/12/23 10:30 AM   Result Value Ref Range     RAPID STREP A Negative Negative         ROSE Garcia

## 2023-07-12 NOTE — PATIENT INSTRUCTIONS
Amoxicillin (By mouth)   Amoxicillin ([id number]-m-AISHA-in)  Treats infections or stomach ulcers. This medicine is a penicillin antibiotic. Brand Name(s): Moxataharry Prevpac   There may be other brand names for this medicine. When This Medicine Should Not Be Used: This medicine is not right for everyone. You should not use it if you had an allergic reaction to amoxicillin, any type of penicillin, or a cephalosporin antibiotic. How to Use This Medicine:   Capsule, Liquid, Tablet, Chewable Tablet, Long Acting Tablet  Your doctor will tell you how much medicine to use. Do not use more than directed. Chewable tablet: You must chew the tablet before you swallow it. You may crush the tablet and mix the medicine with a small amount of food to make it easier to swallow. Oral liquid: Shake well just before each use. Measure the oral liquid medicine with a marked measuring spoon, oral syringe, or medicine cup. You may mix the oral liquid with a baby formula, milk, fruit juice, water, ginger ale, or another cold drink. Be sure your child drinks all of the mixture right away. Tablet for suspension: Place the tablet in a small drinking glass, and add 2 teaspoons of water. Do not use any other liquid. Gently stir or swirl the water in the glass until the tablet is completely dissolved. Drink all of this mixture right away. Add more water to the glass and drink all of it to make sure you get all of the medicine. Do not chew or swallow the tablet for suspension. Take all of the medicine in your prescription to clear up your infection, even if you feel better after the first few doses. Take a dose as soon as you remember. If it is almost time for your next dose, wait until then and take a regular dose. Do not take extra medicine to make up for a missed dose. Store the tablets, capsules, and tablets for suspension at room temperature, away from heat, moisture, and direct light. Store the oral liquid in the refrigerator.  Do not freeze. Throw away any unused medicine after 14 days. Drugs and Foods to Avoid:   Ask your doctor or pharmacist before using any other medicine, including over-the-counter medicines, vitamins, and herbal products. Some medicines can affect how amoxicillin works. Tell your doctor if you are also using any of the following:   Allopurinol  Probenecid  Birth control pills  A blood thinner  Warnings While Using This Medicine:   Tell your doctor if you are pregnant or breastfeeding, or if you have kidney disease, allergies, or a condition called phenylketonuria (PKU). Tell your doctor if you are on dialysis. This medicine can cause diarrhea. Call your doctor if the diarrhea becomes severe, does not stop, or is bloody. Do not take any medicine to stop diarrhea until you have talked to your doctor. Diarrhea can occur 2 months or more after you stop taking this medicine. Tell any doctor or dentist who treats you that you are using this medicine. This medicine may affect certain medical test results. Call your doctor if your symptoms do not improve or if they get worse. Use this medicine to treat only the infection your doctor has prescribed it for. Do not use this medicine for any infection or condition that has not been checked by a doctor. This medicine will not treat the flu or the common cold. Keep all medicine out of the reach of children. Never share your medicine with anyone. Possible Side Effects While Using This Medicine:   Call your doctor right away if you notice any of these side effects:   Allergic reaction: Itching or hives, swelling in your face or hands, swelling or tingling in your mouth or throat, chest tightness, trouble breathing  Blistering, peeling, or red skin rash  Diarrhea that may contain blood, stomach cramps, fever  If you notice these less serious side effects, talk with your doctor:   Mild diarrhea, nausea, or vomiting  Mild skin rash  If you notice other side effects that you think are caused by this medicine, tell your doctor. Call your doctor for medical advice about side effects. You may report side effects to FDA at 9-874-GLF-3816  © Copyright Robert Figueredo 2022 Information is for End User's use only and may not be sold, redistributed or otherwise used for commercial purposes. The above information is an  only. It is not intended as medical advice for individual conditions or treatments. Talk to your doctor, nurse or pharmacist before following any medical regimen to see if it is safe and effective for you.

## 2023-07-12 NOTE — LETTER
July 12, 2023     Patient: César Haines  YOB: 1980  Date of Visit: 7/12/2023      To Whom it May Concern:    Flor James is under my professional care. Bonny King was seen in my office on 7/12/2023. If you have any questions or concerns, please don't hesitate to call.          Sincerely,          ROSE Robles        CC: No Recipients

## 2023-07-19 DIAGNOSIS — F90.0 ATTENTION DEFICIT HYPERACTIVITY DISORDER (ADHD), PREDOMINANTLY INATTENTIVE TYPE: ICD-10-CM

## 2023-07-19 DIAGNOSIS — F31.81 BIPOLAR II DISORDER (HCC): ICD-10-CM

## 2023-07-19 DIAGNOSIS — F41.1 GAD (GENERALIZED ANXIETY DISORDER): ICD-10-CM

## 2023-07-19 RX ORDER — LAMOTRIGINE 200 MG/1
200 TABLET ORAL
Qty: 30 TABLET | Refills: 3 | Status: SHIPPED | OUTPATIENT
Start: 2023-07-19

## 2023-07-19 RX ORDER — ALPRAZOLAM 1 MG/1
1 TABLET ORAL 4 TIMES DAILY PRN
Qty: 120 TABLET | Refills: 0 | Status: SHIPPED | OUTPATIENT
Start: 2023-07-19

## 2023-07-19 RX ORDER — DEXTROAMPHETAMINE SACCHARATE, AMPHETAMINE ASPARTATE, DEXTROAMPHETAMINE SULFATE AND AMPHETAMINE SULFATE 3.75; 3.75; 3.75; 3.75 MG/1; MG/1; MG/1; MG/1
15 TABLET ORAL 2 TIMES DAILY
Qty: 60 TABLET | Refills: 0 | Status: SHIPPED | OUTPATIENT
Start: 2023-07-19

## 2023-07-19 NOTE — TELEPHONE ENCOUNTER
Medication Refill Request     Name of Medication ALPRAZolam (XANAX) 1 mg tablet  Dose/Frequency Take 1 tablet (1 mg total) by mouth 4 (four) times a day as needed for anxiety  Quantity 120  Verified pharmacy   [x]  Verified ordering Provider   [x]  Does patient have enough for the next 3 days? Yes [] No [x]  Does patient have a follow-up appointment scheduled? Yes [x] No []   If so when is appointment:  08/28/23 @ 9 AM        Medication Refill Request     Name of Medication amphetamine-dextroamphetamine (ADDERALL, 15MG,) 15 MG tablet  Dose/Frequency Take 1 tablet (15 mg total) by mouth 2 (two) times a day Max Daily Amount: 30 mg  Quantity 60  Verified pharmacy   [x]  Verified ordering Provider   [x]  Does patient have enough for the next 3 days? Yes [] No [x]  Does patient have a follow-up appointment scheduled? Yes [x] No []   If so when is appointment:  08/28/23 @ 9 AM      Medication Refill Request     Name of Medication lamoTRIgine (LaMICtal) 200 MG tablet  Dose/Frequency Take 1 tablet (200 mg total) by mouth daily at bedtime  Quantity 30  Verified pharmacy   [x]  Verified ordering Provider   [x]  Does patient have enough for the next 3 days? Yes [] No [x]  Does patient have a follow-up appointment scheduled?  Yes [x] No []   If so when is appointment: 08/28/23 @ 9 AM

## 2023-08-01 ENCOUNTER — NURSE TRIAGE (OUTPATIENT)
Age: 43
End: 2023-08-01

## 2023-08-01 NOTE — TELEPHONE ENCOUNTER
----- Message from Cee Enrique sent at 8/1/2023  9:07 AM EDT -----  Patient is not been feeling good. Patient is vomiting, pt is having nausea all day and bleeding when pt goes to the bathroom.

## 2023-08-07 DIAGNOSIS — R11.0 NAUSEA: ICD-10-CM

## 2023-08-07 RX ORDER — ONDANSETRON 4 MG/1
4 TABLET, FILM COATED ORAL EVERY 8 HOURS PRN
Qty: 30 TABLET | Refills: 1 | Status: SHIPPED | OUTPATIENT
Start: 2023-08-07

## 2023-08-07 NOTE — TELEPHONE ENCOUNTER
Called and spoke with patient. She reports having nausea,constipation and intermittent abdominal pain and back pain for several days. She reports having some back pain only and nausea today. She states she will take some Tylenol. She states her last good BM was 2 weeks ago after taking X-lax. She reports having some rectal bleeding with her BM'S. She has taken Zofran in the past for nausea but she will need a refill. I advised her to take Miralax for the constipation. Office visit scheduled for this Thursday. I advised patient to go to the ER with any worsening symptoms. Dr Boston Easton please send a refill of Zofran for patient.

## 2023-08-16 DIAGNOSIS — F41.1 GAD (GENERALIZED ANXIETY DISORDER): ICD-10-CM

## 2023-08-16 DIAGNOSIS — F90.0 ATTENTION DEFICIT HYPERACTIVITY DISORDER (ADHD), PREDOMINANTLY INATTENTIVE TYPE: ICD-10-CM

## 2023-08-16 RX ORDER — DEXTROAMPHETAMINE SACCHARATE, AMPHETAMINE ASPARTATE, DEXTROAMPHETAMINE SULFATE AND AMPHETAMINE SULFATE 3.75; 3.75; 3.75; 3.75 MG/1; MG/1; MG/1; MG/1
15 TABLET ORAL 2 TIMES DAILY
Qty: 60 TABLET | Refills: 0 | Status: SHIPPED | OUTPATIENT
Start: 2023-08-16

## 2023-08-16 RX ORDER — ALPRAZOLAM 1 MG/1
1 TABLET ORAL 4 TIMES DAILY PRN
Qty: 120 TABLET | Refills: 0 | Status: SHIPPED | OUTPATIENT
Start: 2023-08-16

## 2023-08-17 DIAGNOSIS — F33.9 EPISODE OF RECURRENT MAJOR DEPRESSIVE DISORDER, UNSPECIFIED DEPRESSION EPISODE SEVERITY (HCC): Primary | ICD-10-CM

## 2023-08-17 RX ORDER — PAROXETINE HYDROCHLORIDE 20 MG/1
20 TABLET, FILM COATED ORAL DAILY
Qty: 30 TABLET | Refills: 3 | Status: SHIPPED | OUTPATIENT
Start: 2023-08-17

## 2023-08-17 NOTE — PROGRESS NOTES
Telephone call from John, more depressed denies suicidal ideation wants to try Paxil which works for her brother. Wean off BuSpar. Start Paxil 20 mg then when stabilized wean off Wellbutrin.

## 2023-08-28 ENCOUNTER — TELEMEDICINE (OUTPATIENT)
Dept: PSYCHIATRY | Facility: CLINIC | Age: 43
End: 2023-08-28
Payer: COMMERCIAL

## 2023-08-28 DIAGNOSIS — F31.81 BIPOLAR II DISORDER (HCC): ICD-10-CM

## 2023-08-28 DIAGNOSIS — F41.1 GAD (GENERALIZED ANXIETY DISORDER): Primary | ICD-10-CM

## 2023-08-28 DIAGNOSIS — F41.0 PANIC DISORDER WITHOUT AGORAPHOBIA WITH MODERATE PANIC ATTACKS: ICD-10-CM

## 2023-08-28 DIAGNOSIS — R53.83 OTHER FATIGUE: ICD-10-CM

## 2023-08-28 DIAGNOSIS — F90.0 ATTENTION DEFICIT HYPERACTIVITY DISORDER (ADHD), PREDOMINANTLY INATTENTIVE TYPE: ICD-10-CM

## 2023-08-28 PROCEDURE — 90833 PSYTX W PT W E/M 30 MIN: CPT | Performed by: NURSE PRACTITIONER

## 2023-08-28 PROCEDURE — 99214 OFFICE O/P EST MOD 30 MIN: CPT | Performed by: NURSE PRACTITIONER

## 2023-08-28 RX ORDER — PAROXETINE 10 MG/1
10 TABLET, FILM COATED ORAL DAILY
Qty: 30 TABLET | Refills: 3 | Status: SHIPPED | OUTPATIENT
Start: 2023-08-28

## 2023-08-28 RX ORDER — BUPROPION HYDROCHLORIDE 75 MG/1
TABLET ORAL
Qty: 42 TABLET | Refills: 0 | Status: SHIPPED | OUTPATIENT
Start: 2023-08-28

## 2023-08-28 NOTE — BH CRISIS PLAN
Client Name: Vanesa Puri       Client YOB: 1980  : 1980    Treatment Team (include name and contact information):     Psychotherapist: julia  Psychiatrist: BELINDA SALAMANCA [facility]     Healthcare Provider  Sharlette Cheadle, [address] [facility]  [phone]    Type of Plan   * Child plans (children 15 yo and younger) must be completed and signed by the child's legal guardian   * Plans for all individuals 15 yo and above must be signed by the client. Plan Type: adolescent/adult (15 and over) Initial      My Personal Strengths are (in the client's own words):  "good work ethic, maintain temper, good "  The stressors and triggers that may put me at risk are:  being late, not being able to find her stuff, forgot to do something, missed appointment    Coping skills I can use to keep myself calm and safe: Other (describe) xanax and isolate. Coping skills/supports I can use to maintain abstinence from substance use:   Not Applicable    The people that provide me with help and support: (Include name, contact, and how they can help)   Support person #1: Father    * Phone number: in cell phone    * How can they help me? Talk support her   Support person #2:brother, Cathie Mays    * Phone number: in cell phone    * How can they help me? Support her, talk to her  In the past, the following has helped me in times of crisis:    Other: as above, sleep, c[place]abun      If it is an emergency and you need immediate help, call     If there is a possibility of danger to yourself or others, call the following crisis hotline resources:     Adult Crisis Numbers  Suicide Prevention Hotline - Dial   [facility]: [address]: [address] [facility] [address] [facility] [address]: [address]: [phone]  [facility]: [address]: [address]: [address]: [phone] (daytime). [phone] (after hours, weekends, holidays)     Child/Adolescent Crisis Numbers   [facility]: [address] : [phone]   [facility]: [phone]   [facility]: [phone]    Please note: Some [facility] do not have a separate number for Child/Adolescent specific crisis. If your county is not listed under Child/Adolescent, please call the adult number for your county     National Talk to Text Line   [place] - [phone]    In the event your feelings become unmanageable, and you cannot reach your support system, you will call 911 immediately or go to the nearest hospital emergency room.

## 2023-08-28 NOTE — PATIENT INSTRUCTIONS
Change Wellbutrin to 75 mg twice a day for 1 week, then take 1 tablet in the morning for 4 weeks and stop medication  Reduce Paxil to 10 mg p.o. daily  Continue other medicine and call with problems or concerns

## 2023-08-28 NOTE — PSYCH
Virtual Regular Visit    Problem List Items Addressed This Visit        Other    Attention deficit hyperactivity disorder (ADHD), predominantly inattentive type    Bipolar II disorder ([address])    RANJIT (generalized anxiety disorder) - Primary     Reason for visit is   Chief Complaint   Patient presents with   • Anxiety   • Depression   • Mood Swings   • Medication Management     Encounter provider Katelynn Chacon, PhD    Provider located at [address]  [address]  [address]  [address] [facility]  [phone]    Recent Visits  No visits were found meeting these conditions. Showing recent visits within past 7 days and meeting all other requirements  Today's Visits  Date Type Provider Dept   08/28/23 Telemedicine Katelynn Chacon, PhD [facility]   Showing today's visits and meeting all other requirements  Future Appointments  No visits were found meeting these conditions. Showing future appointments within next 150 days and meeting all other requirements       After connecting through televideo, the patient was identified by name and date of birth. Jyoti Mendoza was informed that this is a telemedicine visit and that the visit is being conducted through the [employer]. She agrees to proceed. which may not be secure and therefore, might not be HIPAA-compliant. My office door was closed. No one else was in the room. She acknowledged consent and understanding of privacy and security of the video platform. The patient has agreed to participate and understands they can discontinue the visit at any time. SUBJECTIVE:    Jyoti Mendoza is a 37 y.o. female with a history of bipolar disorder, ADHD and anxiety and depression seen for medication management and mood assessment. Isabel reports Paxil has helped the anxious feelings she was having, she is happier, states 20 mg was too much and she is cutting them in half.   She continues to be  from her  occasionally they will exercise together. He is not contributing to any of the pills in the home, and her son is staying with him. She reports she is working and trying to "get herself together"reports appetite is good and she is sleeping with the use of medication takes medication as prescribed. Family are supportive. Discussed option of weaning off Wellbutrin due to success of Paxil, and reducing Paxil to 10 mg.     HPI ROS Appetite Changes and Sleep: normal appetite and normal energy level    Review Of Systems:     Mood Anxiety and Depression   Behavior Normal    Thought Content Normal   General Relationship Problems and Emotional Problems   Personality Normal   Other Psych Symptoms Normal   Constitutional As Noted in HPI   ENT As Noted in HPI   Cardiovascular As Noted in HPI   Respiratory As Noted in HPI   Gastrointestinal As Noted in HPI   Genitourinary As Noted in HPI   Musculoskeletal As Noted in HPI   Integumentary As Noted in HPI   Neurological As Noted in HPI   Endocrine Normal    Other Symptoms Normal        Substance Abuse History:    Social History     Substance and Sexual Activity   Drug Use No       Family Psychiatric History:     Family History   Problem Relation Age of Onset   • No Known Problems Mother    • No Known Problems Father    • Bipolar disorder Sister    • No Known Problems Brother    • Anxiety disorder Daughter    • Depression Daughter    • No Known Problems Son        Social History     Socioeconomic History   • Marital status: /Civil Union     Spouse name: Not on file   • Number of children: 2   • Years of education: Not on file   • Highest education level: Not on file   Occupational History   • Not on file   Tobacco Use   • Smoking status: Former     Packs/day: 1.00     Years: 15.00     Total pack years: 15.00     Types: Cigarettes     Start date: 1995     Quit date: 6/15/2014     Years since quittin.2   • Smokeless tobacco: Never   Vaping Use   • Vaping Use: Every day   • Substances: Nicotine, Flavoring   Substance and Sexual Activity   • Alcohol use: Yes     Alcohol/week: 2.0 standard drinks of alcohol     Types: 2 Glasses of wine per week     Comment: occassional - wine 2 x month   • Drug use: No   • Sexual activity: Yes     Partners: Male     Birth control/protection: Female Sterilization     Comment: ablation   Other Topics Concern   • Not on file   Social History Narrative   • Not on file     Social Determinants of Health     Financial Resource Strain: Not on file   Food Insecurity: Not on file   Transportation Needs: Not on file   Physical Activity: Not on file   Stress: Not on file   Social Connections: Not on file   Intimate Partner Violence: Not on file   Housing Stability: Not on file       Past Medical History:   Diagnosis Date   • Abdominal pain    • ADHD    • Allergic rhinitis    • Anxiety     panic attacks   • Anxiety    • Asthma    • Bipolar disorder ([address])    • Bladder distention     came to the ED on 10/9/2020-greene in to drain then removed   • Chronic constipation    • Colon polyp    • Contact lens/glasses fitting     and glasses   • Depression    • Depression    • GERD (gastroesophageal reflux disease)    • Psychiatric disorder     anxiety    • Rectal bleeding    • Sepsis ([address]) 2/8/2017       Past Surgical History:   Procedure Laterality Date   • APPENDECTOMY     • BREAST SURGERY      augmentation silicone   • COLONOSCOPY N/A 1/24/2018    Procedure: COLONOSCOPY WITH HEMORRHOID BANDING;  Surgeon: Shoshana Drew MD;  Location: [address] [place] [address] GI LAB; Service: Gastroenterology   • ENDOMETRIAL ABLATION  2016   • [place] SIGMOIDOSCOPY FLX DX W/COLLJ SPEC BR/WA IF PFRMD N/A 11/14/2018    Procedure: FLEXIBLE SIGMOIDOSCOPY WITH APC; Surgeon: Shoshana Drew MD;  Location: [facility] GI LAB;   Service: Gastroenterology   • UPPER GASTROINTESTINAL ENDOSCOPY         Current Outpatient Medications   Medication Sig Dispense Refill   • albuterol (Proventil HFA) 90 mcg/act inhaler Inhale 2 puffs every 6 (six) hours as needed for wheezing or shortness of breath 6.7 g 2   • amphetamine-dextroamphetamine (ADDERALL, 15MG,) 15 MG tablet Take 1 tablet (15 mg total) by mouth 2 (two) times a day Max Daily Amount: 30 mg (Patient taking differently: Take 15 mg by mouth 2 (two) times a day as needed) 60 tablet 0   • benzonatate (TESSALON) 200 MG capsule Take 1 capsule (200 mg total) by mouth 3 (three) times a day as needed for cough 20 capsule 0   • buPROPion (WELLBUTRIN XL) 150 mg 24 hr tablet Take 1 tablet (150 mg total) by mouth daily 30 tablet 3   • fluticasone (FLONASE) 50 mcg/act nasal spray 2 sprays into each nostril daily 9.9 mL 2   • Fluticasone Furoate-Vilanterol (Breo Ellipta) 100-25 mcg/actuation inhaler Inhale 1 puff daily Rinse mouth after use. 60 each 2   • hydrocortisone (ANUSOL-HC) 2.5 % rectal cream Apply topically 2 (two) times a day 28 g 2   • ketoconazole (NIZORAL) 2 % shampoo PRN     • lamoTRIgine (LaMICtal) 200 MG tablet Take 1 tablet (200 mg total) by mouth daily at bedtime 30 tablet 3   • ondansetron (ZOFRAN) 4 mg tablet Take 1 tablet (4 mg total) by mouth every 8 (eight) hours as needed for nausea or vomiting 30 tablet 1   • PARoxetine (PAXIL) 20 mg tablet Take 1 tablet (20 mg total) by mouth daily (Patient taking differently: Take 10 mg by mouth daily) 30 tablet 3   • ALPRAZolam (XANAX) 1 mg tablet Take 1 tablet (1 mg total) by mouth 4 (four) times a day as needed for anxiety 120 tablet 0   • mometasone (ELOCON) 0.1 % lotion  (Patient not taking: Reported on 8/28/2023)     • ofloxacin (OCUFLOX) 0.3 % ophthalmic solution  (Patient not taking: Reported on 9/12/2022)       No current facility-administered medications for this visit.         Allergies   Allergen Reactions   • Doxycycline Other (See Comments)     unknown       The following portions of the patient's history were reviewed and updated as appropriate: allergies, current medications, past family history, past medical history, past social history, past surgical history and problem list.    OBJECTIVE:     Mental Status Examination:    Appearance calm and cooperative , adequate hygiene and grooming and good eye contact    Mood anxious   Affect affect appropriate    Speech a normal rate   Thought Processes normal thought processes   Hallucinations no hallucinations present    Thought Content no delusions   Abnormal Thoughts no suicidal thoughts  and no homicidal thoughts    Orientation  oriented to person and oriented to place   Remote Memory short term memory intact and long term memory intact   Attention Span concentration intact   Intellect Appears to be of Average Intelligence   Insight Insight intact   Judgement judgment was intact   Muscle Strength Muscle strength and tone were normal   Language no difficulty naming common objects   Fund of Knowledge displays adequate knowledge of current events   Pain none   Pain Scale 0       Laboratory Results: No results found for this or any previous visit. Assessment/Plan:       Diagnoses and all orders for this visit:    RANJIT (generalized anxiety disorder)    Bipolar II disorder ([address])    Attention deficit hyperactivity disorder (ADHD), predominantly inattentive type          Treatment Recommendations- Risks Benefits      Immediate Medical/Psychiatric/Psychotherapy Treatments and Any Precautions:  reviewed medication continue with treatment plan reduce Paxil to 10 mg p.o. daily, change Wellbutrin to 75 mg immediate release twice daily. Take for 1 week then decrease Wellbutrin to 75 mg in the morning for 4 weeks and discontinue.     Risks, Benefits And Possible Side Effects Of Medications:  {PSYCH RISK, BENEFITS AND POSSIBLE SIDE EFFECTS (Optional):09999    Controlled Medication Discussion: She is aware of safe use and storage of medication     Psychotherapy Provided: 30 minutes  Supportive therapy  Medication evaluation mood assessment  Medication education  Crisis plan developed    Goals discussed in session: Stable moods       Treatment Plan:    Completed and signed during the session: Not applicable - Treatment Plan not due at this session      Bernarda Rondon, PhD 08/28/23

## 2023-09-18 DIAGNOSIS — F90.0 ATTENTION DEFICIT HYPERACTIVITY DISORDER (ADHD), PREDOMINANTLY INATTENTIVE TYPE: ICD-10-CM

## 2023-09-18 DIAGNOSIS — F41.1 GAD (GENERALIZED ANXIETY DISORDER): ICD-10-CM

## 2023-09-18 RX ORDER — DEXTROAMPHETAMINE SACCHARATE, AMPHETAMINE ASPARTATE, DEXTROAMPHETAMINE SULFATE AND AMPHETAMINE SULFATE 3.75; 3.75; 3.75; 3.75 MG/1; MG/1; MG/1; MG/1
15 TABLET ORAL 2 TIMES DAILY PRN
Qty: 60 TABLET | Refills: 0 | Status: SHIPPED | OUTPATIENT
Start: 2023-09-18

## 2023-09-18 RX ORDER — ALPRAZOLAM 1 MG/1
1 TABLET ORAL 4 TIMES DAILY PRN
Qty: 120 TABLET | Refills: 0 | Status: SHIPPED | OUTPATIENT
Start: 2023-09-18

## 2023-09-18 NOTE — TELEPHONE ENCOUNTER
Patient requests refills on medications:    Xanax 1 mg  Adderall 15 mg    Pharmacy verified.     Patient will call back to make f/u appt with Dr Khoi Yanez

## 2023-10-16 DIAGNOSIS — F90.0 ATTENTION DEFICIT HYPERACTIVITY DISORDER (ADHD), PREDOMINANTLY INATTENTIVE TYPE: ICD-10-CM

## 2023-10-16 DIAGNOSIS — F41.1 GAD (GENERALIZED ANXIETY DISORDER): ICD-10-CM

## 2023-10-16 RX ORDER — PAROXETINE 10 MG/1
10 TABLET, FILM COATED ORAL DAILY
Qty: 30 TABLET | Refills: 3 | Status: SHIPPED | OUTPATIENT
Start: 2023-10-16

## 2023-10-16 RX ORDER — ALPRAZOLAM 1 MG/1
1 TABLET ORAL 4 TIMES DAILY PRN
Qty: 120 TABLET | Refills: 0 | Status: SHIPPED | OUTPATIENT
Start: 2023-10-16

## 2023-10-16 RX ORDER — DEXTROAMPHETAMINE SACCHARATE, AMPHETAMINE ASPARTATE, DEXTROAMPHETAMINE SULFATE AND AMPHETAMINE SULFATE 3.75; 3.75; 3.75; 3.75 MG/1; MG/1; MG/1; MG/1
15 TABLET ORAL 2 TIMES DAILY PRN
Qty: 60 TABLET | Refills: 0 | Status: SHIPPED | OUTPATIENT
Start: 2023-10-16

## 2023-10-16 NOTE — TELEPHONE ENCOUNTER
Patient Request Refills on medications:    Paxil 10 mg  Adderall 15 mg  Xanax 1 mg    Pharmacy Verified    Next visit 11/16/2023 Dr Khoi Yanez

## 2023-10-22 ENCOUNTER — OFFICE VISIT (OUTPATIENT)
Dept: URGENT CARE | Facility: CLINIC | Age: 43
End: 2023-10-22
Payer: COMMERCIAL

## 2023-10-22 VITALS
WEIGHT: 137 LBS | SYSTOLIC BLOOD PRESSURE: 115 MMHG | HEIGHT: 63 IN | HEART RATE: 87 BPM | DIASTOLIC BLOOD PRESSURE: 87 MMHG | TEMPERATURE: 97.5 F | OXYGEN SATURATION: 100 % | BODY MASS INDEX: 24.27 KG/M2 | RESPIRATION RATE: 16 BRPM

## 2023-10-22 DIAGNOSIS — J02.9 SORE THROAT: Primary | ICD-10-CM

## 2023-10-22 PROCEDURE — 99213 OFFICE O/P EST LOW 20 MIN: CPT | Performed by: PHYSICIAN ASSISTANT

## 2023-10-22 RX ORDER — AMOXICILLIN AND CLAVULANATE POTASSIUM 875; 125 MG/1; MG/1
1 TABLET, FILM COATED ORAL EVERY 12 HOURS SCHEDULED
Qty: 14 TABLET | Refills: 0 | Status: SHIPPED | OUTPATIENT
Start: 2023-10-22 | End: 2023-10-29

## 2023-10-22 RX ORDER — FLUCONAZOLE 150 MG/1
150 TABLET ORAL ONCE
Qty: 1 TABLET | Refills: 0 | Status: SHIPPED | OUTPATIENT
Start: 2023-10-22 | End: 2023-10-22

## 2023-10-22 NOTE — PROGRESS NOTES
[facility]        NAME:  Jerald is a 37 y.o. female  : 1980    MRN: [id number]  DATE: 2023  TIME: 4:14 PM    Assessment and Plan   Sore throat [J02.9]  1. Sore throat  amoxicillin-clavulanate (AUGMENTIN) 875-125 mg per tablet    fluconazole (DIFLUCAN) 150 mg tablet            Patient Instructions   Will send on Augmentin to be taken as directed with food and a probiotic for persistent lymph node swelling and URI sx like sore throat. -Stay very well hydrated and push fluids  -Run a humidifier by your bed and take steam showers to clear mucus   -Zicam nasal AllClear can be soothing to the nasal passages   -You can use flonase once daily for two weeks   -Advil or Tylenol for fever or pain. -Mucinex OTC or Zyrtec or Claritin. Drink plenty of water with the mucinex.   -Honey or throat lozenges for cough may be helpful  -Warm salt water gargles and tea with honey   -Obtain a pulse ox device and check your O2 1-2 times a day. You want your oxygen levels to be >93%. If they go below 93% go to the ED immediately. -Vitamin C 500mg, Vitamin D 2000IU daily. You can attempt to use zinc or Zicam up to 30mg per day. -If your sx worsen or persist follow up with your PCP for recheck. ENT follow up is again advised for evaluation of the lymph node swelling. Follow up with PCP in 3-5 days. Proceed to  ER if symptoms worsen. Chief Complaint   No chief complaint on file. History of Present Illness       The patient presents today for sore throat, hoarseness, coughing at night, congestion x 2 weeks. She states that her worst sx today is the sore throat. No fever, chills, body aches. No dyspnea, wheezing, chest tightness, cp, palpitations. No dizziness or weakness. No GI sx. Good PO intake. No loss of taste or smell. No lower extremity edema. No hx of asthma or smoking. No known sick contacts or recent travel. She has been taking advil severe sinus and cold.  She had an ultrasound done over the L anterior cervical node and she was referred to her ENT in June 2023. She never followed up with the ENT for the lymphadenopathy. Review of Systems   Review of Systems   Constitutional:  Negative for activity change, appetite change, chills, diaphoresis, fatigue and fever. HENT:  Positive for congestion, sinus pressure, sore throat and voice change. Negative for ear discharge, ear pain, facial swelling, hearing loss, postnasal drip, rhinorrhea, sinus pain, tinnitus and trouble swallowing. Eyes:  Negative for visual disturbance. Respiratory:  Negative for apnea, cough, chest tightness, shortness of breath, wheezing and stridor. Cardiovascular:  Negative for chest pain, palpitations and leg swelling. Gastrointestinal:  Negative for abdominal distention and abdominal pain. Genitourinary:  Negative for decreased urine volume. Musculoskeletal:  Negative for arthralgias, joint swelling, myalgias, neck pain and neck stiffness. Skin:  Negative for rash. Allergic/Immunologic: Negative for immunocompromised state. Neurological:  Negative for dizziness, weakness, light-headedness, numbness and headaches. Hematological:  Positive for adenopathy.          Current Medications       Current Outpatient Medications:     amoxicillin-clavulanate (AUGMENTIN) 875-125 mg per tablet, Take 1 tablet by mouth every 12 (twelve) hours for 7 days, Disp: 14 tablet, Rfl: 0    fluconazole (DIFLUCAN) 150 mg tablet, Take 1 tablet (150 mg total) by mouth once for 1 dose, Disp: 1 tablet, Rfl: 0    albuterol (Proventil HFA) 90 mcg/act inhaler, Inhale 2 puffs every 6 (six) hours as needed for wheezing or shortness of breath, Disp: 6.7 g, Rfl: 2    ALPRAZolam (XANAX) 1 mg tablet, Take 1 tablet (1 mg total) by mouth 4 (four) times a day as needed for anxiety, Disp: 120 tablet, Rfl: 0    amphetamine-dextroamphetamine (ADDERALL, 15MG,) 15 MG tablet, Take 1 tablet (15 mg total) by mouth 2 (two) times a day as needed (focus and concentration) Max Daily Amount: 30 mg, Disp: 60 tablet, Rfl: 0    benzonatate (TESSALON) 200 MG capsule, Take 1 capsule (200 mg total) by mouth 3 (three) times a day as needed for cough, Disp: 20 capsule, Rfl: 0    buPROPion (WELLBUTRIN) 75 mg tablet, Take one tablet 75 mg twice a day po for one week, then take one tablet 75 mg in am for four weeks and stop., Disp: 42 tablet, Rfl: 0    fluticasone (FLONASE) 50 mcg/act nasal spray, 2 sprays into each nostril daily, Disp: 9.9 mL, Rfl: 2    Fluticasone Furoate-Vilanterol (Breo Ellipta) 100-25 mcg/actuation inhaler, Inhale 1 puff daily Rinse mouth after use., Disp: 60 each, Rfl: 2    hydrocortisone (ANUSOL-HC) 2.5 % rectal cream, Apply topically 2 (two) times a day, Disp: 28 g, Rfl: 2    ketoconazole (NIZORAL) 2 % shampoo, PRN, Disp: , Rfl:     lamoTRIgine (LaMICtal) 200 MG tablet, Take 1 tablet (200 mg total) by mouth daily at bedtime, Disp: 30 tablet, Rfl: 3    mometasone (ELOCON) 0.1 % lotion, , Disp: , Rfl:     ofloxacin (OCUFLOX) 0.3 % ophthalmic solution, , Disp: , Rfl:     ondansetron (ZOFRAN) 4 mg tablet, Take 1 tablet (4 mg total) by mouth every 8 (eight) hours as needed for nausea or vomiting, Disp: 30 tablet, Rfl: 1    PARoxetine (PAXIL) 10 mg tablet, Take 1 tablet (10 mg total) by mouth daily, Disp: 30 tablet, Rfl: 3    Current Allergies     Allergies as of 10/22/2023 - Reviewed 08/28/2023   Allergen Reaction Noted    Doxycycline Other (See Comments) 05/01/2019            The following portions of the patient's history were reviewed and updated as appropriate: allergies, current medications, past family history, past medical history, past social history, past surgical history and problem list.     Past Medical History:   Diagnosis Date    Abdominal pain     ADHD     Allergic rhinitis     Anxiety     panic attacks    Anxiety     Asthma     Bipolar disorder (HCC)     Bladder distention     came to the ED on 10/9/2020-jc in to drain then removed    Chronic constipation     Colon polyp     Contact lens/glasses fitting     and glasses    Depression     Depression     GERD (gastroesophageal reflux disease)     Psychiatric disorder     anxiety     Rectal bleeding     Sepsis ([address]) 2/8/2017       Past Surgical History:   Procedure Laterality Date    APPENDECTOMY      BREAST SURGERY      augmentation silicone    COLONOSCOPY N/A 1/24/2018    Procedure: COLONOSCOPY WITH HEMORRHOID BANDING;  Surgeon: Jayden Jones MD;  Location: [address] [place] [address] [address] GI LAB; Service: Gastroenterology    ENDOMETRIAL ABLATION  2016    [place] SIGMOIDOSCOPY FLX DX W/COLLJ SPEC BR/WA IF PFRMD N/A 11/14/2018    Procedure: FLEXIBLE SIGMOIDOSCOPY WITH APC; Surgeon: Jayden Jones MD;  Location: [facility] GI LAB; Service: Gastroenterology    UPPER GASTROINTESTINAL ENDOSCOPY         Family History   Problem Relation Age of Onset    No Known Problems Mother     No Known Problems Father     Bipolar disorder Sister     No Known Problems Brother     Anxiety disorder Daughter     Depression Daughter     No Known Problems Son          Medications have been verified. Objective   There were no vitals taken for this visit. No LMP recorded. Physical Exam     Physical Exam  Vitals and nursing note reviewed. Constitutional:       General: She is not in acute distress. Appearance: She is well-developed. She is not ill-appearing, toxic-appearing or diaphoretic. HENT:      Head: Normocephalic and atraumatic. Right Ear: Hearing, tympanic membrane, ear canal and external ear normal.      Left Ear: Hearing, tympanic membrane, ear canal and external ear normal.      Nose: Congestion present. No mucosal edema or rhinorrhea. Right Sinus: No maxillary sinus tenderness or frontal sinus tenderness. Left Sinus: No maxillary sinus tenderness or frontal sinus tenderness. Mouth/Throat:      Lips: Pink. Mouth: Mucous membranes are moist.      Pharynx: Uvula midline. Pharyngeal swelling and posterior oropharyngeal erythema present. No oropharyngeal exudate or uvula swelling. Tonsils: No tonsillar exudate or tonsillar abscesses. 1+ on the right. 1+ on the left. Cardiovascular:      Rate and Rhythm: Normal rate and regular rhythm. Heart sounds: S1 normal and S2 normal. Heart sounds not distant. No murmur heard. No friction rub. No gallop. No S3 or S4 sounds. Pulmonary:      Effort: No tachypnea, bradypnea, accessory muscle usage or respiratory distress. Breath sounds: No decreased breath sounds, wheezing, rhonchi or rales. Musculoskeletal:      Cervical back: Normal range of motion and neck supple. Lymphadenopathy:      Cervical: Cervical adenopathy present. Right cervical: No superficial cervical adenopathy. Left cervical: Superficial cervical adenopathy present. Comments: L anterior cervical node is enlarged ~1cm firm and mobile with mild tenderness    Neurological:      Mental Status: She is alert and oriented to person, place, and time.    Psychiatric:         Behavior: Behavior normal.

## 2023-10-24 NOTE — PATIENT INSTRUCTIONS
Will send on Augmentin to be taken as directed with food and a probiotic for persistent lymph node swelling and URI sx like sore throat. -Stay very well hydrated and push fluids  -Run a humidifier by your bed and take steam showers to clear mucus   -Zicam nasal AllClear can be soothing to the nasal passages   -You can use flonase once daily for two weeks   -Advil or Tylenol for fever or pain. -Mucinex OTC or Zyrtec or Claritin. Drink plenty of water with the mucinex.   -Honey or throat lozenges for cough may be helpful  -Warm salt water gargles and tea with honey   -Obtain a pulse ox device and check your O2 1-2 times a day. You want your oxygen levels to be >93%. If they go below 93% go to the ED immediately. -Vitamin C 500mg, Vitamin D 2000IU daily. You can attempt to use zinc or Zicam up to 30mg per day. -If your sx worsen or persist follow up with your PCP for recheck. ENT follow up is again advised for evaluation of the lymph node swelling.

## 2023-10-28 ENCOUNTER — HOSPITAL ENCOUNTER (EMERGENCY)
Facility: HOSPITAL | Age: 43
Discharge: HOME | End: 2023-10-28
Attending: EMERGENCY MEDICINE
Payer: COMMERCIAL

## 2023-10-28 ENCOUNTER — APPOINTMENT (EMERGENCY)
Dept: RADIOLOGY | Facility: HOSPITAL | Age: 43
End: 2023-10-28
Payer: COMMERCIAL

## 2023-10-28 VITALS
TEMPERATURE: 98.8 F | OXYGEN SATURATION: 100 % | HEART RATE: 78 BPM | RESPIRATION RATE: 22 BRPM | SYSTOLIC BLOOD PRESSURE: 179 MMHG | DIASTOLIC BLOOD PRESSURE: 89 MMHG

## 2023-10-28 DIAGNOSIS — R07.9 CHEST PAIN: Primary | ICD-10-CM

## 2023-10-28 DIAGNOSIS — F41.9 ANXIETY: ICD-10-CM

## 2023-10-28 LAB
ALBUMIN SERPL BCP-MCNC: 5 G/DL (ref 3.5–5)
ALP SERPL-CCNC: 48 U/L (ref 34–104)
ALT SERPL W P-5'-P-CCNC: 23 U/L (ref 7–52)
ANION GAP SERPL CALCULATED.3IONS-SCNC: 16 MMOL/L
AST SERPL W P-5'-P-CCNC: 22 U/L (ref 13–39)
BASOPHILS # BLD AUTO: 0.07 THOUSANDS/ÂΜL (ref 0–0.1)
BASOPHILS NFR BLD AUTO: 1 % (ref 0–1)
BILIRUB SERPL-MCNC: 0.59 MG/DL (ref 0.2–1)
BUN SERPL-MCNC: 16 MG/DL (ref 5–25)
CALCIUM SERPL-MCNC: 9.7 MG/DL (ref 8.4–10.2)
CARDIAC TROPONIN I PNL SERPL HS: <2 NG/L
CHLORIDE SERPL-SCNC: 101 MMOL/L (ref 96–108)
CO2 SERPL-SCNC: 21 MMOL/L (ref 21–32)
CREAT SERPL-MCNC: 0.95 MG/DL (ref 0.6–1.3)
D DIMER PPP FEU-MCNC: <0.27 UG/ML FEU
EOSINOPHIL # BLD AUTO: 0.17 THOUSAND/ÂΜL (ref 0–0.61)
EOSINOPHIL NFR BLD AUTO: 1 % (ref 0–6)
ERYTHROCYTE [DISTWIDTH] IN BLOOD BY AUTOMATED COUNT: 12.6 % (ref 11.6–15.1)
GFR SERPL CREATININE-BSD FRML MDRD: 72 ML/MIN/1.73SQ M
GLUCOSE SERPL-MCNC: 93 MG/DL (ref 65–140)
HCT VFR BLD AUTO: 45.2 % (ref 34.8–46.1)
HGB BLD-MCNC: 15.8 G/DL (ref 11.5–15.4)
IMM GRANULOCYTES # BLD AUTO: 0.04 THOUSAND/UL (ref 0–0.2)
IMM GRANULOCYTES NFR BLD AUTO: 0 % (ref 0–2)
LYMPHOCYTES # BLD AUTO: 4.78 THOUSANDS/ÂΜL (ref 0.6–4.47)
LYMPHOCYTES NFR BLD AUTO: 35 % (ref 14–44)
MAGNESIUM SERPL-MCNC: 2 MG/DL (ref 1.9–2.7)
MCH RBC QN AUTO: 34.1 PG (ref 26.8–34.3)
MCHC RBC AUTO-ENTMCNC: 35 G/DL (ref 31.4–37.4)
MCV RBC AUTO: 97 FL (ref 82–98)
MONOCYTES # BLD AUTO: 1.07 THOUSAND/ÂΜL (ref 0.17–1.22)
MONOCYTES NFR BLD AUTO: 8 % (ref 4–12)
NEUTROPHILS # BLD AUTO: 7.7 THOUSANDS/ÂΜL (ref 1.85–7.62)
NEUTS SEG NFR BLD AUTO: 55 % (ref 43–75)
NRBC BLD AUTO-RTO: 0 /100 WBCS
PLATELET # BLD AUTO: 346 THOUSANDS/UL (ref 149–390)
PMV BLD AUTO: 9.3 FL (ref 8.9–12.7)
POTASSIUM SERPL-SCNC: 2.9 MMOL/L (ref 3.5–5.3)
PROT SERPL-MCNC: 7.7 G/DL (ref 6.4–8.4)
RBC # BLD AUTO: 4.64 MILLION/UL (ref 3.81–5.12)
SODIUM SERPL-SCNC: 138 MMOL/L (ref 135–147)
WBC # BLD AUTO: 13.83 THOUSAND/UL (ref 4.31–10.16)

## 2023-10-28 PROCEDURE — 85379 FIBRIN DEGRADATION QUANT: CPT | Performed by: EMERGENCY MEDICINE

## 2023-10-28 PROCEDURE — 93005 ELECTROCARDIOGRAM TRACING: CPT

## 2023-10-28 PROCEDURE — 36415 COLL VENOUS BLD VENIPUNCTURE: CPT | Performed by: EMERGENCY MEDICINE

## 2023-10-28 PROCEDURE — 83735 ASSAY OF MAGNESIUM: CPT | Performed by: EMERGENCY MEDICINE

## 2023-10-28 PROCEDURE — 96374 THER/PROPH/DIAG INJ IV PUSH: CPT

## 2023-10-28 PROCEDURE — 85025 COMPLETE CBC W/AUTO DIFF WBC: CPT | Performed by: EMERGENCY MEDICINE

## 2023-10-28 PROCEDURE — 71045 X-RAY EXAM CHEST 1 VIEW: CPT

## 2023-10-28 PROCEDURE — 80053 COMPREHEN METABOLIC PANEL: CPT | Performed by: EMERGENCY MEDICINE

## 2023-10-28 PROCEDURE — 84484 ASSAY OF TROPONIN QUANT: CPT | Performed by: EMERGENCY MEDICINE

## 2023-10-28 PROCEDURE — 99285 EMERGENCY DEPT VISIT HI MDM: CPT

## 2023-10-28 PROCEDURE — 96361 HYDRATE IV INFUSION ADD-ON: CPT

## 2023-10-28 PROCEDURE — 99285 EMERGENCY DEPT VISIT HI MDM: CPT | Performed by: EMERGENCY MEDICINE

## 2023-10-28 RX ORDER — LORAZEPAM 1 MG/1
1 TABLET ORAL 3 TIMES DAILY PRN
Qty: 8 TABLET | Refills: 0 | Status: SHIPPED | OUTPATIENT
Start: 2023-10-28 | End: 2023-10-31

## 2023-10-28 RX ORDER — POTASSIUM CHLORIDE 20 MEQ/1
40 TABLET, EXTENDED RELEASE ORAL ONCE
Status: COMPLETED | OUTPATIENT
Start: 2023-10-28 | End: 2023-10-28

## 2023-10-28 RX ORDER — ONDANSETRON 4 MG/1
4 TABLET, ORALLY DISINTEGRATING ORAL ONCE
Status: COMPLETED | OUTPATIENT
Start: 2023-10-28 | End: 2023-10-28

## 2023-10-28 RX ORDER — LORAZEPAM 2 MG/ML
1 INJECTION INTRAMUSCULAR ONCE
Status: COMPLETED | OUTPATIENT
Start: 2023-10-28 | End: 2023-10-28

## 2023-10-28 RX ORDER — ONDANSETRON 4 MG/1
4 TABLET, ORALLY DISINTEGRATING ORAL EVERY 6 HOURS PRN
Qty: 9 TABLET | Refills: 0 | Status: SHIPPED | OUTPATIENT
Start: 2023-10-28 | End: 2023-10-31

## 2023-10-28 RX ADMIN — SODIUM CHLORIDE 1000 ML: 0.9 INJECTION, SOLUTION INTRAVENOUS at 17:48

## 2023-10-28 RX ADMIN — ONDANSETRON 4 MG: 4 TABLET, ORALLY DISINTEGRATING ORAL at 17:52

## 2023-10-28 RX ADMIN — POTASSIUM CHLORIDE 40 MEQ: 1500 TABLET, EXTENDED RELEASE ORAL at 18:45

## 2023-10-28 RX ADMIN — LORAZEPAM 1 MG: 2 INJECTION INTRAMUSCULAR; INTRAVENOUS at 17:50

## 2023-10-28 NOTE — ED PROVIDER NOTES
History  Chief Complaint   Patient presents with    Chest Pain     38 y/o female presents with feeling of panic, anxiety shortness of breath. No chest pain,fevers,cough no other symptoms. history of anxiety noted, on xanax. Admits to nausea, denies vomiting or diarrhea      History provided by:  Patient   used: No        Prior to Admission Medications   Prescriptions Last Dose Informant Patient Reported? Taking? ALPRAZolam (XANAX) 1 mg tablet   No No   Sig: Take 1 tablet (1 mg total) by mouth 4 (four) times a day as needed for anxiety   Fluticasone Furoate-Vilanterol (Breo Ellipta) 100-25 mcg/actuation inhaler  Self No No   Sig: Inhale 1 puff daily Rinse mouth after use. PARoxetine (PAXIL) 10 mg tablet   No No   Sig: Take 1 tablet (10 mg total) by mouth daily   albuterol (Proventil HFA) 90 mcg/act inhaler  Self No No   Sig: Inhale 2 puffs every 6 (six) hours as needed for wheezing or shortness of breath   amoxicillin-clavulanate (AUGMENTIN) 875-125 mg per tablet   No No   Sig: Take 1 tablet by mouth every 12 (twelve) hours for 7 days   amphetamine-dextroamphetamine (ADDERALL, 15MG,) 15 MG tablet   No No   Sig: Take 1 tablet (15 mg total) by mouth 2 (two) times a day as needed (focus and concentration) Max Daily Amount: 30 mg   benzonatate (TESSALON) 200 MG capsule  Self No No   Sig: Take 1 capsule (200 mg total) by mouth 3 (three) times a day as needed for cough   buPROPion (WELLBUTRIN) 75 mg tablet   No No   Sig: Take one tablet 75 mg twice a day po for one week, then take one tablet 75 mg in am for four weeks and stop.    fluticasone (FLONASE) 50 mcg/act nasal spray  Self No No   Si sprays into each nostril daily   hydrocortisone (ANUSOL-HC) 2.5 % rectal cream   No No   Sig: Apply topically 2 (two) times a day   ketoconazole (NIZORAL) 2 % shampoo  Self Yes No   Sig: PRN   lamoTRIgine (LaMICtal) 200 MG tablet   No No   Sig: Take 1 tablet (200 mg total) by mouth daily at bedtime mometasone (ELOCON) 0.1 % lotion  Self Yes No   Patient not taking: Reported on 8/28/2023   ofloxacin (OCUFLOX) 0.3 % ophthalmic solution  Self Yes No   Patient not taking: Reported on 9/12/2022   ondansetron (ZOFRAN) 4 mg tablet   No No   Sig: Take 1 tablet (4 mg total) by mouth every 8 (eight) hours as needed for nausea or vomiting      Facility-Administered Medications: None       Past Medical History:   Diagnosis Date    Abdominal pain     ADHD     Allergic rhinitis     Anxiety     panic attacks    Anxiety     Asthma     Bipolar disorder (HCC)     Bladder distention     came to the ED on 10/9/2020-greene in to drain then removed    Chronic constipation     Colon polyp     Contact lens/glasses fitting     and glasses    Depression     Depression     GERD (gastroesophageal reflux disease)     Psychiatric disorder     anxiety     Rectal bleeding     Sepsis ([address]) 2/8/2017       Past Surgical History:   Procedure Laterality Date    APPENDECTOMY      BREAST SURGERY      augmentation silicone    COLONOSCOPY N/A 1/24/2018    Procedure: COLONOSCOPY WITH HEMORRHOID BANDING;  Surgeon: Audrey Burden MD;  Location: [address] [place] [address] GI LAB; Service: Gastroenterology    ENDOMETRIAL ABLATION  2016    AL SIGMOIDOSCOPY FLX DX W/COLLJ SPEC BR/WA IF PFRMD N/A 11/14/2018    Procedure: FLEXIBLE SIGMOIDOSCOPY WITH APC; Surgeon: Audrey Burden MD;  Location: [facility] GI LAB; Service: Gastroenterology    UPPER GASTROINTESTINAL ENDOSCOPY         Family History   Problem Relation Age of Onset    No Known Problems Mother     No Known Problems Father     Bipolar disorder Sister     No Known Problems Brother     Anxiety disorder Daughter     Depression Daughter     No Known Problems Son      I have reviewed and agree with the history as documented.     E-Cigarette/Vaping    E-Cigarette Use Current Every Day User     Comments uses daily      E-Cigarette/Vaping Substances    Nicotine Yes     THC No     CBD No     Flavoring Yes     Other No     Unknown No      Social History     Tobacco Use    Smoking status: Former     Packs/day: 1.00     Years: 15.00     Total pack years: 15.00     Types: Cigarettes     Start date: 1995     Quit date: 6/15/2014     Years since quittin.3    Smokeless tobacco: Never   Vaping Use    Vaping Use: Every day    Substances: Nicotine, Flavoring   Substance Use Topics    Alcohol use: Yes     Alcohol/week: 2.0 standard drinks of alcohol     Types: 2 Glasses of wine per week     Comment: occassional - wine 2 x month    Drug use: No       Review of Systems   Constitutional: Negative. HENT: Negative. Eyes: Negative. Respiratory:  Positive for shortness of breath. Cardiovascular: Negative. Gastrointestinal:  Positive for nausea. Endocrine: Negative. Genitourinary: Negative. Musculoskeletal: Negative. Skin: Negative. Allergic/Immunologic: Negative. Neurological: Negative. Hematological: Negative. Psychiatric/Behavioral:  The patient is nervous/anxious. All other systems reviewed and are negative. Physical Exam  Physical Exam  Vitals and nursing note reviewed. Constitutional:       Appearance: Normal appearance. HENT:      Head: Normocephalic and atraumatic. Nose: Nose normal.      Mouth/Throat:      Mouth: Mucous membranes are moist.   Eyes:      Extraocular Movements: Extraocular movements intact. Pupils: Pupils are equal, round, and reactive to light. Cardiovascular:      Rate and Rhythm: Normal rate and regular rhythm. Pulmonary:      Effort: Pulmonary effort is normal.      Breath sounds: Normal breath sounds. Abdominal:      General: Abdomen is flat. Bowel sounds are normal.      Palpations: Abdomen is soft. Musculoskeletal:         General: Normal range of motion. Cervical back: Normal range of motion and neck supple. Skin:     General: Skin is warm. Capillary Refill: Capillary refill takes less than 2 seconds.    Neurological:      General: No focal deficit present. Mental Status: She is alert and oriented to person, place, and time. Mental status is at baseline. Psychiatric:         Mood and Affect: Mood normal.         Thought Content:  Thought content normal.         Vital Signs  ED Triage Vitals   Temperature Pulse Respirations Blood Pressure SpO2   10/28/23 1752 10/28/23 1738 10/28/23 1738 10/28/23 1738 10/28/23 1738   98.8 °F (37.1 °C) (!) 108 20 (!) 179/89 100 %      Temp Source Heart Rate Source Patient Position - Orthostatic VS BP Location FiO2 (%)   10/28/23 1752 10/28/23 1738 10/28/23 1738 10/28/23 1738 --   Oral Monitor Sitting Left arm       Pain Score       --                  Vitals:    10/28/23 1738 10/28/23 1830   BP: (!) 179/89    Pulse: (!) 108 78   Patient Position - Orthostatic VS: Sitting          Visual Acuity      ED Medications  Medications   LORazepam (ATIVAN) injection 1 mg (1 mg Intravenous Given 10/28/23 1750)   ondansetron (ZOFRAN-ODT) dispersible tablet 4 mg (4 mg Oral Given 10/28/23 1752)   sodium chloride 0.9 % bolus 1,000 mL (0 mL Intravenous Stopped 10/28/23 1848)   potassium chloride (K-DUR,KLOR-CON) CR tablet 40 mEq (40 mEq Oral Given 10/28/23 1845)       Diagnostic Studies  Results Reviewed       Procedure Component Value Units Date/Time    HS Troponin 0hr (reflex protocol) [[id number]]  (Normal) Collected: 10/28/23 1748    Lab Status: Final result Specimen: Blood from Arm, Left Updated: 10/28/23 1817     hs TnI 0hr <2 ng/L     Comprehensive metabolic panel [[id number]]  (Abnormal) Collected: 10/28/23 1748    Lab Status: Final result Specimen: Blood from Arm, Left Updated: 10/28/23 1814     Sodium 138 mmol/L      Potassium 2.9 mmol/L      Chloride 101 mmol/L      CO2 21 mmol/L      ANION GAP 16 mmol/L      BUN 16 mg/dL      Creatinine 0.95 mg/dL      Glucose 93 mg/dL      Calcium 9.7 mg/dL      AST 22 U/L      ALT 23 U/L      Alkaline Phosphatase 48 U/L      Total Protein 7.7 g/dL      Albumin 5.0 g/dL      Total Bilirubin 0.59 mg/dL      eGFR 72 ml/min/1.73sq m     Narrative:      [facility] guidelines for Chronic Kidney Disease (CKD):     Stage 1 with normal or high GFR (GFR > 90 mL/min/1.73 square meters)    Stage 2 Mild CKD (GFR = 60-89 mL/min/1.73 square meters)    Stage 3A Moderate CKD (GFR = 45-59 mL/min/1.73 square meters)    Stage 3B Moderate CKD (GFR = 30-44 mL/min/1.73 square meters)    Stage 4 Severe CKD (GFR = 15-29 mL/min/1.73 square meters)    Stage 5 End Stage CKD (GFR <15 mL/min/1.73 square meters)  Note: GFR calculation is accurate only with a steady state creatinine    Magnesium [[id number]]  (Normal) Collected: 10/28/23 1748    Lab Status: Final result Specimen: Blood from Arm, Left Updated: 10/28/23 1814     Magnesium 2.0 mg/dL     D-Dimer [[id number]]  (Normal) Collected: 10/28/23 1748    Lab Status: Final result Specimen: Blood from Arm, Left Updated: 10/28/23 1811     D-Dimer, Quant <0.27 ug/ml FEU     CBC and differential [[id number]]  (Abnormal) Collected: 10/28/23 1748    Lab Status: Final result Specimen: Blood from Arm, Left Updated: 10/28/23 1754     WBC 13.83 Thousand/uL      RBC 4.64 Million/uL      Hemoglobin 15.8 g/dL      Hematocrit 45.2 %      MCV 97 fL      MCH 34.1 pg      MCHC 35.0 g/dL      RDW 12.6 %      MPV 9.3 fL      Platelets 558 Thousands/uL      nRBC 0 /100 WBCs      Neutrophils Relative 55 %      Immat GRANS % 0 %      Lymphocytes Relative 35 %      Monocytes Relative 8 %      Eosinophils Relative 1 %      Basophils Relative 1 %      Neutrophils Absolute 7.70 Thousands/µL      Immature Grans Absolute 0.04 Thousand/uL      Lymphocytes Absolute 4.78 Thousands/µL      Monocytes Absolute 1.07 Thousand/µL      Eosinophils Absolute 0.17 Thousand/µL      Basophils Absolute 0.07 Thousands/µL                    XR chest 1 view portable   Final Result by Renato Briceno MD (10/29 1348)      No active pulmonary disease.                   Workstation performed: [id number]                    Procedures  ECG 12 Lead Documentation Only    Date/Time: 10/29/2023 9:32 PM    Performed by: Soo Mccall DO  Authorized by: Soo Mccall DO    ECG reviewed by me, the ED Provider: yes    Patient location:  ED  Previous ECG:     Previous ECG:  Unavailable    Comparison to cardiac monitor: Yes    Interpretation:     Interpretation: non-specific    Rate:     ECG rate assessment: normal    Rhythm:     Rhythm: sinus rhythm    Ectopy:     Ectopy: none    QRS:     QRS axis:  Normal  Conduction:     Conduction: normal    ST segments:     ST segments:  Non-specific  T waves:     T waves: non-specific             ED Course                                             Medical Decision Making  Patient evaluated with labs EKG imaging. I reviewed the results and discussed them with the patient. Patient discharged with appropriate instructions medications and follow-up. Patient verbalized understanding had no further questions at the time of discharge. Patient had stable vital signs and well-appearing at the time of discharge. Problems Addressed:  Anxiety: acute illness or injury  Chest pain: acute illness or injury    Amount and/or Complexity of Data Reviewed  External Data Reviewed: notes. Labs: ordered. Decision-making details documented in ED Course. Radiology: ordered and independent interpretation performed. Decision-making details documented in ED Course. Details: no acute disease  ECG/medicine tests: ordered and independent interpretation performed. Decision-making details documented in ED Course. Risk  Prescription drug management.              Disposition  Final diagnoses:   Chest pain   Anxiety     Time reflects when diagnosis was documented in both MDM as applicable and the Disposition within this note       Time User Action Codes Description Comment    10/28/2023  6:41 PM Ilan Oconnors Add [R07.9] Chest pain     10/28/2023  6:41 PM Ilan Oconnor Add [F41.9] Anxiety ED Disposition       ED Disposition   Discharge    Condition   Stable    Date/Time   Sat Oct 28, 2023  6:41 PM    Jd Mondragon discharge to home/self care.                    Follow-up Information       Follow up With Specialties Details Why Contact Info Additional Information    Erik Damico, Benigno Mondragon, Nurse Practitioner Schedule an appointment as soon as possible for a visit   [address] [place] [address]       [address] Emergency Department Emergency Medicine  If symptoms worsen [address] Emergency Department, [address] [facility] [address], [place], [facility] [place], [address]            Discharge Medication List as of 10/28/2023  6:41 PM        START taking these medications    Details   ondansetron (ZOFRAN-ODT) 4 mg disintegrating tablet Take 1 tablet (4 mg total) by mouth every 6 (six) hours as needed for nausea for up to 3 days, Starting Sat 10/28/2023, Until Tue 10/31/2023 at 2359, Normal           CONTINUE these medications which have NOT CHANGED    Details   albuterol (Proventil HFA) 90 mcg/act inhaler Inhale 2 puffs every 6 (six) hours as needed for wheezing or shortness of breath, Starting Thu 2/16/2023, Normal      ALPRAZolam (XANAX) 1 mg tablet Take 1 tablet (1 mg total) by mouth 4 (four) times a day as needed for anxiety, Starting Mon 10/16/2023, Normal      amoxicillin-clavulanate (AUGMENTIN) 875-125 mg per tablet Take 1 tablet by mouth every 12 (twelve) hours for 7 days, Starting Sun 10/22/2023, Until Sun 10/29/2023, Normal      amphetamine-dextroamphetamine (ADDERALL, 15MG,) 15 MG tablet Take 1 tablet (15 mg total) by mouth 2 (two) times a day as needed (focus and concentration) Max Daily Amount: 30 mg, Starting Mon 10/16/2023, Normal      benzonatate (TESSALON) 200 MG capsule Take 1 capsule (200 mg total) by mouth 3 (three) times a day as needed for cough, Starting Wed 11/9/2022, Normal      buPROPion (WELLBUTRIN) 75 mg tablet Take one tablet 75 mg twice a day po for one week, then take one tablet 75 mg in am for four weeks and stop., Normal      fluticasone (FLONASE) 50 mcg/act nasal spray 2 sprays into each nostril daily, Starting Thu 2/16/2023, Normal      Fluticasone Furoate-Vilanterol (Breo Ellipta) 100-25 mcg/actuation inhaler Inhale 1 puff daily Rinse mouth after use., Starting Thu 2/16/2023, Normal      hydrocortisone (ANUSOL-HC) 2.5 % rectal cream Apply topically 2 (two) times a day, Starting Tue 6/27/2023, Normal      ketoconazole (NIZORAL) 2 % shampoo PRN, Starting Thu 4/14/2022, Historical Med      lamoTRIgine (LaMICtal) 200 MG tablet Take 1 tablet (200 mg total) by mouth daily at bedtime, Starting Wed 7/19/2023, Normal      mometasone (ELOCON) 0.1 % lotion Starting Thu 9/16/2021, Historical Med      ofloxacin (OCUFLOX) 0.3 % ophthalmic solution Starting Thu 6/23/2022, Historical Med      ondansetron (ZOFRAN) 4 mg tablet Take 1 tablet (4 mg total) by mouth every 8 (eight) hours as needed for nausea or vomiting, Starting Mon 8/7/2023, Normal      PARoxetine (PAXIL) 10 mg tablet Take 1 tablet (10 mg total) by mouth daily, Starting Mon 10/16/2023, Normal             No discharge procedures on file.     PDMP Review         Value Time User    PDMP Reviewed  Yes 7/19/2023  2:06 PM Marcia Tobin, PhD            ED Provider  Electronically Signed by             Ying Grey DO  10/29/23 7780

## 2023-10-29 LAB
ATRIAL RATE: 96 BPM
P AXIS: 77 DEGREES
PR INTERVAL: 134 MS
QRS AXIS: 73 DEGREES
QRSD INTERVAL: 76 MS
QT INTERVAL: 364 MS
QTC INTERVAL: 459 MS
T WAVE AXIS: 66 DEGREES
VENTRICULAR RATE: 96 BPM

## 2023-10-29 PROCEDURE — 93010 ELECTROCARDIOGRAM REPORT: CPT | Performed by: INTERNAL MEDICINE

## 2023-11-07 ENCOUNTER — OFFICE VISIT (OUTPATIENT)
Dept: FAMILY MEDICINE CLINIC | Facility: CLINIC | Age: 43
End: 2023-11-07
Payer: COMMERCIAL

## 2023-11-07 VITALS
DIASTOLIC BLOOD PRESSURE: 76 MMHG | RESPIRATION RATE: 16 BRPM | HEIGHT: 63 IN | BODY MASS INDEX: 24.8 KG/M2 | WEIGHT: 140 LBS | SYSTOLIC BLOOD PRESSURE: 104 MMHG | OXYGEN SATURATION: 99 % | TEMPERATURE: 98 F | HEART RATE: 86 BPM

## 2023-11-07 DIAGNOSIS — R22.1 NECK SWELLING: ICD-10-CM

## 2023-11-07 DIAGNOSIS — M54.2 NECK PAIN ON LEFT SIDE: Primary | ICD-10-CM

## 2023-11-07 DIAGNOSIS — K11.8 PAROTID GLAND PAIN: ICD-10-CM

## 2023-11-07 PROCEDURE — 99214 OFFICE O/P EST MOD 30 MIN: CPT | Performed by: NURSE PRACTITIONER

## 2023-11-07 RX ORDER — PREDNISONE 10 MG/1
TABLET ORAL
Qty: 20 TABLET | Refills: 0 | Status: SHIPPED | OUTPATIENT
Start: 2023-11-07 | End: 2023-11-17

## 2023-11-07 NOTE — PROGRESS NOTES
Assessment/Plan:    1. Neck pain on left side  -     predniSONE 10 mg tablet; 4 tabs x 2 days, 3 tabs x 2 days, 2 tabs x 2 days, 1 tab x 2 days  -     CT soft tissue neck w contrast; Future; Expected date: 11/07/2023    2. Parotid gland pain  -     predniSONE 10 mg tablet; 4 tabs x 2 days, 3 tabs x 2 days, 2 tabs x 2 days, 1 tab x 2 days  -     CT soft tissue neck w contrast; Future; Expected date: 11/07/2023    3. Neck swelling  -     predniSONE 10 mg tablet; 4 tabs x 2 days, 3 tabs x 2 days, 2 tabs x 2 days, 1 tab x 2 days  -     CT soft tissue neck w contrast; Future; Expected date: 11/07/2023          Patient Instructions:  Supportive care discussed and advised. Advised to RTO for any worsening and no improvement. Follow up for no improvement and worsening of conditions. Patient advised and educated when to see immediate medical care. Return if symptoms worsen or fail to improve. Future Appointments   Date Time Provider [address]   11/16/2023  3:00 PM Filipe Christina, PhD [facility]           Subjective:      Patient ID: Evelina Caputo is a 37 y.o. female. Chief Complaint   Patient presents with   • Earache     Leroy Oswald MA    • Follow-up     ENT follow up    • Sore Throat         Vitals:  /76   Pulse 86   Temp 98 °F (36.7 °C)   Resp 16   Ht 5' 3" (1.6 m)   Wt 63.5 kg (140 lb)   SpO2 99%   BMI 24.80 kg/m²     Patient is here to follow up on discomfort on left side of neck and stated that sore throat is on and off. Having discomfort on neck left side on and off from couple of months and ultrasound was unremarkable and went to ENT at [facility] about 2 weeks ago and checked her out and blood work ordered which showed no current mono but positive for past infection. Denies fever, chills and sob but feeling fatigue. Discussed that will do ct neck soft tissue.  Was treated with Augmentin couple of days ago for sore throat by urgent care      The following portions of the patient's history were reviewed and updated as appropriate: allergies, current medications, past family history, past medical history, past social history, past surgical history and problem list.      Review of Systems   Constitutional:  Negative for chills, diaphoresis, fatigue, fever and unexpected weight change. HENT:  Positive for sore throat. Negative for congestion, dental problem, drooling, ear discharge, ear pain, facial swelling, hearing loss, mouth sores, nosebleeds, postnasal drip, rhinorrhea, sinus pressure, sinus pain, sneezing, tinnitus, trouble swallowing and voice change. As noted in HPI       Respiratory:  Negative for cough, chest tightness, shortness of breath and wheezing. Cardiovascular: Negative. Gastrointestinal:  Negative for abdominal pain, constipation, diarrhea, nausea and vomiting. Musculoskeletal: Negative. Skin: Negative. Neurological:  Negative for dizziness, light-headedness and headaches. Objective:    Social History     Tobacco Use   Smoking Status Former   • Packs/day: 1.00   • Years: 15.00   • Total pack years: 15.00   • Types: Cigarettes   • Start date: 1995   • Quit date: 6/15/2014   • Years since quittin.4   Smokeless Tobacco Never       Allergies:    Allergies   Allergen Reactions   • Doxycycline Other (See Comments)     unknown         Current Outpatient Medications   Medication Sig Dispense Refill   • albuterol (Proventil HFA) 90 mcg/act inhaler Inhale 2 puffs every 6 (six) hours as needed for wheezing or shortness of breath 6.7 g 2   • ALPRAZolam (XANAX) 1 mg tablet Take 1 tablet (1 mg total) by mouth 4 (four) times a day as needed for anxiety 120 tablet 0   • amphetamine-dextroamphetamine (ADDERALL, 15MG,) 15 MG tablet Take 1 tablet (15 mg total) by mouth 2 (two) times a day as needed (focus and concentration) Max Daily Amount: 30 mg 60 tablet 0   • buPROPion (WELLBUTRIN) 75 mg tablet Take one tablet 75 mg twice a day po for one week, then take one tablet 75 mg in am for four weeks and stop. 42 tablet 0   • fluticasone (FLONASE) 50 mcg/act nasal spray 2 sprays into each nostril daily 9.9 mL 2   • Fluticasone Furoate-Vilanterol (Breo Ellipta) 100-25 mcg/actuation inhaler Inhale 1 puff daily Rinse mouth after use. 60 each 2   • ketoconazole (NIZORAL) 2 % shampoo PRN     • lamoTRIgine (LaMICtal) 200 MG tablet Take 1 tablet (200 mg total) by mouth daily at bedtime 30 tablet 3   • LORazepam (Ativan) 1 mg tablet Take 1 tablet (1 mg total) by mouth 3 (three) times a day as needed for anxiety for up to 3 days 8 tablet 0   • ondansetron (ZOFRAN-ODT) 4 mg disintegrating tablet Take 1 tablet (4 mg total) by mouth every 6 (six) hours as needed for nausea for up to 3 days 9 tablet 0   • PARoxetine (PAXIL) 10 mg tablet Take 1 tablet (10 mg total) by mouth daily 30 tablet 3   • predniSONE 10 mg tablet 4 tabs x 2 days, 3 tabs x 2 days, 2 tabs x 2 days, 1 tab x 2 days 20 tablet 0   • benzonatate (TESSALON) 200 MG capsule Take 1 capsule (200 mg total) by mouth 3 (three) times a day as needed for cough (Patient not taking: Reported on 11/7/2023) 20 capsule 0   • hydrocortisone (ANUSOL-HC) 2.5 % rectal cream Apply topically 2 (two) times a day (Patient not taking: Reported on 11/7/2023) 28 g 2   • mometasone (ELOCON) 0.1 % lotion  (Patient not taking: Reported on 8/28/2023)     • ofloxacin (OCUFLOX) 0.3 % ophthalmic solution  (Patient not taking: Reported on 9/12/2022)     • ondansetron (ZOFRAN) 4 mg tablet Take 1 tablet (4 mg total) by mouth every 8 (eight) hours as needed for nausea or vomiting (Patient not taking: Reported on 11/7/2023) 30 tablet 1     No current facility-administered medications for this visit. Physical Exam  Vitals reviewed. Constitutional:       Appearance: Normal appearance. She is well-developed. HENT:      Head: Normocephalic.       Right Ear: Tympanic membrane, ear canal and external ear normal.      Left Ear: Tympanic membrane, ear canal and external ear normal.      Nose: Nose normal.      Right Sinus: No maxillary sinus tenderness or frontal sinus tenderness. Left Sinus: No maxillary sinus tenderness or frontal sinus tenderness. Mouth/Throat:      Mouth: No oral lesions. Pharynx: No oropharyngeal exudate or posterior oropharyngeal erythema. Neck:     Cardiovascular:      Rate and Rhythm: Normal rate and regular rhythm. Heart sounds: Normal heart sounds. Pulmonary:      Effort: Pulmonary effort is normal.      Breath sounds: Normal breath sounds. Musculoskeletal:         General: Normal range of motion. Cervical back: Neck supple. Lymphadenopathy:      Cervical:      Right cervical: No superficial or posterior cervical adenopathy. Left cervical: No superficial or posterior cervical adenopathy. Skin:     General: Skin is warm and dry. Neurological:      Mental Status: She is alert and oriented to person, place, and time. Psychiatric:         Behavior: Behavior normal.         Thought Content:  Thought content normal.         Judgment: Judgment normal.                     ROSE Fernandez

## 2023-11-07 NOTE — PATIENT INSTRUCTIONS
Prednisone (By mouth)   Prednisone (PRED-ni-sone)  Treats many diseases and conditions, especially problems related to inflammation. This medicine is a corticosteroid. Brand Name(s): Man, predniSONE Intensol   There may be other brand names for this medicine. When This Medicine Should Not Be Used: This medicine is not right for everyone. Do not use if you had an allergic reaction to prednisone or if you are pregnant. How to Use This Medicine:   Liquid, Tablet, Delayed Release Tablet  Take your medicine as directed. Your dose may need to be changed several times to find what works best for you. It is best to take this medicine with food or milk. Swallow the delayed-release tablet whole. Do not crush, break, or chew it. Measure the oral liquid medicine with a marked measuring spoon, oral syringe, or medicine cup. Missed dose: Take a dose as soon as you remember. If it is almost time for your next dose, wait until then and take a regular dose. Do not take extra medicine to make up for a missed dose. Store the medicine in a closed container at room temperature, away from heat, moisture, and direct light. Do not freeze the oral liquid. Drugs and Foods to Avoid:   Ask your doctor or pharmacist before using any other medicine, including over-the-counter medicines, vitamins, and herbal products. Tell your doctor if you use any of the following:  Aminoglutethimide, amphotericin B, carbamazepine, cholestyramine, cyclosporine, digoxin, isoniazid, ketoconazole, phenobarbital, phenytoin, or rifampin  Blood thinner, such as warfarin  NSAID pain or arthritis medicine, such as aspirin, diclofenac, ibuprofen, naproxen, celecoxib  Diuretic (water pill)  Diabetes medicine  Macrolide antibiotic, such as azithromycin, clarithromycin, erythromycin  Estrogen, including birth control pills or hormone replacement therapy  This medicine may interfere with vaccines.  Ask your doctor before you get a flu shot or any other vaccines. Warnings While Using This Medicine: It is not safe to take this medicine during pregnancy. It could harm an unborn baby. Tell your doctor right away if you become pregnant. Tell your doctor if you are breastfeeding or if you have kidney problems, heart failure, high blood pressure, a recent heart attack, diabetes, glaucoma, osteoporosis, or thyroid problems. Tell your doctor about any infection you have. Also tell your doctor if you have had mental or emotional problems (such as depression) or stomach or bowel problems (such as an ulcer or diverticulitis). This medicine may cause the following problems:  Mood or behavior changes  Higher blood pressure, retaining water, changes in salt or potassium levels in your body  Cataracts or glaucoma (with long-term use)  Weak bones or osteoporosis (with long-term use)  Slow growth in children (with long-term use)  Muscle problems (with high doses, especially if you have myasthenia gravis or similar nerve and muscle problems)  Do not stop using this medicine suddenly. Your doctor will need to slowly decrease your dose before you stop it completely. This medicine could cause you to get infections more easily. Tell your doctor right away if you are exposed to chicken pox, measles, or other serious infection. Tell your doctor if you had a serious infection in the past, such as tuberculosis or herpes. Tell your doctor about any extra stress or anxiety in your life. Your dose might need to be changed for a short time. Tell any doctor or dentist who treats you that you are using this medicine. This medicine may affect certain medical test results. Keep all medicine out of the reach of children. Never share your medicine with anyone. Possible Side Effects While Using This Medicine:   Call your doctor right away if you notice any of these side effects:   Allergic reaction: Itching or hives, swelling in your face or hands, swelling or tingling in your mouth or throat, chest tightness, trouble breathing  Dark freckles, skin color changes, coldness, weakness, tiredness, nausea, vomiting, weight loss  Depression, unusual thoughts, feelings, or behaviors, trouble sleeping  Fever, chills, cough, sore throat, and body aches  Muscle pain or weakness  Rapid weight gain, swelling in your hands, ankles, or feet  Severe stomach pain, nausea, vomiting, or red or black stools  Skin changes or growths  Trouble seeing, eye pain, headache  If you notice these less serious side effects, talk with your doctor: Increased appetite  Round, puffy face  Weight gain around your neck, upper back, breast, face, or waist  If you notice other side effects that you think are caused by this medicine, tell your doctor. Call your doctor for medical advice about side effects. You may report side effects to FDA at 5-350-FDA-6408  © Copyright Lula Mar 2023 Information is for End User's use only and may not be sold, redistributed or otherwise used for commercial purposes. The above information is an  only. It is not intended as medical advice for individual conditions or treatments. Talk to your doctor, nurse or pharmacist before following any medical regimen to see if it is safe and effective for you.

## 2023-11-14 ENCOUNTER — TELEPHONE (OUTPATIENT)
Dept: FAMILY MEDICINE CLINIC | Facility: CLINIC | Age: 43
End: 2023-11-14

## 2023-11-14 NOTE — TELEPHONE ENCOUNTER
Peer to peer done and CT approved from 119/2023 to 12/7/2024 and authorization number is [id number].  ROSE Saavedra

## 2023-11-14 NOTE — TELEPHONE ENCOUNTER
Please call insurance and arrange peer to peer either today or tomorrow. The prior authorization request for this study has not been approved. You can schedule a peer to peer by calling Jose Baez [phone] AND USE CASE# [id number] with the deadline date of 11/15/23.    ROSE Ordoñez

## 2023-11-15 ENCOUNTER — TELEPHONE (OUTPATIENT)
Age: 43
End: 2023-11-15

## 2023-11-15 NOTE — TELEPHONE ENCOUNTER
FYI: Patient requested ultra sound done on her neck June 2023 be faxed to Dr. Humberto Arciniega   fax# [phone]. Spoke with Evelyn Joshua at office. She was very helpful and stated it will be faxed today.

## 2023-11-16 DIAGNOSIS — F31.81 BIPOLAR II DISORDER (HCC): ICD-10-CM

## 2023-11-16 DIAGNOSIS — F90.0 ATTENTION DEFICIT HYPERACTIVITY DISORDER (ADHD), PREDOMINANTLY INATTENTIVE TYPE: ICD-10-CM

## 2023-11-16 DIAGNOSIS — F41.1 GAD (GENERALIZED ANXIETY DISORDER): ICD-10-CM

## 2023-11-16 RX ORDER — LAMOTRIGINE 200 MG/1
200 TABLET ORAL
Qty: 30 TABLET | Refills: 3 | Status: SHIPPED | OUTPATIENT
Start: 2023-11-16

## 2023-11-16 RX ORDER — PAROXETINE 10 MG/1
10 TABLET, FILM COATED ORAL DAILY
Qty: 30 TABLET | Refills: 3 | Status: SHIPPED | OUTPATIENT
Start: 2023-11-16

## 2023-11-16 RX ORDER — ALPRAZOLAM 1 MG/1
1 TABLET ORAL 4 TIMES DAILY PRN
Qty: 120 TABLET | Refills: 0 | Status: SHIPPED | OUTPATIENT
Start: 2023-11-16

## 2023-11-16 RX ORDER — DEXTROAMPHETAMINE SACCHARATE, AMPHETAMINE ASPARTATE, DEXTROAMPHETAMINE SULFATE AND AMPHETAMINE SULFATE 3.75; 3.75; 3.75; 3.75 MG/1; MG/1; MG/1; MG/1
15 TABLET ORAL 2 TIMES DAILY PRN
Qty: 60 TABLET | Refills: 0 | Status: SHIPPED | OUTPATIENT
Start: 2023-11-16

## 2023-11-16 NOTE — TELEPHONE ENCOUNTER
Patient is calling regarding cancelling an appointment.     Date/Time: 11/16/23 3 pm    Reason: Cat scan    Patient was rescheduled: YES [x] NO []  If yes, when was Patient reschedule for: 11/30/23 3 pm    Patient requesting call back to reschedule: YES [] NO [x]

## 2023-12-04 ENCOUNTER — TELEPHONE (OUTPATIENT)
Dept: BEHAVIORAL/MENTAL HEALTH CLINIC | Facility: CLINIC | Age: 43
End: 2023-12-04

## 2023-12-05 ENCOUNTER — DOCUMENTATION (OUTPATIENT)
Dept: PSYCHIATRY | Facility: CLINIC | Age: 43
End: 2023-12-05

## 2023-12-05 ENCOUNTER — TELEPHONE (OUTPATIENT)
Dept: BEHAVIORAL/MENTAL HEALTH CLINIC | Facility: CLINIC | Age: 43
End: 2023-12-05

## 2023-12-05 DIAGNOSIS — F31.81 BIPOLAR II DISORDER (HCC): ICD-10-CM

## 2023-12-05 DIAGNOSIS — F90.0 ATTENTION DEFICIT HYPERACTIVITY DISORDER (ADHD), PREDOMINANTLY INATTENTIVE TYPE: ICD-10-CM

## 2023-12-05 DIAGNOSIS — F41.1 GAD (GENERALIZED ANXIETY DISORDER): Primary | ICD-10-CM

## 2023-12-05 DIAGNOSIS — F41.0 PANIC DISORDER WITHOUT AGORAPHOBIA WITH MODERATE PANIC ATTACKS: ICD-10-CM

## 2023-12-05 NOTE — PSYCH
Psychiatric Discharge Summary   Discharge Summary:  began treatment in approximately 2014, discharged 11/30/2023 due to multiple no shows to appointments    Discharge Diagnosis:  1. RANJIT (generalized anxiety disorder)        2. Panic disorder without agoraphobia with moderate panic attacks        3. Bipolar II disorder ([address])        4. Attention deficit hyperactivity disorder (ADHD), predominantly inattentive type          Treating Physician: Radha Harris, PhD, MPH, APRN, Treatment Complications: none, medication stabilized mood. Frequent stressors regarding , separation, daughter and son's well being and her family  Prognosis at time of discharge: Good  Presenting Problems/Pertinent Findings: Jacquie Izaguirre is compliant to the treatment plan; however,multiple no shows make it difficult for continuity of care. Therapist: None  Course of Treatment: Medication Management  Summary of Treatment Progress: coping through multiple stressors    To what extent did the consumer achieve their goals? Some  Criteria for Discharge: Have 2 or more unexcused absences for services  Aftercare Recommendations:  Follow up with therapist and Follow up with psychiatrist  Discharge Medications:   Current Outpatient Medications:     albuterol (Proventil HFA) 90 mcg/act inhaler, Inhale 2 puffs every 6 (six) hours as needed for wheezing or shortness of breath, Disp: 6.7 g, Rfl: 2    ALPRAZolam (XANAX) 1 mg tablet, Take 1 tablet (1 mg total) by mouth 4 (four) times a day as needed for anxiety, Disp: 120 tablet, Rfl: 0    amphetamine-dextroamphetamine (ADDERALL, 15MG,) 15 MG tablet, Take 1 tablet (15 mg total) by mouth 2 (two) times a day as needed (focus and concentration) Max Daily Amount: 30 mg, Disp: 60 tablet, Rfl: 0    benzonatate (TESSALON) 200 MG capsule, Take 1 capsule (200 mg total) by mouth 3 (three) times a day as needed for cough (Patient not taking: Reported on 11/7/2023), Disp: 20 capsule, Rfl: 0    buPROPion (WELLBUTRIN) 75 mg tablet, Take one tablet 75 mg twice a day po for one week, then take one tablet 75 mg in am for four weeks and stop., Disp: 42 tablet, Rfl: 0    fluticasone (FLONASE) 50 mcg/act nasal spray, 2 sprays into each nostril daily, Disp: 9.9 mL, Rfl: 2    Fluticasone Furoate-Vilanterol (Breo Ellipta) 100-25 mcg/actuation inhaler, Inhale 1 puff daily Rinse mouth after use., Disp: 60 each, Rfl: 2    hydrocortisone (ANUSOL-HC) 2.5 % rectal cream, Apply topically 2 (two) times a day (Patient not taking: Reported on 11/7/2023), Disp: 28 g, Rfl: 2    ketoconazole (NIZORAL) 2 % shampoo, PRN, Disp: , Rfl:     lamoTRIgine (LaMICtal) 200 MG tablet, Take 1 tablet (200 mg total) by mouth daily at bedtime, Disp: 30 tablet, Rfl: 3    LORazepam (Ativan) 1 mg tablet, Take 1 tablet (1 mg total) by mouth 3 (three) times a day as needed for anxiety for up to 3 days, Disp: 8 tablet, Rfl: 0    mometasone (ELOCON) 0.1 % lotion, , Disp: , Rfl:     ofloxacin (OCUFLOX) 0.3 % ophthalmic solution, , Disp: , Rfl:     ondansetron (ZOFRAN) 4 mg tablet, Take 1 tablet (4 mg total) by mouth every 8 (eight) hours as needed for nausea or vomiting (Patient not taking: Reported on 11/7/2023), Disp: 30 tablet, Rfl: 1    ondansetron (ZOFRAN-ODT) 4 mg disintegrating tablet, Take 1 tablet (4 mg total) by mouth every 6 (six) hours as needed for nausea for up to 3 days, Disp: 9 tablet, Rfl: 0    PARoxetine (PAXIL) 10 mg tablet, Take 1 tablet (10 mg total) by mouth daily, Disp: 30 tablet, Rfl: 3     Describe consumers ability and willingness to work and solve her mental problem. Good.     Substance Abuse History:  Social History     Substance and Sexual Activity   Drug Use No       Family Psychiatric History:   Family History   Problem Relation Age of Onset    No Known Problems Mother     No Known Problems Father     Bipolar disorder Sister     No Known Problems Brother     Anxiety disorder Daughter     Depression Daughter     No Known Problems Son        The following portions of the patient's history were reviewed and updated as appropriate: allergies, current medications, past family history, past medical history, past social history, past surgical history, and problem list.    Social History     Socioeconomic History    Marital status: /Civil Union     Spouse name: Not on file    Number of children: 2    Years of education: Not on file    Highest education level: Not on file   Occupational History    Not on file   Tobacco Use    Smoking status: Former     Packs/day: 1.00     Years: 15.00     Total pack years: 15.00     Types: Cigarettes     Start date: 1995     Quit date: 6/15/2014     Years since quittin.4    Smokeless tobacco: Never   Vaping Use    Vaping Use: Every day    Substances: Nicotine, Flavoring   Substance and Sexual Activity    Alcohol use: Yes     Alcohol/week: 2.0 standard drinks of alcohol     Types: 2 Glasses of wine per week     Comment: occassional - wine 2 x month    Drug use: No    Sexual activity: Yes     Partners: Male     Birth control/protection: Female Sterilization     Comment: ablation   Other Topics Concern    Not on file   Social History Narrative    Not on file     Social Determinants of Health     Financial Resource Strain: Not on file   Food Insecurity: Not on file   Transportation Needs: Not on file   Physical Activity: Not on file   Stress: Not on file   Social Connections: Not on file   Intimate Partner Violence: Not on file   Housing Stability: Not on file     Social History     Social History Narrative    Not on file       Mental Status at Time of most recent visit on 2023.    Mental Status Examination:     Appearance calm and cooperative , adequate hygiene and grooming and good eye contact    Mood anxious   Affect affect appropriate    Speech a normal rate   Thought Processes normal thought processes   Hallucinations no hallucinations present    Thought Content no delusions   Abnormal Thoughts no suicidal thoughts  and no homicidal thoughts    Orientation  oriented to person and oriented to place   Remote Memory short term memory intact and long term memory intact   Attention Span concentration intact   Intellect Appears to be of Average Intelligence   Insight Insight intact   Judgement judgment was intact   Muscle Strength Muscle strength and tone were normal   Language no difficulty naming common objects   Fund of Knowledge displays adequate knowledge of current events   Pain none   Pain Scale 0

## 2023-12-05 NOTE — TELEPHONE ENCOUNTER
Patient was discharged from Dr. Juhi Baker due to 6 late cxl and or No shows. Olegario Arredondo keeps calling to get another chance. Writer informed Isabel, That it is [employer] policy regarding discharge. Olegario Arredondo is saying she is in a really bad spot with a divorce and other issues going on. Writer tried to explain that she had been discharged from Dr. Juhi Baker prior but was given another chance and continued to cxl late and or No show. Writer told Olegario Arredondo all we can do is speak with the  but can not promise anything.

## 2023-12-07 ENCOUNTER — TELEPHONE (OUTPATIENT)
Dept: BEHAVIORAL/MENTAL HEALTH CLINIC | Facility: CLINIC | Age: 43
End: 2023-12-07

## 2023-12-07 NOTE — TELEPHONE ENCOUNTER
DISCHARGE LETTER for {Dr. Farley certified and regular) placed in outgoing mail on 12/7/2023    Article #:  [id number]    Address:  [address] [place] [address]

## 2023-12-12 ENCOUNTER — TELEPHONE (OUTPATIENT)
Dept: BEHAVIORAL/MENTAL HEALTH CLINIC | Facility: CLINIC | Age: 43
End: 2023-12-12

## 2023-12-12 NOTE — TELEPHONE ENCOUNTER
Patient put back on wait list    Open to any. Isabel was D/C 12/06/2023 from Dr. Mayi Qureshi for 6 no show's. Dr Mayi Qureshi is willing to take her back but Isabel needs to go back through the intake process.

## 2023-12-14 NOTE — TELEPHONE ENCOUNTER
Patient called looking to be a new patient. Writer called and spoke to patient. Writer stated patient was placed on the wait list for past provider again but could be placed on the wait list for more providers and locations. After Stating the lengthiness and not being able to give an estimated wait time patient decided to just remain on the wait list for past provider. not applicable

## 2023-12-15 DIAGNOSIS — F41.1 GAD (GENERALIZED ANXIETY DISORDER): ICD-10-CM

## 2023-12-15 DIAGNOSIS — F90.0 ATTENTION DEFICIT HYPERACTIVITY DISORDER (ADHD), PREDOMINANTLY INATTENTIVE TYPE: ICD-10-CM

## 2023-12-15 DIAGNOSIS — F33.9 EPISODE OF RECURRENT MAJOR DEPRESSIVE DISORDER, UNSPECIFIED DEPRESSION EPISODE SEVERITY (HCC): ICD-10-CM

## 2023-12-15 RX ORDER — ALPRAZOLAM 1 MG/1
1 TABLET ORAL 4 TIMES DAILY PRN
Qty: 120 TABLET | Refills: 0 | Status: SHIPPED | OUTPATIENT
Start: 2023-12-15

## 2023-12-15 RX ORDER — PAROXETINE 10 MG/1
10 TABLET, FILM COATED ORAL DAILY
Qty: 30 TABLET | Refills: 3 | Status: SHIPPED | OUTPATIENT
Start: 2023-12-15

## 2023-12-15 RX ORDER — DEXTROAMPHETAMINE SACCHARATE, AMPHETAMINE ASPARTATE, DEXTROAMPHETAMINE SULFATE AND AMPHETAMINE SULFATE 3.75; 3.75; 3.75; 3.75 MG/1; MG/1; MG/1; MG/1
15 TABLET ORAL 2 TIMES DAILY PRN
Qty: 60 TABLET | Refills: 0 | Status: SHIPPED | OUTPATIENT
Start: 2023-12-15

## 2023-12-15 NOTE — TELEPHONE ENCOUNTER
Patient was discharged from Dr Cheryl Messina and put on the wait list to return. Patient is aware we are covering medication.     Xanax 1 mg   Adderall 15 mg    Pharmacy verified [facility], [place]

## 2023-12-18 NOTE — PROGRESS NOTES
Assessment/Plan:         Diagnoses and all orders for this visit:    Acute sinusitis, recurrence not specified, unspecified location  -     doxycycline hyclate (VIBRAMYCIN) 100 mg capsule; Take 1 capsule (100 mg total) by mouth every 12 (twelve) hours for 10 days    Nausea  -     ondansetron (ZOFRAN) 4 mg tablet; Take 1 tablet (4 mg total) by mouth every 8 (eight) hours as needed for nausea or vomiting          BMI Counseling: Body mass index is 22 14 kg/m²  Discussed the patient's BMI with her  The BMI is in the acceptable range  Patient Instructions: Take medication with food  It is important that you take the entire course of antibiotics prescribed  May also take a probiotic of your choice to maintain healthy GI gagan  Can take some probiotic and yogurt with the medication  Increase fluid intake, saline nasal rinses, and hot tea with honey and lemon  Cool air humidification can be helpful as well  May take Ibuprofen or Tylenol as needed for pain or fevers  Mucinex D for sinus congestion or Coricidin HBP if you have high blood pressure or a heart condition  Mucinex or Robitussin DM are effective for cough and chest congestion  Supportive care discussed and advised  Follow up for no improvement and worsening of conditions  Patient advised and educated when to see immediate medical care  Return if symptoms worsen or fail to improve  Subjective:      Patient ID: Yvon Kapadia is a 34 y o  female  Chief Complaint   Patient presents with    Generalized Body Aches     Sinus pressure, congestion, h/a, fatigue  Was seen at [facility] and treated but isn't feeling better [id number]    Nausea       HPI  Patient was seen in urgent care on 3/10 and was treated with amox and then went back to urgent care on 3/16 as was feeling worse  Stated that finished her medications but feeling worse with bodyaches, sinus pressure, headache and nausea    Feeling feverish and nauseated  Denies any chest Report received from RODGER Coy. Patient is A+OX3, laying down in stretcher. Breathing spontaneous and unlabored on bipap, tolerating bipap well. Skin warm pink and dry. Patient denies pain/discomfort. Patient and family at bedside updated on plan of care. Admitted to [facility], awaiting bed. Report received from RODGER Coy. Patient is A+OX3, laying down in stretcher. Breathing spontaneous and unlabored on bipap, tolerating bipap well. Skin warm pink and dry. Patient denies pain/discomfort. Patient and family at bedside updated on plan of care. Admitted to [facility], awaiting bed. pain, sob and dizziness  Vitals:  /68   Pulse 96   Temp 98 9 °F (37 2 °C)   Resp 16   Ht 5' 3" (1 6 m)   Wt 56 7 kg (125 lb)   BMI 22 14 kg/m²     The following portions of the patient's history were reviewed and updated as appropriate: allergies, current medications, past family history, past medical history, past social history, past surgical history and problem list       Review of Systems   Constitutional: Negative for chills, fatigue and fever  HENT: Positive for congestion and sinus pressure  Negative for ear discharge, ear pain, facial swelling, hearing loss, mouth sores, nosebleeds, postnasal drip, rhinorrhea, sinus pain, sneezing, sore throat, trouble swallowing and voice change  Respiratory: Negative for cough, chest tightness, shortness of breath and wheezing  Cardiovascular: Negative  Gastrointestinal: Positive for nausea  Negative for abdominal pain, constipation and diarrhea  Musculoskeletal: Positive for myalgias  Neurological: Positive for headaches  Negative for dizziness, weakness and light-headedness  Psychiatric/Behavioral: Negative            Objective:    Social History     Tobacco Use   Smoking Status Former Smoker    Types: Cigarettes    Last attempt to quit: 2014    Years since quittin 2   Smokeless Tobacco Never Used       Allergies: No Known Allergies      Current Outpatient Medications   Medication Sig Dispense Refill    ALPRAZolam (XANAX) 1 mg tablet Take 1 mg by mouth 3 (three) times a day as needed for anxiety      buPROPion (WELLBUTRIN XL) 150 mg 24 hr tablet Take 150 mg by mouth every morning      fluticasone (FLONASE) 50 mcg/act nasal spray 2 sprays into each nostril daily 16 g 3    lamoTRIgine (LaMICtal) 100 mg tablet Take 1 tablet by mouth daily      doxycycline hyclate (VIBRAMYCIN) 100 mg capsule Take 1 capsule (100 mg total) by mouth every 12 (twelve) hours for 10 days 20 capsule 0    ondansetron (ZOFRAN) 4 mg tablet Take 1 tablet (4 mg total) by mouth every 8 (eight) hours as needed for nausea or vomiting 20 tablet 0    valACYclovir (VALTREX) 1,000 mg tablet Valacyclovir 1 gm 2 tablets every 12 hourly at sign of eruption and then stop after 4 tabs  (Patient not taking: Reported on 3/10/2019) 20 tablet 0     No current facility-administered medications for this visit  Physical Exam   Constitutional: She is oriented to person, place, and time  She appears well-developed and well-nourished  HENT:   Head: Normocephalic  Right Ear: Tympanic membrane, external ear and ear canal normal    Left Ear: Tympanic membrane, external ear and ear canal normal    Nose: Mucosal edema present  Right sinus exhibits maxillary sinus tenderness  Right sinus exhibits no frontal sinus tenderness  Left sinus exhibits maxillary sinus tenderness  Left sinus exhibits no frontal sinus tenderness  Mouth/Throat: Oropharynx is clear and moist and mucous membranes are normal    Neck: Neck supple  Cardiovascular: Normal rate, regular rhythm and normal heart sounds  Pulmonary/Chest: Effort normal and breath sounds normal    Abdominal: Normal appearance and bowel sounds are normal  There is no hepatosplenomegaly  There is no tenderness  There is no rebound  Musculoskeletal: Normal range of motion  Lymphadenopathy:        Right cervical: No superficial cervical and no posterior cervical adenopathy present  Left cervical: No superficial cervical and no posterior cervical adenopathy present  Neurological: She is alert and oriented to person, place, and time  Skin: Skin is warm and dry  Psychiatric: She has a normal mood and affect  Her behavior is normal  Judgment and thought content normal    Vitals reviewed                    ROSE Jones

## 2023-12-22 ENCOUNTER — OFFICE VISIT (OUTPATIENT)
Dept: GASTROENTEROLOGY | Facility: CLINIC | Age: 43
End: 2023-12-22
Payer: COMMERCIAL

## 2023-12-22 ENCOUNTER — TELEPHONE (OUTPATIENT)
Dept: GASTROENTEROLOGY | Facility: CLINIC | Age: 43
End: 2023-12-22

## 2023-12-22 VITALS
BODY MASS INDEX: 24.8 KG/M2 | DIASTOLIC BLOOD PRESSURE: 72 MMHG | SYSTOLIC BLOOD PRESSURE: 118 MMHG | WEIGHT: 140 LBS | HEART RATE: 90 BPM | HEIGHT: 63 IN

## 2023-12-22 DIAGNOSIS — K64.9 HEMORRHOIDS, UNSPECIFIED HEMORRHOID TYPE: ICD-10-CM

## 2023-12-22 DIAGNOSIS — K83.8 INTRAHEPATIC BILE DUCT DILATION: ICD-10-CM

## 2023-12-22 DIAGNOSIS — K92.1 BLACK STOOL: Primary | ICD-10-CM

## 2023-12-22 DIAGNOSIS — K21.9 GASTROESOPHAGEAL REFLUX DISEASE, UNSPECIFIED WHETHER ESOPHAGITIS PRESENT: ICD-10-CM

## 2023-12-22 DIAGNOSIS — R11.2 NAUSEA AND VOMITING, UNSPECIFIED VOMITING TYPE: ICD-10-CM

## 2023-12-22 DIAGNOSIS — R10.13 EPIGASTRIC PAIN: ICD-10-CM

## 2023-12-22 PROCEDURE — 99214 OFFICE O/P EST MOD 30 MIN: CPT | Performed by: NURSE PRACTITIONER

## 2023-12-22 RX ORDER — ONDANSETRON 4 MG/1
4 TABLET, FILM COATED ORAL EVERY 8 HOURS PRN
Qty: 90 TABLET | Refills: 2 | Status: SHIPPED | OUTPATIENT
Start: 2023-12-22

## 2023-12-22 RX ORDER — ESOMEPRAZOLE MAGNESIUM 40 MG/1
40 GRANULE, DELAYED RELEASE ORAL
Qty: 30 EACH | Refills: 5 | Status: SHIPPED | OUTPATIENT
Start: 2023-12-22 | End: 2024-01-21

## 2023-12-22 NOTE — PROGRESS NOTES
[facility] - Outpatient Follow-up Note  Isabel Queen 43 y.o. female MRN: [id number]  Encounter: [id number]          ASSESSMENT AND PLAN:      1. Black stool  Patient reports episodes of black stool.  Patient does take Pepto-Bismol which can cause stool to become black.  Review of labs show hemoglobin stable and within normal limits.  - EGD; for further evaluation of black stool     2. Gastroesophageal reflux disease, unspecified whether esophagitis present  3. Epigastric pain  4. Nausea and vomiting, unspecified vomiting type  Patient reports acid reflux, epigastric pain and nausea and vomiting.  Patient reports symptoms have been ongoing since 2022 but has increased in severity.  Patient reports 3 episodes of vomiting over the last week.  On examination she does have tenderness in epigastric area.  Differential diagnosis includes gastroparesis, gastritis, esophagitis, ulceration, H. pylori, SIBO, or lesion.  - EGD; for further evaluation of symptoms.  Scheduled for EGD.  Prep and procedure explained to patient in detail.  Further recommendations pending results of EGD.   - esomeprazole (NexIUM) 40 mg packet; Take 40 mg by mouth daily in the early morning  Dispense: 30 each; Refill: 5  - NM gastric emptying; Future  - ondansetron (ZOFRAN) 4 mg tablet; Take 1 tablet (4 mg total) by mouth every 8 (eight) hours as needed for nausea or vomiting  Dispense: 90 tablet; Refill: 2  -Currently declines SIBO testing    5. Intrahepatic bile duct dilation  CT imaging done 1/12/2023 showed dilation of extrahepatic biliary ducts which has been stable since 2021, no further imaging has been done to further evaluate extrahepatic biliary dilation.  - MRI abdomen w wo contrast and mrcp;  for further evaluation of extrahepatic biliary dilation and to rule out stone or stricture    6. Hemorrhoids, unspecified hemorrhoid type  Patient has history of hemorrhoidal bleeding and recurrence of hemorrhoids.   Patient had  multiple hemorrhoidal banding in the past.  Patient reports she cannot have hemorrhoidal banding done in the office this needs to be done under sedation.  Will refer to colorectal surgery for hemorrhoidal banding under sedation.  - Ambulatory Referral to Colorectal Surgery; for hemorrhoidal banding under sedation.    Follow-up in 3 months  ______________________________________________________________________    SUBJECTIVE: Patient presents to office for follow-up due to persistent nausea associated with vomiting, acid reflux, epigastric pain and rectal bleeding. Patient reports acid reflux, epigastric pain and nausea and vomiting.  Patient reports symptoms have been ongoing since 2022 but has increased in severity.  Patient reports 3 episodes of vomiting over the last week.  On examination she does have tenderness in epigastric area. The presented today to office with report of hemorrhoidal bleeding.  Review of medical records shows lab work done 10/28 hemoglobin 15.8 and  LFTs within normal limits.CT imaging done 1/12/2023 showed dilation of extrahepatic biliary ducts which has been stable since 2021, no further imaging has been done to further evaluate extrahepatic biliary dilation.    Patient has followed with Dr. Hammonds for many years due to persistent rectal bleeding and nausea.  Patient has undergone multipile colonoscopy as well as hemorrhoidal banding in the past on numerous occasions but this only relieves her hemorrhoidal bleeding temporarily.  Colonoscopy last done 4/28/2021 which showed symptomatic internal hemorrhoids.  Suboptimal bowel prep.  Normal colonic mucosa.  EGD done 1/31/2022 showed esophagus normal and duodenum normal.  Few superficial gastric erosions.  Biopsies obtained.  Biopsy showed no H. pylori.  No intestinal metaplasia, dysplasia, or carcinoma.  Chronic inactive gastritis.      REVIEW OF SYSTEMS IS OTHERWISE NEGATIVE.      Historical Information   Past Medical History:    Diagnosis Date    Abdominal pain     ADHD     Allergic rhinitis     Anxiety     panic attacks    Anxiety     Asthma     Bipolar disorder (HCC)     Bladder distention     came to the ED on 10/9/2020-greene in to drain then removed    Chronic constipation     Colon polyp     Contact lens/glasses fitting     and glasses    Depression     Depression     GERD (gastroesophageal reflux disease)     Psychiatric disorder     anxiety     Rectal bleeding     Sepsis (HCC) 2017     Past Surgical History:   Procedure Laterality Date    APPENDECTOMY      BAND HEMORRHOIDECTOMY      BREAST SURGERY      augmentation silicone    COLONOSCOPY N/A 2018    Procedure: COLONOSCOPY WITH HEMORRHOID BANDING;  Surgeon: Ozzy Hammonds MD;  Location: [facility] GI [facility];  Service: Gastroenterology    ENDOMETRIAL ABLATION  2016    SC SIGMOIDOSCOPY FLX DX W/COLLJ SPEC BR/WA IF PFRMD N/A 2018    Procedure: FLEXIBLE SIGMOIDOSCOPY WITH APC;  Surgeon: Ozzy Hammonds MD;  Location: [facility] GI [facility];  Service: Gastroenterology    UPPER GASTROINTESTINAL ENDOSCOPY       Social History   Social History     Substance and Sexual Activity   Alcohol Use Yes    Alcohol/week: 2.0 standard drinks of alcohol    Types: 2 Glasses of wine per week    Comment: occassional - wine 2 x month     Social History     Substance and Sexual Activity   Drug Use No     Social History     Tobacco Use   Smoking Status Former    Current packs/day: 0.00    Average packs/day: 1 pack/day for 19.1 years (19.1 ttl pk-yrs)    Types: Cigarettes    Start date: 1995    Quit date: 6/15/2014    Years since quittin.5   Smokeless Tobacco Never     Family History   Problem Relation Age of Onset    No Known Problems Mother     No Known Problems Father     Bipolar disorder Sister     No Known Problems Brother     Anxiety disorder Daughter     Depression Daughter     No Known Problems Son        Meds/Allergies       Current Outpatient Medications:     albuterol (Proventil HFA) 90  "mcg/act inhaler    ALPRAZolam (XANAX) 1 mg tablet    amphetamine-dextroamphetamine (ADDERALL, 15MG,) 15 MG tablet    buPROPion (WELLBUTRIN) 75 mg tablet    esomeprazole (NexIUM) 40 mg packet    fluticasone (FLONASE) 50 mcg/act nasal spray    Fluticasone Furoate-Vilanterol (Breo Ellipta) 100-25 mcg/actuation inhaler    ketoconazole (NIZORAL) 2 % shampoo    lamoTRIgine (LaMICtal) 200 MG tablet    ondansetron (ZOFRAN) 4 mg tablet    PARoxetine (PAXIL) 10 mg tablet    benzonatate (TESSALON) 200 MG capsule    hydrocortisone (ANUSOL-HC) 2.5 % rectal cream    LORazepam (Ativan) 1 mg tablet    mometasone (ELOCON) 0.1 % lotion    ofloxacin (OCUFLOX) 0.3 % ophthalmic solution    ondansetron (ZOFRAN-ODT) 4 mg disintegrating tablet    PARoxetine (PAXIL) 10 mg tablet    Allergies   Allergen Reactions    Doxycycline Other (See Comments)     unknown           Objective     Blood pressure 118/72, pulse 90, height 5' 3\" (1.6 m), weight 63.5 kg (140 lb), not currently breastfeeding. Body mass index is 24.8 kg/m².      PHYSICAL EXAM:      General Appearance:   Alert, cooperative, no distress   HEENT:   Normocephalic, atraumatic, anicteric.     Neck:  Supple, symmetrical, trachea midline   Lungs:   Clear to auscultation bilaterally; no rales, rhonchi or wheezing; respirations unlabored    Heart::   Regular rate and rhythm; no murmur, rub, or gallop.   Abdomen:   Soft, non-tender, non-distended; normal bowel sounds; no masses, no organomegaly    Genitalia:   Deferred    Rectal:   Deferred    Extremities:  No cyanosis, clubbing or edema    Pulses:  2+ and symmetric    Skin:  No jaundice, rashes, or lesions    Lymph nodes:  No palpable cervical lymphadenopathy        Lab Results:   No visits with results within 1 Day(s) from this visit.   Latest known visit with results is:   Admission on 10/28/2023, Discharged on 10/28/2023   Component Date Value    WBC 10/28/2023 13.83 (H)     RBC 10/28/2023 4.64     Hemoglobin 10/28/2023 15.8 (H)     " Hematocrit 10/28/2023 45.2     MCV 10/28/2023 97     MCH 10/28/2023 34.1     MCHC 10/28/2023 35.0     RDW 10/28/2023 12.6     MPV 10/28/2023 9.3     Platelets 10/28/2023 346     nRBC 10/28/2023 0     Neutrophils Relative 10/28/2023 55     Immat GRANS % 10/28/2023 0     Lymphocytes Relative 10/28/2023 35     Monocytes Relative 10/28/2023 8     Eosinophils Relative 10/28/2023 1     Basophils Relative 10/28/2023 1     Neutrophils Absolute 10/28/2023 7.70 (H)     Immature Grans Absolute 10/28/2023 0.04     Lymphocytes Absolute 10/28/2023 4.78 (H)     Monocytes Absolute 10/28/2023 1.07     Eosinophils Absolute 10/28/2023 0.17     Basophils Absolute 10/28/2023 0.07     Sodium 10/28/2023 138     Potassium 10/28/2023 2.9 (L)     Chloride 10/28/2023 101     CO2 10/28/2023 21     ANION GAP 10/28/2023 16     BUN 10/28/2023 16     Creatinine 10/28/2023 0.95     Glucose 10/28/2023 93     Calcium 10/28/2023 9.7     AST 10/28/2023 22     ALT 10/28/2023 23     Alkaline Phosphatase 10/28/2023 48     Total Protein 10/28/2023 7.7     Albumin 10/28/2023 5.0     Total Bilirubin 10/28/2023 0.59     eGFR 10/28/2023 72     Magnesium 10/28/2023 2.0     hs TnI 0hr 10/28/2023 <2     D-Dimer, Quant 10/28/2023 <0.27     Ventricular Rate 10/28/2023 96     Atrial Rate 10/28/2023 96     HI Interval 10/28/2023 134     QRSD Interval 10/28/2023 76     QT Interval 10/28/2023 364     QTC Interval 10/28/2023 459     P Axis 10/28/2023 77     QRS [place] 10/28/2023 73     T Wave [place] 10/28/2023 66          Radiology Results:   No results found.

## 2023-12-22 NOTE — TELEPHONE ENCOUNTER
Scheduled date of EGD(as of today): 2/20/24  Physician performing EGD: Dr Sunshine  [facility] of EGD: [facility]  Instructions reviewed with patient by: vale given at appt   Clearances: n/a

## 2023-12-22 NOTE — PATIENT INSTRUCTIONS
Colace 100 mg 2 times a day available over-the-counter which is a stool softener  Drink plenty of fluids  Follow reflux diet and measures avoid coffee, carbonated drinks, chocolate, fried fatty spicy foods and tomato based products

## 2023-12-27 ENCOUNTER — HOSPITAL ENCOUNTER (EMERGENCY)
Facility: HOSPITAL | Age: 43
Discharge: STILL ADMITTED OR ADMINISTRATIVE | End: 2023-12-27
Payer: COMMERCIAL

## 2023-12-27 ENCOUNTER — OFFICE VISIT (OUTPATIENT)
Dept: URGENT CARE | Facility: CLINIC | Age: 43
End: 2023-12-27
Payer: COMMERCIAL

## 2023-12-27 ENCOUNTER — NURSE TRIAGE (OUTPATIENT)
Age: 43
End: 2023-12-27

## 2023-12-27 VITALS
WEIGHT: 139.8 LBS | BODY MASS INDEX: 24.77 KG/M2 | TEMPERATURE: 99.4 F | OXYGEN SATURATION: 98 % | SYSTOLIC BLOOD PRESSURE: 122 MMHG | HEART RATE: 98 BPM | RESPIRATION RATE: 20 BRPM | HEIGHT: 63 IN | DIASTOLIC BLOOD PRESSURE: 70 MMHG

## 2023-12-27 DIAGNOSIS — R68.89 FLU-LIKE SYMPTOMS: Primary | ICD-10-CM

## 2023-12-27 PROCEDURE — 87636 SARSCOV2 & INF A&B AMP PRB: CPT | Performed by: STUDENT IN AN ORGANIZED HEALTH CARE EDUCATION/TRAINING PROGRAM

## 2023-12-27 PROCEDURE — 99213 OFFICE O/P EST LOW 20 MIN: CPT | Performed by: STUDENT IN AN ORGANIZED HEALTH CARE EDUCATION/TRAINING PROGRAM

## 2023-12-27 RX ORDER — ONDANSETRON 4 MG/1
4 TABLET, FILM COATED ORAL EVERY 8 HOURS PRN
Qty: 20 TABLET | Refills: 0 | Status: SHIPPED | OUTPATIENT
Start: 2023-12-27

## 2023-12-27 RX ORDER — OSELTAMIVIR PHOSPHATE 75 MG/1
75 CAPSULE ORAL EVERY 12 HOURS SCHEDULED
Qty: 10 CAPSULE | Refills: 0 | Status: SHIPPED | OUTPATIENT
Start: 2023-12-27 | End: 2024-01-01

## 2023-12-27 NOTE — TELEPHONE ENCOUNTER
Regarding: Vomiting  ----- Message from Lilliam Mcneil sent at 12/27/2023  8:42 AM EST -----  Fever vomiting bones ache over night  Tried to warm transfer

## 2023-12-27 NOTE — PROGRESS NOTES
[facility]        NAME: Isabel Queen is a 43 y.o. female  : 1980    MRN: [id number]  DATE: 2023  TIME: 11:32 AM    Assessment and Orders   Flu-like symptoms [R68.89]  1. Flu-like symptoms  oseltamivir (TAMIFLU) 75 mg capsule    Covid/Flu- Office Collect Normal    ondansetron (ZOFRAN) 4 mg tablet            Plan and Discussion      Symptoms and exam consistent with viral illness, likely influenza.  Will treat with Tamiflu to be started today.  Patient to follow-up with COVID flu test in the next 24 to 48 hours and can discontinue Tamiflu if negative for influenza A and influenza B.  Zofran ordered for symptomatic relief.        Discussed ED precautions including (but not limited to)  Difficultly breathing or shortness of breath  Chest pain  Acutely worsening symptoms.     Risks and benefits discussed. Patient understands and agrees with the plan.     Follow up with PCP.     Chief Complaint     Chief Complaint   Patient presents with    Cold Like Symptoms     Pt here ill x 24 hours s/s  fever 101.3,  cough, chills, body aches, head and chest congestion. Pt used Tylenol, Dayquil and cough  med.  No Covid test done.          History of Present Illness       URI   This is a new problem. The current episode started yesterday. The problem has been gradually worsening. The maximum temperature recorded prior to her arrival was 101 - 101.9 F. Associated symptoms include congestion, coughing, headaches, nausea, rhinorrhea and vomiting.       Review of Systems   Review of Systems   HENT:  Positive for congestion and rhinorrhea.    Respiratory:  Positive for cough.    Gastrointestinal:  Positive for nausea and vomiting.   Musculoskeletal:  Positive for myalgias.   Neurological:  Positive for headaches.         Current Medications       Current Outpatient Medications:     albuterol (Proventil HFA) 90 mcg/act inhaler, Inhale 2 puffs every 6 (six) hours as needed for wheezing or shortness of breath,  Disp: 6.7 g, Rfl: 2    ALPRAZolam (XANAX) 1 mg tablet, Take 1 tablet (1 mg total) by mouth 4 (four) times a day as needed for anxiety, Disp: 120 tablet, Rfl: 0    amphetamine-dextroamphetamine (ADDERALL, 15MG,) 15 MG tablet, Take 1 tablet (15 mg total) by mouth 2 (two) times a day as needed (focus and concentration) Max Daily Amount: 30 mg, Disp: 60 tablet, Rfl: 0    buPROPion (WELLBUTRIN) 75 mg tablet, Take one tablet 75 mg twice a day po for one week, then take one tablet 75 mg in am for four weeks and stop., Disp: 42 tablet, Rfl: 0    esomeprazole (NexIUM) 40 mg packet, Take 40 mg by mouth daily in the early morning, Disp: 30 each, Rfl: 5    fluticasone (FLONASE) 50 mcg/act nasal spray, 2 sprays into each nostril daily, Disp: 9.9 mL, Rfl: 2    Fluticasone Furoate-Vilanterol (Breo Ellipta) 100-25 mcg/actuation inhaler, Inhale 1 puff daily Rinse mouth after use., Disp: 60 each, Rfl: 2    hydrocortisone (ANUSOL-HC) 2.5 % rectal cream, Apply topically 2 (two) times a day, Disp: 28 g, Rfl: 2    ketoconazole (NIZORAL) 2 % shampoo, PRN, Disp: , Rfl:     lamoTRIgine (LaMICtal) 200 MG tablet, Take 1 tablet (200 mg total) by mouth daily at bedtime, Disp: 30 tablet, Rfl: 3    mometasone (ELOCON) 0.1 % lotion, , Disp: , Rfl:     ondansetron (ZOFRAN) 4 mg tablet, Take 1 tablet (4 mg total) by mouth every 8 (eight) hours as needed for nausea or vomiting, Disp: 90 tablet, Rfl: 2    ondansetron (ZOFRAN) 4 mg tablet, Take 1 tablet (4 mg total) by mouth every 8 (eight) hours as needed for nausea or vomiting, Disp: 20 tablet, Rfl: 0    oseltamivir (TAMIFLU) 75 mg capsule, Take 1 capsule (75 mg total) by mouth every 12 (twelve) hours for 5 days, Disp: 10 capsule, Rfl: 0    PARoxetine (PAXIL) 10 mg tablet, Take 1 tablet (10 mg total) by mouth daily, Disp: 30 tablet, Rfl: 3    Current Allergies     Allergies as of 12/27/2023 - Reviewed 12/27/2023   Allergen Reaction Noted    Doxycycline Other (See Comments) 05/01/2019            The  "following portions of the patient's history were reviewed and updated as appropriate: allergies, current medications, past family history, past medical history, past social history, past surgical history and problem list.     Past Medical History:   Diagnosis Date    Abdominal pain     ADHD     Allergic rhinitis     Anxiety     panic attacks    Anxiety     Asthma     Bipolar disorder (HCC)     Bladder distention     came to the ED on 10/9/2020-greene in to drain then removed    Chronic constipation     Colon polyp     Contact lens/glasses fitting     and glasses    Depression     Depression     GERD (gastroesophageal reflux disease)     Psychiatric disorder     anxiety     Rectal bleeding     Sepsis (HCC) 2/8/2017       Past Surgical History:   Procedure Laterality Date    APPENDECTOMY      BAND HEMORRHOIDECTOMY      BREAST SURGERY      augmentation silicone    COLONOSCOPY N/A 01/24/2018    Procedure: COLONOSCOPY WITH HEMORRHOID BANDING;  Surgeon: Ozzy Hammonds MD;  Location: [facility] GI [facility];  Service: Gastroenterology    ENDOMETRIAL ABLATION  2016    MA SIGMOIDOSCOPY FLX DX W/COLLJ SPEC BR/WA IF PFRMD N/A 11/14/2018    Procedure: FLEXIBLE SIGMOIDOSCOPY WITH APC;  Surgeon: Ozzy Hammonds MD;  Location: [facility];  Service: Gastroenterology    UPPER GASTROINTESTINAL ENDOSCOPY         Family History   Problem Relation Age of Onset    No Known Problems Mother     No Known Problems Father     Bipolar disorder Sister     No Known Problems Brother     Anxiety disorder Daughter     Depression Daughter     No Known Problems Son          Medications have been verified.        Objective   /70   Pulse 98   Temp 99.4 °F (37.4 °C)   Resp 20   Ht 5' 3\" (1.6 m)   Wt 63.4 kg (139 lb 12.8 oz)   SpO2 98%   BMI 24.76 kg/m²   No LMP recorded.       Physical Exam     Physical Exam  Constitutional:       General: She is not in acute distress.     Appearance: She is not ill-appearing.   HENT:      Nose: Congestion present.    "   Mouth/Throat:      Pharynx: Posterior oropharyngeal erythema present.   Cardiovascular:      Rate and Rhythm: Tachycardia present.   Pulmonary:      Effort: Pulmonary effort is normal. No respiratory distress.   Abdominal:      General: There is no distension.   Neurological:      General: No focal deficit present.      Mental Status: She is alert and oriented to person, place, and time.   Psychiatric:         Mood and Affect: Mood normal.         Behavior: Behavior normal.               Nirmala Dela Cruz DO

## 2023-12-27 NOTE — TELEPHONE ENCOUNTER
"Reason for Disposition  • Drinking very little and has signs of dehydration (e.g., no urine > 12 hours, very dry mouth, very lightheaded)    Answer Assessment - Initial Assessment Questions  1. VOMITING SEVERITY: \"How many times have you vomited in the past 24 hours?\"      - MILD:  1 - 2 times/day     - MODERATE: 3 - 5 times/day, decreased oral intake without significant weight loss or symptoms of dehydration     - SEVERE: 6 or more times/day, vomits everything or nearly everything, with significant weight loss, symptoms of dehydration       4   2. ONSET: \"When did the vomiting begin?\"       Yesterday   3. FLUIDS: \"What fluids or food have you vomited up today?\" \"Have you been able to keep any fluids down?\"      Unable to keep fluids and food down   4. ABDOMINAL PAIN: \"Are your having any abdominal pain?\" If yes : \"How bad is it and what does it feel like?\" (e.g., crampy, dull, intermittent, constant)       No   5. DIARRHEA: \"Is there any diarrhea?\" If Yes, ask: \"How many times today?\"       Yes today  x1  6. CONTACTS: \"Is there anyone else in the family with the same symptoms?\"       no  7. CAUSE: \"What do you think is causing your vomiting?\"      Unknown   8. HYDRATION STATUS: \"Any signs of dehydration?\" (e.g., dry mouth [not only dry lips], too weak to stand) \"When did you last urinate?\"      Mouth is dry patient stated she is extremely  weak ,patient urinated an hour ago very dark urine   9. OTHER SYMPTOMS: \"Do you have any other symptoms?\" (e.g., fever, headache, vertigo, vomiting blood or coffee grounds, recent head injury)      Fever 101.1 yesterday, cough,fatigue ,nasal congestion ,body aches   10. PREGNANCY: \"Is there any chance you are pregnant?\" \"When was your last menstrual period?\"        no    Protocols used: Vomiting-ADULT-[place]    "

## 2023-12-27 NOTE — TELEPHONE ENCOUNTER
Patient called with c/o vomiting ,body aches ,fever yesterday 101.0,exteremly weak, dry mouth,cough,nasal congestion.Patient stated she vomited x4 since yesterday,unable to keep fluids and food down.Diarrhea x1 this morning.No appts available at the office.Patient advised to go to the ED for further evaluation. Patient on route to [facility].

## 2023-12-28 DIAGNOSIS — R09.82 POST-NASAL DRIP: ICD-10-CM

## 2023-12-28 LAB
FLUAV RNA RESP QL NAA+PROBE: POSITIVE
FLUBV RNA RESP QL NAA+PROBE: NEGATIVE
SARS-COV-2 RNA RESP QL NAA+PROBE: NEGATIVE

## 2023-12-28 RX ORDER — FLUTICASONE PROPIONATE 50 MCG
2 SPRAY, SUSPENSION (ML) NASAL DAILY
Qty: 9.9 ML | Refills: 0 | Status: SHIPPED | OUTPATIENT
Start: 2023-12-28

## 2024-01-03 ENCOUNTER — TELEPHONE (OUTPATIENT)
Age: 44
End: 2024-01-03

## 2024-01-03 NOTE — TELEPHONE ENCOUNTER
Patient's friend called asking for the results of MRI. He is not on her communication consent. He states that he is on his way to pick her up from work, and will have her call back to give verbal consent to speak with him. I explained that she can fill this out via her [employer] for future calls.

## 2024-01-04 NOTE — TELEPHONE ENCOUNTER
Pt Isabel called back and stated that she has not completed her MRI yet. She states she is going to call central scheduling today to get the MRI scheduled. She is  also requesting that her US of her neck that was completed back in June 2023 be faxed to her ENT Fax # [phone]. Please advise. Thank you!

## 2024-01-10 ENCOUNTER — TELEPHONE (OUTPATIENT)
Dept: PSYCHIATRY | Facility: CLINIC | Age: 44
End: 2024-01-10

## 2024-01-10 DIAGNOSIS — F41.1 GAD (GENERALIZED ANXIETY DISORDER): ICD-10-CM

## 2024-01-10 DIAGNOSIS — F31.81 BIPOLAR II DISORDER (HCC): ICD-10-CM

## 2024-01-10 DIAGNOSIS — F90.0 ATTENTION DEFICIT HYPERACTIVITY DISORDER (ADHD), PREDOMINANTLY INATTENTIVE TYPE: ICD-10-CM

## 2024-01-10 RX ORDER — DEXTROAMPHETAMINE SACCHARATE, AMPHETAMINE ASPARTATE, DEXTROAMPHETAMINE SULFATE AND AMPHETAMINE SULFATE 3.75; 3.75; 3.75; 3.75 MG/1; MG/1; MG/1; MG/1
15 TABLET ORAL 2 TIMES DAILY PRN
Qty: 60 TABLET | Refills: 0 | Status: SHIPPED | OUTPATIENT
Start: 2024-01-10

## 2024-01-10 RX ORDER — ALPRAZOLAM 1 MG/1
1 TABLET ORAL 4 TIMES DAILY PRN
Qty: 120 TABLET | Refills: 0 | Status: SHIPPED | OUTPATIENT
Start: 2024-01-10

## 2024-01-10 RX ORDER — LAMOTRIGINE 200 MG/1
200 TABLET ORAL
Qty: 30 TABLET | Refills: 3 | Status: SHIPPED | OUTPATIENT
Start: 2024-01-10

## 2024-01-10 NOTE — TELEPHONE ENCOUNTER
Patient has been added to the Medication Management wait list with a referral.    Insurance: blue cross  Insurance Type:    Commercial [x]   Medicaid []   County (if applicable)   Medicare []  Location Preference: [place]  Provider Preference: any  Virtual: Yes [] No [x]  Were outside resources sent: Yes [] No [x]

## 2024-01-10 NOTE — TELEPHONE ENCOUNTER
Pt called asking what the status was for her appt, She is getting nervous cause she needs her medication. Pt asked if Dr. Farley can refill them for her?

## 2024-01-31 ENCOUNTER — TELEPHONE (OUTPATIENT)
Dept: PSYCHIATRY | Facility: CLINIC | Age: 44
End: 2024-01-31

## 2024-01-31 NOTE — TELEPHONE ENCOUNTER
Received call from patient stating that she had called the intake department and they weren't helpful at all to get her an appointment.  Patient will be running out of medications again soon.  Dr Ramona Farley will see patient again.

## 2024-01-31 NOTE — TELEPHONE ENCOUNTER
IC called pt to schedule with Dr. Farley. Pt scheduled for first available opening:  Monday, Feb 26, 2024 @ 8:30am  Monday, Mar 25, 2024 @ 3:30pm    RTE for appts can be run as of 1/31/2024.

## 2024-02-06 ENCOUNTER — ANESTHESIA EVENT (OUTPATIENT)
Dept: ANESTHESIOLOGY | Facility: HOSPITAL | Age: 44
End: 2024-02-06

## 2024-02-06 ENCOUNTER — ANESTHESIA (OUTPATIENT)
Dept: ANESTHESIOLOGY | Facility: HOSPITAL | Age: 44
End: 2024-02-06

## 2024-02-19 ENCOUNTER — TELEPHONE (OUTPATIENT)
Dept: GASTROENTEROLOGY | Facility: CLINIC | Age: 44
End: 2024-02-19

## 2024-02-19 RX ORDER — SODIUM CHLORIDE, SODIUM LACTATE, POTASSIUM CHLORIDE, CALCIUM CHLORIDE 600; 310; 30; 20 MG/100ML; MG/100ML; MG/100ML; MG/100ML
75 INJECTION, SOLUTION INTRAVENOUS CONTINUOUS
Status: CANCELLED | OUTPATIENT
Start: 2024-02-19

## 2024-02-19 NOTE — TELEPHONE ENCOUNTER
I spoke to pt who has instructions and a . She has no questions at this time and has her arrival time. Mark

## 2024-02-20 ENCOUNTER — HOSPITAL ENCOUNTER (OUTPATIENT)
Dept: GASTROENTEROLOGY | Facility: AMBULARY SURGERY CENTER | Age: 44
Setting detail: OUTPATIENT SURGERY
Discharge: HOME | End: 2024-02-20
Attending: INTERNAL MEDICINE
Payer: COMMERCIAL

## 2024-02-20 ENCOUNTER — ANESTHESIA (OUTPATIENT)
Dept: GASTROENTEROLOGY | Facility: AMBULARY SURGERY CENTER | Age: 44
End: 2024-02-20

## 2024-02-20 ENCOUNTER — ANESTHESIA EVENT (OUTPATIENT)
Dept: GASTROENTEROLOGY | Facility: AMBULARY SURGERY CENTER | Age: 44
End: 2024-02-20

## 2024-02-20 VITALS
RESPIRATION RATE: 18 BRPM | HEART RATE: 74 BPM | DIASTOLIC BLOOD PRESSURE: 80 MMHG | TEMPERATURE: 97.8 F | OXYGEN SATURATION: 100 % | SYSTOLIC BLOOD PRESSURE: 117 MMHG

## 2024-02-20 DIAGNOSIS — K92.1 BLACK STOOL: ICD-10-CM

## 2024-02-20 DIAGNOSIS — R10.13 EPIGASTRIC PAIN: ICD-10-CM

## 2024-02-20 DIAGNOSIS — K21.9 GASTROESOPHAGEAL REFLUX DISEASE, UNSPECIFIED WHETHER ESOPHAGITIS PRESENT: ICD-10-CM

## 2024-02-20 DIAGNOSIS — K64.9 HEMORRHOIDS, UNSPECIFIED HEMORRHOID TYPE: ICD-10-CM

## 2024-02-20 LAB
EXT PREGNANCY TEST URINE: NEGATIVE
EXT. CONTROL: NORMAL

## 2024-02-20 PROCEDURE — 43239 EGD BIOPSY SINGLE/MULTIPLE: CPT | Performed by: INTERNAL MEDICINE

## 2024-02-20 PROCEDURE — 88305 TISSUE EXAM BY PATHOLOGIST: CPT | Performed by: STUDENT IN AN ORGANIZED HEALTH CARE EDUCATION/TRAINING PROGRAM

## 2024-02-20 PROCEDURE — 81025 URINE PREGNANCY TEST: CPT | Performed by: ANESTHESIOLOGY

## 2024-02-20 RX ORDER — PROPOFOL 10 MG/ML
INJECTION, EMULSION INTRAVENOUS AS NEEDED
Status: DISCONTINUED | OUTPATIENT
Start: 2024-02-20 | End: 2024-02-20

## 2024-02-20 RX ORDER — SODIUM CHLORIDE, SODIUM LACTATE, POTASSIUM CHLORIDE, CALCIUM CHLORIDE 600; 310; 30; 20 MG/100ML; MG/100ML; MG/100ML; MG/100ML
75 INJECTION, SOLUTION INTRAVENOUS CONTINUOUS
Status: DISCONTINUED | OUTPATIENT
Start: 2024-02-20 | End: 2024-02-24 | Stop reason: HOSPADM

## 2024-02-20 RX ORDER — LIDOCAINE HYDROCHLORIDE 20 MG/ML
INJECTION, SOLUTION EPIDURAL; INFILTRATION; INTRACAUDAL; PERINEURAL AS NEEDED
Status: DISCONTINUED | OUTPATIENT
Start: 2024-02-20 | End: 2024-02-20

## 2024-02-20 RX ORDER — SODIUM CHLORIDE, SODIUM LACTATE, POTASSIUM CHLORIDE, CALCIUM CHLORIDE 600; 310; 30; 20 MG/100ML; MG/100ML; MG/100ML; MG/100ML
INJECTION, SOLUTION INTRAVENOUS CONTINUOUS PRN
Status: DISCONTINUED | OUTPATIENT
Start: 2024-02-20 | End: 2024-02-20

## 2024-02-20 RX ADMIN — LIDOCAINE HYDROCHLORIDE 100 MG: 20 INJECTION, SOLUTION EPIDURAL; INFILTRATION; INTRACAUDAL; PERINEURAL at 11:27

## 2024-02-20 RX ADMIN — SODIUM CHLORIDE, SODIUM LACTATE, POTASSIUM CHLORIDE, AND CALCIUM CHLORIDE 75 ML/HR: .6; .31; .03; .02 INJECTION, SOLUTION INTRAVENOUS at 11:22

## 2024-02-20 RX ADMIN — PROPOFOL 150 MG: 10 INJECTION, EMULSION INTRAVENOUS at 11:27

## 2024-02-20 RX ADMIN — SODIUM CHLORIDE, SODIUM LACTATE, POTASSIUM CHLORIDE, AND CALCIUM CHLORIDE: .6; .31; .03; .02 INJECTION, SOLUTION INTRAVENOUS at 11:00

## 2024-02-20 NOTE — H&P
History and Physical - [facility]  Isabel Queen 43 y.o. female MRN: [id number]                  HPI: Isabel Queen is a 43 y.o. year old female who presents for nausea vomiting, abdominal pain, melena      REVIEW OF SYSTEMS: Per the HPI, and otherwise unremarkable.    Historical Information   Past Medical History:   Diagnosis Date    Abdominal pain     ADHD     Allergic rhinitis     Anxiety     panic attacks    Anxiety     Asthma     Bipolar disorder (HCC)     Bladder distention     came to the ED on 10/9/2020-greene in to drain then removed    Chronic constipation     Colon polyp     Contact lens/glasses fitting     and glasses    Depression     Depression     GERD (gastroesophageal reflux disease)     Psychiatric disorder     anxiety     Rectal bleeding     Sepsis (HCC) 2017     Past Surgical History:   Procedure Laterality Date    APPENDECTOMY      BAND HEMORRHOIDECTOMY      BREAST SURGERY      augmentation silicone    COLONOSCOPY N/A 2018    Procedure: COLONOSCOPY WITH HEMORRHOID BANDING;  Surgeon: Ozzy Hammonds MD;  Location: [facility];  Service: Gastroenterology    ENDOMETRIAL ABLATION      IL SIGMOIDOSCOPY FLX DX W/COLLJ SPEC BR/WA IF PFRMD N/A 2018    Procedure: FLEXIBLE SIGMOIDOSCOPY WITH APC;  Surgeon: Ozzy Hammonds MD;  Location: [facility];  Service: Gastroenterology    UPPER GASTROINTESTINAL ENDOSCOPY       Social History   Social History     Substance and Sexual Activity   Alcohol Use Yes    Alcohol/week: 2.0 standard drinks of alcohol    Types: 2 Glasses of wine per week    Comment: occassional - wine 2 x month     Social History     Substance and Sexual Activity   Drug Use No     Social History     Tobacco Use   Smoking Status Former    Current packs/day: 0.00    Average packs/day: 1 pack/day for 19.1 years (19.1 ttl pk-yrs)    Types: Cigarettes    Start date: 1995    Quit date: 6/15/2014    Years since quittin.6   Smokeless Tobacco Never      Family History   Problem Relation Age of Onset    No Known Problems Mother     No Known Problems Father     Bipolar disorder Sister     No Known Problems Brother     Anxiety disorder Daughter     Depression Daughter     No Known Problems Son        Meds/Allergies       Current Outpatient Medications:     albuterol (Proventil HFA) 90 mcg/act inhaler    ALPRAZolam (XANAX) 1 mg tablet    amphetamine-dextroamphetamine (ADDERALL, 15MG,) 15 MG tablet    buPROPion (WELLBUTRIN) 75 mg tablet    Fluticasone Furoate-Vilanterol (Breo Ellipta) 100-25 mcg/actuation inhaler    lamoTRIgine (LaMICtal) 200 MG tablet    ondansetron (ZOFRAN) 4 mg tablet    ondansetron (ZOFRAN) 4 mg tablet    PARoxetine (PAXIL) 10 mg tablet    esomeprazole (NexIUM) 40 mg packet    fluticasone (FLONASE) 50 mcg/act nasal spray    hydrocortisone (ANUSOL-HC) 2.5 % rectal cream    ketoconazole (NIZORAL) 2 % shampoo    mometasone (ELOCON) 0.1 % lotion    Allergies   Allergen Reactions    Doxycycline Other (See Comments)       stomach  upset       Objective     /80   Pulse 82   Temp 97.8 °F (36.6 °C) (Temporal)   Resp 18   SpO2 100%       PHYSICAL EXAM    Gen: NAD  Head: NCAT  CV: RRR  CHEST: Clear  ABD: soft, NT/ND  EXT: no edema      ASSESSMENT/PLAN:  This is a 43 y.o. year old female here for EGD, and she is stable and optimized for her procedure.

## 2024-02-20 NOTE — ANESTHESIA POSTPROCEDURE EVALUATION
Post-Op Assessment Note    CV Status:  Stable  Pain Score: 0    Pain management: adequate       Mental Status:  Sleepy and arousable   Hydration Status:  Stable   PONV Controlled:  None   Airway Patency:  Patent and adequate     Post Op Vitals Reviewed: Yes    No anethesia notable event occurred.    Staff: CRNA               BP      Temp      Pulse     Resp      SpO2

## 2024-02-20 NOTE — ANESTHESIA PREPROCEDURE EVALUATION
Procedure:  EGD  AMB REFERRAL TO COLORECTAL SURGERY    Relevant Problems   ANESTHESIA (within normal limits)      CARDIO (within normal limits)      ENDO (within normal limits)      GI/HEPATIC   (+) Gastroesophageal reflux disease      /RENAL (within normal limits)      GYN (within normal limits)   (-) Currently pregnant      HEMATOLOGY (within normal limits)      MUSCULOSKELETAL (within normal limits)      NEURO/PSYCH   (+) RANJIT (generalized anxiety disorder)   (+) Numbness and tingling in both hands   (+) Panic disorder without agoraphobia with moderate panic attacks      PULMONARY   (+) Mild persistent asthma without complication   (+) Shortness of breath      Other   (+) Current every day vaping        Physical Exam    Airway    Mallampati score: I  TM Distance: >3 FB  Neck ROM: full     Dental   No notable dental hx     Cardiovascular  Rhythm: regular, Rate: normal, Cardiovascular exam normal    Pulmonary  Pulmonary exam normal Breath sounds clear to auscultation    Other Findings  post-pubertal.      Anesthesia Plan  ASA Score- 2     Anesthesia Type- IV sedation with anesthesia with ASA Monitors.         Additional Monitors:     Airway Plan:            Plan Factors-Exercise tolerance (METS): >4 METS.    Chart reviewed. EKG reviewed. Imaging results reviewed. Existing labs reviewed. Patient summary reviewed.    Patient is a current smoker (vapes).  Patient instructed to abstain from smoking on day of procedure.             Induction- intravenous.    Postoperative Plan-     Informed Consent- Anesthetic plan and risks discussed with patient.  I personally reviewed this patient with the CRNA. Discussed and agreed on the Anesthesia Plan with the CRNA..              NPO and allergies verified.  Urine pregnancy test negative today, 2/20/24.  Patient used her Breo Ellipta today, 2/20/24.    Plan:  IV sedation, GA backup    Benefits and risks of sedation were discussed with the patient including possibility of recall  under sedation and the potential for conversion to general anesthesia if necessary.  All questions were answered.  Anesthesia consent was obtained from the patient.

## 2024-02-22 PROCEDURE — 88305 TISSUE EXAM BY PATHOLOGIST: CPT | Performed by: STUDENT IN AN ORGANIZED HEALTH CARE EDUCATION/TRAINING PROGRAM

## 2024-02-24 DIAGNOSIS — J45.30 MILD PERSISTENT ASTHMA WITHOUT COMPLICATION: ICD-10-CM

## 2024-02-24 DIAGNOSIS — R06.02 SHORTNESS OF BREATH: ICD-10-CM

## 2024-02-24 RX ORDER — FLUTICASONE FUROATE AND VILANTEROL TRIFENATATE 100; 25 UG/1; UG/1
POWDER RESPIRATORY (INHALATION)
Qty: 60 EACH | Refills: 2 | Status: SHIPPED | OUTPATIENT
Start: 2024-02-24

## 2024-02-24 RX ORDER — ALBUTEROL SULFATE 90 UG/1
AEROSOL, METERED RESPIRATORY (INHALATION)
Qty: 8.5 G | Refills: 2 | Status: SHIPPED | OUTPATIENT
Start: 2024-02-24

## 2024-02-26 ENCOUNTER — OFFICE VISIT (OUTPATIENT)
Dept: PSYCHIATRY | Facility: CLINIC | Age: 44
End: 2024-02-26
Payer: COMMERCIAL

## 2024-02-26 VITALS
SYSTOLIC BLOOD PRESSURE: 131 MMHG | BODY MASS INDEX: 25.52 KG/M2 | WEIGHT: 144 LBS | HEART RATE: 109 BPM | HEIGHT: 63 IN | DIASTOLIC BLOOD PRESSURE: 90 MMHG

## 2024-02-26 DIAGNOSIS — F41.0 PANIC DISORDER WITHOUT AGORAPHOBIA WITH MODERATE PANIC ATTACKS: Primary | ICD-10-CM

## 2024-02-26 DIAGNOSIS — F90.0 ATTENTION DEFICIT HYPERACTIVITY DISORDER (ADHD), PREDOMINANTLY INATTENTIVE TYPE: ICD-10-CM

## 2024-02-26 DIAGNOSIS — F33.9 EPISODE OF RECURRENT MAJOR DEPRESSIVE DISORDER, UNSPECIFIED DEPRESSION EPISODE SEVERITY (HCC): ICD-10-CM

## 2024-02-26 DIAGNOSIS — F41.1 GAD (GENERALIZED ANXIETY DISORDER): ICD-10-CM

## 2024-02-26 DIAGNOSIS — F31.81 BIPOLAR II DISORDER (HCC): ICD-10-CM

## 2024-02-26 PROCEDURE — 90792 PSYCH DIAG EVAL W/MED SRVCS: CPT | Performed by: NURSE PRACTITIONER

## 2024-02-26 RX ORDER — DEXTROAMPHETAMINE SACCHARATE, AMPHETAMINE ASPARTATE, DEXTROAMPHETAMINE SULFATE AND AMPHETAMINE SULFATE 5; 5; 5; 5 MG/1; MG/1; MG/1; MG/1
20 TABLET ORAL
Qty: 60 TABLET | Refills: 0 | Status: SHIPPED | OUTPATIENT
Start: 2024-02-26

## 2024-02-26 RX ORDER — LAMOTRIGINE 200 MG/1
200 TABLET ORAL
Qty: 30 TABLET | Refills: 3 | Status: SHIPPED | OUTPATIENT
Start: 2024-02-26

## 2024-02-26 RX ORDER — PAROXETINE 10 MG/1
10 TABLET, FILM COATED ORAL DAILY
Qty: 30 TABLET | Refills: 3 | Status: SHIPPED | OUTPATIENT
Start: 2024-02-26

## 2024-02-26 RX ORDER — ALPRAZOLAM 1 MG/1
1 TABLET ORAL 4 TIMES DAILY PRN
Qty: 120 TABLET | Refills: 0 | Status: SHIPPED | OUTPATIENT
Start: 2024-02-26
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
## 2024-02-28 NOTE — BH TREATMENT PLAN
[facility]     Name and Date of Birth:  Isabel Queen 43 y.o. 1980  Date of Treatment Plan: July 21, 2020, October 20, 2020, August 25, 2021, March 30, 2022, November 21, 2022, June 14, 2023, February 26, 2024  Diagnosis/Diagnoses:    1. Bipolar II disorder (HCC)    2. Panic disorder without agoraphobia with moderate panic attacks    3. Attention deficit hyperactivity disorder (ADHD), predominantly inattentive type       Strengths/Personal Resources for Self-Care: supportive family, taking medications as prescribed, ability to communicate needs, ability to listen, motivation for treatment.  Area/Areas of need (in own words): anxiety symptoms, depressive symptoms  1.         Long Term Goal: improve control of anxiety.  And mood, improve self-care  Target Date: 6 months -8/26/2024  Person/Persons responsible for completion of goal: Isabel and provider  2.         Short Term Objective (s) - How will we reach this goal?:   A. Provider new recommended medication/dosage changes and/or continue medication(s):   B. Create a structure in her day and self care  C. Create realistic expectations.  Target Date: 6 months -8/26/2024  Person/Persons Responsible for Completion of Goal: Isabel and provider  Progress Towards Goals: progressing slowly  Treatment Modality: medication management every 1-3 month  Review due 180 days from date of this plan: 6 months -8/26/2024  Expected length of service: ongoing treatment  My Physician/PA/NP and I have developed this plan together and I agree to work on the goals and objectives. I understand the treatment goals that were developed for my treatment.

## 2024-03-25 DIAGNOSIS — F41.1 GAD (GENERALIZED ANXIETY DISORDER): ICD-10-CM

## 2024-03-25 DIAGNOSIS — F31.81 BIPOLAR II DISORDER (HCC): ICD-10-CM

## 2024-03-25 RX ORDER — DEXTROAMPHETAMINE SACCHARATE, AMPHETAMINE ASPARTATE, DEXTROAMPHETAMINE SULFATE AND AMPHETAMINE SULFATE 5; 5; 5; 5 MG/1; MG/1; MG/1; MG/1
20 TABLET ORAL
Qty: 60 TABLET | Refills: 0 | Status: SHIPPED | OUTPATIENT
Start: 2024-03-25

## 2024-03-25 RX ORDER — ALPRAZOLAM 1 MG/1
1 TABLET ORAL 4 TIMES DAILY PRN
Qty: 120 TABLET | Refills: 0 | Status: SHIPPED | OUTPATIENT
Start: 2024-03-25

## 2024-03-25 NOTE — TELEPHONE ENCOUNTER
Patient is calling regarding cancelling an appointment.    Date/Time: 3/25/2024 3:30     Reason: boss is not in the office she has to stay     Patient was rescheduled: YES [x] NO []  If yes, when was Patient reschedule for: 4/16    Patient requesting call back to reschedule: YES [] NO [x]

## 2024-04-02 ENCOUNTER — TELEPHONE (OUTPATIENT)
Dept: GASTROENTEROLOGY | Facility: CLINIC | Age: 44
End: 2024-04-02

## 2024-04-02 NOTE — TELEPHONE ENCOUNTER
Please see procedure done 2/20/24. Please call patient to schedule colonoscopy for screening purposes with extended prep with Dr Sunshine. Thank you

## 2024-04-11 ENCOUNTER — TELEPHONE (OUTPATIENT)
Dept: GASTROENTEROLOGY | Facility: CLINIC | Age: 44
End: 2024-04-11

## 2024-04-11 DIAGNOSIS — Z12.11 SCREEN FOR COLON CANCER: Primary | ICD-10-CM

## 2024-04-11 NOTE — TELEPHONE ENCOUNTER
Scheduled date of colonoscopy (as of today):7/15/24  Physician performing colonoscopy:Dr Sunshine   Location of colonoscopy:[facility]  Bowel prep reviewed with patient:opal   Instructions reviewed with patient by:sb  Clearances: none

## 2024-04-15 ENCOUNTER — TELEPHONE (OUTPATIENT)
Dept: GASTROENTEROLOGY | Facility: CLINIC | Age: 44
End: 2024-04-15

## 2024-04-16 ENCOUNTER — OFFICE VISIT (OUTPATIENT)
Dept: PSYCHIATRY | Facility: CLINIC | Age: 44
End: 2024-04-16
Payer: COMMERCIAL

## 2024-04-16 VITALS
SYSTOLIC BLOOD PRESSURE: 138 MMHG | HEART RATE: 114 BPM | WEIGHT: 134 LBS | DIASTOLIC BLOOD PRESSURE: 78 MMHG | BODY MASS INDEX: 23.74 KG/M2 | HEIGHT: 63 IN

## 2024-04-16 DIAGNOSIS — Z72.89 CURRENT EVERY DAY VAPING: ICD-10-CM

## 2024-04-16 DIAGNOSIS — F90.0 ATTENTION DEFICIT HYPERACTIVITY DISORDER (ADHD), PREDOMINANTLY INATTENTIVE TYPE: ICD-10-CM

## 2024-04-16 DIAGNOSIS — F33.9 EPISODE OF RECURRENT MAJOR DEPRESSIVE DISORDER, UNSPECIFIED DEPRESSION EPISODE SEVERITY (HCC): ICD-10-CM

## 2024-04-16 DIAGNOSIS — F41.0 PANIC DISORDER WITHOUT AGORAPHOBIA WITH MODERATE PANIC ATTACKS: Primary | ICD-10-CM

## 2024-04-16 DIAGNOSIS — F31.81 BIPOLAR II DISORDER (HCC): ICD-10-CM

## 2024-04-16 DIAGNOSIS — F41.1 GAD (GENERALIZED ANXIETY DISORDER): ICD-10-CM

## 2024-04-16 PROCEDURE — 90833 PSYTX W PT W E/M 30 MIN: CPT | Performed by: NURSE PRACTITIONER

## 2024-04-16 PROCEDURE — 99214 OFFICE O/P EST MOD 30 MIN: CPT | Performed by: NURSE PRACTITIONER

## 2024-04-16 RX ORDER — PAROXETINE 10 MG/1
10 TABLET, FILM COATED ORAL DAILY
Qty: 30 TABLET | Refills: 3 | Status: SHIPPED | OUTPATIENT
Start: 2024-04-16

## 2024-04-16 RX ORDER — ALPRAZOLAM 1 MG/1
1 TABLET ORAL 4 TIMES DAILY PRN
Qty: 120 TABLET | Refills: 0 | Status: SHIPPED | OUTPATIENT
Start: 2024-04-16

## 2024-04-16 RX ORDER — DEXTROAMPHETAMINE SACCHARATE, AMPHETAMINE ASPARTATE, DEXTROAMPHETAMINE SULFATE AND AMPHETAMINE SULFATE 5; 5; 5; 5 MG/1; MG/1; MG/1; MG/1
20 TABLET ORAL
Qty: 60 TABLET | Refills: 0 | Status: SHIPPED | OUTPATIENT
Start: 2024-04-16

## 2024-04-30 NOTE — PSYCH
Visit Time    Visit Start Time: 6:00  Visit Stop Time: 6:30  Total Visit Duration:  30 minutes    Subjective:     Patient ID: Isabel Queen is a 43 y.o. female.  History of RANJIT, ADHD, panic, anxiety, depression, insomnia seen for medication management and mood assessment.  Isabel reports increased conflict with her , they remain  when she is stressed due to worry about her son and feeling everyone depends on her and no one helps her or supports her.  We discussed boundaries and coping skills.  Denies suicidal ideation, she is anxious and depressed due to situation.  She continues to work full-time and work out at the gym.  She is preparing for competition soon and her  was planning to go with her; however, she was going alone.  She reports her appetite is fair to good.  She sleeps with the use of Xanax as needed.  Isabel reports her focus and concentration are good utilizing the Adderall.  Takes medications as prescribed.  Friends and some family members are supportive    HPI ROS Appetite Changes and Sleep: normal appetite and normal energy level    Review Of Systems:     Mood Anxiety and Depression   Behavior Normal    Thought Content Normal   General Relationship Problems, Emotional Problems, and Sleep Disturbances   Personality Normal   Other Psych Symptoms Normal   Constitutional As Noted in HPI   ENT As Noted in HPI   Cardiovascular As Noted in HPI   Respiratory As Noted in HPI   Gastrointestinal As Noted in HPI   Genitourinary As Noted in HPI   Musculoskeletal As Noted in HPI   Integumentary As Noted in HPI   Neurological As Noted in HPI   Endocrine Normal    Other Symptoms Normal        Laboratory Results:     Substance Abuse History:  Social History     Substance and Sexual Activity   Drug Use No       Family Psychiatric History:   Family History   Problem Relation Age of Onset    No Known Problems Mother     No Known Problems Father     Bipolar disorder Sister     No Known Problems Brother      Anxiety disorder Daughter     Depression Daughter     No Known Problems Son        The following portions of the patient's history were reviewed and updated as appropriate: allergies, current medications, past family history, past medical history, past social history, past surgical history, and problem list.    Social History     Socioeconomic History    Marital status: /Civil Union     Spouse name: Not on file    Number of children: 2    Years of education: Not on file    Highest education level: Not on file   Occupational History    Not on file   Tobacco Use    Smoking status: Former     Current packs/day: 0.00     Average packs/day: 1 pack/day for 19.1 years (19.1 ttl pk-yrs)     Types: Cigarettes     Start date: 1995     Quit date: 6/15/2014     Years since quittin.8    Smokeless tobacco: Never   Vaping Use    Vaping status: Every Day    Substances: Nicotine, Flavoring   Substance and Sexual Activity    Alcohol use: Yes     Alcohol/week: 2.0 standard drinks of alcohol     Types: 2 Glasses of wine per week     Comment: occassional - wine 2 x month    Drug use: No    Sexual activity: Yes     Partners: Male     Birth control/protection: Female Sterilization     Comment: ablation   Other Topics Concern    Not on file   Social History Narrative    Not on file     Social Determinants of Health     Financial Resource Strain: Not on file   Food Insecurity: Not on file   Transportation Needs: Not on file   Physical Activity: Not on file   Stress: Not on file   Social Connections: Not on file   Intimate Partner Violence: Not on file   Housing Stability: Not on file     Social History     Social History Narrative    Not on file       Objective:       Mental status:  Appearance calm and cooperative , adequate hygiene and grooming, and good eye contact    Mood depressed and anxious   Affect affect appropriate    Speech a normal rate   Thought Processes normal thought processes   Hallucinations no  hallucinations present    Thought Content no delusions   Abnormal Thoughts no suicidal thoughts  and no homicidal thoughts    Orientation  oriented to person and place and time   Remote Memory short term memory intact and long term memory intact   Attention Span concentration intact   Intellect Appears to be of Average Intelligence   Insight Insight intact   Judgement judgment was intact   Muscle Strength Muscle strength and tone were normal   Language no difficulty naming common objects   Fund of Knowledge displays adequate knowledge of current events   Pain none   Pain Scale 0       Assessment/Plan:       Diagnoses and all orders for this visit:    Panic disorder without agoraphobia with moderate panic attacks    RANJIT (generalized anxiety disorder)  -     ALPRAZolam (XANAX) 1 mg tablet; Take 1 tablet (1 mg total) by mouth 4 (four) times a day as needed for anxiety    Bipolar II disorder (HCC)  -     amphetamine-dextroamphetamine (ADDERALL, 20MG,) 20 mg tablet; Take 1 tablet (20 mg total) by mouth 2 (two) times a day Max Daily Amount: 40 mg    Episode of recurrent major depressive disorder, unspecified depression episode severity (HCC)  -     PARoxetine (PAXIL) 10 mg tablet; Take 1 tablet (10 mg total) by mouth daily    Current every day vaping    Attention deficit hyperactivity disorder (ADHD), predominantly inattentive type            Treatment Recommendations- Risks Benefits      Immediate Medical/Psychiatric/Psychotherapy Treatments and Any Precautions: Continue treatment plan    Risks, Benefits And Possible Side Effects Of Medications:  {PSYCH RISK, BENEFITS AND POSSIBLE SIDE EFFECTS (Optional):37055    Controlled Medication Discussion: She is aware of safe use and storage of medication    Psychotherapy Provided: 30 minutes individual psychotherapy provided.   Supportive therapy  Medication evaluation  Mood assessment  Discussed coping skills and boundaries should be made with family members.  Normalized and  "validated her feelings  Safety plan not compiled; however, she is aware of who to call in crisis, 988 and ED if necessary    Goals discussed in session: Stabilize mood, adequate sleep, control of ADHD symptoms    \"Portions of the record may have been created with voice recognition software. Occasional wrong word or \"sound a like\" substitutions may have occurred due to the inherent limitations of voice recognition software. Read the chart carefully and recognize, using context, where substitutions have occurred. Please call if you have any questions. \"                                   "

## 2024-05-19 ENCOUNTER — APPOINTMENT (EMERGENCY)
Dept: RADIOLOGY | Facility: HOSPITAL | Age: 44
End: 2024-05-19
Payer: COMMERCIAL

## 2024-05-19 ENCOUNTER — HOSPITAL ENCOUNTER (EMERGENCY)
Facility: HOSPITAL | Age: 44
Discharge: HOME | End: 2024-05-19
Attending: STUDENT IN AN ORGANIZED HEALTH CARE EDUCATION/TRAINING PROGRAM
Payer: COMMERCIAL

## 2024-05-19 VITALS
HEART RATE: 98 BPM | DIASTOLIC BLOOD PRESSURE: 77 MMHG | RESPIRATION RATE: 20 BRPM | SYSTOLIC BLOOD PRESSURE: 144 MMHG | OXYGEN SATURATION: 97 % | TEMPERATURE: 98.1 F

## 2024-05-19 DIAGNOSIS — M25.512 ACUTE PAIN OF LEFT SHOULDER: Primary | ICD-10-CM

## 2024-05-19 LAB
ALBUMIN SERPL BCP-MCNC: 4.4 G/DL (ref 3.5–5)
ALP SERPL-CCNC: 46 U/L (ref 34–104)
ALT SERPL W P-5'-P-CCNC: 43 U/L (ref 7–52)
ANION GAP SERPL CALCULATED.3IONS-SCNC: 7 MMOL/L (ref 4–13)
AST SERPL W P-5'-P-CCNC: 28 U/L (ref 13–39)
BASOPHILS # BLD AUTO: 0.05 THOUSANDS/ÂΜL (ref 0–0.1)
BASOPHILS NFR BLD AUTO: 1 % (ref 0–1)
BILIRUB SERPL-MCNC: 0.37 MG/DL (ref 0.2–1)
BUN SERPL-MCNC: 19 MG/DL (ref 5–25)
CALCIUM SERPL-MCNC: 9.2 MG/DL (ref 8.4–10.2)
CARDIAC TROPONIN I PNL SERPL HS: <2 NG/L
CHLORIDE SERPL-SCNC: 107 MMOL/L (ref 96–108)
CO2 SERPL-SCNC: 26 MMOL/L (ref 21–32)
CREAT SERPL-MCNC: 0.88 MG/DL (ref 0.6–1.3)
EOSINOPHIL # BLD AUTO: 0.14 THOUSAND/ÂΜL (ref 0–0.61)
EOSINOPHIL NFR BLD AUTO: 1 % (ref 0–6)
ERYTHROCYTE [DISTWIDTH] IN BLOOD BY AUTOMATED COUNT: 13 % (ref 11.6–15.1)
GFR SERPL CREATININE-BSD FRML MDRD: 79 ML/MIN/1.73SQ M
GLUCOSE SERPL-MCNC: 80 MG/DL (ref 65–140)
HCT VFR BLD AUTO: 42.6 % (ref 34.8–46.1)
HGB BLD-MCNC: 14 G/DL (ref 11.5–15.4)
IMM GRANULOCYTES # BLD AUTO: 0.02 THOUSAND/UL (ref 0–0.2)
IMM GRANULOCYTES NFR BLD AUTO: 0 % (ref 0–2)
LYMPHOCYTES # BLD AUTO: 2.68 THOUSANDS/ÂΜL (ref 0.6–4.47)
LYMPHOCYTES NFR BLD AUTO: 26 % (ref 14–44)
MCH RBC QN AUTO: 32.2 PG (ref 26.8–34.3)
MCHC RBC AUTO-ENTMCNC: 32.9 G/DL (ref 31.4–37.4)
MCV RBC AUTO: 98 FL (ref 82–98)
MONOCYTES # BLD AUTO: 0.84 THOUSAND/ÂΜL (ref 0.17–1.22)
MONOCYTES NFR BLD AUTO: 8 % (ref 4–12)
NEUTROPHILS # BLD AUTO: 6.68 THOUSANDS/ÂΜL (ref 1.85–7.62)
NEUTS SEG NFR BLD AUTO: 64 % (ref 43–75)
NRBC BLD AUTO-RTO: 0 /100 WBCS
PLATELET # BLD AUTO: 296 THOUSANDS/UL (ref 149–390)
PMV BLD AUTO: 9.4 FL (ref 8.9–12.7)
POTASSIUM SERPL-SCNC: 4.1 MMOL/L (ref 3.5–5.3)
PROT SERPL-MCNC: 6.6 G/DL (ref 6.4–8.4)
RBC # BLD AUTO: 4.35 MILLION/UL (ref 3.81–5.12)
SODIUM SERPL-SCNC: 140 MMOL/L (ref 135–147)
WBC # BLD AUTO: 10.41 THOUSAND/UL (ref 4.31–10.16)

## 2024-05-19 PROCEDURE — 99284 EMERGENCY DEPT VISIT MOD MDM: CPT

## 2024-05-19 PROCEDURE — 93005 ELECTROCARDIOGRAM TRACING: CPT

## 2024-05-19 PROCEDURE — 73030 X-RAY EXAM OF SHOULDER: CPT

## 2024-05-19 PROCEDURE — 71045 X-RAY EXAM CHEST 1 VIEW: CPT

## 2024-05-19 PROCEDURE — 84484 ASSAY OF TROPONIN QUANT: CPT | Performed by: STUDENT IN AN ORGANIZED HEALTH CARE EDUCATION/TRAINING PROGRAM

## 2024-05-19 PROCEDURE — 80053 COMPREHEN METABOLIC PANEL: CPT | Performed by: STUDENT IN AN ORGANIZED HEALTH CARE EDUCATION/TRAINING PROGRAM

## 2024-05-19 PROCEDURE — 99284 EMERGENCY DEPT VISIT MOD MDM: CPT | Performed by: STUDENT IN AN ORGANIZED HEALTH CARE EDUCATION/TRAINING PROGRAM

## 2024-05-19 PROCEDURE — 36415 COLL VENOUS BLD VENIPUNCTURE: CPT | Performed by: STUDENT IN AN ORGANIZED HEALTH CARE EDUCATION/TRAINING PROGRAM

## 2024-05-19 PROCEDURE — 85025 COMPLETE CBC W/AUTO DIFF WBC: CPT | Performed by: STUDENT IN AN ORGANIZED HEALTH CARE EDUCATION/TRAINING PROGRAM

## 2024-05-19 RX ORDER — SODIUM CHLORIDE 9 MG/ML
3 INJECTION INTRAVENOUS
Status: DISCONTINUED | OUTPATIENT
Start: 2024-05-19 | End: 2024-05-19 | Stop reason: HOSPADM

## 2024-05-19 NOTE — ED PROVIDER NOTES
History  Chief Complaint   Patient presents with    Shoulder Pain     Pt reports Left Sided Shoulder pain, beginning about 10 days ago. Pain is reproducible and worse with movement     Patient is a 44-year-old female, past medical history including bipolar disorder, GERD, depression, anxiety, and ADHD, who presents to the emergency room for left shoulder pain.  Has been present for about 10 days.  Relatively constant.  Worse with movement.  Relieved with rest.  No other modifying factors.  No associated symptoms.  No numbness, tingling, weakness.  Denies any trauma.  Does lift weights frequently.  No chest pain or shortness of breath.  No other complaints or concerns.        Prior to Admission Medications   Prescriptions Last Dose Informant Patient Reported? Taking?   ALPRAZolam (XANAX) 1 mg tablet   No No   Sig: Take 1 tablet (1 mg total) by mouth 4 (four) times a day as needed for anxiety   Breo Ellipta 100-25 MCG/ACT inhaler   No No   Sig: INHALE 1 PUFF BY MOUTHN ONCE DAILY. RINSE MOUTH AFTER USE   PARoxetine (PAXIL) 10 mg tablet   No No   Sig: Take 1 tablet (10 mg total) by mouth daily   albuterol (PROVENTIL HFA,VENTOLIN HFA) 90 mcg/act inhaler   No No   Sig: INHALE 2 PUFFS BY MOUTH EVERY 6 HOURS AS NEEDED FOR WHEEZING OR SHORTNESS OF BREATH   amphetamine-dextroamphetamine (ADDERALL, 20MG,) 20 mg tablet   No No   Sig: Take 1 tablet (20 mg total) by mouth 2 (two) times a day Max Daily Amount: 40 mg   esomeprazole (NexIUM) 40 mg packet   No No   Sig: Take 40 mg by mouth daily in the early morning   fluticasone (FLONASE) 50 mcg/act nasal spray   No No   Si sprays into each nostril daily   hydrocortisone (ANUSOL-HC) 2.5 % rectal cream  Self No No   Sig: Apply topically 2 (two) times a day   ketoconazole (NIZORAL) 2 % shampoo  Self Yes No   Sig: PRN   lamoTRIgine (LaMICtal) 200 MG tablet   No No   Sig: Take 1 tablet (200 mg total) by mouth daily at bedtime   mometasone (ELOCON) 0.1 % lotion  Self Yes No    ondansetron (ZOFRAN) 4 mg tablet   No No   Sig: Take 1 tablet (4 mg total) by mouth every 8 (eight) hours as needed for nausea or vomiting   ondansetron (ZOFRAN) 4 mg tablet   No No   Sig: Take 1 tablet (4 mg total) by mouth every 8 (eight) hours as needed for nausea or vomiting   polyethylene glycol (GOLYTELY) 4000 mL solution   No No   Sig: Take 4,000 mL by mouth once for 1 dose      Facility-Administered Medications: None       Past Medical History:   Diagnosis Date    Abdominal pain     ADHD     Allergic rhinitis     Anxiety     panic attacks    Anxiety     Asthma     Bipolar disorder (HCC)     Bladder distention     came to the ED on 10/9/2020-greene in to drain then removed    Chronic constipation     Colon polyp     Contact lens/glasses fitting     and glasses    Depression     Depression     GERD (gastroesophageal reflux disease)     Psychiatric disorder     anxiety     Rectal bleeding     Sepsis (HCC) 2/8/2017       Past Surgical History:   Procedure Laterality Date    APPENDECTOMY      BAND HEMORRHOIDECTOMY      BREAST SURGERY      augmentation silicone    COLONOSCOPY N/A 01/24/2018    Procedure: COLONOSCOPY WITH HEMORRHOID BANDING;  Surgeon: Ozzy Hammonds MD;  Location: [facility];  Service: Gastroenterology    ENDOMETRIAL ABLATION  2016    NC SIGMOIDOSCOPY FLX DX W/COLLJ SPEC BR/WA IF PFRMD N/A 11/14/2018    Procedure: FLEXIBLE SIGMOIDOSCOPY WITH APC;  Surgeon: Ozzy Hammonds MD;  Location: [facility];  Service: Gastroenterology    UPPER GASTROINTESTINAL ENDOSCOPY         Family History   Problem Relation Age of Onset    No Known Problems Mother     No Known Problems Father     Bipolar disorder Sister     No Known Problems Brother     Anxiety disorder Daughter     Depression Daughter     No Known Problems Son      I have reviewed and agree with the history as documented.    E-Cigarette/Vaping    E-Cigarette Use Current Every Day User     Comments uses daily      E-Cigarette/Vaping Substances     Nicotine Yes     THC No     CBD No     Flavoring Yes     Other No     Unknown No      Social History     Tobacco Use    Smoking status: Former     Current packs/day: 0.00     Average packs/day: 1 pack/day for 19.1 years (19.1 ttl pk-yrs)     Types: Cigarettes     Start date: 1995     Quit date: 6/15/2014     Years since quittin.9    Smokeless tobacco: Never   Vaping Use    Vaping status: Every Day    Substances: Nicotine, Flavoring   Substance Use Topics    Alcohol use: Yes     Alcohol/week: 2.0 standard drinks of alcohol     Types: 2 Glasses of wine per week     Comment: occassional - wine 2 x month    Drug use: No       Review of Systems   Constitutional:  Negative for chills and fever.   Musculoskeletal:         Left shoulder pain     All other systems reviewed and are negative.      Physical Exam  Physical Exam  Vitals and nursing note reviewed.   Constitutional:       General: She is not in acute distress.     Appearance: She is well-developed. She is not diaphoretic.   HENT:      Head: Normocephalic and atraumatic.      Right Ear: External ear normal.      Left Ear: External ear normal.      Nose: Nose normal.   Eyes:      General: Lids are normal. No scleral icterus.  Cardiovascular:      Rate and Rhythm: Normal rate and regular rhythm.      Heart sounds: Normal heart sounds. No murmur heard.     No friction rub. No gallop.   Pulmonary:      Effort: Pulmonary effort is normal. No respiratory distress.      Breath sounds: Normal breath sounds. No wheezing or rales.   Abdominal:      Palpations: Abdomen is soft.      Tenderness: There is no abdominal tenderness. There is no guarding or rebound.   Musculoskeletal:         General: No deformity. Normal range of motion.      Cervical back: Normal range of motion and neck supple.   Skin:     General: Skin is warm and dry.   Neurological:      General: No focal deficit present.      Mental Status: She is alert.   Psychiatric:         Mood and Affect: Mood  normal.         Behavior: Behavior normal.         Vital Signs  ED Triage Vitals   Temperature Pulse Respirations Blood Pressure SpO2   05/19/24 1735 05/19/24 1730 05/19/24 1735 05/19/24 1730 05/19/24 1730   98.1 °F (36.7 °C) 98 20 144/77 97 %      Temp Source Heart Rate Source Patient Position - Orthostatic VS BP Location FiO2 (%)   05/19/24 1735 -- -- -- --   Oral          Pain Score       --                  Vitals:    05/19/24 1730   BP: 144/77   Pulse: 98         Visual Acuity      ED Medications  Medications   sodium chloride (PF) 0.9 % injection 3 mL (has no administration in time range)       Diagnostic Studies  Results Reviewed       Procedure Component Value Units Date/Time    HS Troponin 0hr (reflex protocol) [[id number]]  (Normal) Collected: 05/19/24 1741    Lab Status: Final result Specimen: Blood from Arm, Left Updated: 05/19/24 1812     hs TnI 0hr <2 ng/L     HS Troponin I 2hr [[id number]]     Lab Status: No result Specimen: Blood     Comprehensive metabolic panel [[id number]] Collected: 05/19/24 1741    Lab Status: Final result Specimen: Blood from Arm, Left Updated: 05/19/24 1807     Sodium 140 mmol/L      Potassium 4.1 mmol/L      Chloride 107 mmol/L      CO2 26 mmol/L      ANION GAP 7 mmol/L      BUN 19 mg/dL      Creatinine 0.88 mg/dL      Glucose 80 mg/dL      Calcium 9.2 mg/dL      AST 28 U/L      ALT 43 U/L      Alkaline Phosphatase 46 U/L      Total Protein 6.6 g/dL      Albumin 4.4 g/dL      Total Bilirubin 0.37 mg/dL      eGFR 79 ml/min/1.73sq m     Narrative:      National Kidney Disease Foundation guidelines for Chronic Kidney Disease (CKD):     Stage 1 with normal or high GFR (GFR > 90 mL/min/1.73 square meters)    Stage 2 Mild CKD (GFR = 60-89 mL/min/1.73 square meters)    Stage 3A Moderate CKD (GFR = 45-59 mL/min/1.73 square meters)    Stage 3B Moderate CKD (GFR = 30-44 mL/min/1.73 square meters)    Stage 4 Severe CKD (GFR = 15-29 mL/min/1.73 square meters)    Stage 5 End Stage CKD (GFR  <15 mL/min/1.73 square meters)  Note: GFR calculation is accurate only with a steady state creatinine    CBC and differential [[id number]]  (Abnormal) Collected: 05/19/24 1741    Lab Status: Final result Specimen: Blood from Arm, Left Updated: 05/19/24 1748     WBC 10.41 Thousand/uL      RBC 4.35 Million/uL      Hemoglobin 14.0 g/dL      Hematocrit 42.6 %      MCV 98 fL      MCH 32.2 pg      MCHC 32.9 g/dL      RDW 13.0 %      MPV 9.4 fL      Platelets 296 Thousands/uL      nRBC 0 /100 WBCs      Segmented % 64 %      Immature Grans % 0 %      Lymphocytes % 26 %      Monocytes % 8 %      Eosinophils Relative 1 %      Basophils Relative 1 %      Absolute Neutrophils 6.68 Thousands/µL      Absolute Immature Grans 0.02 Thousand/uL      Absolute Lymphocytes 2.68 Thousands/µL      Absolute Monocytes 0.84 Thousand/µL      Eosinophils Absolute 0.14 Thousand/µL      Basophils Absolute 0.05 Thousands/µL                    X-ray chest 1 view portable   ED Interpretation by Ashvin Trinidad DO (05/19 1813)   No acute cardiopulmonary disease      XR shoulder 2+ views LEFT   ED Interpretation by Ashvin Trinidad DO (05/19 1813)   No acute osseous abnormality                 Procedures  ECG 12 Lead Documentation Only    Date/Time: 5/19/2024 7:35 PM    Performed by: Ashvin Trinidad DO  Authorized by: Ashvin Trinidad DO    ECG reviewed by me, the ED Provider: yes    Patient location:  ED  Interpretation:     Interpretation: normal    Rate:     ECG rate:  88    ECG rate assessment: normal    Rhythm:     Rhythm: sinus rhythm    Ectopy:     Ectopy: none    QRS:     QRS axis:  Normal  Conduction:     Conduction: normal    ST segments:     ST segments:  Normal  T waves:     T waves: normal             ED Course  ED Course as of 05/19/24 1938   Sun May 19, 2024   1819 Patient reevaluated.  Resting comfortably.  No new complaints.  Discussed negative workup (no indication for delta troponin at this time).  Will discharge.  Recommended  "orthopedic follow-up.  Return precautions discussed.  Patient verbalized understanding and agreed to plan of care.                                             Medical Decision Making  Patient is a 44 y.o. female who presents to the ED for left shoulder pain.  Patient is nontoxic, well-appearing.  Vitals are stable.  Physical exam is unremarkable.  There is no tenderness with palpation of the left shoulder.  Left upper extremity is neurovascularly intact.    Differential includes but is not limited to: Strain/sprain, rotator cuff injury.  Low suspicion for fracture.  Doubt referred pain/ACS.    Plan: Labs, x-ray, reassess                 Portions of the record may have been created with voice recognition software. Occasional wrong word or \"sound a like\" substitutions may have occurred due to the inherent limitations of voice recognition software. Read the chart carefully and recognize, using context, where substitutions have occurred.    Problems Addressed:  Acute pain of left shoulder: acute illness or injury    Amount and/or Complexity of Data Reviewed  Labs: ordered.  Radiology: ordered and independent interpretation performed.    Risk  Prescription drug management.             Disposition  Final diagnoses:   Acute pain of left shoulder     Time reflects when diagnosis was documented in both MDM as applicable and the Disposition within this note       Time User Action Codes Description Comment    5/19/2024  6:13 PM Ashvin Trinidad Add [M25.512] Acute pain of left shoulder           ED Disposition       ED Disposition   Discharge    Condition   Stable    Date/Time   Sun May 19, 2024  6:13 PM    Comment   Isabel Queen discharge to home/self care.                   Follow-up Information       Follow up With Specialties Details Why Contact Info Additional Information    [facility], Nurse Practitioner   [address] [address]  [phone]       [facility] " [place] [facility] Emergency [facility]   [address] [facility] [id number] [phone] [facility], [address] [facility], [place], [place], [address]    [facility] [place] [facility] Schedule an appointment as soon as possible for a visit   [address] [facility]  [address]  [facility] [address] [phone] [facility] [place], [address], [place], [place], [address] [phone]            Discharge Medication List as of 5/19/2024  6:19 PM        CONTINUE these medications which have NOT CHANGED    Details   albuterol (PROVENTIL HFA,VENTOLIN HFA) 90 mcg/act inhaler INHALE 2 PUFFS BY MOUTH EVERY 6 HOURS AS NEEDED FOR WHEEZING OR SHORTNESS OF BREATH, Normal      ALPRAZolam (XANAX) 1 mg tablet Take 1 tablet (1 mg total) by mouth 4 (four) times a day as needed for anxiety, Starting Tue 4/16/2024, Normal      amphetamine-dextroamphetamine (ADDERALL, 20MG,) 20 mg tablet Take 1 tablet (20 mg total) by mouth 2 (two) times a day Max Daily Amount: 40 mg, Starting Tue 4/16/2024, Normal      Breo Ellipta 100-25 MCG/ACT inhaler INHALE 1 PUFF BY MOUTHN ONCE DAILY. RINSE MOUTH AFTER USE, Normal      esomeprazole (NexIUM) 40 mg packet Take 40 mg by mouth daily in the early morning, Starting Fri 12/22/2023, Until Sun 1/21/2024, Normal      fluticasone (FLONASE) 50 mcg/act nasal spray 2 sprays into each nostril daily, Starting Thu 12/28/2023, Normal      hydrocortisone (ANUSOL-HC) 2.5 % rectal cream Apply topically 2 (two) times a day, Starting Tue 6/27/2023, Normal      ketoconazole (NIZORAL) 2 % shampoo PRN, Starting Thu 4/14/2022, Historical Med      lamoTRIgine (LaMICtal) 200 MG tablet Take 1 tablet (200 mg total) by mouth daily at bedtime, Starting Mon 2/26/2024, Normal      mometasone (ELOCON) 0.1 % lotion Starting Thu 9/16/2021, Historical Med      !!  ondansetron (ZOFRAN) 4 mg tablet Take 1 tablet (4 mg total) by mouth every 8 (eight) hours as needed for nausea or vomiting, Starting Fri 12/22/2023, Normal      !! ondansetron (ZOFRAN) 4 mg tablet Take 1 tablet (4 mg total) by mouth every 8 (eight) hours as needed for nausea or vomiting, Starting Wed 12/27/2023, Normal      PARoxetine (PAXIL) 10 mg tablet Take 1 tablet (10 mg total) by mouth daily, Starting Tue 4/16/2024, Normal      polyethylene glycol (GOLYTELY) 4000 mL solution Take 4,000 mL by mouth once for 1 dose, Starting Thu 4/11/2024, Normal       !! - Potential duplicate medications found. Please discuss with provider.          No discharge procedures on file.    PDMP Review         Value Time User    PDMP Reviewed  Yes 4/16/2024  6:39 PM Ramona Farley, PhD            ED Provider  Electronically Signed by             Ashvin Trinidad DO  05/19/24 [id number]

## 2024-05-19 NOTE — DISCHARGE INSTRUCTIONS
You were seen in the emergency department for left shoulder pain.  Your x-rays and labs did not reveal any emergent cause of your symptoms.  As discussed please follow-up with orthopedics as soon as possible for further evaluation. Return to the ED for any new or concerning symptoms.

## 2024-05-23 LAB
ATRIAL RATE: 88 BPM
P AXIS: 48 DEGREES
PR INTERVAL: 126 MS
QRS AXIS: 76 DEGREES
QRSD INTERVAL: 84 MS
QT INTERVAL: 360 MS
QTC INTERVAL: 435 MS
T WAVE AXIS: 64 DEGREES
VENTRICULAR RATE: 88 BPM

## 2024-05-23 PROCEDURE — 93010 ELECTROCARDIOGRAM REPORT: CPT | Performed by: INTERNAL MEDICINE

## 2024-05-29 DIAGNOSIS — F41.1 GAD (GENERALIZED ANXIETY DISORDER): ICD-10-CM

## 2024-05-29 DIAGNOSIS — F31.81 BIPOLAR II DISORDER (HCC): ICD-10-CM

## 2024-05-29 RX ORDER — DEXTROAMPHETAMINE SACCHARATE, AMPHETAMINE ASPARTATE, DEXTROAMPHETAMINE SULFATE AND AMPHETAMINE SULFATE 5; 5; 5; 5 MG/1; MG/1; MG/1; MG/1
20 TABLET ORAL
Qty: 60 TABLET | Refills: 0 | Status: SHIPPED | OUTPATIENT
Start: 2024-05-29

## 2024-05-29 RX ORDER — ALPRAZOLAM 1 MG/1
1 TABLET ORAL 4 TIMES DAILY PRN
Qty: 120 TABLET | Refills: 0 | Status: SHIPPED | OUTPATIENT
Start: 2024-05-29

## 2024-05-29 NOTE — TELEPHONE ENCOUNTER
Last seen 4/16/2024 she just lmm stating she needs her meds filled. I checked the other ones they are ok

## 2024-06-13 ENCOUNTER — OFFICE VISIT (OUTPATIENT)
Dept: OBGYN CLINIC | Facility: CLINIC | Age: 44
End: 2024-06-13
Payer: COMMERCIAL

## 2024-06-13 VITALS
DIASTOLIC BLOOD PRESSURE: 74 MMHG | WEIGHT: 134 LBS | HEIGHT: 63 IN | HEART RATE: 90 BPM | SYSTOLIC BLOOD PRESSURE: 113 MMHG | BODY MASS INDEX: 23.74 KG/M2

## 2024-06-13 DIAGNOSIS — M75.42 IMPINGEMENT SYNDROME OF LEFT SHOULDER: Primary | ICD-10-CM

## 2024-06-13 DIAGNOSIS — M75.22 BICEPS TENDINITIS OF LEFT SHOULDER: ICD-10-CM

## 2024-06-13 PROCEDURE — 99203 OFFICE O/P NEW LOW 30 MIN: CPT | Performed by: ORTHOPAEDIC SURGERY

## 2024-06-13 RX ORDER — NAPROXEN 500 MG/1
500 TABLET ORAL 2 TIMES DAILY WITH MEALS
Qty: 60 TABLET | Refills: 0 | Status: SHIPPED | OUTPATIENT
Start: 2024-06-13

## 2024-06-13 NOTE — PROGRESS NOTES
Assessment/Plan:  1. Impingement syndrome of left shoulder  Ambulatory referral to Physical Therapy    naproxen (NAPROSYN) 500 mg tablet      2. Biceps tendinitis of left shoulder  Ambulatory referral to Physical Therapy    naproxen (NAPROSYN) 500 mg tablet          Isabel has left-sided shoulder pain which is not very painful on examination today.  I informed her that pain can sometimes come and go in the form of shoulder impingement which is the most likely scenario for her pain.  She has a little bit of discomfort along her proximal biceps today.  I recommended formal physical therapy, anti-inflammatory medications, activity modification and home exercises.  She will undergo conservative treatment for the next 4 to 6 weeks.  If pain persists or worsens we could consider a biceps tendon sheath injection, subacromial injection or MRI depending on her exam at that time.      Subjective:   Isabel Queen is a 44 y.o. female who presents to the office for evaluation for left-sided shoulder pain.  She denies any recent injury or trauma.  She has been feeling increased discomfort over the anterior and lateral aspect of her left shoulder for the last 1 month.  She did go to the emergency room at 1 point and had x-ray which was unremarkable.  She does weight lift competitively and has been feeling increased discomfort with a lot of physical activity and weight lifting.  She does not recall 1 specific injury or feeling a pop and does not have increased pain with any certain exercise but the pain seems to come and go after activity.  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  She denies any history of shoulder issue in the past.  Today she is not feeling much pain.  There are days where is quite sharp and tender.  She has been taking oral anti-inflammatories in the form of Advil occasionally. She feels like the pain radiates down the left arm into the deltoid region.      Review of  Systems   Constitutional:  Negative for chills, fever and unexpected weight change.   HENT:  Negative for hearing loss, nosebleeds and sore throat.    Eyes:  Negative for pain, redness and visual disturbance.   Respiratory:  Negative for cough, shortness of breath and wheezing.    Cardiovascular:  Negative for chest pain, palpitations and leg swelling.   Gastrointestinal:  Negative for abdominal pain, nausea and vomiting.   Endocrine: Negative for polydipsia and polyuria.   Genitourinary:  Negative for dysuria and hematuria.   Musculoskeletal:         See HPI   Skin:  Negative for rash and wound.   Neurological:  Negative for dizziness, numbness and headaches.   Psychiatric/Behavioral:  Negative for decreased concentration and suicidal ideas. The patient is not nervous/anxious.          Past Medical History:   Diagnosis Date    Abdominal pain     ADHD     Allergic rhinitis     Anxiety     panic attacks    Anxiety     Asthma     Bipolar disorder (HCC)     Bladder distention     came to the ED on 10/9/2020-greene in to drain then removed    Chronic constipation     Colon polyp     Contact lens/glasses fitting     and glasses    Depression     Depression     GERD (gastroesophageal reflux disease)     Psychiatric disorder     anxiety     Rectal bleeding     Sepsis (HCC) 2/8/2017       Past Surgical History:   Procedure Laterality Date    APPENDECTOMY      BAND HEMORRHOIDECTOMY      BREAST SURGERY      augmentation silicone    COLONOSCOPY N/A 01/24/2018    Procedure: COLONOSCOPY WITH HEMORRHOID BANDING;  Surgeon: Ozzy Hammonds MD;  Location: [facility];  Service: Gastroenterology    ENDOMETRIAL ABLATION  2016    ID SIGMOIDOSCOPY FLX DX W/COLLJ SPEC BR/WA IF PFRMD N/A 11/14/2018    Procedure: FLEXIBLE SIGMOIDOSCOPY WITH APC;  Surgeon: Ozzy Hammonds MD;  Location: [facility];  Service: Gastroenterology    UPPER GASTROINTESTINAL ENDOSCOPY         Family History   Problem Relation Age of Onset    No Known Problems  Mother     No Known Problems Father     Bipolar disorder Sister     No Known Problems Brother     Anxiety disorder Daughter     Depression Daughter     No Known Problems Son        Social History     Occupational History    Not on file   Tobacco Use    Smoking status: Former     Current packs/day: 0.00     Average packs/day: 1 pack/day for 19.1 years (19.1 ttl pk-yrs)     Types: Cigarettes     Start date: 5/8/1995     Quit date: 6/15/2014     Years since quitting: 10.0    Smokeless tobacco: Never   Vaping Use    Vaping status: Every Day    Substances: Nicotine, Flavoring   Substance and Sexual Activity    Alcohol use: Yes     Alcohol/week: 2.0 standard drinks of alcohol     Types: 2 Glasses of wine per week     Comment: occassional - wine 2 x month    Drug use: No    Sexual activity: Yes     Partners: Male     Birth control/protection: Female Sterilization     Comment: ablation         Current Outpatient Medications:     albuterol (PROVENTIL HFA,VENTOLIN HFA) 90 mcg/act inhaler, INHALE 2 PUFFS BY MOUTH EVERY 6 HOURS AS NEEDED FOR WHEEZING OR SHORTNESS OF BREATH, Disp: 8.5 g, Rfl: 2    ALPRAZolam (XANAX) 1 mg tablet, Take 1 tablet (1 mg total) by mouth 4 (four) times a day as needed for anxiety, Disp: 120 tablet, Rfl: 0    amphetamine-dextroamphetamine (ADDERALL, 20MG,) 20 mg tablet, Take 1 tablet (20 mg total) by mouth 2 (two) times a day Max Daily Amount: 40 mg, Disp: 60 tablet, Rfl: 0    Breo Ellipta 100-25 MCG/ACT inhaler, INHALE 1 PUFF BY MOUTHN ONCE DAILY. RINSE MOUTH AFTER USE, Disp: 60 each, Rfl: 2    esomeprazole (NexIUM) 40 mg packet, Take 40 mg by mouth daily in the early morning, Disp: 30 each, Rfl: 5    fluticasone (FLONASE) 50 mcg/act nasal spray, 2 sprays into each nostril daily, Disp: 9.9 mL, Rfl: 0    hydrocortisone (ANUSOL-HC) 2.5 % rectal cream, Apply topically 2 (two) times a day, Disp: 28 g, Rfl: 2    ketoconazole (NIZORAL) 2 % shampoo, PRN, Disp: , Rfl:     lamoTRIgine (LaMICtal) 200 MG tablet,  Take 1 tablet (200 mg total) by mouth daily at bedtime, Disp: 30 tablet, Rfl: 3    mometasone (ELOCON) 0.1 % lotion, , Disp: , Rfl:     ondansetron (ZOFRAN) 4 mg tablet, Take 1 tablet (4 mg total) by mouth every 8 (eight) hours as needed for nausea or vomiting, Disp: 20 tablet, Rfl: 0    PARoxetine (PAXIL) 10 mg tablet, Take 1 tablet (10 mg total) by mouth daily, Disp: 30 tablet, Rfl: 3    ondansetron (ZOFRAN) 4 mg tablet, Take 1 tablet (4 mg total) by mouth every 8 (eight) hours as needed for nausea or vomiting (Patient not taking: Reported on 6/13/2024), Disp: 90 tablet, Rfl: 2    polyethylene glycol (GOLYTELY) 4000 mL solution, Take 4,000 mL by mouth once for 1 dose (Patient not taking: Reported on 6/13/2024), Disp: 4000 mL, Rfl: 0    Allergies   Allergen Reactions    Doxycycline Other (See Comments)       stomach  upset       Objective:  Vitals:    06/13/24 0805   BP: 113/74   Pulse: 90     Pain Score:   8      Left Shoulder Exam     Tenderness   The patient is experiencing tenderness in the biceps tendon.    Range of Motion   Active abduction:  normal   Passive abduction:  normal   Extension:  normal   External rotation:  normal   Forward flexion:  normal   Internal rotation 0 degrees:  normal     Muscle Strength   Abduction: 5/5   Internal rotation: 5/5   External rotation: 5/5   Supraspinatus: 5/5   Subscapularis: 5/5   Biceps: 5/5     Tests   Mireles test: negative  Impingement: negative  Drop arm: negative    Other   Erythema: absent  Sensation: normal  Pulse: present     Comments:  Negative speeds test    Full range of motion cervical spine, negative Spurling's maneuver            Physical Exam  Vitals and nursing note reviewed.   Constitutional:       Appearance: Normal appearance. She is well-developed.   HENT:      Head: Normocephalic and atraumatic.      Right Ear: External ear normal.      Left Ear: External ear normal.   Eyes:      General: No scleral icterus.     Extraocular Movements: Extraocular  movements intact.      Conjunctiva/sclera: Conjunctivae normal.   Cardiovascular:      Rate and Rhythm: Normal rate.   Pulmonary:      Effort: Pulmonary effort is normal. No respiratory distress.   Musculoskeletal:      Cervical back: Normal range of motion and neck supple.      Comments: See Ortho exam   Skin:     General: Skin is warm and dry.   Neurological:      General: No focal deficit present.      Mental Status: She is alert and oriented to person, place, and time.   Psychiatric:         Behavior: Behavior normal.         I have personally reviewed pertinent films in PACS and my interpretation is as follows:  X-ray of the left shoulder from the emergency room on 5/19/2024 demonstrates no evidence of acute abnormality.      This document was created using speech voice recognition software.   Grammatical errors, random word insertions, pronoun errors, and incomplete sentences are an occasional consequence of this system due to software limitations, ambient noise, and hardware issues.   Any formal questions or concerns about content, text, or information contained within the body of this dictation should be directly addressed to the provider for clarification.

## 2024-06-26 DIAGNOSIS — F31.81 BIPOLAR II DISORDER (HCC): ICD-10-CM

## 2024-06-26 DIAGNOSIS — F41.1 GAD (GENERALIZED ANXIETY DISORDER): ICD-10-CM

## 2024-06-26 RX ORDER — ALPRAZOLAM 1 MG/1
1 TABLET ORAL 4 TIMES DAILY PRN
Qty: 120 TABLET | Refills: 0 | Status: SHIPPED | OUTPATIENT
Start: 2024-06-26

## 2024-06-26 RX ORDER — DEXTROAMPHETAMINE SACCHARATE, AMPHETAMINE ASPARTATE, DEXTROAMPHETAMINE SULFATE AND AMPHETAMINE SULFATE 5; 5; 5; 5 MG/1; MG/1; MG/1; MG/1
20 TABLET ORAL
Qty: 60 TABLET | Refills: 0 | Status: SHIPPED | OUTPATIENT
Start: 2024-06-26

## 2024-06-26 RX ORDER — LAMOTRIGINE 200 MG/1
200 TABLET ORAL
Qty: 30 TABLET | Refills: 0 | Status: SHIPPED | OUTPATIENT
Start: 2024-06-26

## 2024-06-26 NOTE — TELEPHONE ENCOUNTER
Patient calling request refills on medication:    Patient is out of medications.    Xanax 1 mg  Adderall 20 mg  Lamictal 200 mg    Pharmacy [facility] [place], [place]    Next Visit 8/29/2024 with Dr Ramona Farley-provider on vacation

## 2024-07-08 ENCOUNTER — TELEPHONE (OUTPATIENT)
Dept: GASTROENTEROLOGY | Facility: CLINIC | Age: 44
End: 2024-07-08

## 2024-07-08 NOTE — TELEPHONE ENCOUNTER
Lmm that procedure notification is thru my chart. Pt should have golytely prep and a 2 day prep instructions. She will need a  and will be called with her arrival time before her procedure. She needs to check her instructions regarding her clear liquid diet. Sb

## 2024-07-18 ENCOUNTER — OFFICE VISIT (OUTPATIENT)
Dept: URGENT CARE | Facility: CLINIC | Age: 44
End: 2024-07-18
Payer: COMMERCIAL

## 2024-07-18 ENCOUNTER — NURSE TRIAGE (OUTPATIENT)
Age: 44
End: 2024-07-18

## 2024-07-18 VITALS
HEIGHT: 63 IN | BODY MASS INDEX: 23.74 KG/M2 | HEART RATE: 69 BPM | OXYGEN SATURATION: 100 % | TEMPERATURE: 98.4 F | RESPIRATION RATE: 20 BRPM

## 2024-07-18 DIAGNOSIS — L30.9 DERMATITIS: Primary | ICD-10-CM

## 2024-07-18 DIAGNOSIS — L30.8 PRURITIC DERMATITIS: ICD-10-CM

## 2024-07-18 PROCEDURE — 99213 OFFICE O/P EST LOW 20 MIN: CPT | Performed by: PHYSICIAN ASSISTANT

## 2024-07-18 RX ORDER — METHYLPREDNISOLONE 4 MG/1
TABLET ORAL
Qty: 1 EACH | Refills: 0 | Status: SHIPPED | OUTPATIENT
Start: 2024-07-18

## 2024-07-18 RX ORDER — CLOBETASOL PROPIONATE 0.5 MG/G
CREAM TOPICAL 2 TIMES DAILY
Qty: 30 G | Refills: 0 | Status: SHIPPED | OUTPATIENT
Start: 2024-07-18

## 2024-07-18 RX ORDER — HYDROXYZINE HYDROCHLORIDE 10 MG/1
10 TABLET, FILM COATED ORAL
Qty: 30 TABLET | Refills: 0 | Status: SHIPPED | OUTPATIENT
Start: 2024-07-18

## 2024-07-18 NOTE — TELEPHONE ENCOUNTER
"Regarding: rash  ----- Message from Sienna MEEKS sent at 7/18/2024 12:02 PM EDT -----  My Chart:    \"I have been getting a rash or hives bump on my skin  that are extremely itchy happen after all different times I am attaching photos I am extremely uncomfortable\"    "

## 2024-07-18 NOTE — TELEPHONE ENCOUNTER
"Reeived call from patient's friend Mckay while krissy is at employer meeting in [facility] to report local rash with raised bumps, redness, and moderate to severe itching at posterior left knee and anterior abdomen since Monday 7/15. Patient needs to be evaluated. Patient advised to upload picture to My Chart today. OV scheduled with Dr. Negron for Friday 7/19 at 11 AM. If patient unable to keep OV, she will call back to cancel and go to urgent care for evaluation.    Reason for Disposition   Localized rash is very painful (no fever)    Answer Assessment - Initial Assessment Questions  1. APPEARANCE of RASH: \"Describe the rash.\"       Raised white bumps with itching, redness  2. LOCATION: \"Where is the rash located?\"       Left leg posterior knee and anterior stomach  3. NUMBER: \"How many spots are there?\"       Small contained area on both sites  4. SIZE: \"How big are the spots?\" (Inches, centimeters or compare to size of a coin)       na  5. ONSET: \"When did the rash start?\"       Monday 7/15  6. ITCHING: \"Does the rash itch?\" If Yes, ask: \"How bad is the itch?\"  (Scale 1-10; or mild, moderate, severe)      Moderate to severe  7. PAIN: \"Does the rash hurt?\" If Yes, ask: \"How bad is the pain?\"  (Scale 1-10; or mild, moderate, severe)      Discomfort  8. OTHER SYMPTOMS: \"Do you have any other symptoms?\" (e.g., fever)      Denies  9. PREGNANCY: \"Is there any chance you are pregnant?\" \"When was your last menstrual period?\"      Denies    Protocols used: Rash or Redness - Localized-ADULT-[place]    "

## 2024-07-18 NOTE — PATIENT INSTRUCTIONS
"Patient Education     Insect bites and stings   The Basics   Written by the doctors and editors at [facility]   How are insect bites and stings different? -- When an insect bites you, it uses its mouth parts. When an insect stings you, it uses a special \"stinger\" on the back of its body.   Biting insects, like mosquitoes and ticks, can transfer blood from other people and animals that they've bitten on to you. Some diseases and infections can be spread this way.   Stinging insects, like bees, wasps, and fire ants, do not usually carry disease. But stinging insects can inject you with \"venom.\" This can irritate your skin. Plus, a sting can be deadly to people who are severely allergic to the insect venom.  What is a normal reaction to an insect sting? -- Insect stings can cause the area around the sting to swell, turn red, hurt, and feel hot (picture 1).  To treat the pain and swelling around the area of the sting, you can:   Wash the area with soap and cool water.   Keep the area clean, and try not to scratch it.   Put a cold, damp washcloth on the area.   Take or apply anti-itch medicine.   Take a non-prescription pain medicine for the pain.  The reaction usually gets better in an hour or 2. But for some people, swelling around the sting can last for days. This is called a \"large local reaction.\" It is not dangerous, and it is not the same as an allergic reaction.  Some people do have a severe allergic reaction to insect stings. This is called \"anaphylaxis.\" Signs include hives, swelling, and trouble breathing.  What should I know about tick bites? -- Ticks are found in the grass and on shrubs, and can attach to people walking by. One type of tick can spread Lyme disease. But a tick has to stay attached for a while before it can give you the infection.  If you are bitten by a tick, gently remove the tick from your skin using tweezers. Save the tick by sealing it in a piece of clear tape. If you can't save it, try to " remember its color and size. This can help your doctor or nurse figure out if it might be the type of tick that carries Lyme disease.  Call your doctor or nurse if:   You cannot remove a tick from yourself or your child.   You get a fever or rash within the next few weeks.   You think that you have had a tick attached for at least 36 hours (a day and a half).  Your doctor or nurse can then decide if you need to take a dose of an antibiotic to help prevent Lyme. Doctors only recommend antibiotics to prevent Lyme disease in some situations. It depends on your age, where you live, what kind of tick bit you, and how long it was attached.  What can I do to lower my chances of getting bitten or stung? -- You can:   Wear shoes, long-sleeved shirts, and long pants when you go outside. If you are worried about ticks, tuck your pants into your socks and wear light colors so you can spot any ticks that get on you.   Wear bug spray.   Stay inside at janis and dusk, when mosquitoes are most active.   Keep your windows closed. If you do open them, make sure that they have screens.   Drain areas of standing water near your home, such as wading pools and buckets. Mosquitoes breed in standing water.   Keep foods and drinks covered when you are outside.   If you see a stinging insect, stay calm and slowly back away.   If you live in an area that has fire ants, avoid stepping on ant mounds.   If you find an insect nest in or near your house, call a pest control service to get rid of the nest safely.   Treat your pets and home for fleas, if needed. Ask your pet's  for advice on how to do this.  What should I do if I am stung by a bee, wasp, or fire ant? -- If you are stung by a bee or wasp, quickly remove the stinger from your skin if it is still there. If you are stung by a fire ant, kill the ant with a slap as soon as you feel the sting.  Call for an ambulance (in the [place] and [place], call 9-1-1) if you:   Have trouble  breathing, become hoarse, or start wheezing (hearing a whistling sound when you breathe)   Start to swell, especially around the face, eyelids, ears, mouth, hands, or feet   Have belly cramps, nausea, vomiting, or diarrhea   Feel dizzy or pass out  All topics are updated as new evidence becomes available and our peer review process is complete.  This topic retrieved from [employer] on: Feb 26, 2024.  Topic 95179 Version 12.0  Release: 32.2.4 - C32.56  © 2024 UpToDate, Inc. and/or its affiliates. All rights reserved.  picture 1: Insect sting     This is a normal reaction to a bee or wasp sting. There can be redness and mild, painful swelling around the spot where the stinger went in. These symptoms usually go away within a few hours.  Graphic 32007 Version 4.0  Consumer Information Use and Disclaimer   Disclaimer: This generalized information is a limited summary of diagnosis, treatment, and/or medication information. It is not meant to be comprehensive and should be used as a tool to help the user understand and/or assess potential diagnostic and treatment options. It does NOT include all information about conditions, treatments, medications, side effects, or risks that may apply to a specific patient. It is not intended to be medical advice or a substitute for the medical advice, diagnosis, or treatment of a health care provider based on the health care provider's examination and assessment of a patient's specific and unique circumstances. Patients must speak with a health care provider for complete information about their health, medical questions, and treatment options, including any risks or benefits regarding use of medications. This information does not endorse any treatments or medications as safe, effective, or approved for treating a specific patient. UpToDate, Inc. and its affiliates disclaim any warranty or liability relating to this information or the use thereof.The use of this information is governed by  the Terms of Use, available at https://www.[employer].com/en/know/clinical-effectiveness-terms. 2024© UpToDate, Inc. and its affiliates and/or licensors. All rights reserved.  Copyright   © 2024 UpToDate, Inc. and/or its affiliates. All rights reserved.    Rash: ddx bed bugs, mosquito bites vs idiopathic urticaria   -Allegra or claritin during the day   -benadryl or hydroxyzine during the night  -Topical steroids like triamcinolone or clobetasol under occlusion like a bandaid can aid with sx   -Sweet musty odor, check linen seams and mattress seams.   -typically you will need need a professional to eradicate them  -heat >113 degrees and wash bedding, towels and clothing on the sanitize cycle, vacuum the area, isolate your furniture and cover with plastic for up to 72 hours.   -You can attempt use of all natural diatomaceous earth as discussed  -Follow up immediately if the lesions persist or worsen with allergist vs dermatologist

## 2024-07-18 NOTE — TELEPHONE ENCOUNTER
Three attempts to call patient.  Left messages for patient to call back.        Reason for Disposition   Third attempt to contact caller AND no contact made. Phone number verified.    Answer Assessment - Initial Assessment Questions  N/A    Protocols used: No Contact or Duplicate Contact Call-ADULT-[place]

## 2024-07-18 NOTE — PROGRESS NOTES
[facility]        NAME: Isabel Queen is a 44 y.o. female  : 1980    MRN: [id number]  DATE: 2024  TIME: 3:45 PM    Assessment and Plan   Dermatitis [L30.9]  1. Dermatitis  clobetasol (TEMOVATE) 0.05 % cream    methylPREDNISolone 4 MG tablet therapy pack      2. Pruritic dermatitis  hydrOXYzine HCL (ATARAX) 10 mg tablet            Patient Instructions   Rash: ddx bed bugs, mosquito bites vs idiopathic urticaria   -Allegra or claritin during the day   -benadryl or hydroxyzine during the night  -Topical steroids like triamcinolone or clobetasol under occlusion like a bandaid can aid with sx   -Sweet musty odor, check linen seams and mattress seams.   -typically you will need need a professional to eradicate them  -heat >113 degrees and wash bedding, towels and clothing on the sanitize cycle, vacuum the area, isolate your furniture and cover with plastic for up to 72 hours.   -You can attempt use of all natural diatomaceous earth as discussed  -Follow up immediately if the lesions persist or worsen with allergist vs dermatologist       Follow up with PCP in 3-5 days.  Proceed to  ER if symptoms worsen.    If tests have been performed at [facility], our office will contact you with results if changes need to be made to the care plan discussed with you at the visit.  [facility]    Chief Complaint     Chief Complaint   Patient presents with    Rash     Pt here for a rash on going x 2 weeks area is itchy and raised and red.  Pt used Cortisone and Benadryl, Zyrtec.          History of Present Illness       The patient is a 44-year-old female who presents today for a two week hx of a pruritic, raised, erythematous rash. She states that she first noticed the rash over her trunk area. There is no oozing or drainage from the rash. She did stay at a hotel the end of  in [place]. No sick contacts. No fever, chills, trouble swallowing. No facial swelling. She  has no known food or environmental allergies. No sweats, palpitations, dizziness. She has been using topical cortisone cream, benadryl and Zyrtec. No new medications, lotions, body wash, detergents.         Review of Systems   Review of Systems   Constitutional:  Negative for activity change, appetite change, chills, diaphoresis, fatigue and fever.   HENT:  Negative for facial swelling, sore throat and trouble swallowing.    Respiratory:  Negative for chest tightness, shortness of breath, wheezing and stridor.    Cardiovascular:  Negative for chest pain and palpitations.   Skin:  Positive for rash.   Allergic/Immunologic: Negative for environmental allergies, food allergies and immunocompromised state.   Neurological:  Negative for dizziness and light-headedness.   Hematological:  Negative for adenopathy. Does not bruise/bleed easily.         Current Medications       Current Outpatient Medications:     albuterol (PROVENTIL HFA,VENTOLIN HFA) 90 mcg/act inhaler, INHALE 2 PUFFS BY MOUTH EVERY 6 HOURS AS NEEDED FOR WHEEZING OR SHORTNESS OF BREATH, Disp: 8.5 g, Rfl: 2    ALPRAZolam (XANAX) 1 mg tablet, Take 1 tablet (1 mg total) by mouth 4 (four) times a day as needed for anxiety, Disp: 120 tablet, Rfl: 0    amphetamine-dextroamphetamine (ADDERALL, 20MG,) 20 mg tablet, Take 1 tablet (20 mg total) by mouth 2 (two) times a day Max Daily Amount: 40 mg, Disp: 60 tablet, Rfl: 0    Breo Ellipta 100-25 MCG/ACT inhaler, INHALE 1 PUFF BY MOUTHN ONCE DAILY. RINSE MOUTH AFTER USE, Disp: 60 each, Rfl: 2    clobetasol (TEMOVATE) 0.05 % cream, Apply topically 2 (two) times a day, Disp: 30 g, Rfl: 0    fluticasone (FLONASE) 50 mcg/act nasal spray, 2 sprays into each nostril daily, Disp: 9.9 mL, Rfl: 0    hydrocortisone (ANUSOL-HC) 2.5 % rectal cream, Apply topically 2 (two) times a day, Disp: 28 g, Rfl: 2    hydrOXYzine HCL (ATARAX) 10 mg tablet, Take 1 tablet (10 mg total) by mouth daily at bedtime, Disp: 30 tablet, Rfl: 0     ketoconazole (NIZORAL) 2 % shampoo, PRN, Disp: , Rfl:     lamoTRIgine (LaMICtal) 200 MG tablet, Take 1 tablet (200 mg total) by mouth daily at bedtime, Disp: 30 tablet, Rfl: 0    methylPREDNISolone 4 MG tablet therapy pack, Use as directed on package, Disp: 1 each, Rfl: 0    mometasone (ELOCON) 0.1 % lotion, , Disp: , Rfl:     naproxen (NAPROSYN) 500 mg tablet, Take 1 tablet (500 mg total) by mouth 2 (two) times a day with meals, Disp: 60 tablet, Rfl: 0    ondansetron (ZOFRAN) 4 mg tablet, Take 1 tablet (4 mg total) by mouth every 8 (eight) hours as needed for nausea or vomiting, Disp: 20 tablet, Rfl: 0    PARoxetine (PAXIL) 10 mg tablet, Take 1 tablet (10 mg total) by mouth daily, Disp: 30 tablet, Rfl: 3    esomeprazole (NexIUM) 40 mg packet, Take 40 mg by mouth daily in the early morning, Disp: 30 each, Rfl: 5    polyethylene glycol (GOLYTELY) 4000 mL solution, Take 4,000 mL by mouth once for 1 dose (Patient not taking: Reported on 6/13/2024), Disp: 4000 mL, Rfl: 0    Current Allergies     Allergies as of 07/18/2024 - Reviewed 07/18/2024   Allergen Reaction Noted    Doxycycline Other (See Comments) 05/01/2019            The following portions of the patient's history were reviewed and updated as appropriate: allergies, current medications, past family history, past medical history, past social history, past surgical history and problem list.     Past Medical History:   Diagnosis Date    Abdominal pain     ADHD     Allergic rhinitis     Anxiety     panic attacks    Anxiety     Asthma     Bipolar disorder (HCC)     Bladder distention     came to the ED on 10/9/2020-greene in to drain then removed    Chronic constipation     Colon polyp     Contact lens/glasses fitting     and glasses    Depression     Depression     GERD (gastroesophageal reflux disease)     Psychiatric disorder     anxiety     Rectal bleeding     Sepsis (HCC) 2/8/2017       Past Surgical History:   Procedure Laterality Date    APPENDECTOMY      BAND  "HEMORRHOIDECTOMY      BREAST SURGERY      augmentation silicone    COLONOSCOPY N/A 01/24/2018    Procedure: COLONOSCOPY WITH HEMORRHOID BANDING;  Surgeon: Ozzy Hammonds MD;  Location: [facility];  Service: Gastroenterology    ENDOMETRIAL ABLATION  2016    CT SIGMOIDOSCOPY FLX DX W/COLLJ SPEC BR/WA IF PFRMD N/A 11/14/2018    Procedure: FLEXIBLE SIGMOIDOSCOPY WITH APC;  Surgeon: Ozzy Hammonds MD;  Location: [facility];  Service: Gastroenterology    UPPER GASTROINTESTINAL ENDOSCOPY         Family History   Problem Relation Age of Onset    No Known Problems Mother     No Known Problems Father     Bipolar disorder Sister     No Known Problems Brother     Anxiety disorder Daughter     Depression Daughter     No Known Problems Son          Medications have been verified.        Objective   Pulse 69   Temp 98.4 °F (36.9 °C)   Resp 20   Ht 5' 3\" (1.6 m)   SpO2 100%   BMI 23.74 kg/m²   No LMP recorded.       Physical Exam     Physical Exam  Vitals and nursing note reviewed.   Constitutional:       General: She is not in acute distress.     Appearance: She is well-developed. She is not diaphoretic.   HENT:      Mouth/Throat:      Mouth: Oropharynx is clear and moist and mucous membranes are normal.      Pharynx: Uvula midline.   Cardiovascular:      Rate and Rhythm: Normal rate and regular rhythm.      Pulses: Normal pulses.      Heart sounds: Normal heart sounds, S1 normal and S2 normal. No murmur heard.  Pulmonary:      Effort: Pulmonary effort is normal. No tachypnea, accessory muscle usage or respiratory distress.      Breath sounds: Normal breath sounds. No stridor. No decreased breath sounds, wheezing, rhonchi or rales.   Skin:     Capillary Refill: Capillary refill takes less than 2 seconds.      Findings: Rash present. Rash is macular, maculopapular and papular.      Comments: There are scattered lesions on the trunk that are raised maculopapular and flesh/pink colored. There are three lesions in a row over " the L inner thigh. No oozing or drainage. No vesicular lesions or oozing and drainage.

## 2024-07-28 ENCOUNTER — OFFICE VISIT (OUTPATIENT)
Dept: URGENT CARE | Facility: CLINIC | Age: 44
End: 2024-07-28
Payer: COMMERCIAL

## 2024-07-28 VITALS
TEMPERATURE: 98.6 F | OXYGEN SATURATION: 96 % | WEIGHT: 136 LBS | BODY MASS INDEX: 24.09 KG/M2 | RESPIRATION RATE: 16 BRPM | HEART RATE: 100 BPM

## 2024-07-28 DIAGNOSIS — R30.0 DYSURIA: Primary | ICD-10-CM

## 2024-07-28 LAB
SL AMB  POCT GLUCOSE, UA: ABNORMAL
SL AMB LEUKOCYTE ESTERASE,UA: ABNORMAL
SL AMB POCT BILIRUBIN,UA: ABNORMAL
SL AMB POCT BLOOD,UA: ABNORMAL
SL AMB POCT CLARITY,UA: CLEAR
SL AMB POCT COLOR,UA: YELLOW
SL AMB POCT KETONES,UA: ABNORMAL
SL AMB POCT NITRITE,UA: ABNORMAL
SL AMB POCT PH,UA: 7.5
SL AMB POCT SPECIFIC GRAVITY,UA: 1.01
SL AMB POCT URINE PROTEIN: ABNORMAL
SL AMB POCT UROBILINOGEN: 0.2

## 2024-07-28 PROCEDURE — 87086 URINE CULTURE/COLONY COUNT: CPT | Performed by: PHYSICIAN ASSISTANT

## 2024-07-28 PROCEDURE — 81002 URINALYSIS NONAUTO W/O SCOPE: CPT | Performed by: PHYSICIAN ASSISTANT

## 2024-07-28 PROCEDURE — 99213 OFFICE O/P EST LOW 20 MIN: CPT | Performed by: PHYSICIAN ASSISTANT

## 2024-07-28 RX ORDER — CEPHALEXIN 500 MG/1
500 CAPSULE ORAL EVERY 12 HOURS SCHEDULED
Qty: 14 CAPSULE | Refills: 0 | Status: SHIPPED | OUTPATIENT
Start: 2024-07-28 | End: 2024-08-04

## 2024-07-28 RX ORDER — FLUCONAZOLE 150 MG/1
150 TABLET ORAL ONCE
Qty: 1 TABLET | Refills: 0 | Status: SHIPPED | OUTPATIENT
Start: 2024-07-28 | End: 2024-07-28

## 2024-07-28 NOTE — PROGRESS NOTES
"  [facility]        NAME: Isabel Queen is a 44 y.o. female  : 1980    MRN: [id number]  DATE: 2024  TIME: 4:10 PM    Assessment and Plan   Dysuria [R30.0]  1. Dysuria  POCT urine dip    Urine culture            Patient Instructions   Dysuria:   -U/A showed trace leukocytes.  Will treat with Keflex taken as prescribed. Take with food and a probiotic. She has no fever, chills, body aches. No flank pain or CVA tenderness as per patient.  -urine cx ordered today. Call in 48 hours for your results.   -We discussed Uqora supplements or D-Mannose for bladder health and prevention   -Warm heating pad on the abdomen or sitz bath for comfort  -Avoid bubble bath/bath oils. Caffeine and alcohol may irritate the bladder.   -Empty bladder immediately before and following intercourse. Avoid \"holding urine.\"  -AZO/Uricalm for pain control, do not take for more than 48 hours as this can mask worsening symptoms.   -Stay VERY well hydrated and push fluids. You want your urine clear.   -Prevention and precautions discussed  -If your sx worsen or persists, see your PCP or OBGYN immediately as discussed. Red flag signs discussed in depth.     Follow up with PCP in 3-5 days.  Proceed to  ER if symptoms worsen.    If tests have been performed at [facility], our office will contact you with results if changes need to be made to the care plan discussed with you at the visit.  [facility]    Chief Complaint     Chief Complaint   Patient presents with    Possible UTI     Pt presents with lower ABD pain, pressure, frequency, started 1-2 weeks ago// took 's ABX// took AZO last dose last night//         History of Present Illness       The patient presents today for suprapubic pressure, urinary frequency and urgency x 1 week. She states that she took her husbands old antibiotics. She took it for four days from 14-24. She is  unsure of what antibiotic it was. She " also took one dose of AZO last night. She has no fever, chills, body aches. No nausea, vomiting, diarrhea. No flank pain, abdominal pain, pelvic pain. No hematuria. No abnormal vaginal bleeding or discharge. No vaginal pruritis or irritation. No OTC measures attempted. LMP was: She does not get her menses anymore due to uterine ablation.           Review of Systems   Review of Systems   Constitutional:  Negative for activity change, appetite change, chills, fatigue and fever.   Respiratory:  Negative for cough, chest tightness, shortness of breath and wheezing.    Cardiovascular:  Negative for chest pain and palpitations.   Gastrointestinal:  Negative for abdominal distention, abdominal pain, blood in stool, constipation, diarrhea, nausea and vomiting.   Genitourinary:  Positive for frequency and urgency. Negative for decreased urine volume, difficulty urinating, dyspareunia, dysuria, flank pain, hematuria, pelvic pain, vaginal bleeding, vaginal discharge and vaginal pain.   Musculoskeletal:  Negative for arthralgias and myalgias.   Skin:  Negative for rash.   Hematological:  Negative for adenopathy. Does not bruise/bleed easily.         Current Medications       Current Outpatient Medications:     albuterol (PROVENTIL HFA,VENTOLIN HFA) 90 mcg/act inhaler, INHALE 2 PUFFS BY MOUTH EVERY 6 HOURS AS NEEDED FOR WHEEZING OR SHORTNESS OF BREATH, Disp: 8.5 g, Rfl: 2    ALPRAZolam (XANAX) 1 mg tablet, Take 1 tablet (1 mg total) by mouth 4 (four) times a day as needed for anxiety, Disp: 120 tablet, Rfl: 0    amphetamine-dextroamphetamine (ADDERALL, 20MG,) 20 mg tablet, Take 1 tablet (20 mg total) by mouth 2 (two) times a day Max Daily Amount: 40 mg, Disp: 60 tablet, Rfl: 0    Breo Ellipta 100-25 MCG/ACT inhaler, INHALE 1 PUFF BY MOUTHN ONCE DAILY. RINSE MOUTH AFTER USE, Disp: 60 each, Rfl: 2    fluticasone (FLONASE) 50 mcg/act nasal spray, 2 sprays into each nostril daily, Disp: 9.9 mL, Rfl: 0    ketoconazole (NIZORAL) 2 %  shampoo, PRN, Disp: , Rfl:     lamoTRIgine (LaMICtal) 200 MG tablet, Take 1 tablet (200 mg total) by mouth daily at bedtime, Disp: 30 tablet, Rfl: 0    ondansetron (ZOFRAN) 4 mg tablet, Take 1 tablet (4 mg total) by mouth every 8 (eight) hours as needed for nausea or vomiting, Disp: 20 tablet, Rfl: 0    PARoxetine (PAXIL) 10 mg tablet, Take 1 tablet (10 mg total) by mouth daily, Disp: 30 tablet, Rfl: 3    polyethylene glycol (GOLYTELY) 4000 mL solution, Take 4,000 mL by mouth once for 1 dose, Disp: 4000 mL, Rfl: 0    clobetasol (TEMOVATE) 0.05 % cream, Apply topically 2 (two) times a day (Patient not taking: Reported on 7/28/2024), Disp: 30 g, Rfl: 0    esomeprazole (NexIUM) 40 mg packet, Take 40 mg by mouth daily in the early morning, Disp: 30 each, Rfl: 5    hydrocortisone (ANUSOL-HC) 2.5 % rectal cream, Apply topically 2 (two) times a day (Patient not taking: Reported on 7/28/2024), Disp: 28 g, Rfl: 2    hydrOXYzine HCL (ATARAX) 10 mg tablet, Take 1 tablet (10 mg total) by mouth daily at bedtime (Patient not taking: Reported on 7/28/2024), Disp: 30 tablet, Rfl: 0    methylPREDNISolone 4 MG tablet therapy pack, Use as directed on package (Patient not taking: Reported on 7/28/2024), Disp: 1 each, Rfl: 0    mometasone (ELOCON) 0.1 % lotion, , Disp: , Rfl:     naproxen (NAPROSYN) 500 mg tablet, Take 1 tablet (500 mg total) by mouth 2 (two) times a day with meals (Patient not taking: Reported on 7/28/2024), Disp: 60 tablet, Rfl: 0    Current Allergies     Allergies as of 07/28/2024 - Reviewed 07/28/2024   Allergen Reaction Noted    Doxycycline Other (See Comments) 05/01/2019            The following portions of the patient's history were reviewed and updated as appropriate: allergies, current medications, past family history, past medical history, past social history, past surgical history and problem list.     Past Medical History:   Diagnosis Date    Abdominal pain     ADHD     Allergic rhinitis     Anxiety      panic attacks    Anxiety     Asthma     Bipolar disorder (HCC)     Bladder distention     came to the ED on 10/9/2020-greene in to drain then removed    Chronic constipation     Colon polyp     Contact lens/glasses fitting     and glasses    Depression     Depression     GERD (gastroesophageal reflux disease)     Psychiatric disorder     anxiety     Rectal bleeding     Sepsis (HCC) 2/8/2017       Past Surgical History:   Procedure Laterality Date    APPENDECTOMY      BAND HEMORRHOIDECTOMY      BREAST SURGERY      augmentation silicone    COLONOSCOPY N/A 01/24/2018    Procedure: COLONOSCOPY WITH HEMORRHOID BANDING;  Surgeon: Ozzy Hammonds MD;  Location: [facility];  Service: Gastroenterology    ENDOMETRIAL ABLATION  2016    AL SIGMOIDOSCOPY FLX DX W/COLLJ SPEC BR/WA IF PFRMD N/A 11/14/2018    Procedure: FLEXIBLE SIGMOIDOSCOPY WITH APC;  Surgeon: Ozzy Hammonds MD;  Location: [facility];  Service: Gastroenterology    UPPER GASTROINTESTINAL ENDOSCOPY         Family History   Problem Relation Age of Onset    No Known Problems Mother     No Known Problems Father     Bipolar disorder Sister     No Known Problems Brother     Anxiety disorder Daughter     Depression Daughter     No Known Problems Son          Medications have been verified.        Objective   Pulse 100   Temp 98.6 °F (37 °C)   Resp 16   Wt 61.7 kg (136 lb)   SpO2 96%   BMI 24.09 kg/m²   No LMP recorded.       Physical Exam     Physical Exam  Vitals and nursing note reviewed.   Constitutional:       General: She is not in acute distress.     Appearance: Normal appearance. She is well-developed. She is not diaphoretic.   Cardiovascular:      Rate and Rhythm: Normal rate and regular rhythm.      Heart sounds: Normal heart sounds.   Pulmonary:      Effort: Pulmonary effort is normal.      Breath sounds: Normal breath sounds.   Abdominal:      General: Bowel sounds are normal. There is no distension.      Palpations: Abdomen is soft. Abdomen is not  rigid.      Tenderness: There is abdominal tenderness in the suprapubic area. There is no right CVA tenderness, left CVA tenderness, guarding or rebound. Negative signs include Roman's sign and McBurney's sign.      Hernia: No hernia is present.      Comments: Suprapubic pressure, no tenderness    Skin:     General: Skin is warm and dry.      Capillary Refill: Capillary refill takes less than 2 seconds.   Psychiatric:         Behavior: Behavior normal.

## 2024-07-28 NOTE — PATIENT INSTRUCTIONS
"Dysuria:   -U/A showed trace leukocytes.  Will treat with Keflex taken as prescribed. Take with food and a probiotic. She has no fever, chills, body aches. No flank pain or CVA tenderness as per patient.  -urine cx ordered today. Call in 48 hours for your results.   -We discussed Uqora supplements or D-Mannose for bladder health and prevention   -Warm heating pad on the abdomen or sitz bath for comfort  -Avoid bubble bath/bath oils. Caffeine and alcohol may irritate the bladder.   -Empty bladder immediately before and following intercourse. Avoid \"holding urine.\"  -AZO/Uricalm for pain control, do not take for more than 48 hours as this can mask worsening symptoms.   -Stay VERY well hydrated and push fluids. You want your urine clear.   -Prevention and precautions discussed  -If your sx worsen or persists, see your PCP or OBGYN immediately as discussed. Red flag signs discussed in depth.       "

## 2024-07-29 LAB — BACTERIA UR CULT: NORMAL

## 2024-07-31 DIAGNOSIS — F41.1 GAD (GENERALIZED ANXIETY DISORDER): ICD-10-CM

## 2024-07-31 DIAGNOSIS — F31.81 BIPOLAR II DISORDER (HCC): ICD-10-CM

## 2024-07-31 RX ORDER — LAMOTRIGINE 200 MG/1
200 TABLET ORAL
Qty: 30 TABLET | Refills: 0 | Status: SHIPPED | OUTPATIENT
Start: 2024-07-31

## 2024-07-31 NOTE — TELEPHONE ENCOUNTER
Reason for call:   [x] Refill   [] Prior Auth  [] Other:     Office:   [] PCP/Provider -   [x] Specialty/Provider - Psych: Dr. Farley    Medication:   lamoTRIgine (LaMICtal) 200 MG/Take 1 tablet (200 mg total) by mouth daily at bedtime     ALPRAZolam (XANAX) 1 mg/Take 1 tablet (1 mg total) by mouth 4 (four) times a day as needed     amphetamine-dextroamphetamine (ADDERALL, 20MG,) 20 mg/Take 1 tablet (20 mg total) by mouth 2 (two) times a day     Quantity: 30 Day    Pharmacy: [id number] - [facility] [address] [place] [address]     Does the patient have enough for 3 days?   [x] Yes   [] No - Send as HP to POD

## 2024-08-01 RX ORDER — ALPRAZOLAM 1 MG/1
1 TABLET ORAL 4 TIMES DAILY PRN
Qty: 120 TABLET | Refills: 0 | Status: SHIPPED | OUTPATIENT
Start: 2024-08-01

## 2024-08-01 RX ORDER — DEXTROAMPHETAMINE SACCHARATE, AMPHETAMINE ASPARTATE, DEXTROAMPHETAMINE SULFATE AND AMPHETAMINE SULFATE 5; 5; 5; 5 MG/1; MG/1; MG/1; MG/1
20 TABLET ORAL
Qty: 60 TABLET | Refills: 0 | Status: SHIPPED | OUTPATIENT
Start: 2024-08-01

## 2024-08-03 ENCOUNTER — HOSPITAL ENCOUNTER (EMERGENCY)
Facility: HOSPITAL | Age: 44
Discharge: HOME | End: 2024-08-04
Attending: EMERGENCY MEDICINE
Payer: COMMERCIAL

## 2024-08-03 DIAGNOSIS — R10.2 PELVIC PAIN: Primary | ICD-10-CM

## 2024-08-03 DIAGNOSIS — K59.00 CONSTIPATION: ICD-10-CM

## 2024-08-03 PROCEDURE — 99284 EMERGENCY DEPT VISIT MOD MDM: CPT

## 2024-08-04 ENCOUNTER — APPOINTMENT (EMERGENCY)
Dept: RADIOLOGY | Facility: HOSPITAL | Age: 44
End: 2024-08-04
Payer: COMMERCIAL

## 2024-08-04 VITALS
SYSTOLIC BLOOD PRESSURE: 140 MMHG | HEART RATE: 87 BPM | DIASTOLIC BLOOD PRESSURE: 68 MMHG | OXYGEN SATURATION: 100 % | TEMPERATURE: 98.1 F | RESPIRATION RATE: 16 BRPM

## 2024-08-04 LAB
ALBUMIN SERPL BCG-MCNC: 4.6 G/DL (ref 3.5–5)
ALP SERPL-CCNC: 39 U/L (ref 34–104)
ALT SERPL W P-5'-P-CCNC: 18 U/L (ref 7–52)
ANION GAP SERPL CALCULATED.3IONS-SCNC: 6 MMOL/L (ref 4–13)
AST SERPL W P-5'-P-CCNC: 21 U/L (ref 13–39)
BACTERIA UR QL AUTO: ABNORMAL /HPF
BASOPHILS # BLD AUTO: 0.05 THOUSANDS/ÂΜL (ref 0–0.1)
BASOPHILS NFR BLD AUTO: 1 % (ref 0–1)
BILIRUB SERPL-MCNC: 0.29 MG/DL (ref 0.2–1)
BILIRUB UR QL STRIP: NEGATIVE
BUN SERPL-MCNC: 16 MG/DL (ref 5–25)
CALCIUM SERPL-MCNC: 9.4 MG/DL (ref 8.4–10.2)
CHLORIDE SERPL-SCNC: 106 MMOL/L (ref 96–108)
CLARITY UR: CLEAR
CO2 SERPL-SCNC: 27 MMOL/L (ref 21–32)
COLOR UR: YELLOW
CREAT SERPL-MCNC: 0.86 MG/DL (ref 0.6–1.3)
EOSINOPHIL # BLD AUTO: 0.2 THOUSAND/ÂΜL (ref 0–0.61)
EOSINOPHIL NFR BLD AUTO: 2 % (ref 0–6)
ERYTHROCYTE [DISTWIDTH] IN BLOOD BY AUTOMATED COUNT: 12.7 % (ref 11.6–15.1)
EXT PREGNANCY TEST URINE: NEGATIVE
EXT. CONTROL: NORMAL
GFR SERPL CREATININE-BSD FRML MDRD: 81 ML/MIN/1.73SQ M
GLUCOSE SERPL-MCNC: 89 MG/DL (ref 65–140)
GLUCOSE UR STRIP-MCNC: NEGATIVE MG/DL
HCT VFR BLD AUTO: 41 % (ref 34.8–46.1)
HGB BLD-MCNC: 13.5 G/DL (ref 11.5–15.4)
HGB UR QL STRIP.AUTO: NEGATIVE
IMM GRANULOCYTES # BLD AUTO: 0.02 THOUSAND/UL (ref 0–0.2)
IMM GRANULOCYTES NFR BLD AUTO: 0 % (ref 0–2)
KETONES UR STRIP-MCNC: NEGATIVE MG/DL
LEUKOCYTE ESTERASE UR QL STRIP: ABNORMAL
LYMPHOCYTES # BLD AUTO: 3.75 THOUSANDS/ÂΜL (ref 0.6–4.47)
LYMPHOCYTES NFR BLD AUTO: 36 % (ref 14–44)
MCH RBC QN AUTO: 31.8 PG (ref 26.8–34.3)
MCHC RBC AUTO-ENTMCNC: 32.9 G/DL (ref 31.4–37.4)
MCV RBC AUTO: 97 FL (ref 82–98)
MONOCYTES # BLD AUTO: 0.81 THOUSAND/ÂΜL (ref 0.17–1.22)
MONOCYTES NFR BLD AUTO: 8 % (ref 4–12)
NEUTROPHILS # BLD AUTO: 5.71 THOUSANDS/ÂΜL (ref 1.85–7.62)
NEUTS SEG NFR BLD AUTO: 53 % (ref 43–75)
NITRITE UR QL STRIP: NEGATIVE
NON-SQ EPI CELLS URNS QL MICRO: ABNORMAL /HPF
NRBC BLD AUTO-RTO: 0 /100 WBCS
PH UR STRIP.AUTO: 6 [PH]
PLATELET # BLD AUTO: 284 THOUSANDS/UL (ref 149–390)
PMV BLD AUTO: 9.8 FL (ref 8.9–12.7)
POTASSIUM SERPL-SCNC: 3.7 MMOL/L (ref 3.5–5.3)
PROT SERPL-MCNC: 6.9 G/DL (ref 6.4–8.4)
PROT UR STRIP-MCNC: NEGATIVE MG/DL
RBC # BLD AUTO: 4.24 MILLION/UL (ref 3.81–5.12)
RBC #/AREA URNS AUTO: ABNORMAL /HPF
SODIUM SERPL-SCNC: 139 MMOL/L (ref 135–147)
SP GR UR STRIP.AUTO: 1.02 (ref 1–1.03)
UROBILINOGEN UR STRIP-ACNC: <2 MG/DL
WBC # BLD AUTO: 10.54 THOUSAND/UL (ref 4.31–10.16)
WBC #/AREA URNS AUTO: ABNORMAL /HPF

## 2024-08-04 PROCEDURE — 81001 URINALYSIS AUTO W/SCOPE: CPT | Performed by: EMERGENCY MEDICINE

## 2024-08-04 PROCEDURE — 85025 COMPLETE CBC W/AUTO DIFF WBC: CPT | Performed by: EMERGENCY MEDICINE

## 2024-08-04 PROCEDURE — 36415 COLL VENOUS BLD VENIPUNCTURE: CPT | Performed by: EMERGENCY MEDICINE

## 2024-08-04 PROCEDURE — 80053 COMPREHEN METABOLIC PANEL: CPT | Performed by: EMERGENCY MEDICINE

## 2024-08-04 PROCEDURE — 99284 EMERGENCY DEPT VISIT MOD MDM: CPT | Performed by: EMERGENCY MEDICINE

## 2024-08-04 PROCEDURE — 74176 CT ABD & PELVIS W/O CONTRAST: CPT

## 2024-08-04 PROCEDURE — 81025 URINE PREGNANCY TEST: CPT | Performed by: EMERGENCY MEDICINE

## 2024-08-04 RX ORDER — POLYETHYLENE GLYCOL 3350 17 G/17G
17 POWDER, FOR SOLUTION ORAL DAILY
Qty: 10 EACH | Refills: 0 | Status: SHIPPED | OUTPATIENT
Start: 2024-08-04

## 2024-08-04 RX ORDER — AMOXICILLIN 250 MG
1 CAPSULE ORAL DAILY
Qty: 20 TABLET | Refills: 0 | Status: SHIPPED | OUTPATIENT
Start: 2024-08-04

## 2024-08-04 RX ORDER — DOCUSATE SODIUM 100 MG/1
100 CAPSULE, LIQUID FILLED ORAL EVERY 12 HOURS
Qty: 60 CAPSULE | Refills: 0 | Status: SHIPPED | OUTPATIENT
Start: 2024-08-04

## 2024-08-04 NOTE — DISCHARGE INSTRUCTIONS
Lab work today was normal.  Your CAT scan showed constipation but was otherwise normal.  We have given you prescription for multiple medications to help with your constipation.  Follow-up with your primary care provider in the next few weeks for reassessment.

## 2024-08-04 NOTE — ED PROVIDER NOTES
History  Chief Complaint   Patient presents with    Pelvic Pain     Patient has lower abdominal pain, was treated for a uti last week, does not feel any better, thinks has worsened, did have some nausea without vomiting and left side flank discomfort     HPI  Patient is a 44-year-old female presenting for evaluation of approximately 1 week of pelvic pain, left flank pain, dysuria, urinary urgency, sensation of incomplete voiding.  Patient denies hematuria, fevers, chills, states some mild nausea earlier in the week which has since resolved, denies vomiting.  Patient was evaluated at urgent care earlier in the week for the same complaint, had an unremarkable urine dip and urine culture, was prescribed a course of Keflex which she states she is still taking.  Denies vaginal bleeding or discharge, states that she is sexually active with 1 partner and that they are monogamous.  Prior to Admission Medications   Prescriptions Last Dose Informant Patient Reported? Taking?   ALPRAZolam (XANAX) 1 mg tablet   No No   Sig: Take 1 tablet (1 mg total) by mouth 4 (four) times a day as needed for anxiety   Breo Ellipta 100-25 MCG/ACT inhaler   No No   Sig: INHALE 1 PUFF BY MOUTHN ONCE DAILY. RINSE MOUTH AFTER USE   PARoxetine (PAXIL) 10 mg tablet   No No   Sig: Take 1 tablet (10 mg total) by mouth daily   albuterol (PROVENTIL HFA,VENTOLIN HFA) 90 mcg/act inhaler  Self No No   Sig: INHALE 2 PUFFS BY MOUTH EVERY 6 HOURS AS NEEDED FOR WHEEZING OR SHORTNESS OF BREATH   amphetamine-dextroamphetamine (ADDERALL, 20MG,) 20 mg tablet   No No   Sig: Take 1 tablet (20 mg total) by mouth 2 (two) times a day Max Daily Amount: 40 mg   cephalexin (KEFLEX) 500 mg capsule   No No   Sig: Take 1 capsule (500 mg total) by mouth every 12 (twelve) hours for 7 days   clobetasol (TEMOVATE) 0.05 % cream   No No   Sig: Apply topically 2 (two) times a day   Patient not taking: Reported on 7/28/2024   esomeprazole (NexIUM) 40 mg packet   No No   Sig: Take 40  mg by mouth daily in the early morning   fluticasone (FLONASE) 50 mcg/act nasal spray  Self No No   Si sprays into each nostril daily   hydrOXYzine HCL (ATARAX) 10 mg tablet   No No   Sig: Take 1 tablet (10 mg total) by mouth daily at bedtime   Patient not taking: Reported on 2024   hydrocortisone (ANUSOL-HC) 2.5 % rectal cream  Self No No   Sig: Apply topically 2 (two) times a day   Patient not taking: Reported on 2024   ketoconazole (NIZORAL) 2 % shampoo  Self Yes No   Sig: PRN   lamoTRIgine (LaMICtal) 200 MG tablet   No No   Sig: Take 1 tablet (200 mg total) by mouth daily at bedtime   methylPREDNISolone 4 MG tablet therapy pack   No No   Sig: Use as directed on package   Patient not taking: Reported on 2024   mometasone (ELOCON) 0.1 % lotion  Self Yes No   Patient not taking: Reported on 2024   naproxen (NAPROSYN) 500 mg tablet   No No   Sig: Take 1 tablet (500 mg total) by mouth 2 (two) times a day with meals   Patient not taking: Reported on 2024   ondansetron (ZOFRAN) 4 mg tablet  Self No No   Sig: Take 1 tablet (4 mg total) by mouth every 8 (eight) hours as needed for nausea or vomiting   polyethylene glycol (GOLYTELY) 4000 mL solution  Self No No   Sig: Take 4,000 mL by mouth once for 1 dose      Facility-Administered Medications: None       Past Medical History:   Diagnosis Date    Abdominal pain     ADHD     Allergic rhinitis     Anxiety     panic attacks    Anxiety     Asthma     Bipolar disorder (HCC)     Bladder distention     came to the ED on 10/9/2020-greene in to drain then removed    Chronic constipation     Colon polyp     Contact lens/glasses fitting     and glasses    Depression     Depression     GERD (gastroesophageal reflux disease)     Psychiatric disorder     anxiety     Rectal bleeding     Sepsis (HCC) 2017       Past Surgical History:   Procedure Laterality Date    APPENDECTOMY      BAND HEMORRHOIDECTOMY      BREAST SURGERY      augmentation silicone     COLONOSCOPY N/A 01/24/2018    Procedure: COLONOSCOPY WITH HEMORRHOID BANDING;  Surgeon: Ozzy Hammonds MD;  Location: [facility];  Service: Gastroenterology    ENDOMETRIAL ABLATION  2016    NH SIGMOIDOSCOPY FLX DX W/COLLJ SPEC BR/WA IF PFRMD N/A 11/14/2018    Procedure: FLEXIBLE SIGMOIDOSCOPY WITH APC;  Surgeon: Ozzy aHmmonds MD;  Location: [facility];  Service: Gastroenterology    UPPER GASTROINTESTINAL ENDOSCOPY         Family History   Problem Relation Age of Onset    No Known Problems Mother     No Known Problems Father     Bipolar disorder Sister     No Known Problems Brother     Anxiety disorder Daughter     Depression Daughter     No Known Problems Son      I have reviewed and agree with the history as documented.    E-Cigarette/Vaping    E-Cigarette Use Current Every Day User     Comments uses daily      E-Cigarette/Vaping Substances    Nicotine Yes     THC No     CBD No     Flavoring Yes     Other No     Unknown No      Social History     Tobacco Use    Smoking status: Former     Current packs/day: 0.00     Average packs/day: 1 pack/day for 19.1 years (19.1 ttl pk-yrs)     Types: Cigarettes     Start date: 5/8/1995     Quit date: 6/15/2014     Years since quitting: 10.1     Passive exposure: Past    Smokeless tobacco: Never   Vaping Use    Vaping status: Every Day    Substances: Nicotine, Flavoring   Substance Use Topics    Alcohol use: Yes     Alcohol/week: 2.0 standard drinks of alcohol     Types: 2 Glasses of wine per week     Comment: occassional - wine 2 x month    Drug use: No       Review of Systems   Constitutional:  Negative for chills, fatigue and fever.   Respiratory:  Negative for cough and stridor.    Gastrointestinal:  Negative for diarrhea, nausea and vomiting.   Genitourinary:  Positive for difficulty urinating, dysuria, flank pain, frequency and pelvic pain.   Musculoskeletal:  Negative for arthralgias and myalgias.   Psychiatric/Behavioral:  Negative for confusion.        Physical  Exam  Physical Exam  Vitals and nursing note reviewed.   Constitutional:       General: She is not in acute distress.     Appearance: Normal appearance. She is not ill-appearing, toxic-appearing or diaphoretic.      Comments: Well-appearing, nontoxic, nondistressed   HENT:      Head: Normocephalic and atraumatic.      Comments: Moist mucous membranes     Right Ear: External ear normal.      Left Ear: External ear normal.   Eyes:      General:         Right eye: No discharge.         Left eye: No discharge.   Cardiovascular:      Comments: Regular rate and rhythm, no murmurs rubs or gallops.  Extremities warm and well-perfused without mottling  Pulmonary:      Effort: No respiratory distress.      Comments: No increased work of breathing.  Speaking in complete sentences.  Lungs clear to auscultation bilaterally without wheezes, rales, rhonchi.  Satting at 100% on room air indicating adequate oxygenation  Abdominal:      General: There is no distension.      Comments: Abdomen nondistended with normal bowel sounds.  No rigidity, rebound, guarding.  Mild suprapubic tenderness but otherwise nontender   Genitourinary:     Comments: Mild suprapubic tenderness.  No CVA tenderness  Musculoskeletal:         General: No deformity.      Cervical back: Normal range of motion.   Skin:     Findings: No lesion or rash.   Neurological:      Mental Status: She is alert and oriented to person, place, and time. Mental status is at baseline.      Comments: Awake, alert, pleasant, interactive   Psychiatric:         Mood and Affect: Mood and affect normal.         Vital Signs  ED Triage Vitals [08/04/24 0006]   Temperature Pulse Respirations Blood Pressure SpO2   98.1 °F (36.7 °C) 87 16 140/68 100 %      Temp Source Heart Rate Source Patient Position - Orthostatic VS BP Location FiO2 (%)   Oral Monitor -- -- --      Pain Score       --           Vitals:    08/04/24 0006   BP: 140/68   Pulse: 87         Visual Acuity      ED  Medications  Medications - No data to display    Diagnostic Studies  Results Reviewed       Procedure Component Value Units Date/Time    Comprehensive metabolic panel [[id number]] Collected: 08/04/24 0018    Lab Status: Final result Specimen: Blood from Arm, Left Updated: 08/04/24 0052     Sodium 139 mmol/L      Potassium 3.7 mmol/L      Chloride 106 mmol/L      CO2 27 mmol/L      ANION GAP 6 mmol/L      BUN 16 mg/dL      Creatinine 0.86 mg/dL      Glucose 89 mg/dL      Calcium 9.4 mg/dL      AST 21 U/L      ALT 18 U/L      Alkaline Phosphatase 39 U/L      Total Protein 6.9 g/dL      Albumin 4.6 g/dL      Total Bilirubin 0.29 mg/dL      eGFR 81 ml/min/1.73sq m     Narrative:      National Kid[employer] Disease Foundation guidelines for Chronic Kidney Disease (CKD):     Stage 1 with normal or high GFR (GFR > 90 mL/min/1.73 square meters)    Stage 2 Mild CKD (GFR = 60-89 mL/min/1.73 square meters)    Stage 3A Moderate CKD (GFR = 45-59 mL/min/1.73 square meters)    Stage 3B Moderate CKD (GFR = 30-44 mL/min/1.73 square meters)    Stage 4 Severe CKD (GFR = 15-29 mL/min/1.73 square meters)    Stage 5 End Stage CKD (GFR <15 mL/min/1.73 square meters)  Note: GFR calculation is accurate only with a steady state creatinine    Urinalysis with microscopic [[id number]]  (Abnormal) Collected: 08/04/24 0018    Lab Status: Final result Specimen: Urine, Clean Catch Updated: 08/04/24 0046     Color, UA Yellow     Clarity, UA Clear     Specific Gravity, UA 1.025     pH, UA 6.0     Leukocytes, UA Trace     Nitrite, UA Negative     Protein, UA Negative mg/dl      Glucose, UA Negative mg/dl      Ketones, UA Negative mg/dl      Urobilinogen, UA <2.0 mg/dl      Bilirubin, UA Negative     Occult Blood, UA Negative     RBC, UA 0-1 /hpf      WBC, UA 0-1 /hpf      Epithelial Cells Occasional /hpf      Bacteria, UA Occasional /hpf     Narrative:      Specimen received was less than 10mL. Microscopic performed on concentrated specimen.    CBC and  differential [[id number]]  (Abnormal) Collected: 08/04/24 0018    Lab Status: Final result Specimen: Blood from Arm, Left Updated: 08/04/24 0034     WBC 10.54 Thousand/uL      RBC 4.24 Million/uL      Hemoglobin 13.5 g/dL      Hematocrit 41.0 %      MCV 97 fL      MCH 31.8 pg      MCHC 32.9 g/dL      RDW 12.7 %      MPV 9.8 fL      Platelets 284 Thousands/uL      nRBC 0 /100 WBCs      Segmented % 53 %      Immature Grans % 0 %      Lymphocytes % 36 %      Monocytes % 8 %      Eosinophils Relative 2 %      Basophils Relative 1 %      Absolute Neutrophils 5.71 Thousands/µL      Absolute Immature Grans 0.02 Thousand/uL      Absolute Lymphocytes 3.75 Thousands/µL      Absolute Monocytes 0.81 Thousand/µL      Eosinophils Absolute 0.20 Thousand/µL      Basophils Absolute 0.05 Thousands/µL     POCT pregnancy, urine [[id number]]  (Normal) Resulted: 08/04/24 0027    Lab Status: Final result Updated: 08/04/24 0027     EXT Preg Test, Ur Negative     Control Valid    Urine culture [[id number]] Collected: 08/04/24 0018    Lab Status: No result Specimen: Urine, Clean Catch                    CT renal stone study abdomen pelvis wo contrast   Final Result by Mookie Donnelly MD (08/04 0122)      No acute pathology. Specifically, no urinary tract calculi or obstructive uropathy.         Workstation performed: [id number]                    Procedures  Procedures         ED Course                                 SBIRT 22yo+      Flowsheet Row Most Recent Value   Initial Alcohol Screen: US AUDIT-C     1. How often do you have a drink containing alcohol? 0 Filed at: 08/04/2024 0010   2. How many drinks containing alcohol do you have on a typical day you are drinking?  0 Filed at: 08/04/2024 0010   3b. FEMALE Any Age, or MALE 65+: How often do you have 4 or more drinks on one occassion? 0 Filed at: 08/04/2024 0010   Audit-C Score 0 Filed at: 08/04/2024 0010   JUDY: How many times in the past year have you...    Used an illegal drug or  used a prescription medication for non-medical reasons? Never Filed at: 08/04/2024 0010                      Medical Decision Making  .  I obtained history from the patient.  I reviewed external medical documentation.  Patient was evaluated at [facility] now on 7/28/2024, was complaining of dysuria at that time.  Patient was noted to have some suprapubic tenderness.  Patient ultimately treated with fluconazole and discharged on a course of Keflex.  Urine dip and urine culture at that time were both unremarkable.    Differential diagnosis for patient's pelvic pain includes but is not limited to urinary tract infection, nephrolithiasis, colitis, constipation.  I ordered and reviewed lab work including CBC, CMP, urinalysis, urine pregnancy test which were all unremarkable.  I ordered and reviewed a CT stone study which demonstrates large stool burden but otherwise unremarkable.  Patient stating that her most recent bowel movement was about 4 days ago.  Started on bowel regiment, provided with reassurance, instructions to follow-up with primary care provider, additionally provided with information for caring for women clinic, discharged with return precautions.    Amount and/or Complexity of Data Reviewed  Labs: ordered.  Radiology: ordered.    Risk  OTC drugs.                 Disposition  Final diagnoses:   Pelvic pain   Constipation     Time reflects when diagnosis was documented in both MDM as applicable and the Disposition within this note       Time User Action Codes Description Comment    8/4/2024  1:26 AM Shaq Devine Add [R10.2] Pelvic pain     8/4/2024  1:32 AM Shaq Devine Add [K59.00] Constipation           ED Disposition       ED Disposition   Discharge    Condition   Stable    Date/Time   Sun Aug 4, 2024 0126    Comment   Isabel Queen discharge to home/self care.                   Follow-up Information       Follow up With Specialties Details Why Contact Info Additional Information     Count includes the [facility] Emergency Department Emergency Medicine  If symptoms worsen [address] [address]  [phone] [facility] Emergency Department, [address] [place], [place], [address]    [facility] [place] [facility]   [address] [id number] [phone] [facility] [place], [address], [id number] [phone]            Patient's Medications   Discharge Prescriptions    DOCUSATE SODIUM (COLACE) 100 MG CAPSULE    Take 1 capsule (100 mg total) by mouth every 12 (twelve) hours       Start Date: 8/4/2024  End Date: --       Order Dose: 100 mg       Quantity: 60 capsule    Refills: 0    POLYETHYLENE GLYCOL (MIRALAX) 17 G PACKET    Take 17 g by mouth daily       Start Date: 8/4/2024  End Date: --       Order Dose: 17 g       Quantity: 10 each    Refills: 0    SENNA-DOCUSATE SODIUM (SENOKOT S) 8.6-50 MG PER TABLET    Take 1 tablet by mouth daily       Start Date: 8/4/2024  End Date: --       Order Dose: 1 tablet       Quantity: 20 tablet    Refills: 0       No discharge procedures on file.    PDMP Review         Value Time User    PDMP Reviewed  Yes 6/26/2024  4:00 PM Kamila Almaraz PA-C            ED Provider  Electronically Signed by             Shaq Devine MD  08/04/24 0135

## 2024-08-25 ENCOUNTER — ANESTHESIA (OUTPATIENT)
Dept: ANESTHESIOLOGY | Facility: HOSPITAL | Age: 44
End: 2024-08-25

## 2024-08-25 ENCOUNTER — ANESTHESIA EVENT (OUTPATIENT)
Dept: ANESTHESIOLOGY | Facility: HOSPITAL | Age: 44
End: 2024-08-25

## 2024-08-27 DIAGNOSIS — F33.9 EPISODE OF RECURRENT MAJOR DEPRESSIVE DISORDER, UNSPECIFIED DEPRESSION EPISODE SEVERITY (HCC): ICD-10-CM

## 2024-08-27 DIAGNOSIS — F31.81 BIPOLAR II DISORDER (HCC): ICD-10-CM

## 2024-08-27 DIAGNOSIS — F41.1 GAD (GENERALIZED ANXIETY DISORDER): ICD-10-CM

## 2024-08-27 RX ORDER — DEXTROAMPHETAMINE SACCHARATE, AMPHETAMINE ASPARTATE, DEXTROAMPHETAMINE SULFATE AND AMPHETAMINE SULFATE 5; 5; 5; 5 MG/1; MG/1; MG/1; MG/1
20 TABLET ORAL
Qty: 60 TABLET | Refills: 0 | Status: SHIPPED | OUTPATIENT
Start: 2024-08-27

## 2024-08-27 RX ORDER — PAROXETINE 10 MG/1
10 TABLET, FILM COATED ORAL DAILY
Qty: 30 TABLET | Refills: 2 | Status: SHIPPED | OUTPATIENT
Start: 2024-08-27

## 2024-08-27 RX ORDER — ALPRAZOLAM 1 MG
1 TABLET ORAL 4 TIMES DAILY PRN
Qty: 120 TABLET | Refills: 0 | Status: SHIPPED | OUTPATIENT
Start: 2024-08-27

## 2024-08-27 RX ORDER — LAMOTRIGINE 200 MG/1
200 TABLET ORAL
Qty: 30 TABLET | Refills: 0 | Status: SHIPPED | OUTPATIENT
Start: 2024-08-27

## 2024-08-28 ENCOUNTER — OFFICE VISIT (OUTPATIENT)
Dept: FAMILY MEDICINE CLINIC | Facility: CLINIC | Age: 44
End: 2024-08-28
Payer: COMMERCIAL

## 2024-08-28 VITALS
BODY MASS INDEX: 23.92 KG/M2 | WEIGHT: 135 LBS | DIASTOLIC BLOOD PRESSURE: 84 MMHG | HEIGHT: 63 IN | HEART RATE: 92 BPM | TEMPERATURE: 98.6 F | SYSTOLIC BLOOD PRESSURE: 132 MMHG | RESPIRATION RATE: 17 BRPM

## 2024-08-28 DIAGNOSIS — Z01.419 ENCOUNTER FOR GYNECOLOGICAL EXAMINATION: Primary | ICD-10-CM

## 2024-08-28 DIAGNOSIS — J20.9 ACUTE BRONCHITIS, UNSPECIFIED ORGANISM: ICD-10-CM

## 2024-08-28 DIAGNOSIS — J45.30 MILD PERSISTENT ASTHMA WITHOUT COMPLICATION: ICD-10-CM

## 2024-08-28 DIAGNOSIS — Z12.31 ENCOUNTER FOR SCREENING MAMMOGRAM FOR BREAST CANCER: ICD-10-CM

## 2024-08-28 DIAGNOSIS — R06.02 SHORTNESS OF BREATH: ICD-10-CM

## 2024-08-28 PROCEDURE — 99213 OFFICE O/P EST LOW 20 MIN: CPT | Performed by: NURSE PRACTITIONER

## 2024-08-28 RX ORDER — ALBUTEROL SULFATE 90 UG/1
2 AEROSOL, METERED RESPIRATORY (INHALATION) EVERY 4 HOURS PRN
Qty: 8.5 G | Refills: 2 | Status: SHIPPED | OUTPATIENT
Start: 2024-08-28

## 2024-08-28 RX ORDER — METHYLPREDNISOLONE 4 MG
TABLET, DOSE PACK ORAL
Qty: 21 TABLET | Refills: 0 | Status: SHIPPED | OUTPATIENT
Start: 2024-08-28

## 2024-08-28 NOTE — PATIENT INSTRUCTIONS
Patient Education     Methylprednisolone (meth il pred NIS oh lone)   Brand Names: US DEPO-Medrol; Medrol; P-Care D40 [DSC]; P-Care D80 [DSC]; SOLU-Medrol   Brand Names: Christiano Depo-Medrol; Medrol; Solu-MEDROL; SOLU-Medrol (PF); Uni-Med [DSC]   What is this drug used for?   It is used for many health problems like allergy signs, asthma, adrenal gland problems, blood problems, skin rashes, or swelling problems. This is not a list of all health problems that this drug may be used for. Talk with the doctor.  What do I need to tell my doctor BEFORE I take this drug?   All products:   If you are allergic to this drug; any part of this drug; or any other drugs, foods, or substances. Tell your doctor about the allergy and what signs you had.  If you have any of these health problems: A fungal infection or a malaria infection in the brain.  If you have a herpes infection of the eye.  If you have nerve problems in the eye.  Injection (if given in the muscle):   If you have idiopathic thrombocytopenic purpura (ITP).  This is not a list of all drugs or health problems that interact with this drug.  Tell your doctor and pharmacist about all of your drugs (prescription or OTC, natural products, vitamins) and health problems. You must check to make sure that it is safe for you to take this drug with all of your drugs and health problems. Do not start, stop, or change the dose of any drug without checking with your doctor.  What are some things I need to know or do while I take this drug?   All products:   Tell all of your health care providers that you take this drug. This includes your doctors, nurses, pharmacists, and dentists.  This drug may affect allergy skin tests. Be sure your doctor and lab workers know you take this drug.  You may have more chance of getting an infection. Wash hands often. Stay away from people with infections, colds, or flu.  Call your doctor right away if you have any signs of infection like fever,  chills, flu-like signs, very bad sore throat, ear or sinus pain, cough, more sputum or change in color of sputum, pain with passing urine, mouth sores, or a wound that will not heal.  Chickenpox and measles can be very bad or even deadly in some people taking steroid drugs like this drug. Avoid being near anyone with chickenpox or measles if you have not had these health problems before. If you have been exposed to chickenpox or measles, talk with your doctor.  This drug lowers how much natural steroid your body makes. Tell your doctor if you have fever, infection, surgery, or injury. Your body's normal response to these stresses may be affected. You may need extra doses of steroid.  Long-term use may raise the chance of cataracts or glaucoma. Talk with the doctor.  This drug may cause weak bones (osteoporosis) with long-term use. Talk with your doctor to see if you have a higher chance of weak bones or if you have any questions.  Talk with your doctor before getting any vaccines. Use of some vaccines with this drug may either raise the chance of an infection or make the vaccine not work as well.  If you have high blood sugar (diabetes), you will need to watch your blood sugar closely.  Talk with your doctor before you drink alcohol.  Liver problems have rarely happened with this drug. Sometimes, this has been deadly. Call your doctor right away if you have signs of liver problems like dark urine, tiredness, decreased appetite, upset stomach or stomach pain, light-colored stools, throwing up, or yellow skin or eyes.  Patients with cancer may be at greater risk of getting a bad and sometimes deadly health problem called tumor lysis syndrome (TLS). Talk with the doctor.  If you are 65 or older, use this drug with care. You could have more side effects.  This drug may affect growth in children and teens in some cases. They may need regular growth checks. Talk with the doctor.  Tell your doctor if you are pregnant, plan  on getting pregnant, or are breast-feeding. You will need to talk about the benefits and risks to you and the baby.  Injection:   Very bad health problems have happened when drugs like this one have been given into the spine (epidural). These include paralysis, loss of eyesight, stroke, and sometimes death. It is not known if drugs like this one are safe and effective when given into the spine. These drugs are not approved for this use. Talk with the doctor.  Some products have benzyl alcohol. If possible, avoid products with benzyl alcohol in newborns or infants. Serious side effects can happen in these children with some doses of benzyl alcohol, including if given with other drugs that have benzyl alcohol. Talk with the doctor to see if this product has benzyl alcohol in it.  What are some side effects that I need to call my doctor about right away?   WARNING/CAUTION: Even though it may be rare, some people may have very bad and sometimes deadly side effects when taking a drug. Tell your doctor or get medical help right away if you have any of the following signs or symptoms that may be related to a very bad side effect:  Signs of an allergic reaction, like rash; hives; itching; red, swollen, blistered, or peeling skin with or without fever; wheezing; tightness in the chest or throat; trouble breathing, swallowing, or talking; unusual hoarseness; or swelling of the mouth, face, lips, tongue, or throat.  Signs of high blood sugar like confusion, feeling sleepy, unusual thirst or hunger, passing urine more often, flushing, fast breathing, or breath that smells like fruit.  Signs of Cushing's disease like weight gain in the upper back or belly, moon face, very bad headache, or slow healing.  Signs of a weak adrenal gland like a severe upset stomach or throwing up, severe dizziness or passing out, muscle weakness, feeling very tired, mood changes, decreased appetite, or weight loss.  Signs of low potassium levels like  muscle pain or weakness, muscle cramps, or a heartbeat that does not feel normal.  Signs of a pancreas problem (pancreatitis) like very bad stomach pain, very bad back pain, or very bad upset stomach or throwing up.  Signs of high blood pressure like very bad headache or dizziness, passing out, or change in eyesight.  Shortness of breath, a big weight gain, or swelling in the arms or legs.  Not able to pass urine or change in how much urine is passed.  Skin changes (pimples, stretch marks, slow healing, hair growth).  Chest pain or pressure.  Fast, slow, or abnormal heartbeat.  Period (menstrual) changes.  Bone or joint pain.  Change in eyesight.  Mental, mood, or behavior changes that are new or worse.  Seizures.  A burning, numbness, or tingling feeling that is not normal.  Any unexplained bruising or bleeding.  Severe stomach pain.  Black, tarry, or bloody stools.  Throwing up blood or throw up that looks like coffee grounds.  What are some other side effects of this drug?   All drugs may cause side effects. However, many people have no side effects or only have minor side effects. Call your doctor or get medical help if any of these side effects or any other side effects bother you or do not go away:  Upset stomach or throwing up.  Trouble sleeping.  Restlessness.  Sweating a lot.  Headache.  These are not all of the side effects that may occur. If you have questions about side effects, call your doctor. Call your doctor for medical advice about side effects.  You may report side effects to your national health agency.  You may report side effects to the FDA at 1-871.420.4300. You may also report side effects at https://www.fda.gov/medwatch.  How is this drug best taken?   Use this drug as ordered by your doctor. Read all information given to you. Follow all instructions closely.  Tablets:   Take in the morning if taking once a day.  Take with food.  Keep taking this drug as you have been told by your doctor or  other health care provider, even if you feel well.  Injection:   It is given as a shot.  All products:   Tell your doctor if you have missed a dose or recently stopped this drug and you feel very tired, weak, or shaky, or have a fast heartbeat, confusion, sweating, or dizziness.  If you have been taking this drug for many weeks, talk with your doctor before stopping. You may want to slowly stop this drug.  Have your blood work checked as you have been told by your doctor. You may also need to have your eye pressure and bone density checked if you take this drug for a long time.  You may need to lower how much salt is in your diet and take extra potassium. Talk with your doctor.  What do I do if I miss a dose?   Tablets:   Take a missed dose as soon as you think about it.  If it is close to the time for your next dose, skip the missed dose and go back to your normal time.  Do not take 2 doses at the same time or extra doses.  Injection:   Call your doctor to find out what to do.  How do I store and/or throw out this drug?   Tablets:   Store at room temperature in a dry place. Do not store in a bathroom.  Injection:   If you need to store this drug at home, talk with your doctor, nurse, or pharmacist about how to store it.  All products:   Keep all drugs in a safe place. Keep all drugs out of the reach of children and pets.  Throw away unused or  drugs. Do not flush down a toilet or pour down a drain unless you are told to do so. Check with your pharmacist if you have questions about the best way to throw out drugs. There may be drug take-back programs in your area.  General drug facts   If your symptoms or health problems do not get better or if they become worse, call your doctor.  Do not share your drugs with others and do not take anyone else's drugs.  Some drugs may have another patient information leaflet. If you have any questions about this drug, please talk with your doctor, nurse, pharmacist, or other  health care provider.  Some drugs may have another patient information leaflet. Check with your pharmacist. If you have any questions about this drug, please talk with your doctor, nurse, pharmacist, or other health care provider.  If you think there has been an overdose, call your poison control center or get medical care right away. Be ready to tell or show what was taken, how much, and when it happened.  Consumer Information Use and Disclaimer   This generalized information is a limited summary of diagnosis, treatment, and/or medication information. It is not meant to be comprehensive and should be used as a tool to help the user understand and/or assess potential diagnostic and treatment options. It does NOT include all information about conditions, treatments, medications, side effects, or risks that may apply to a specific patient. It is not intended to be medical advice or a substitute for the medical advice, diagnosis, or treatment of a health care provider based on the health care provider's examination and assessment of a patient's specific and unique circumstances. Patients must speak with a health care provider for complete information about their health, medical questions, and treatment options, including any risks or benefits regarding use of medications. This information does not endorse any treatments or medications as safe, effective, or approved for treating a specific patient. Up[employer], Inc[employer] and its affiliates disclaim any warranty or liability relating to this information or the use thereof. The use of this information is governed by the Terms of Use, available at https://www.woltersRadialpointuwer.com/en/know/clinical-effectiveness-terms.  Last Reviewed Date   2024-01-26  Copyright   © 2024 UpToDate, Inc. and its affiliates and/or licensors. All rights reserved.

## 2024-08-28 NOTE — PROGRESS NOTES
"Assessment/Plan:    1. Encounter for gynecological examination  -     Ambulatory Referral to Obstetrics / Gynecology; Future  2. Acute bronchitis, unspecified organism  -     methylPREDNISolone 4 MG tablet therapy pack; Use as directed on package  3. Mild persistent asthma without complication  -     albuterol (PROVENTIL HFA,VENTOLIN HFA) 90 mcg/act inhaler; Inhale 2 puffs every 4 (four) hours as needed for wheezing  4. Shortness of breath  5. Encounter for screening mammogram for breast cancer  -     Mammo screening bilateral w 3d & cad; Future; Expected date: 08/28/2024      Patient Instructions:  Take prednisone with food in morning and do not take any NSAID's while taking prednisone.    Return if symptoms worsen or fail to improve.      Future Appointments   Date Time Provider Department Center   8/29/2024 12:30 PM Ramona Farley, PhD [facility]   9/9/2024 11:45 AM Aldo Sunshine MD [facility]   9/18/2024  1:00 PM Carlton Santana MD [facility]           Subjective:      Patient ID: Isabel Queen is a 44 y.o. female.    Chief Complaint   Patient presents with   • Cough     Started over 1 week ago Merced Figueroa LPN     • Hot Flashes   • Fatigue         Vitals:  /84   Pulse 92   Temp 98.6 °F (37 °C)   Resp 17   Ht 5' 3\" (1.6 m)   Wt 61.2 kg (135 lb)   BMI 23.91 kg/m²     Patient stated that having cough from 2 weeks and denies chest pain, sob and fever.  Does vaping every day  Going through perimenopause and will be following with gynecologist    Cough  Pertinent negatives include no chills, ear pain, fever, headaches, postnasal drip, rhinorrhea, sore throat, shortness of breath or wheezing.   Fatigue  Associated symptoms include coughing and fatigue. Pertinent negatives include no abdominal pain, chills, congestion, diaphoresis, fever, headaches, nausea, sore throat or vomiting.             The following portions of the patient's history were reviewed and " updated as appropriate: allergies, current medications, past family history, past medical history, past social history, past surgical history and problem list.      Review of Systems   Constitutional:  Positive for fatigue. Negative for chills, diaphoresis, fever and unexpected weight change.   HENT:  Negative for congestion, dental problem, drooling, ear discharge, ear pain, facial swelling, hearing loss, mouth sores, nosebleeds, postnasal drip, rhinorrhea, sinus pressure, sinus pain, sneezing, sore throat, tinnitus, trouble swallowing and voice change.    Respiratory:  Positive for cough. Negative for chest tightness, shortness of breath and wheezing.    Cardiovascular: Negative.    Gastrointestinal:  Negative for abdominal pain, constipation, diarrhea, nausea and vomiting.   Musculoskeletal: Negative.    Skin: Negative.    Neurological:  Negative for dizziness, light-headedness and headaches.         Objective:    Social History     Tobacco Use   Smoking Status Former   • Current packs/day: 0.00   • Average packs/day: 1 pack/day for 19.1 years (19.1 ttl pk-yrs)   • Types: Cigarettes   • Start date: 5/8/1995   • Quit date: 6/15/2014   • Years since quitting: 10.2   • Passive exposure: Past   Smokeless Tobacco Never       Allergies:   Allergies   Allergen Reactions   • Doxycycline Other (See Comments)       stomach  upset         Current Outpatient Medications   Medication Sig Dispense Refill   • albuterol (PROVENTIL HFA,VENTOLIN HFA) 90 mcg/act inhaler Inhale 2 puffs every 4 (four) hours as needed for wheezing 8.5 g 2   • ALPRAZolam (XANAX) 1 mg tablet Take 1 tablet (1 mg total) by mouth 4 (four) times a day as needed for anxiety (Patient taking differently: Take 1 mg by mouth 4 (four) times a day as needed for anxiety Taking 4 times daily) 120 tablet 0   • amphetamine-dextroamphetamine (ADDERALL, 20MG,) 20 mg tablet Take 1 tablet (20 mg total) by mouth 2 (two) times a day Max Daily Amount: 40 mg 60 tablet 0   •  Breo Ellipta 100-25 MCG/ACT inhaler INHALE 1 PUFF BY MOUTHN ONCE DAILY. RINSE MOUTH AFTER USE 60 each 2   • ketoconazole (NIZORAL) 2 % shampoo PRN     • lamoTRIgine (LaMICtal) 200 MG tablet Take 1 tablet (200 mg total) by mouth daily at bedtime 30 tablet 0   • methylPREDNISolone 4 MG tablet therapy pack Use as directed on package 21 tablet 0   • ondansetron (ZOFRAN) 4 mg tablet Take 1 tablet (4 mg total) by mouth every 8 (eight) hours as needed for nausea or vomiting 20 tablet 0   • PARoxetine (PAXIL) 10 mg tablet Take 1 tablet (10 mg total) by mouth daily 30 tablet 2   • clobetasol (TEMOVATE) 0.05 % cream Apply topically 2 (two) times a day (Patient not taking: Reported on 7/28/2024) 30 g 0   • docusate sodium (COLACE) 100 mg capsule Take 1 capsule (100 mg total) by mouth every 12 (twelve) hours (Patient not taking: Reported on 8/28/2024) 60 capsule 0   • esomeprazole (NexIUM) 40 mg packet Take 40 mg by mouth daily in the early morning (Patient not taking: Reported on 8/28/2024) 30 each 5   • fluticasone (FLONASE) 50 mcg/act nasal spray 2 sprays into each nostril daily (Patient not taking: Reported on 8/28/2024) 9.9 mL 0   • hydrocortisone (ANUSOL-HC) 2.5 % rectal cream Apply topically 2 (two) times a day (Patient not taking: Reported on 7/28/2024) 28 g 2   • hydrOXYzine HCL (ATARAX) 10 mg tablet Take 1 tablet (10 mg total) by mouth daily at bedtime (Patient not taking: Reported on 7/28/2024) 30 tablet 0   • mometasone (ELOCON) 0.1 % lotion  (Patient not taking: Reported on 7/28/2024)     • naproxen (NAPROSYN) 500 mg tablet Take 1 tablet (500 mg total) by mouth 2 (two) times a day with meals (Patient not taking: Reported on 7/28/2024) 60 tablet 0   • polyethylene glycol (GOLYTELY) 4000 mL solution Take 4,000 mL by mouth once for 1 dose (Patient not taking: Reported on 8/28/2024) 4000 mL 0   • polyethylene glycol (MIRALAX) 17 g packet Take 17 g by mouth daily (Patient not taking: Reported on 8/28/2024) 10 each 0   •  senna-docusate sodium (SENOKOT S) 8.6-50 mg per tablet Take 1 tablet by mouth daily (Patient not taking: Reported on 8/28/2024) 20 tablet 0     No current facility-administered medications for this visit.          Physical Exam  Vitals reviewed.   Constitutional:       Appearance: Normal appearance. She is well-developed.   HENT:      Head: Normocephalic.      Right Ear: Tympanic membrane, ear canal and external ear normal.      Left Ear: Tympanic membrane, ear canal and external ear normal.      Nose: Nose normal.      Right Sinus: No maxillary sinus tenderness or frontal sinus tenderness.      Left Sinus: No maxillary sinus tenderness or frontal sinus tenderness.      Mouth/Throat:      Mouth: No oral lesions.      Pharynx: No oropharyngeal exudate or posterior oropharyngeal erythema.   Cardiovascular:      Rate and Rhythm: Normal rate and regular rhythm.      Heart sounds: Normal heart sounds.   Pulmonary:      Effort: Pulmonary effort is normal.      Breath sounds: Wheezing present.   Musculoskeletal:      Cervical back: Neck supple.   Lymphadenopathy:      Cervical:      Right cervical: No superficial or posterior cervical adenopathy.     Left cervical: No superficial or posterior cervical adenopathy.   Skin:     General: Skin is warm and dry.   Neurological:      Mental Status: She is alert and oriented to person, place, and time.   Psychiatric:         Behavior: Behavior normal.         Thought Content: Thought content normal.         Judgment: Judgment normal.                     ROSE Raygoza

## 2024-08-29 ENCOUNTER — TELEMEDICINE (OUTPATIENT)
Dept: PSYCHIATRY | Facility: CLINIC | Age: 44
End: 2024-08-29
Payer: COMMERCIAL

## 2024-08-29 DIAGNOSIS — F31.81 BIPOLAR II DISORDER (HCC): ICD-10-CM

## 2024-08-29 DIAGNOSIS — F41.1 GAD (GENERALIZED ANXIETY DISORDER): ICD-10-CM

## 2024-08-29 DIAGNOSIS — F41.0 PANIC DISORDER WITHOUT AGORAPHOBIA WITH MODERATE PANIC ATTACKS: Primary | ICD-10-CM

## 2024-08-29 DIAGNOSIS — F90.0 ATTENTION DEFICIT HYPERACTIVITY DISORDER (ADHD), PREDOMINANTLY INATTENTIVE TYPE: ICD-10-CM

## 2024-08-29 PROCEDURE — 99214 OFFICE O/P EST MOD 30 MIN: CPT | Performed by: NURSE PRACTITIONER

## 2024-08-29 PROCEDURE — 90833 PSYTX W PT W E/M 30 MIN: CPT | Performed by: NURSE PRACTITIONER

## 2024-09-03 ENCOUNTER — TELEPHONE (OUTPATIENT)
Dept: GASTROENTEROLOGY | Facility: CLINIC | Age: 44
End: 2024-09-03

## 2024-09-03 DIAGNOSIS — K64.8 OTHER HEMORRHOIDS: ICD-10-CM

## 2024-09-03 NOTE — TELEPHONE ENCOUNTER
Barrett for pt to check to see if she has her 2 day golytely prep and a . She will await a call regarding her arrival time and if she has any questions she will call us back. Mark

## 2024-09-04 RX ORDER — HYDROCORTISONE 25 MG/G
CREAM TOPICAL 2 TIMES DAILY
Qty: 28 G | Refills: 1 | Status: SHIPPED | OUTPATIENT
Start: 2024-09-04

## 2024-09-04 NOTE — BH TREATMENT PLAN
[facility]     Name and Date of Birth:  Isabel KOREY Queen 44 y.o. 1980  Date of Treatment Plan: July 21, 2020, October 20, 2020, August 25, 2021, March 30, 2022, November 21, 2022, June 14, 2023, February 26, 2024, August 29, 2024  Diagnosis/Diagnoses:    1. Bipolar II disorder (HCC)    2. Panic disorder without agoraphobia with moderate panic attacks    3. Attention deficit hyperactivity disorder (ADHD), predominantly inattentive type       Strengths/Personal Resources for Self-Care: supportive family, taking medications as prescribed, ability to communicate needs, ability to listen, motivation for treatment.  Area/Areas of need (in own words): anxiety symptoms, depressive symptoms  1.         Long Term Goal: improve control of anxiety.  And mood, improve self-care  Target Date: 6 months -2/29/2025  Person/Persons responsible for completion of goal: Isabel and provider  2.         Short Term Objective (s) - How will we reach this goal?:   A. Provider new recommended medication/dosage changes and/or continue medication(s):   B. Create a structure in her day and self care  C. Create realistic expectations.  Target Date: 6 months -2/29/2025  Person/Persons Responsible for Completion of Goal: Isabel and provider  Progress Towards Goals: progressing slowly  Treatment Modality: medication management every 1-3 month  Review due 180 days from date of this plan: 6 months -2/29/2025  Expected length of service: ongoing treatment  My Physician/PA/NP and I have developed this plan together and I agree to work on the goals and objectives. I understand the treatment goals that were developed for my treatment.

## 2024-09-04 NOTE — PSYCH
Virtual Regular Visit    Problem List Items Addressed This Visit          Behavioral Health    Panic disorder without agoraphobia with moderate panic attacks - Primary    RANJIT (generalized anxiety disorder)    Bipolar II disorder (HCC)    Attention deficit hyperactivity disorder (ADHD), predominantly inattentive type     Reason for visit is   Chief Complaint   Patient presents with    Anxiety    Panic Attack    Depression    Mood Swings    Medication Management     Encounter provider Ramona Farley, PhD    Provider located at [address]  [address]  [facility] [id number] [phone]    Recent Visits  Date Type Provider Dept   08/29/24 Telemedicine Ramona Farley, PhD [facility]   08/28/24 Office Visit ROSE Raygoza [facility]   Showing recent visits within past 7 days and meeting all other requirements  Future Appointments  No visits were found meeting these conditions.  Showing future appointments within next 150 days and meeting all other requirements       After connecting through [employer], the patient was identified by name and date of birth. Isabel Queen was informed that this is a telemedicine visit and that the visit is being conducted through the Epic Embedded platform. She agrees to proceed. which may not be secure and therefore, might not be HIPAA-compliant.  My office door was closed. No one else was in the room.  She acknowledged consent and understanding of privacy and security of the video platform. The patient has agreed to participate and understands they can discontinue the visit at any time.    SUBJECTIVE:    Isabel Queen is a 44 y.o. female with a history of bipolar 2, panic attacks, anxiety, depression seen for medication management and mood assessment.  Isabel reports being tired all the time, living back home with her  and teenage son.  Reports son gives her a hard time  as he is going through puberty.   wants to get back together however he shows no effort in helping the relationship or sharing the cost of the home.  She continues to work 2 jobs, reports anxiety and depression denies suicidal ideation.  Twin sister is in group home [place] for cannabis.  She is upset and feels she cannot help her at this time.  She believes she is premenopausal and she was encouraged to seek her OB GYN.  Reports appetite is fair to good, sleep patterns are good at times.  Takes medication as prescribed and feels Adderall helps her focus and concentration.  Denies panic attacks moods have been without jomar or severe depression.    HPI ROS Appetite Changes and Sleep: normal appetite and decreased energy    Review Of Systems:     Mood Anxiety and Depression   Behavior Normal    Thought Content Normal   General Relationship Problems, Emotional Problems, and Sleep Disturbances   Personality Normal   Other Psych Symptoms Normal   Constitutional As Noted in HPI   ENT As Noted in HPI   Cardiovascular As Noted in HPI   Respiratory As Noted in HPI   Gastrointestinal As Noted in HPI   Genitourinary As Noted in HPI   Musculoskeletal As Noted in HPI   Integumentary As Noted in HPI   Neurological As Noted in HPI   Endocrine Normal    Other Symptoms Normal        Substance Abuse History:    Social History     Substance and Sexual Activity   Drug Use No       Family Psychiatric History:     Family History   Problem Relation Age of Onset    No Known Problems Mother     No Known Problems Father     Bipolar disorder Sister     No Known Problems Brother     Anxiety disorder Daughter     Depression Daughter     No Known Problems Son        Social History     Socioeconomic History    Marital status: /Civil Union     Spouse name: Not on file    Number of children: 2    Years of education: Not on file    Highest education level: Not on file   Occupational History    Not on file   Tobacco Use    Smoking status:  Former     Current packs/day: 0.00     Average packs/day: 1 pack/day for 19.1 years (19.1 ttl pk-yrs)     Types: Cigarettes     Start date: 5/8/1995     Quit date: 6/15/2014     Years since quitting: 10.2     Passive exposure: Past    Smokeless tobacco: Never   Vaping Use    Vaping status: Every Day    Substances: Nicotine, Flavoring   Substance and Sexual Activity    Alcohol use: Yes     Alcohol/week: 2.0 standard drinks of alcohol     Types: 2 Glasses of wine per week     Comment: occassional - wine 2 x month    Drug use: No    Sexual activity: Yes     Partners: Male     Birth control/protection: Female Sterilization     Comment: ablation   Other Topics Concern    Not on file   Social History Narrative    Not on file     Social Determinants of Health     Financial Resource Strain: Not on file   Food Insecurity: Not on file   Transportation Needs: Not on file   Physical Activity: Not on file   Stress: Not on file   Social Connections: Not on file   Intimate Partner Violence: Not on file   Housing Stability: Not on file       Past Medical History:   Diagnosis Date    Abdominal pain     ADHD     Allergic rhinitis     Anxiety     panic attacks    Anxiety     Asthma     Bipolar disorder (HCC)     Bladder distention     came to the ED on 10/9/2020-greene in to drain then removed    Chronic constipation     Colon polyp     Contact lens/glasses fitting     and glasses    Depression     Depression     GERD (gastroesophageal reflux disease)     Psychiatric disorder     anxiety     Rectal bleeding     Sepsis (HCC) 2/8/2017       Past Surgical History:   Procedure Laterality Date    APPENDECTOMY      BAND HEMORRHOIDECTOMY      BREAST SURGERY      augmentation silicone    COLONOSCOPY N/A 01/24/2018    Procedure: COLONOSCOPY WITH HEMORRHOID BANDING;  Surgeon: Ozzy Hammonds MD;  Location: [facility];  Service: Gastroenterology    ENDOMETRIAL ABLATION  2016    [place] SIGMOIDOSCOPY FLX DX W/COLLJ SPEC BR/WA IF PFRMD N/A 11/14/2018     Procedure: FLEXIBLE SIGMOIDOSCOPY WITH APC;  Surgeon: Ozzy Hammonds MD;  Location: [facility];  Service: Gastroenterology    UPPER GASTROINTESTINAL ENDOSCOPY         Current Outpatient Medications   Medication Sig Dispense Refill    albuterol (PROVENTIL HFA,VENTOLIN HFA) 90 mcg/act inhaler Inhale 2 puffs every 4 (four) hours as needed for wheezing 8.5 g 2    ALPRAZolam (XANAX) 1 mg tablet Take 1 tablet (1 mg total) by mouth 4 (four) times a day as needed for anxiety (Patient taking differently: Take 1 mg by mouth 4 (four) times a day as needed for anxiety Taking 4 times daily) 120 tablet 0    amphetamine-dextroamphetamine (ADDERALL, 20MG,) 20 mg tablet Take 1 tablet (20 mg total) by mouth 2 (two) times a day Max Daily Amount: 40 mg 60 tablet 0    Breo Ellipta 100-25 MCG/ACT inhaler INHALE 1 PUFF BY MOUTHN ONCE DAILY. RINSE MOUTH AFTER USE 60 each 2    clobetasol (TEMOVATE) 0.05 % cream Apply topically 2 (two) times a day (Patient not taking: Reported on 7/28/2024) 30 g 0    docusate sodium (COLACE) 100 mg capsule Take 1 capsule (100 mg total) by mouth every 12 (twelve) hours (Patient not taking: Reported on 8/28/2024) 60 capsule 0    esomeprazole (NexIUM) 40 mg packet Take 40 mg by mouth daily in the early morning (Patient not taking: Reported on 8/28/2024) 30 each 5    fluticasone (FLONASE) 50 mcg/act nasal spray 2 sprays into each nostril daily (Patient not taking: Reported on 8/28/2024) 9.9 mL 0    hydrocortisone (ANUSOL-HC) 2.5 % rectal cream Apply topically 2 (two) times a day (Patient not taking: Reported on 7/28/2024) 28 g 2    hydrOXYzine HCL (ATARAX) 10 mg tablet Take 1 tablet (10 mg total) by mouth daily at bedtime (Patient not taking: Reported on 7/28/2024) 30 tablet 0    ketoconazole (NIZORAL) 2 % shampoo PRN      lamoTRIgine (LaMICtal) 200 MG tablet Take 1 tablet (200 mg total) by mouth daily at bedtime 30 tablet 0    methylPREDNISolone 4 MG tablet therapy pack Use as directed on package 21 tablet  0    mometasone (ELOCON) 0.1 % lotion  (Patient not taking: Reported on 7/28/2024)      naproxen (NAPROSYN) 500 mg tablet Take 1 tablet (500 mg total) by mouth 2 (two) times a day with meals (Patient not taking: Reported on 7/28/2024) 60 tablet 0    ondansetron (ZOFRAN) 4 mg tablet Take 1 tablet (4 mg total) by mouth every 8 (eight) hours as needed for nausea or vomiting 20 tablet 0    PARoxetine (PAXIL) 10 mg tablet Take 1 tablet (10 mg total) by mouth daily 30 tablet 2    polyethylene glycol (GOLYTELY) 4000 mL solution Take 4,000 mL by mouth once for 1 dose (Patient not taking: Reported on 8/28/2024) 4000 mL 0    polyethylene glycol (MIRALAX) 17 g packet Take 17 g by mouth daily (Patient not taking: Reported on 8/28/2024) 10 each 0    senna-docusate sodium (SENOKOT S) 8.6-50 mg per tablet Take 1 tablet by mouth daily (Patient not taking: Reported on 8/28/2024) 20 tablet 0     No current facility-administered medications for this visit.        Allergies   Allergen Reactions    Doxycycline Other (See Comments)       stomach  upset       The following portions of the patient's history were reviewed and updated as appropriate: allergies, current medications, past family history, past medical history, past social history, past surgical history, and problem list.    OBJECTIVE:     Mental Status Examination:    Appearance calm and cooperative , adequate hygiene and grooming, and good eye contact    Mood anxious   Affect affect appropriate    Speech a normal rate   Thought Processes normal thought processes   Hallucinations no hallucinations present    Thought Content no delusions   Abnormal Thoughts no suicidal thoughts  and no homicidal thoughts    Orientation  oriented to person and place and time   Remote Memory short term memory intact and long term memory intact   Attention Span concentration intact   Intellect Appears to be of Average Intelligence   Insight Insight intact   Judgement judgment was intact   Muscle  "Strength Muscle strength and tone were normal   Language no difficulty naming common objects   Fund of Knowledge displays adequate knowledge of current events   Pain none   Pain Scale 0       Laboratory Results: No results found for this or any previous visit.    Assessment/Plan:       Diagnoses and all orders for this visit:    Panic disorder without agoraphobia with moderate panic attacks    RANJIT (generalized anxiety disorder)    Bipolar II disorder (HCC)    Attention deficit hyperactivity disorder (ADHD), predominantly inattentive type          Treatment Recommendations- Risks Benefits      Immediate Medical/Psychiatric/Psychotherapy Treatments and Any Precautions: Continue treatment plan    Risks, Benefits And Possible Side Effects Of Medications:  {PSYCH RISK, BENEFITS AND POSSIBLE SIDE EFFECTS (Optional):82348    Controlled Medication Discussion: She is aware of safe use and storage of medication    Psychotherapy Provided: 30 minutes  Supportive therapy  Medication evaluation  Mood assessment  Normalized and validated her feelings  Safety plan not compiled; however, she is aware of who to call in crisis, 988 and ED if necessary  Goals discussed in session: Stabilize mood reduce fatigue       Treatment Plan:    Completed and signed during the session: Yes - Treatment Plan done but not signed at time of office visit due to:  Plan reviewed by video and verbal consent given due to virtual visit. Treatment Plan sent to patient via [employer] for signature.    I spent 30 minutes with the patient during this visit.  \"Portions of the record may have been created with voice recognition software. Occasional wrong word or \"sound a like\" substitutions may have occurred due to the inherent limitations of voice recognition software. Read the chart carefully and recognize, using context, where substitutions have occurred. Please call if you have any questions. \"      Ramona Farley, PhD 09/04/24  "

## 2024-09-05 ENCOUNTER — TELEPHONE (OUTPATIENT)
Dept: OTHER | Facility: OTHER | Age: 44
End: 2024-09-05

## 2024-09-09 ENCOUNTER — ANESTHESIA EVENT (OUTPATIENT)
Dept: GASTROENTEROLOGY | Facility: AMBULARY SURGERY CENTER | Age: 44
End: 2024-09-09
Payer: COMMERCIAL

## 2024-09-09 ENCOUNTER — HOSPITAL ENCOUNTER (OUTPATIENT)
Dept: GASTROENTEROLOGY | Facility: AMBULARY SURGERY CENTER | Age: 44
Setting detail: OUTPATIENT SURGERY
Discharge: HOME | End: 2024-09-09
Attending: INTERNAL MEDICINE
Payer: COMMERCIAL

## 2024-09-09 VITALS
HEART RATE: 99 BPM | OXYGEN SATURATION: 100 % | SYSTOLIC BLOOD PRESSURE: 110 MMHG | TEMPERATURE: 98.2 F | RESPIRATION RATE: 16 BRPM | DIASTOLIC BLOOD PRESSURE: 68 MMHG

## 2024-09-09 DIAGNOSIS — Z12.11 SCREENING FOR COLON CANCER: ICD-10-CM

## 2024-09-09 PROCEDURE — G0121 COLON CA SCRN NOT HI RSK IND: HCPCS | Performed by: INTERNAL MEDICINE

## 2024-09-09 RX ORDER — SODIUM CHLORIDE, SODIUM LACTATE, POTASSIUM CHLORIDE, CALCIUM CHLORIDE 600; 310; 30; 20 MG/100ML; MG/100ML; MG/100ML; MG/100ML
125 INJECTION, SOLUTION INTRAVENOUS CONTINUOUS
Status: CANCELLED | OUTPATIENT
Start: 2024-09-09

## 2024-09-09 RX ORDER — PROPOFOL 10 MG/ML
INJECTION, EMULSION INTRAVENOUS CONTINUOUS PRN
Status: DISCONTINUED | OUTPATIENT
Start: 2024-09-09 | End: 2024-09-09

## 2024-09-09 RX ORDER — PROPOFOL 10 MG/ML
INJECTION, EMULSION INTRAVENOUS AS NEEDED
Status: DISCONTINUED | OUTPATIENT
Start: 2024-09-09 | End: 2024-09-09

## 2024-09-09 RX ORDER — LIDOCAINE HYDROCHLORIDE 10 MG/ML
INJECTION, SOLUTION EPIDURAL; INFILTRATION; INTRACAUDAL; PERINEURAL AS NEEDED
Status: DISCONTINUED | OUTPATIENT
Start: 2024-09-09 | End: 2024-09-09

## 2024-09-09 RX ORDER — SODIUM CHLORIDE, SODIUM LACTATE, POTASSIUM CHLORIDE, CALCIUM CHLORIDE 600; 310; 30; 20 MG/100ML; MG/100ML; MG/100ML; MG/100ML
INJECTION, SOLUTION INTRAVENOUS CONTINUOUS PRN
Status: DISCONTINUED | OUTPATIENT
Start: 2024-09-09 | End: 2024-09-09

## 2024-09-09 RX ADMIN — LIDOCAINE HYDROCHLORIDE 5 ML: 10 INJECTION, SOLUTION EPIDURAL; INFILTRATION; INTRACAUDAL; PERINEURAL at 10:42

## 2024-09-09 RX ADMIN — SODIUM CHLORIDE, SODIUM LACTATE, POTASSIUM CHLORIDE, AND CALCIUM CHLORIDE: .6; .31; .03; .02 INJECTION, SOLUTION INTRAVENOUS at 10:24

## 2024-09-09 RX ADMIN — PROPOFOL 150 MCG/KG/MIN: 10 INJECTION, EMULSION INTRAVENOUS at 10:42

## 2024-09-09 RX ADMIN — PROPOFOL 100 MG: 10 INJECTION, EMULSION INTRAVENOUS at 10:42

## 2024-09-09 NOTE — ANESTHESIA PREPROCEDURE EVALUATION
Procedure:  COLONOSCOPY    Relevant Problems   GI/HEPATIC   (+) Gastroesophageal reflux disease      NEURO/PSYCH   (+) RANJIT (generalized anxiety disorder)   (+) Panic disorder without agoraphobia with moderate panic attacks      PULMONARY   (+) Mild persistent asthma without complication      Behavioral Health   (+) Bipolar II disorder (HCC)   (+) Current every day vaping        Physical Exam    Airway    Mallampati score: I  TM Distance: >3 FB  Neck ROM: full     Dental   Comment: Denies loose teeth     Cardiovascular  Cardiovascular exam normal    Pulmonary  Pulmonary exam normal     Other Findings  Portions of exam deferred due to low yield and/or unknown COVID statuspost-pubertal.      Anesthesia Plan  ASA Score- 2     Anesthesia Type- IV sedation with anesthesia with ASA Monitors.         Additional Monitors:     Airway Plan:            Plan Factors-Exercise tolerance (METS): >4 METS.    Chart reviewed.   Existing labs reviewed. Patient summary reviewed.    Patient is a current smoker.              Induction- intravenous.    Postoperative Plan-         Informed Consent- Anesthetic plan and risks discussed with patient.  I personally reviewed this patient with the CRNA. Discussed and agreed on the Anesthesia Plan with the CRNA..

## 2024-09-09 NOTE — H&P
History and Physical - [facility]  Isabel Queen 44 y.o. female MRN: [id number]                  HPI: Isabel Queen is a 44 y.o. year old female who presents for history of colon polyp 2018.  Patient only drank GoLytely prep the night before the procedure, not the additional MiraLAX 2 nights before as instructed      REVIEW OF SYSTEMS: Per the HPI, and otherwise unremarkable.    Historical Information   Past Medical History:   Diagnosis Date    Abdominal pain     ADHD     Allergic rhinitis     Anxiety     panic attacks    Anxiety     Asthma     Bipolar disorder (HCC)     Bladder distention     came to the ED on 10/9/2020-greene in to drain then removed    Chronic constipation     Colon polyp     Contact lens/glasses fitting     and glasses    Depression     Depression     GERD (gastroesophageal reflux disease)     Psychiatric disorder     anxiety     Rectal bleeding     Sepsis (HCC) 2/8/2017     Past Surgical History:   Procedure Laterality Date    APPENDECTOMY      BAND HEMORRHOIDECTOMY      BREAST SURGERY      augmentation silicone    COLONOSCOPY N/A 01/24/2018    Procedure: COLONOSCOPY WITH HEMORRHOID BANDING;  Surgeon: Ozzy Hammonds MD;  Location: [facility];  Service: Gastroenterology    ENDOMETRIAL ABLATION  2016    [place] SIGMOIDOSCOPY FLX DX W/COLLJ SPEC BR/WA IF PFRMD N/A 11/14/2018    Procedure: FLEXIBLE SIGMOIDOSCOPY WITH APC;  Surgeon: Ozzy Hammonds MD;  Location: [facility];  Service: Gastroenterology    UPPER GASTROINTESTINAL ENDOSCOPY       Social History   Social History     Substance and Sexual Activity   Alcohol Use Yes    Alcohol/week: 2.0 standard drinks of alcohol    Types: 2 Glasses of wine per week    Comment: occassional - wine 2 x month     Social History     Substance and Sexual Activity   Drug Use No     Social History     Tobacco Use   Smoking Status Former    Current packs/day: 0.00    Average packs/day: 1 pack/day for 19.1 years (19.1 ttl pk-yrs)    Types: Cigarettes     Start date: 5/8/1995    Quit date: 6/15/2014    Years since quitting: 10.2    Passive exposure: Past   Smokeless Tobacco Never     Family History   Problem Relation Age of Onset    No Known Problems Mother     No Known Problems Father     Bipolar disorder Sister     No Known Problems Brother     Anxiety disorder Daughter     Depression Daughter     No Known Problems Son        Meds/Allergies       Current Outpatient Medications:     albuterol (PROVENTIL HFA,VENTOLIN HFA) 90 mcg/act inhaler    ALPRAZolam (XANAX) 1 mg tablet    amphetamine-dextroamphetamine (ADDERALL, 20MG,) 20 mg tablet    Breo Ellipta 100-25 MCG/ACT inhaler    docusate sodium (COLACE) 100 mg capsule    fluticasone (FLONASE) 50 mcg/act nasal spray    lamoTRIgine (LaMICtal) 200 MG tablet    PARoxetine (PAXIL) 10 mg tablet    polyethylene glycol (MIRALAX) 17 g packet    senna-docusate sodium (SENOKOT S) 8.6-50 mg per tablet    clobetasol (TEMOVATE) 0.05 % cream    esomeprazole (NexIUM) 40 mg packet    hydrocortisone (ANUSOL-HC) 2.5 % rectal cream    hydrOXYzine HCL (ATARAX) 10 mg tablet    ketoconazole (NIZORAL) 2 % shampoo    methylPREDNISolone 4 MG tablet therapy pack    mometasone (ELOCON) 0.1 % lotion    naproxen (NAPROSYN) 500 mg tablet    ondansetron (ZOFRAN) 4 mg tablet    polyethylene glycol (GOLYTELY) 4000 mL solution  No current facility-administered medications for this encounter.    Facility-Administered Medications Ordered in Other Encounters:     lactated ringers infusion, , Intravenous, Continuous PRN, New Bag at 09/09/24 1024    Allergies   Allergen Reactions    Doxycycline Other (See Comments)       stomach  upset       Objective     /65   Pulse 99   Temp 98.2 °F (36.8 °C) (Temporal)   Resp 18   SpO2 100%       PHYSICAL EXAM    Gen: NAD  Head: NCAT  CV: RRR  CHEST: Clear  ABD: soft, NT/ND  EXT: no edema      ASSESSMENT/PLAN:  This is a 44 y.o. year old female here for colonoscopy, and she is stable and optimized for her  procedure.

## 2024-09-09 NOTE — ANESTHESIA POSTPROCEDURE EVALUATION
Post-Op Assessment Note    CV Status:  Stable         Mental Status:  Alert   PONV Controlled:  Controlled   Airway Patency:  Patent     Post Op Vitals Reviewed: Yes    No anethesia notable event occurred.    Staff: CRNA               BP   100/57   Temp      Pulse  96   Resp   20   SpO2   100

## 2024-09-19 ENCOUNTER — OFFICE VISIT (OUTPATIENT)
Dept: OBGYN CLINIC | Facility: CLINIC | Age: 44
End: 2024-09-19
Payer: COMMERCIAL

## 2024-09-19 VITALS
BODY MASS INDEX: 24.27 KG/M2 | SYSTOLIC BLOOD PRESSURE: 120 MMHG | WEIGHT: 137 LBS | HEIGHT: 63 IN | DIASTOLIC BLOOD PRESSURE: 70 MMHG

## 2024-09-19 DIAGNOSIS — Z01.419 ENCOUNTER FOR GYNECOLOGICAL EXAMINATION: ICD-10-CM

## 2024-09-19 DIAGNOSIS — R45.4 IRRITABILITY: ICD-10-CM

## 2024-09-19 DIAGNOSIS — N95.1 PERIMENOPAUSAL VASOMOTOR SYMPTOMS: Primary | ICD-10-CM

## 2024-09-19 PROCEDURE — 99203 OFFICE O/P NEW LOW 30 MIN: CPT | Performed by: OBSTETRICS & GYNECOLOGY

## 2024-09-19 NOTE — PROGRESS NOTES
Assessment/Plan:      Diagnoses and all orders for this visit:    Perimenopausal vasomotor symptoms    Encounter for gynecological examination  -     Ambulatory Referral to Obstetrics / Gynecology    Irritability          Subjective:    Patient is a 44-year-old female who presents today for evaluation of vasomotor instability during the perimenopause.    We had extensive discussion today regarding various perimenopausal symptoms which she is experiencing.  They include hot flashes, insomnia, irritability and episodes of anxiety.    She is also receiving psychologic and psychiatric treatment for her anxiety and depression as well.    I discussed various treatment options for her vasomotor symptoms and at this time feel best to initiate treatment with low-dose estrogen replacement therapy.    Patient is very comfortable with this approach.    Prescription for Prempro 0.45 mg dose will be sent to the pharmacy via the EMR system today.    I stressed the importance for the patient to obtain her mammogram ASAP in light of initiating estrogen replacement therapy.    I also advised her that we will see her back in 3 months for a surveillance visit to determine efficacy of the treatment plan.    At that time, we will do a complete gynecologic and medical examination including obtaining a Pap smear.    All questions were answered in detail for her at today's visit    She was told to feel free to call for any problems, questions, issues or concerns which may arise for her.     Patient ID: Isabel Queen is a 44 y.o. female.    HPI    Review of Systems   Constitutional: Negative.  Negative for appetite change, diaphoresis, fatigue, fever and unexpected weight change.   HENT: Negative.     Eyes: Negative.    Respiratory: Negative.     Cardiovascular: Negative.    Gastrointestinal: Negative.  Negative for abdominal pain, blood in stool, constipation, diarrhea, nausea and vomiting.   Endocrine: Negative.  Negative for cold  intolerance and heat intolerance.   Genitourinary: Negative.  Negative for dysuria, frequency, hematuria, urgency, vaginal bleeding, vaginal discharge and vaginal pain.   Musculoskeletal: Negative.    Skin: Negative.    Allergic/Immunologic: Negative.    Neurological: Negative.    Hematological: Negative.  Negative for adenopathy.   Psychiatric/Behavioral:  The patient is nervous/anxious.         Followed for anxiety and depression by therapist and psychiatry         Objective:     Physical Exam  Constitutional:       Appearance: She is well-developed.   HENT:      Head: Normocephalic.   Eyes:      Pupils: Pupils are equal, round, and reactive to light.   Cardiovascular:      Rate and Rhythm: Normal rate.   Pulmonary:      Effort: Pulmonary effort is normal.   Musculoskeletal:         General: Normal range of motion.      Cervical back: Normal range of motion and neck supple.   Skin:     General: Skin is warm and dry.   Neurological:      General: No focal deficit present.      Mental Status: She is alert and oriented to person, place, and time.   Psychiatric:         Mood and Affect: Mood normal.         Behavior: Behavior normal.         Thought Content: Thought content normal.         Judgment: Judgment normal.

## 2024-09-19 NOTE — PATIENT INSTRUCTIONS
Topic: Vasomotor motor instability during perimenopause    Patient and I had extensive discussion today regarding various perimenopausal symptoms which she is experiencing.  They include hot flashes, insomnia, irritability and episodes of anxiety.    Patient is receiving therapy for treatment of her anxiety and depression.    I discussed treatment options for her vasomotor symptoms and at this time feel it best to initiate treatment with low-dose estrogen replacement therapy.  Prescription for Prempro 0.45 mg dose will be sent to her pharmacy via the EMR system today    I advised her that I will see her back in 3 months for surveillance visit    In addition I stressed the importance of obtaining her mammogram ASAP    All questions were answered for her    She was told to feel free to call for any problems, questions, issues or concerns which may arise for her

## 2024-09-20 ENCOUNTER — TELEPHONE (OUTPATIENT)
Age: 44
End: 2024-09-20

## 2024-09-20 DIAGNOSIS — N95.1 PERIMENOPAUSAL VASOMOTOR SYMPTOMS: Primary | ICD-10-CM

## 2024-09-20 NOTE — TELEPHONE ENCOUNTER
Pt called. Pt stated when Prempro was sent to pharmacy by Dr Santana; pharmacy stated medication needs prior authorization from provider. Pt made aware of message sent to prior auth sent to prior auth team and how long prior auths can take. Pt verbalized understanding.

## 2024-09-20 NOTE — TELEPHONE ENCOUNTER
PA for Estrogen, conjugated,-medroxyprogesterone (PREMPRO) 0.45-1.5 MG per tablet SUBMITTED     via    []BLAINE-KEY:   [x]Ayesha-Case ID #: [id number]   []Faxed to plan   []Other website   []Phone call Case ID #     Office notes sent, clinical questions answered. Awaiting determination    Turnaround time for your insurance to make a decision on your Prior Authorization can take 7-21 business days.

## 2024-09-20 NOTE — TELEPHONE ENCOUNTER
Patient called in stated there was supposed to be a script sent to her pharmacy from her appointment yesterday. There is a note from the visit about sending a script for  Prempro but it was not sent to the pharmacy.

## 2024-09-23 NOTE — TELEPHONE ENCOUNTER
PA for Prempro 0.45-1.5 mg DENIED    Reason:(Screenshot if applicable)        Message sent to office clinical pool Yes    Denial letter scanned into Media Yes    Appeal started No (Provider will need to decide if appeal is warranted and send clinical documentation to Prior Authorization Team for initiation.)    **Please follow up with your patient regarding denial and next steps**

## 2024-09-24 ENCOUNTER — TELEPHONE (OUTPATIENT)
Age: 44
End: 2024-09-24

## 2024-09-24 NOTE — TELEPHONE ENCOUNTER
Isabel Queen called and  requested a call back to discuss that she is struggling with her depression and menopause..    They can be reached at P# [phone].       Thank you.

## 2024-09-24 NOTE — TELEPHONE ENCOUNTER
Ventura Best    I thought I had adequate documentation from her visit that this patient would benefit from estrogen replacement therapy.    Can we please obtain all possible medications with estrogen and progesterone that her plan would cover.    Many thanks

## 2024-09-25 DIAGNOSIS — N95.1 PERIMENOPAUSAL VASOMOTOR SYMPTOMS: Primary | ICD-10-CM

## 2024-09-25 NOTE — TELEPHONE ENCOUNTER
Writer attempted to call the patient to schedule an appt in office. Writer left a vm for the patient to call back and schedule an appt.

## 2024-09-29 ENCOUNTER — HOSPITAL ENCOUNTER (EMERGENCY)
Facility: HOSPITAL | Age: 44
Discharge: HOME | End: 2024-09-29
Attending: EMERGENCY MEDICINE | Admitting: EMERGENCY MEDICINE
Payer: COMMERCIAL

## 2024-09-29 ENCOUNTER — APPOINTMENT (EMERGENCY)
Dept: RADIOLOGY | Facility: HOSPITAL | Age: 44
End: 2024-09-29
Payer: COMMERCIAL

## 2024-09-29 VITALS
SYSTOLIC BLOOD PRESSURE: 153 MMHG | DIASTOLIC BLOOD PRESSURE: 81 MMHG | RESPIRATION RATE: 20 BRPM | TEMPERATURE: 98.6 F | HEART RATE: 103 BPM | OXYGEN SATURATION: 97 %

## 2024-09-29 DIAGNOSIS — R11.0 NAUSEA: Primary | ICD-10-CM

## 2024-09-29 LAB
2HR DELTA HS TROPONIN: 1 NG/L
ALBUMIN SERPL BCG-MCNC: 4.2 G/DL (ref 3.5–5)
ALP SERPL-CCNC: 32 U/L (ref 34–104)
ALT SERPL W P-5'-P-CCNC: 19 U/L (ref 7–52)
ANION GAP SERPL CALCULATED.3IONS-SCNC: 6 MMOL/L (ref 4–13)
AST SERPL W P-5'-P-CCNC: 30 U/L (ref 13–39)
BACTERIA UR QL AUTO: ABNORMAL /HPF
BASOPHILS # BLD AUTO: 0.04 THOUSANDS/ÂΜL (ref 0–0.1)
BASOPHILS NFR BLD AUTO: 0 % (ref 0–1)
BILIRUB SERPL-MCNC: 0.5 MG/DL (ref 0.2–1)
BILIRUB UR QL STRIP: NEGATIVE
BUN SERPL-MCNC: 14 MG/DL (ref 5–25)
CALCIUM SERPL-MCNC: 8.8 MG/DL (ref 8.4–10.2)
CARDIAC TROPONIN I PNL SERPL HS: 4 NG/L
CARDIAC TROPONIN I PNL SERPL HS: 5 NG/L
CHLORIDE SERPL-SCNC: 107 MMOL/L (ref 96–108)
CK SERPL-CCNC: 555 U/L (ref 26–192)
CLARITY UR: CLEAR
CO2 SERPL-SCNC: 25 MMOL/L (ref 21–32)
COLOR UR: YELLOW
CREAT SERPL-MCNC: 0.75 MG/DL (ref 0.6–1.3)
D DIMER PPP FEU-MCNC: <0.27 UG/ML FEU
EOSINOPHIL # BLD AUTO: 0.08 THOUSAND/ÂΜL (ref 0–0.61)
EOSINOPHIL NFR BLD AUTO: 1 % (ref 0–6)
ERYTHROCYTE [DISTWIDTH] IN BLOOD BY AUTOMATED COUNT: 13.7 % (ref 11.6–15.1)
EXT PREGNANCY TEST URINE: NEGATIVE
EXT. CONTROL: NORMAL
GFR SERPL CREATININE-BSD FRML MDRD: 95 ML/MIN/1.73SQ M
GLUCOSE SERPL-MCNC: 91 MG/DL (ref 65–140)
GLUCOSE UR STRIP-MCNC: NEGATIVE MG/DL
HCG SERPL QL: NEGATIVE
HCT VFR BLD AUTO: 41.7 % (ref 34.8–46.1)
HGB BLD-MCNC: 13.3 G/DL (ref 11.5–15.4)
HGB UR QL STRIP.AUTO: NEGATIVE
IMM GRANULOCYTES # BLD AUTO: 0.03 THOUSAND/UL (ref 0–0.2)
IMM GRANULOCYTES NFR BLD AUTO: 0 % (ref 0–2)
KETONES UR STRIP-MCNC: NEGATIVE MG/DL
LEUKOCYTE ESTERASE UR QL STRIP: ABNORMAL
LYMPHOCYTES # BLD AUTO: 1.73 THOUSANDS/ÂΜL (ref 0.6–4.47)
LYMPHOCYTES NFR BLD AUTO: 17 % (ref 14–44)
MCH RBC QN AUTO: 32.4 PG (ref 26.8–34.3)
MCHC RBC AUTO-ENTMCNC: 31.9 G/DL (ref 31.4–37.4)
MCV RBC AUTO: 102 FL (ref 82–98)
MONOCYTES # BLD AUTO: 0.9 THOUSAND/ÂΜL (ref 0.17–1.22)
MONOCYTES NFR BLD AUTO: 9 % (ref 4–12)
NEUTROPHILS # BLD AUTO: 7.28 THOUSANDS/ÂΜL (ref 1.85–7.62)
NEUTS SEG NFR BLD AUTO: 73 % (ref 43–75)
NITRITE UR QL STRIP: NEGATIVE
NON-SQ EPI CELLS URNS QL MICRO: ABNORMAL /HPF
NRBC BLD AUTO-RTO: 0 /100 WBCS
PH UR STRIP.AUTO: 7 [PH]
PLATELET # BLD AUTO: 370 THOUSANDS/UL (ref 149–390)
PMV BLD AUTO: 9.3 FL (ref 8.9–12.7)
POTASSIUM SERPL-SCNC: 4 MMOL/L (ref 3.5–5.3)
PROT SERPL-MCNC: 6.3 G/DL (ref 6.4–8.4)
PROT UR STRIP-MCNC: NEGATIVE MG/DL
RBC # BLD AUTO: 4.11 MILLION/UL (ref 3.81–5.12)
RBC #/AREA URNS AUTO: ABNORMAL /HPF
SODIUM SERPL-SCNC: 138 MMOL/L (ref 135–147)
SP GR UR STRIP.AUTO: 1.01 (ref 1–1.03)
TSH SERPL DL<=0.05 MIU/L-ACNC: 0.76 UIU/ML (ref 0.45–4.5)
UROBILINOGEN UR STRIP-ACNC: <2 MG/DL
WBC # BLD AUTO: 10.06 THOUSAND/UL (ref 4.31–10.16)
WBC #/AREA URNS AUTO: ABNORMAL /HPF

## 2024-09-29 PROCEDURE — 85379 FIBRIN DEGRADATION QUANT: CPT | Performed by: EMERGENCY MEDICINE

## 2024-09-29 PROCEDURE — 71045 X-RAY EXAM CHEST 1 VIEW: CPT

## 2024-09-29 PROCEDURE — 82550 ASSAY OF CK (CPK): CPT | Performed by: EMERGENCY MEDICINE

## 2024-09-29 PROCEDURE — 81001 URINALYSIS AUTO W/SCOPE: CPT | Performed by: EMERGENCY MEDICINE

## 2024-09-29 PROCEDURE — 99285 EMERGENCY DEPT VISIT HI MDM: CPT | Performed by: EMERGENCY MEDICINE

## 2024-09-29 PROCEDURE — 96376 TX/PRO/DX INJ SAME DRUG ADON: CPT

## 2024-09-29 PROCEDURE — 81025 URINE PREGNANCY TEST: CPT | Performed by: EMERGENCY MEDICINE

## 2024-09-29 PROCEDURE — 84443 ASSAY THYROID STIM HORMONE: CPT | Performed by: EMERGENCY MEDICINE

## 2024-09-29 PROCEDURE — 93005 ELECTROCARDIOGRAM TRACING: CPT

## 2024-09-29 PROCEDURE — 96374 THER/PROPH/DIAG INJ IV PUSH: CPT

## 2024-09-29 PROCEDURE — 84703 CHORIONIC GONADOTROPIN ASSAY: CPT | Performed by: EMERGENCY MEDICINE

## 2024-09-29 PROCEDURE — 84484 ASSAY OF TROPONIN QUANT: CPT | Performed by: EMERGENCY MEDICINE

## 2024-09-29 PROCEDURE — 96375 TX/PRO/DX INJ NEW DRUG ADDON: CPT

## 2024-09-29 PROCEDURE — 36415 COLL VENOUS BLD VENIPUNCTURE: CPT | Performed by: EMERGENCY MEDICINE

## 2024-09-29 PROCEDURE — 80053 COMPREHEN METABOLIC PANEL: CPT | Performed by: EMERGENCY MEDICINE

## 2024-09-29 PROCEDURE — 99284 EMERGENCY DEPT VISIT MOD MDM: CPT

## 2024-09-29 PROCEDURE — 96361 HYDRATE IV INFUSION ADD-ON: CPT

## 2024-09-29 PROCEDURE — 85025 COMPLETE CBC W/AUTO DIFF WBC: CPT | Performed by: EMERGENCY MEDICINE

## 2024-09-29 RX ORDER — KETOROLAC TROMETHAMINE 30 MG/ML
15 INJECTION, SOLUTION INTRAMUSCULAR; INTRAVENOUS ONCE
Status: COMPLETED | OUTPATIENT
Start: 2024-09-29 | End: 2024-09-29

## 2024-09-29 RX ORDER — ONDANSETRON 2 MG/ML
4 INJECTION INTRAMUSCULAR; INTRAVENOUS ONCE
Status: COMPLETED | OUTPATIENT
Start: 2024-09-29 | End: 2024-09-29

## 2024-09-29 RX ORDER — ONDANSETRON 4 MG/1
4 TABLET, FILM COATED ORAL EVERY 6 HOURS PRN
Qty: 12 TABLET | Refills: 0 | Status: SHIPPED | OUTPATIENT
Start: 2024-09-29

## 2024-09-29 RX ADMIN — ONDANSETRON 4 MG: 2 INJECTION INTRAMUSCULAR; INTRAVENOUS at 13:39

## 2024-09-29 RX ADMIN — KETOROLAC TROMETHAMINE 15 MG: 30 INJECTION, SOLUTION INTRAMUSCULAR; INTRAVENOUS at 14:49

## 2024-09-29 RX ADMIN — ONDANSETRON 4 MG: 2 INJECTION INTRAMUSCULAR; INTRAVENOUS at 11:58

## 2024-09-29 RX ADMIN — ONDANSETRON 4 MG: 2 INJECTION INTRAMUSCULAR; INTRAVENOUS at 14:49

## 2024-09-29 RX ADMIN — SODIUM CHLORIDE 1000 ML: 0.9 INJECTION, SOLUTION INTRAVENOUS at 11:58

## 2024-09-29 NOTE — Clinical Note
Isabel Queen was seen and treated in our emergency department on 9/29/2024.                Diagnosis:     Isabel  may return to work on return date.    She may return on this date: 10/01/2024         If you have any questions or concerns, please don't hesitate to call.      Nirmala Rothman MD    ______________________________           _______________          _______________  Hospital Representative                              Date                                Time

## 2024-09-29 NOTE — ED PROVIDER NOTES
Final diagnoses:   Nausea     ED Disposition       ED Disposition   Discharge    Condition   Stable    Date/Time   Sun Sep 29, 2024  2:38 PM    Comment   Isabel Queen discharge to home/self care.                   Assessment & Plan       Medical Decision Making  Pt is a 43yo F who presents with nausea. Exam pertinent for tachycardia.    Differential diagnosis to include but not limited to dehydration, electrolyte abnormality, rhabdomyolysis, medication reaction, PE.  Will plan for labs and imaging.  Will treat symptomatically and reassess.  See ED course for results and details.    Plan to discharge pt with f/u to PCP. Discussed returning the ED with new or worsening of symptoms. Discussed use of over the counter medications as stated on the bottle as needed for pain. Discussed taking new medication as prescribed as needed for nausea. Pt expressed understanding of discharge instructions, return precautions, and medication instructions and is stable for discharge at this time. All questions were answered and pt was discharged without incident.       Amount and/or Complexity of Data Reviewed  Labs: ordered. Decision-making details documented in ED Course.  Radiology: ordered. Decision-making details documented in ED Course.    Risk  Prescription drug management.        ED Course as of 09/29/24 1509   Sun Sep 29, 2024   1155 Procedure Note: EKG  Date/Time: 09/29/24 11:55 AM   Interpreted by: Nirmala Rothman MD  Indications / Diagnosis: Tachycardia  ECG reviewed by me, the ED Physician: yes   The EKG demonstrates:  Rhythm: sinus tachycardia  Intervals: normal intervals  Axis: normal axis  QRS/Blocks: normal QRS  ST Changes: No acute ST Changes, no STD/ZOË.   1209 CBC and differential(!)  Reviewed and without actionable derangement.    1210 Pt unable to provide urine sample at this time.    1233 Comprehensive metabolic panel(!)  Reviewed and without actionable derangement.    1233 D-Dimer, Quant: <0.27  Negative.     1233 hs TnI 0hr: 4  Negative. Will require delta based on timing.    1234 Total CK(!): 555  Elevated but not meeting criteria for rhabdo. Fluids in process.    1335 TSH 3RD GENERATON: 0.763  WNL   1335 PREGNANCY, SERUM: Negative  Negative.    1339 Pulse: 103  Interval improvement.    1339 Respirations: 20  Interval improvement.    1356 Leukocytes, UA(!): Trace   1357 Nitrite, UA: Negative   1357 Blood, UA: Negative   1357 Pt requesting food. Will provide.    1358 PREGNANCY TEST URINE: Negative  Negative.    1359 XR chest portable  No acute findings on wet read.    1416 WBC, UA: 1-2   1416 Bacteria, UA: Occasional   1420 Delta 2hr hsTnI: 1  WNL. No indication for further trending.    1437 Pt reassessed and resting comfortably. Appears more comfortable than upon arrival. VS improved. Discussed all results with pt as well as plan for DC with symptomatic treatment. Pt expressed understanding and is agreeable. Pt requesting one more dose of nausea medication prior to DC.        Medications   sodium chloride 0.9 % bolus 1,000 mL (0 mL Intravenous Stopped 9/29/24 1348)   ondansetron (ZOFRAN) injection 4 mg (4 mg Intravenous Given 9/29/24 1158)   ondansetron (ZOFRAN) injection 4 mg (4 mg Intravenous Given 9/29/24 1339)   ondansetron (ZOFRAN) injection 4 mg (4 mg Intravenous Given 9/29/24 1449)   ketorolac (TORADOL) injection 15 mg (15 mg Intravenous Given 9/29/24 1449)       ED Risk Strat Scores                                               History of Present Illness       Chief Complaint   Patient presents with    Nausea     Competes in body building, taking her normal meds but not eating for 3 days. Drinking a lot of caffeine. Feeling lightheaded and nauseated       Past Medical History:   Diagnosis Date    Abdominal pain     ADHD     Allergic rhinitis     Anxiety     panic attacks    Anxiety     Asthma     Bipolar disorder (HCC)     Bladder distention     came to the ED on 10/9/2020-greene in to drain then removed     Chronic constipation     Colon polyp     Contact lens/glasses fitting     and glasses    Depression     Depression     GERD (gastroesophageal reflux disease)     Psychiatric disorder     anxiety     Rectal bleeding     Sepsis (HCC) 2/8/2017      Past Surgical History:   Procedure Laterality Date    APPENDECTOMY      BAND HEMORRHOIDECTOMY      BREAST SURGERY      augmentation silicone    COLONOSCOPY N/A 01/24/2018    Procedure: COLONOSCOPY WITH HEMORRHOID BANDING;  Surgeon: Ozzy Hammonds MD;  Location: [facility];  Service: Gastroenterology    ENDOMETRIAL ABLATION  2016    ND SIGMOIDOSCOPY FLX DX W/COLLJ SPEC BR/WA IF PFRMD N/A 11/14/2018    Procedure: FLEXIBLE SIGMOIDOSCOPY WITH APC;  Surgeon: Ozzy Hammonds MD;  Location: [facility];  Service: Gastroenterology    UPPER GASTROINTESTINAL ENDOSCOPY        Family History   Problem Relation Age of Onset    No Known Problems Mother     No Known Problems Father     Bipolar disorder Sister     No Known Problems Brother     Anxiety disorder Daughter     Depression Daughter     No Known Problems Son       Social History     Tobacco Use    Smoking status: Former     Current packs/day: 0.00     Average packs/day: 1 pack/day for 19.1 years (19.1 ttl pk-yrs)     Types: Cigarettes     Start date: 5/8/1995     Quit date: 6/15/2014     Years since quitting: 10.2     Passive exposure: Past    Smokeless tobacco: Never   Vaping Use    Vaping status: Every Day    Substances: Nicotine, Flavoring   Substance Use Topics    Alcohol use: Yes     Alcohol/week: 2.0 standard drinks of alcohol     Types: 2 Glasses of wine per week     Comment: occassional - wine 2 x month    Drug use: No      E-Cigarette/Vaping    E-Cigarette Use Current Every Day User     Comments uses daily       E-Cigarette/Vaping Substances    Nicotine Yes     THC No     CBD No     Flavoring Yes     Other No     Unknown No       I have reviewed and agree with the history as documented.     Pt is a 45yo F who presents  for nausea.  Patient reports that earlier this morning she began suddenly with nausea, lightheadedness, and shortness of breath.  Patient states she felt as though she was going to pass out.  Patient reports she had taken her regular medications approximately 20 minutes before this started.  Patient reports no recent changes to her medications aside from the addition of estrogen several days ago.  Patient states that prior to this she was feeling well.  Patient states she has never had similar symptoms previously.  Patient does report for the past 2 days she has not had anything to eat.  Patient is currently training for a bodybuilding competition and has therefore been taking supplements but not eating food.  Patient's friend who presents with her states that she has lost a lot of weight recently.          Objective       ED Triage Vitals [09/29/24 1111]   Temperature Pulse Blood Pressure Respirations SpO2 Patient Position - Orthostatic VS   98.6 °F (37 °C) (!) 119 141/89 (!) 24 96 % Sitting      Temp Source Heart Rate Source BP Location FiO2 (%) Pain Score    Tympanic Monitor Left arm -- No Pain      Vitals      Date and Time Temp Pulse SpO2 Resp BP Pain Score FACES Pain Rating User   09/29/24 1322 -- 103 97 % 20 153/81 -- -- JG   09/29/24 1111 98.6 °F (37 °C) 119 96 % 24 141/89 No Pain -- LS            Physical Exam  Vitals reviewed.   Constitutional:       Appearance: She is well-developed. She is not diaphoretic.      Comments: Uncomfortable appearing   HENT:      Head: Normocephalic and atraumatic.      Right Ear: External ear normal.      Left Ear: External ear normal.      Nose: Nose normal.      Mouth/Throat:      Pharynx: Oropharynx is clear.   Eyes:      Extraocular Movements: Extraocular movements intact.      Pupils: Pupils are equal, round, and reactive to light.   Cardiovascular:      Rate and Rhythm: Regular rhythm. Tachycardia present.      Heart sounds: Normal heart sounds.   Pulmonary:       Effort: Pulmonary effort is normal. Tachypnea present. No respiratory distress.      Breath sounds: Normal breath sounds. No wheezing or rales.   Abdominal:      General: There is no distension.      Palpations: Abdomen is soft.      Tenderness: There is no abdominal tenderness.   Musculoskeletal:         General: Normal range of motion.      Cervical back: Normal range of motion and neck supple.      Right lower leg: No edema.      Left lower leg: No edema.   Skin:     General: Skin is warm and dry.      Capillary Refill: Capillary refill takes less than 2 seconds.      Coloration: Skin is not pale.      Findings: No erythema or rash.   Neurological:      General: No focal deficit present.      Mental Status: She is alert and oriented to person, place, and time.   Psychiatric:         Speech: Speech normal.         Behavior: Behavior is cooperative.         Results Reviewed       Procedure Component Value Units Date/Time    HS Troponin I 2hr [[id number]]  (Normal) Collected: 09/29/24 1350    Lab Status: Final result Specimen: Blood from Hand, Left Updated: 09/29/24 1417     hs TnI 2hr 5 ng/L      Delta 2hr hsTnI 1 ng/L     Urine Microscopic [[id number]]  (Abnormal) Collected: 09/29/24 1350    Lab Status: Final result Specimen: Urine, Clean Catch Updated: 09/29/24 1416     RBC, UA None Seen /hpf      WBC, UA 1-2 /hpf      Epithelial Cells Moderate /hpf      Bacteria, UA Occasional /hpf     POCT pregnancy, urine [[id number]]  (Normal) Resulted: 09/29/24 1357    Lab Status: Final result Updated: 09/29/24 1357     EXT Preg Test, Ur Negative     Control Valid    UA (URINE) with reflex to Scope [[id number]]  (Abnormal) Collected: 09/29/24 1350    Lab Status: Final result Specimen: Urine, Clean Catch Updated: 09/29/24 1355     Color, UA Yellow     Clarity, UA Clear     Specific Gravity, UA 1.015     pH, UA 7.0     Leukocytes, UA Trace     Nitrite, UA Negative     Protein, UA Negative mg/dl      Glucose, UA Negative mg/dl       Ketones, UA Negative mg/dl      Urobilinogen, UA <2.0 mg/dl      Bilirubin, UA Negative     Occult Blood, UA Negative    TSH, 3rd generation with Free T4 reflex [[id number]]  (Normal) Collected: 09/29/24 1157    Lab Status: Final result Specimen: Blood from Arm, Left Updated: 09/29/24 1334     TSH 3RD GENERATON 0.763 uIU/mL     hCG, qualitative pregnancy [[id number]]  (Normal) Collected: 09/29/24 1157    Lab Status: Final result Specimen: Blood from Arm, Left Updated: 09/29/24 1334     Preg, Serum Negative    HS Troponin 0hr (reflex protocol) [[id number]]  (Normal) Collected: 09/29/24 1157    Lab Status: Final result Specimen: Blood from Arm, Left Updated: 09/29/24 1232     hs TnI 0hr 4 ng/L     Comprehensive metabolic panel [[id number]]  (Abnormal) Collected: 09/29/24 1157    Lab Status: Final result Specimen: Blood from Arm, Left Updated: 09/29/24 1228     Sodium 138 mmol/L      Potassium 4.0 mmol/L      Chloride 107 mmol/L      CO2 25 mmol/L      ANION GAP 6 mmol/L      BUN 14 mg/dL      Creatinine 0.75 mg/dL      Glucose 91 mg/dL      Calcium 8.8 mg/dL      AST 30 U/L      ALT 19 U/L      Alkaline Phosphatase 32 U/L      Total Protein 6.3 g/dL      Albumin 4.2 g/dL      Total Bilirubin 0.50 mg/dL      eGFR 95 ml/min/1.73sq m     Narrative:      [employer] guidelines for Chronic Kidney Disease (CKD):     Stage 1 with normal or high GFR (GFR > 90 mL/min/1.73 square meters)    Stage 2 Mild CKD (GFR = 60-89 mL/min/1.73 square meters)    Stage 3A Moderate CKD (GFR = 45-59 mL/min/1.73 square meters)    Stage 3B Moderate CKD (GFR = 30-44 mL/min/1.73 square meters)    Stage 4 Severe CKD (GFR = 15-29 mL/min/1.73 square meters)    Stage 5 End Stage CKD (GFR <15 mL/min/1.73 square meters)  Note: GFR calculation is accurate only with a steady state creatinine    CK [[id number]]  (Abnormal) Collected: 09/29/24 1157    Lab Status: Final result Specimen: Blood from Arm, Left Updated: 09/29/24 9192     Total   U/L     D-Dimer [[id number]]  (Normal) Collected: 09/29/24 1157    Lab Status: Final result Specimen: Blood from Arm, Left Updated: 09/29/24 1226     D-Dimer, Quant <0.27 ug/ml FEU     CBC and differential [[id number]]  (Abnormal) Collected: 09/29/24 1157    Lab Status: Final result Specimen: Blood from Arm, Left Updated: 09/29/24 1209     WBC 10.06 Thousand/uL      RBC 4.11 Million/uL      Hemoglobin 13.3 g/dL      Hematocrit 41.7 %       fL      MCH 32.4 pg      MCHC 31.9 g/dL      RDW 13.7 %      MPV 9.3 fL      Platelets 370 Thousands/uL      nRBC 0 /100 WBCs      Segmented % 73 %      Immature Grans % 0 %      Lymphocytes % 17 %      Monocytes % 9 %      Eosinophils Relative 1 %      Basophils Relative 0 %      Absolute Neutrophils 7.28 Thousands/µL      Absolute Immature Grans 0.03 Thousand/uL      Absolute Lymphocytes 1.73 Thousands/µL      Absolute Monocytes 0.90 Thousand/µL      Eosinophils Absolute 0.08 Thousand/µL      Basophils Absolute 0.04 Thousands/µL             XR chest portable    (Results Pending)       Procedures    ED Medication and Procedure Management   Prior to Admission Medications   Prescriptions Last Dose Informant Patient Reported? Taking?   ALPRAZolam (XANAX) 1 mg tablet   No No   Sig: Take 1 tablet (1 mg total) by mouth 4 (four) times a day as needed for anxiety   Patient taking differently: Take 1 mg by mouth 4 (four) times a day as needed for anxiety Taking 4 times daily   Breo Ellipta 100-25 MCG/ACT inhaler   No No   Sig: INHALE 1 PUFF BY MOUTHN ONCE DAILY. RINSE MOUTH AFTER USE   Estradiol-Progesterone 0.5-100 MG CAPS   No No   Sig: Take 1 tablet daily as directed   PARoxetine (PAXIL) 10 mg tablet   No No   Sig: Take 1 tablet (10 mg total) by mouth daily   albuterol (PROVENTIL HFA,VENTOLIN HFA) 90 mcg/act inhaler   No No   Sig: Inhale 2 puffs every 4 (four) hours as needed for wheezing   amphetamine-dextroamphetamine (ADDERALL, 20MG,) 20 mg tablet   No No   Sig: Take 1  tablet (20 mg total) by mouth 2 (two) times a day Max Daily Amount: 40 mg   clobetasol (TEMOVATE) 0.05 % cream   No No   Sig: Apply topically 2 (two) times a day   Patient not taking: Reported on 2024   docusate sodium (COLACE) 100 mg capsule   No No   Sig: Take 1 capsule (100 mg total) by mouth every 12 (twelve) hours   Patient not taking: Reported on 2024   esomeprazole (NexIUM) 40 mg packet   No No   Sig: Take 40 mg by mouth daily in the early morning   Patient not taking: Reported on 2024   estrogen, conjugated,-medroxyprogesterone (PREMPRO) 0.45-1.5 MG per tablet   No No   Sig: Take 1 tablet by mouth daily   fluticasone (FLONASE) 50 mcg/act nasal spray  Self No No   Si sprays into each nostril daily   hydrOXYzine HCL (ATARAX) 10 mg tablet   No No   Sig: Take 1 tablet (10 mg total) by mouth daily at bedtime   Patient not taking: Reported on 2024   hydrocortisone (ANUSOL-HC) 2.5 % rectal cream   No No   Sig: Apply topically 2 (two) times a day   ketoconazole (NIZORAL) 2 % shampoo  Self Yes No   Sig: PRN   lamoTRIgine (LaMICtal) 200 MG tablet   No No   Sig: Take 1 tablet (200 mg total) by mouth daily at bedtime   methylPREDNISolone 4 MG tablet therapy pack   No No   Sig: Use as directed on package   Patient not taking: Reported on 2024   mometasone (ELOCON) 0.1 % lotion  Self Yes No   Patient not taking: Reported on 2024   naproxen (NAPROSYN) 500 mg tablet   No No   Sig: Take 1 tablet (500 mg total) by mouth 2 (two) times a day with meals   Patient not taking: Reported on 2024   ondansetron (ZOFRAN) 4 mg tablet  Self No No   Sig: Take 1 tablet (4 mg total) by mouth every 8 (eight) hours as needed for nausea or vomiting   polyethylene glycol (GOLYTELY) 4000 mL solution  Self No No   Sig: Take 4,000 mL by mouth once for 1 dose   Patient not taking: Reported on 2024   polyethylene glycol (MIRALAX) 17 g packet   No No   Sig: Take 17 g by mouth daily   Patient not taking:  Reported on 9/19/2024   senna-docusate sodium (SENOKOT S) 8.6-50 mg per tablet   No No   Sig: Take 1 tablet by mouth daily   Patient not taking: Reported on 9/19/2024      Facility-Administered Medications: None     Discharge Medication List as of 9/29/2024  2:39 PM        START taking these medications    Details   !! ondansetron (ZOFRAN) 4 mg tablet Take 1 tablet (4 mg total) by mouth every 6 (six) hours as needed for nausea or vomiting, Starting Sun 9/29/2024, Normal       !! - Potential duplicate medications found. Please discuss with provider.        CONTINUE these medications which have NOT CHANGED    Details   Estradiol-Progesterone 0.5-100 MG CAPS Take 1 tablet daily as directed, Normal      albuterol (PROVENTIL HFA,VENTOLIN HFA) 90 mcg/act inhaler Inhale 2 puffs every 4 (four) hours as needed for wheezing, Starting Wed 8/28/2024, Normal      ALPRAZolam (XANAX) 1 mg tablet Take 1 tablet (1 mg total) by mouth 4 (four) times a day as needed for anxiety, Starting Tue 8/27/2024, Normal      amphetamine-dextroamphetamine (ADDERALL, 20MG,) 20 mg tablet Take 1 tablet (20 mg total) by mouth 2 (two) times a day Max Daily Amount: 40 mg, Starting Tue 8/27/2024, Normal      Breo Ellipta 100-25 MCG/ACT inhaler INHALE 1 PUFF BY MOUTHN ONCE DAILY. RINSE MOUTH AFTER USE, Normal      clobetasol (TEMOVATE) 0.05 % cream Apply topically 2 (two) times a day, Starting Thu 7/18/2024, Normal      docusate sodium (COLACE) 100 mg capsule Take 1 capsule (100 mg total) by mouth every 12 (twelve) hours, Starting Sun 8/4/2024, Normal      esomeprazole (NexIUM) 40 mg packet Take 40 mg by mouth daily in the early morning, Starting Fri 12/22/2023, Until Thu 6/13/2024, Normal      estrogen, conjugated,-medroxyprogesterone (PREMPRO) 0.45-1.5 MG per tablet Take 1 tablet by mouth daily, Starting Fri 9/20/2024, Normal      fluticasone (FLONASE) 50 mcg/act nasal spray 2 sprays into each nostril daily, Starting Thu 12/28/2023, Normal       hydrocortisone (ANUSOL-HC) 2.5 % rectal cream Apply topically 2 (two) times a day, Starting Wed 9/4/2024, Normal      hydrOXYzine HCL (ATARAX) 10 mg tablet Take 1 tablet (10 mg total) by mouth daily at bedtime, Starting Thu 7/18/2024, Normal      ketoconazole (NIZORAL) 2 % shampoo PRN, Starting Thu 4/14/2022, Historical Med      lamoTRIgine (LaMICtal) 200 MG tablet Take 1 tablet (200 mg total) by mouth daily at bedtime, Starting Tue 8/27/2024, Normal      methylPREDNISolone 4 MG tablet therapy pack Use as directed on package, Normal      mometasone (ELOCON) 0.1 % lotion Starting Thu 9/16/2021, Historical Med      naproxen (NAPROSYN) 500 mg tablet Take 1 tablet (500 mg total) by mouth 2 (two) times a day with meals, Starting Thu 6/13/2024, Normal      !! ondansetron (ZOFRAN) 4 mg tablet Take 1 tablet (4 mg total) by mouth every 8 (eight) hours as needed for nausea or vomiting, Starting Wed 12/27/2023, Normal      PARoxetine (PAXIL) 10 mg tablet Take 1 tablet (10 mg total) by mouth daily, Starting Tue 8/27/2024, Normal      polyethylene glycol (GOLYTELY) 4000 mL solution Take 4,000 mL by mouth once for 1 dose, Starting Thu 4/11/2024, Normal      polyethylene glycol (MIRALAX) 17 g packet Take 17 g by mouth daily, Starting Sun 8/4/2024, Normal      senna-docusate sodium (SENOKOT S) 8.6-50 mg per tablet Take 1 tablet by mouth daily, Starting Sun 8/4/2024, Normal       !! - Potential duplicate medications found. Please discuss with provider.        No discharge procedures on file.  ED SEPSIS DOCUMENTATION   Time reflects when diagnosis was documented in both MDM as applicable and the Disposition within this note       Time User Action Codes Description Comment    9/29/2024  2:38 PM Nirmala Rothman Add [R11.0] Nausea                  Nirmala Rothman MD  09/29/24 9011

## 2024-09-30 LAB
ATRIAL RATE: 108 BPM
P AXIS: 77 DEGREES
PR INTERVAL: 176 MS
QRS AXIS: 82 DEGREES
QRSD INTERVAL: 62 MS
QT INTERVAL: 328 MS
QTC INTERVAL: 439 MS
T WAVE AXIS: 70 DEGREES
VENTRICULAR RATE: 108 BPM

## 2024-09-30 PROCEDURE — 93010 ELECTROCARDIOGRAM REPORT: CPT | Performed by: INTERNAL MEDICINE

## 2024-10-01 ENCOUNTER — TELEPHONE (OUTPATIENT)
Dept: PSYCHIATRY | Facility: CLINIC | Age: 44
End: 2024-10-01

## 2024-10-01 DIAGNOSIS — F41.1 GAD (GENERALIZED ANXIETY DISORDER): ICD-10-CM

## 2024-10-01 DIAGNOSIS — F33.9 EPISODE OF RECURRENT MAJOR DEPRESSIVE DISORDER, UNSPECIFIED DEPRESSION EPISODE SEVERITY (HCC): ICD-10-CM

## 2024-10-01 DIAGNOSIS — L30.8 PRURITIC DERMATITIS: ICD-10-CM

## 2024-10-01 DIAGNOSIS — F31.81 BIPOLAR II DISORDER (HCC): ICD-10-CM

## 2024-10-01 RX ORDER — PAROXETINE 10 MG/1
10 TABLET, FILM COATED ORAL DAILY
Qty: 30 TABLET | Refills: 0 | Status: SHIPPED | OUTPATIENT
Start: 2024-10-01

## 2024-10-01 RX ORDER — DEXTROAMPHETAMINE SACCHARATE, AMPHETAMINE ASPARTATE, DEXTROAMPHETAMINE SULFATE AND AMPHETAMINE SULFATE 5; 5; 5; 5 MG/1; MG/1; MG/1; MG/1
20 TABLET ORAL
Qty: 60 TABLET | Refills: 0 | Status: SHIPPED | OUTPATIENT
Start: 2024-10-01

## 2024-10-01 RX ORDER — ALPRAZOLAM 1 MG
1 TABLET ORAL 4 TIMES DAILY PRN
Qty: 120 TABLET | Refills: 0 | Status: SHIPPED | OUTPATIENT
Start: 2024-10-01

## 2024-10-01 NOTE — TELEPHONE ENCOUNTER
Contacted patient to inform them that their appointment for 10/3/24 at 2:30 pm was cancelled due to the provider being out of office.     Patient was rescheduled: YES [] NO [x]  If yes, when was patient rescheduled for:   If no, when is the patient's next appointment:   Could not LVM to let pt know appt is cancel mailbox is full, pt need to call office to R/S appt   Patient requesting call back to reschedule: YES [] NO []

## 2024-10-01 NOTE — TELEPHONE ENCOUNTER
Medication Refill Request     Name of Medication ALPRAZolam (XANAX) 1 mg tablet,PARoxetine (PAXIL) 10 mg tablet,  amphetamine-dextroamphetamine (ADDERALL, 20MG,) 20 mg tablet   Dose/Frequency   Quantity   Verified pharmacy   [x]  Verified ordering Provider   [x]  Does patient have enough for the next 3 days? Yes [] No [x]  Does patient have a follow-up appointment scheduled? Yes [x] No []   If so when is appointment: 10/3/24

## 2024-10-15 ENCOUNTER — OFFICE VISIT (OUTPATIENT)
Dept: FAMILY MEDICINE CLINIC | Facility: CLINIC | Age: 44
End: 2024-10-15
Payer: COMMERCIAL

## 2024-10-15 VITALS
BODY MASS INDEX: 24.8 KG/M2 | SYSTOLIC BLOOD PRESSURE: 128 MMHG | TEMPERATURE: 97 F | DIASTOLIC BLOOD PRESSURE: 66 MMHG | WEIGHT: 140 LBS | HEIGHT: 63 IN | HEART RATE: 106 BPM | RESPIRATION RATE: 16 BRPM

## 2024-10-15 DIAGNOSIS — J02.9 ACUTE PHARYNGITIS, UNSPECIFIED ETIOLOGY: Primary | ICD-10-CM

## 2024-10-15 DIAGNOSIS — N89.8 VAGINAL ITCHING: ICD-10-CM

## 2024-10-15 LAB
S PYO AG THROAT QL: NEGATIVE
SARS-COV-2 AG UPPER RESP QL IA: NEGATIVE
VALID CONTROL: NORMAL

## 2024-10-15 PROCEDURE — 87880 STREP A ASSAY W/OPTIC: CPT | Performed by: NURSE PRACTITIONER

## 2024-10-15 PROCEDURE — 87811 SARS-COV-2 COVID19 W/OPTIC: CPT | Performed by: NURSE PRACTITIONER

## 2024-10-15 PROCEDURE — 99213 OFFICE O/P EST LOW 20 MIN: CPT | Performed by: NURSE PRACTITIONER

## 2024-10-15 RX ORDER — AMOXICILLIN 875 MG/1
875 TABLET, COATED ORAL 2 TIMES DAILY
Qty: 20 TABLET | Refills: 0 | Status: SHIPPED | OUTPATIENT
Start: 2024-10-15 | End: 2024-10-25

## 2024-10-15 RX ORDER — FLUCONAZOLE 150 MG/1
150 TABLET ORAL ONCE
Qty: 1 TABLET | Refills: 0 | Status: SHIPPED | OUTPATIENT
Start: 2024-10-15 | End: 2024-10-15

## 2024-10-15 NOTE — PATIENT INSTRUCTIONS
Patient Education     Amoxicillin (a moks i AISHA in)   Brand Names: Christiano AG-Amoxicillin; APO-Amoxi; APO-Amoxi Sugar Free [DSC]; Auro-Amoxicillin; DOM-Amoxicillin; JAMP-Amoxicillin; Novamoxin; PMS-Amoxicillin; Polymox; PRO Amox-500; PRO-Amox-250; SIMA-Amoxicillin   What is this drug used for?   It is used to treat bacterial infections.  It may be given to you for other reasons. Talk with the doctor.  What do I need to tell my doctor BEFORE I take this drug?   If you are allergic to this drug; any part of this drug; or any other drugs, foods, or substances. Tell your doctor about the allergy and what signs you had.  If you are allergic to penicillin.  If you have mono.  This is not a list of all drugs or health problems that interact with this drug.  Tell your doctor and pharmacist about all of your drugs (prescription or OTC, natural products, vitamins) and health problems. You must check to make sure that it is safe for you to take this drug with all of your drugs and health problems. Do not start, stop, or change the dose of any drug without checking with your doctor.  What are some things I need to know or do while I take this drug?   Tell all of your health care providers that you take this drug. This includes your doctors, nurses, pharmacists, and dentists.  Have your blood work checked if you are on this drug for a long time. Talk with your doctor.  This drug may affect certain lab tests. Tell all of your health care providers and lab workers that you take this drug.  If you have high blood sugar (diabetes) and test your urine glucose, talk with your doctor to find out which tests are best to use.  If you have phenylketonuria (PKU), talk with your doctor. Some products have phenylalanine.  Do not use longer than you have been told. A second infection may happen.  Change in tooth color to yellow-gray-brown has happened with this drug. Most reports happened in children. Most of the time, the color change got less  or went away with brushing or dental cleaning. If a change of tooth color happens, talk with the doctor.  Birth control pills and other hormone-based birth control may not work as well to prevent pregnancy. Use some other kind of birth control also like a condom when taking this drug.  Tell your doctor if you are pregnant, plan on getting pregnant, or are breast-feeding. You will need to talk about the benefits and risks to you and the baby.  What are some side effects that I need to call my doctor about right away?   WARNING/CAUTION: Even though it may be rare, some people may have very bad and sometimes deadly side effects when taking a drug. Tell your doctor or get medical help right away if you have any of the following signs or symptoms that may be related to a very bad side effect:  Signs of an allergic reaction, like rash; hives; itching; red, swollen, blistered, or peeling skin with or without fever; wheezing; tightness in the chest or throat; trouble breathing, swallowing, or talking; unusual hoarseness; or swelling of the mouth, face, lips, tongue, or throat. Rarely, some allergic reactions have been deadly.  Signs of a type of allergic reaction called drug-induced enterocolitis syndrome, like vomiting within 1 to 4 hours after taking this drug, diarrhea within 24 hours after taking this drug, pale or gray skin, feeling tired or unwell, or signs of low blood pressure like severe dizziness or passing out.  Any unexplained bruising or bleeding.  Fever or chills.  Vaginal itching or discharge.  Diarrhea is common with antibiotics. Rarely, a severe form called C diff-associated diarrhea (CDAD) may happen. Sometimes, this has led to a deadly bowel problem. CDAD may happen during or a few months after taking antibiotics. Call your doctor right away if you have stomach pain, cramps, or very loose, watery, or bloody stools. Check with your doctor before treating diarrhea.  Severe skin reactions may happen with  this drug. These include Hargrove-Jovanny syndrome (SJS), toxic epidermal necrolysis (TEN), and other serious reactions. Sometimes, body organs may also be affected. These reactions can be deadly. Get medical help right away if you have signs like red, swollen, blistered, or peeling skin; red or irritated eyes; sores in your mouth, throat, nose, eyes, genitals, or any areas of skin; fever; chills; body aches; shortness of breath; or swollen glands.  What are some other side effects of this drug?   All drugs may cause side effects. However, many people have no side effects or only have minor side effects. Call your doctor or get medical help if any of these side effects or any other side effects bother you or do not go away:  Diarrhea, upset stomach, or throwing up.  Headache.  These are not all of the side effects that may occur. If you have questions about side effects, call your doctor. Call your doctor for medical advice about side effects.  You may report side effects to your national health agency.  You may report side effects to the FDA at 1-655.112.7404. You may also report side effects at https://www.fda.gov/medwatch.  How is this drug best taken?   Use this drug as ordered by your doctor. Read all information given to you. Follow all instructions closely.  All products:   Take this drug at the start of a meal to help it work the best and lower the chance of upset stomach.  Take this drug at the same time of day.  Keep using this drug as you have been told by your doctor or other health care provider, even if you feel well.  Chewable tablets:   Chew well before swallowing.  Liquid (suspension):   Shake well before use.  Measure liquid doses carefully. Use the measuring device that comes with this drug. If there is none, ask the pharmacist for a device to measure this drug.  May be mixed with formula, milk, water, or other cold drinks. Drink right away after mixing.  What do I do if I miss a dose?   Take a  missed dose as soon as you think about it.  If it is close to the time for your next dose, skip the missed dose and go back to your normal time.  Do not take 2 doses at the same time or extra doses.  How do I store and/or throw out this drug?   Liquid (suspension):   Store liquid (suspension) at room temperature or in a refrigerator. Do not freeze. Throw away any part not used after 2 weeks.  All other products:   Store at room temperature in a dry place. Do not store in a bathroom.  All products:   Keep all drugs in a safe place. Keep all drugs out of the reach of children and pets.  Throw away unused or  drugs. Do not flush down a toilet or pour down a drain unless you are told to do so. Check with your pharmacist if you have questions about the best way to throw out drugs. There may be drug take-back programs in your area.  General drug facts   If your symptoms or health problems do not get better or if they become worse, call your doctor.  Do not share your drugs with others and do not take anyone else's drugs.  Some drugs may have another patient information leaflet. If you have any questions about this drug, please talk with your doctor, nurse, pharmacist, or other health care provider.  Some drugs may have another patient information leaflet. Check with your pharmacist. If you have any questions about this drug, please talk with your doctor, nurse, pharmacist, or other health care provider.  If you think there has been an overdose, call your poison control center or get medical care right away. Be ready to tell or show what was taken, how much, and when it happened.  Consumer Information Use and Disclaimer   This generalized information is a limited summary of diagnosis, treatment, and/or medication information. It is not meant to be comprehensive and should be used as a tool to help the user understand and/or assess potential diagnostic and treatment options. It does NOT include all information about  conditions, treatments, medications, side effects, or risks that may apply to a specific patient. It is not intended to be medical advice or a substitute for the medical advice, diagnosis, or treatment of a health care provider based on the health care provider's examination and assessment of a patient's specific and unique circumstances. Patients must speak with a health care provider for complete information about their health, medical questions, and treatment options, including any risks or benefits regarding use of medications. This information does not endorse any treatments or medications as safe, effective, or approved for treating a specific patient. [employer] and its affiliates disclaim any warranty or liability relating to this information or the use thereof. The use of this information is governed by the Terms of Use, available at https://www.[employer] Solutionsuwer.com/en/know/clinical-effectiveness-terms.  Last Reviewed Date   2024-05-09  Copyright   © 2024 [employer] and its affiliates and/or licensors. All rights reserved.

## 2024-10-15 NOTE — PROGRESS NOTES
"Assessment/Plan:    1. Acute pharyngitis, unspecified etiology  -     amoxicillin (AMOXIL) 875 mg tablet; Take 1 tablet (875 mg total) by mouth 2 (two) times a day for 10 days  -     POCT rapid ANTIGEN strepA  -     POCT Rapid Covid Ag  2. Vaginal itching  -     fluconazole (DIFLUCAN) 150 mg tablet; Take 1 tablet (150 mg total) by mouth once for 1 dose        Patient Instructions:  Take medication with food.  It is important that you take the entire course of antibiotics prescribed.  May also take a probiotic of your choice to maintain healthy GI gagan.    Can take some probiotic and yogurt with the medication.  Supportive care discussed and advised.  Advised to RTO for any worsening and no improvement.   Follow up for no improvement and worsening of conditions.  Patient advised and educated when to see immediate medical care.    Return if symptoms worsen or fail to improve.      Future Appointments   Date Time Provider Department Center   10/22/2024 11:45 AM Ramona Farley, PhD PSYCH [facility]   12/30/2024  3:30 PM Carlton Santana MD OB [facility]   1/15/2025  2:30 PM [address]           Subjective:      Patient ID: Isabel Queen is a 44 y.o. female.    Chief Complaint   Patient presents with    Sinus Problem    Sore Throat     X 2 days  Rachel Harris CMA     Nasal Congestion    Cold Like Symptoms         Vitals:  /66   Pulse (!) 106   Temp (!) 97 °F (36.1 °C)   Resp 16   Ht 5' 3\" (1.6 m)   Wt 63.5 kg (140 lb)   LMP  (LMP Unknown)   BMI 24.80 kg/m²     Patient stated that started with sore throat couple of days and progressed to sinus pressure. Denies fever, chills and sob.      Sinus Problem  Associated symptoms include a sore throat. Pertinent negatives include no chills, congestion, coughing, diaphoresis, ear pain, headaches, shortness of breath, sinus pressure or sneezing.   Sore Throat   Pertinent negatives include no abdominal pain, congestion, coughing, " diarrhea, drooling, ear discharge, ear pain, headaches, shortness of breath, trouble swallowing or vomiting.                     The following portions of the patient's history were reviewed and updated as appropriate: allergies, current medications, past family history, past medical history, past social history, past surgical history and problem list.      Review of Systems   Constitutional:  Negative for chills, diaphoresis, fatigue, fever and unexpected weight change.   HENT:  Positive for sore throat. Negative for congestion, dental problem, drooling, ear discharge, ear pain, facial swelling, hearing loss, mouth sores, nosebleeds, postnasal drip, rhinorrhea, sinus pressure, sinus pain, sneezing, tinnitus, trouble swallowing and voice change.    Respiratory:  Negative for cough, chest tightness, shortness of breath and wheezing.    Cardiovascular: Negative.    Gastrointestinal:  Negative for abdominal pain, constipation, diarrhea, nausea and vomiting.   Musculoskeletal: Negative.    Skin: Negative.    Neurological:  Negative for dizziness, light-headedness and headaches.         Objective:    Social History     Tobacco Use   Smoking Status Former    Current packs/day: 0.00    Average packs/day: 1 pack/day for 19.1 years (19.1 ttl pk-yrs)    Types: Cigarettes    Start date: 5/8/1995    Quit date: 6/15/2014    Years since quitting: 10.3    Passive exposure: Past   Smokeless Tobacco Never       Allergies:   Allergies   Allergen Reactions    Doxycycline Other (See Comments)       stomach  upset         Current Outpatient Medications   Medication Sig Dispense Refill    albuterol (PROVENTIL HFA,VENTOLIN HFA) 90 mcg/act inhaler Inhale 2 puffs every 4 (four) hours as needed for wheezing 8.5 g 2    ALPRAZolam (XANAX) 1 mg tablet Take 1 tablet (1 mg total) by mouth 4 (four) times a day as needed for anxiety 120 tablet 0    amoxicillin (AMOXIL) 875 mg tablet Take 1 tablet (875 mg total) by mouth 2 (two) times a day for 10  days 20 tablet 0    amphetamine-dextroamphetamine (ADDERALL, 20MG,) 20 mg tablet Take 1 tablet (20 mg total) by mouth 2 (two) times a day Max Daily Amount: 40 mg 60 tablet 0    Breo Ellipta 100-25 MCG/ACT inhaler INHALE 1 PUFF BY MOUTHN ONCE DAILY. RINSE MOUTH AFTER USE 60 each 2    Estradiol-Progesterone 0.5-100 MG CAPS Take 1 tablet daily as directed 30 capsule 5    estrogen, conjugated,-medroxyprogesterone (PREMPRO) 0.45-1.5 MG per tablet Take 1 tablet by mouth daily 30 tablet 5    fluconazole (DIFLUCAN) 150 mg tablet Take 1 tablet (150 mg total) by mouth once for 1 dose 1 tablet 0    fluticasone (FLONASE) 50 mcg/act nasal spray 2 sprays into each nostril daily 9.9 mL 0    hydrocortisone (ANUSOL-HC) 2.5 % rectal cream Apply topically 2 (two) times a day 28 g 1    ketoconazole (NIZORAL) 2 % shampoo PRN      lamoTRIgine (LaMICtal) 200 MG tablet Take 1 tablet (200 mg total) by mouth daily at bedtime 30 tablet 0    ondansetron (ZOFRAN) 4 mg tablet Take 1 tablet (4 mg total) by mouth every 8 (eight) hours as needed for nausea or vomiting 20 tablet 0    ondansetron (ZOFRAN) 4 mg tablet Take 1 tablet (4 mg total) by mouth every 6 (six) hours as needed for nausea or vomiting 12 tablet 0    PARoxetine (PAXIL) 10 mg tablet Take 1 tablet (10 mg total) by mouth daily 30 tablet 0    clobetasol (TEMOVATE) 0.05 % cream Apply topically 2 (two) times a day (Patient not taking: Reported on 7/28/2024) 30 g 0    docusate sodium (COLACE) 100 mg capsule Take 1 capsule (100 mg total) by mouth every 12 (twelve) hours (Patient not taking: Reported on 9/19/2024) 60 capsule 0    esomeprazole (NexIUM) 40 mg packet Take 40 mg by mouth daily in the early morning (Patient not taking: Reported on 8/28/2024) 30 each 5    hydrOXYzine HCL (ATARAX) 10 mg tablet Take 1 tablet (10 mg total) by mouth daily at bedtime (Patient not taking: Reported on 7/28/2024) 30 tablet 0    mometasone (ELOCON) 0.1 % lotion  (Patient not taking: Reported on 7/28/2024)       naproxen (NAPROSYN) 500 mg tablet Take 1 tablet (500 mg total) by mouth 2 (two) times a day with meals (Patient not taking: Reported on 7/28/2024) 60 tablet 0    polyethylene glycol (GOLYTELY) 4000 mL solution Take 4,000 mL by mouth once for 1 dose (Patient not taking: Reported on 8/28/2024) 4000 mL 0    polyethylene glycol (MIRALAX) 17 g packet Take 17 g by mouth daily (Patient not taking: Reported on 9/19/2024) 10 each 0    senna-docusate sodium (SENOKOT S) 8.6-50 mg per tablet Take 1 tablet by mouth daily (Patient not taking: Reported on 9/19/2024) 20 tablet 0     No current facility-administered medications for this visit.          Physical Exam  Vitals reviewed.   Constitutional:       Appearance: Normal appearance. She is well-developed.   HENT:      Head: Normocephalic.      Right Ear: Tympanic membrane, ear canal and external ear normal.      Left Ear: Tympanic membrane, ear canal and external ear normal.      Nose: Mucosal edema present.      Right Sinus: No maxillary sinus tenderness or frontal sinus tenderness.      Left Sinus: No maxillary sinus tenderness or frontal sinus tenderness.      Mouth/Throat:      Mouth: No oral lesions.      Pharynx: No oropharyngeal exudate or posterior oropharyngeal erythema.   Cardiovascular:      Rate and Rhythm: Normal rate and regular rhythm.      Heart sounds: Normal heart sounds.   Pulmonary:      Effort: Pulmonary effort is normal.      Breath sounds: Normal breath sounds.   Musculoskeletal:         General: Normal range of motion.      Cervical back: Neck supple.   Lymphadenopathy:      Cervical:      Right cervical: No superficial or posterior cervical adenopathy.     Left cervical: No superficial or posterior cervical adenopathy.   Skin:     General: Skin is warm and dry.   Neurological:      Mental Status: She is alert and oriented to person, place, and time.   Psychiatric:         Behavior: Behavior normal.         Thought Content: Thought content normal.          Judgment: Judgment normal.                     ROSE Raygoza

## 2024-10-22 ENCOUNTER — TELEMEDICINE (OUTPATIENT)
Dept: PSYCHIATRY | Facility: CLINIC | Age: 44
End: 2024-10-22
Payer: COMMERCIAL

## 2024-10-22 ENCOUNTER — TELEPHONE (OUTPATIENT)
Age: 44
End: 2024-10-22

## 2024-10-22 DIAGNOSIS — F31.81 BIPOLAR II DISORDER (HCC): ICD-10-CM

## 2024-10-22 DIAGNOSIS — F90.0 ATTENTION DEFICIT HYPERACTIVITY DISORDER (ADHD), PREDOMINANTLY INATTENTIVE TYPE: Primary | ICD-10-CM

## 2024-10-22 DIAGNOSIS — F41.1 GAD (GENERALIZED ANXIETY DISORDER): ICD-10-CM

## 2024-10-22 PROCEDURE — 90833 PSYTX W PT W E/M 30 MIN: CPT | Performed by: NURSE PRACTITIONER

## 2024-10-22 PROCEDURE — 99214 OFFICE O/P EST MOD 30 MIN: CPT | Performed by: NURSE PRACTITIONER

## 2024-10-22 RX ORDER — DEXTROAMPHETAMINE SACCHARATE, AMPHETAMINE ASPARTATE, DEXTROAMPHETAMINE SULFATE AND AMPHETAMINE SULFATE 5; 5; 5; 5 MG/1; MG/1; MG/1; MG/1
20 TABLET ORAL
Qty: 60 TABLET | Refills: 0 | Status: SHIPPED | OUTPATIENT
Start: 2024-10-22

## 2024-10-22 RX ORDER — LAMOTRIGINE 200 MG/1
200 TABLET ORAL
Qty: 30 TABLET | Refills: 3 | Status: SHIPPED | OUTPATIENT
Start: 2024-10-22

## 2024-10-22 RX ORDER — ALPRAZOLAM 1 MG/1
1 TABLET ORAL 4 TIMES DAILY PRN
Qty: 120 TABLET | Refills: 0 | Status: SHIPPED | OUTPATIENT
Start: 2024-10-22
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
## 2024-11-04 NOTE — ASSESSMENT & PLAN NOTE
Lamictal 200 mg is effective to maintain mood stabilization. Denies side effects, mood swings or hypomania. Situational depression is experienced. Denies SI or HI.

## 2024-12-03 DIAGNOSIS — F41.1 GAD (GENERALIZED ANXIETY DISORDER): ICD-10-CM

## 2024-12-03 DIAGNOSIS — F31.81 BIPOLAR II DISORDER (HCC): ICD-10-CM

## 2024-12-03 DIAGNOSIS — F33.9 EPISODE OF RECURRENT MAJOR DEPRESSIVE DISORDER, UNSPECIFIED DEPRESSION EPISODE SEVERITY (HCC): ICD-10-CM

## 2024-12-03 RX ORDER — DEXTROAMPHETAMINE SACCHARATE, AMPHETAMINE ASPARTATE, DEXTROAMPHETAMINE SULFATE AND AMPHETAMINE SULFATE 5; 5; 5; 5 MG/1; MG/1; MG/1; MG/1
20 TABLET ORAL
Qty: 60 TABLET | Refills: 0 | Status: SHIPPED | OUTPATIENT
Start: 2024-12-03

## 2024-12-03 RX ORDER — ALPRAZOLAM 1 MG/1
1 TABLET ORAL 4 TIMES DAILY PRN
Qty: 120 TABLET | Refills: 0 | Status: SHIPPED | OUTPATIENT
Start: 2024-12-03

## 2024-12-03 RX ORDER — PAROXETINE 10 MG/1
10 TABLET, FILM COATED ORAL DAILY
Qty: 30 TABLET | Refills: 0 | Status: SHIPPED | OUTPATIENT
Start: 2024-12-03

## 2024-12-03 RX ORDER — LAMOTRIGINE 200 MG/1
200 TABLET ORAL
Qty: 30 TABLET | Refills: 3 | Status: SHIPPED | OUTPATIENT
Start: 2024-12-03

## 2024-12-10 ENCOUNTER — TELEPHONE (OUTPATIENT)
Age: 44
End: 2024-12-10

## 2024-12-10 ENCOUNTER — TELEMEDICINE (OUTPATIENT)
Dept: PSYCHIATRY | Facility: CLINIC | Age: 44
End: 2024-12-10
Payer: COMMERCIAL

## 2024-12-10 DIAGNOSIS — F90.0 ATTENTION DEFICIT HYPERACTIVITY DISORDER (ADHD), PREDOMINANTLY INATTENTIVE TYPE: ICD-10-CM

## 2024-12-10 DIAGNOSIS — G47.00 INSOMNIA, UNSPECIFIED TYPE: ICD-10-CM

## 2024-12-10 DIAGNOSIS — F31.81 BIPOLAR II DISORDER (HCC): ICD-10-CM

## 2024-12-10 DIAGNOSIS — F31.81 BIPOLAR II DISORDER (HCC): Primary | ICD-10-CM

## 2024-12-10 DIAGNOSIS — F41.0 PANIC DISORDER WITHOUT AGORAPHOBIA WITH MODERATE PANIC ATTACKS: Primary | ICD-10-CM

## 2024-12-10 PROCEDURE — 99215 OFFICE O/P EST HI 40 MIN: CPT | Performed by: NURSE PRACTITIONER

## 2024-12-10 PROCEDURE — PBNCHG PB NO CHARGE PLACEHOLDER: Performed by: NURSE PRACTITIONER

## 2024-12-10 PROCEDURE — 90836 PSYTX W PT W E/M 45 MIN: CPT | Performed by: NURSE PRACTITIONER

## 2024-12-10 RX ORDER — FLUCONAZOLE 150 MG/1
TABLET ORAL
COMMUNITY
Start: 2024-10-15

## 2024-12-10 RX ORDER — RAMELTEON 8 MG/1
8 TABLET ORAL
Qty: 30 TABLET | Refills: 3 | Status: SHIPPED | OUTPATIENT
Start: 2024-12-10
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
## 2024-12-10 NOTE — TELEPHONE ENCOUNTER
I left a v/m letting pt know I had some opens with HERLINDA Maldonado as soon as tomorrow. If pt would like to come in she can be scheduled on Monday or Tuesday with Joel as she is in parallel with [facility].

## 2024-12-10 NOTE — PSYCH
No Call. No Show. No Charge    Isabel Queen no showed 12/10/24 appointment , staff called and left message to reschedule appointment     Treatment Plan not due at this session.

## 2024-12-10 NOTE — TELEPHONE ENCOUNTER
Patient called in after missing their virtual medication management appointment this morning, 12/10/24 @10:45am.    Patient stated they did not receive a text message to start the appointment.    Writer stated that with the new protocol the patient would need to log on to their active [employer] account 15 minutes before the appointment to set up and wait in the virtual waiting room for the appointment.    Writer stated they are not home and would need the text link to be sent to [phone]    Writer stated the provider would be made aware of the request.    Patient is rescheduled for today, 12/10/24 @2pm virtually.
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
## 2024-12-27 ENCOUNTER — TELEPHONE (OUTPATIENT)
Age: 44
End: 2024-12-27

## 2024-12-27 DIAGNOSIS — F31.81 BIPOLAR II DISORDER (HCC): ICD-10-CM

## 2024-12-27 DIAGNOSIS — F33.9 EPISODE OF RECURRENT MAJOR DEPRESSIVE DISORDER, UNSPECIFIED DEPRESSION EPISODE SEVERITY (HCC): ICD-10-CM

## 2024-12-27 DIAGNOSIS — F41.1 GAD (GENERALIZED ANXIETY DISORDER): ICD-10-CM

## 2024-12-27 RX ORDER — PAROXETINE 10 MG/1
10 TABLET, FILM COATED ORAL DAILY
Qty: 30 TABLET | Refills: 0 | Status: SHIPPED | OUTPATIENT
Start: 2024-12-27

## 2024-12-27 RX ORDER — LAMOTRIGINE 200 MG/1
200 TABLET ORAL
Qty: 30 TABLET | Refills: 3 | OUTPATIENT
Start: 2024-12-27

## 2024-12-27 NOTE — TELEPHONE ENCOUNTER
Medication Refill Request     Name of Medication ALPRAZolam (XANAX)   Dose/Frequency 90mcg  Quantity 120tablets  Verified pharmacy   [x]  Verified ordering Provider   []  Does patient have enough for the next 3 days? Yes [x] No []  Does patient have a follow-up appointment scheduled? Yes [x] No []   If so when is appointment: 3/3 at 8:30am    Medication Refill Request     Name of Medication Adderrall   Dose/Frequency 20mg  Quantity 60tablets  Verified pharmacy   [x]  Verified ordering Provider   [x]  Does patient have enough for the next 3 days? Yes [x] No []  Does patient have a follow-up appointment scheduled? Yes [x] No []   If so when is appointment: 3/3/25 at 8:30am    Medication Refill Request     Name of Medication Lamictal  Dose/Frequency 200mg  Quantity 30tablets  Verified pharmacy   [x]  Verified ordering Provider   [x]  Does patient have enough for the next 3 days? Yes [x] No []  Does patient have a follow-up appointment scheduled? Yes [x] No []   If so when is appointment: 3/3/25 at 8:30am    Medication Refill Request     Name of Medication Paxil  Dose/Frequency 10mg  Quantity 30tablets  Verified pharmacy   [x]  Verified ordering Provider   [x]  Does patient have enough for the next 3 days? Yes [x] No []  Does patient have a follow-up appointment scheduled? Yes [x] No []   If so when is appointment: 3/3/25 at 8:30am

## 2024-12-27 NOTE — TELEPHONE ENCOUNTER
Patient called reporting concern about sleeping medication. Reported it is not working and to please inform provider. Please call back to discuss.

## 2024-12-30 ENCOUNTER — ANNUAL EXAM (OUTPATIENT)
Dept: OBGYN CLINIC | Facility: CLINIC | Age: 44
End: 2024-12-30
Payer: COMMERCIAL

## 2024-12-30 VITALS — BODY MASS INDEX: 23.74 KG/M2 | DIASTOLIC BLOOD PRESSURE: 90 MMHG | SYSTOLIC BLOOD PRESSURE: 140 MMHG | WEIGHT: 134 LBS

## 2024-12-30 DIAGNOSIS — N95.2 ATROPHIC VAGINITIS: ICD-10-CM

## 2024-12-30 DIAGNOSIS — Z01.419 WOMEN'S ANNUAL ROUTINE GYNECOLOGICAL EXAMINATION: Primary | ICD-10-CM

## 2024-12-30 DIAGNOSIS — Z12.31 ENCOUNTER FOR SCREENING MAMMOGRAM FOR MALIGNANT NEOPLASM OF BREAST: ICD-10-CM

## 2024-12-30 DIAGNOSIS — Z01.419 PAP SMEAR, AS PART OF ROUTINE GYNECOLOGICAL EXAMINATION: ICD-10-CM

## 2024-12-30 DIAGNOSIS — N95.1 PERIMENOPAUSAL VASOMOTOR SYMPTOMS: ICD-10-CM

## 2024-12-30 PROCEDURE — G0145 SCR C/V CYTO,THINLAYER,RESCR: HCPCS | Performed by: OBSTETRICS & GYNECOLOGY

## 2024-12-30 PROCEDURE — S0612 ANNUAL GYNECOLOGICAL EXAMINA: HCPCS | Performed by: OBSTETRICS & GYNECOLOGY

## 2024-12-30 PROCEDURE — G0476 HPV COMBO ASSAY CA SCREEN: HCPCS | Performed by: OBSTETRICS & GYNECOLOGY

## 2024-12-30 NOTE — TELEPHONE ENCOUNTER
Called Isabel. She said Dr. Farley told her to call with her response to rozerem 8 mg daily at bedtime. She said it hasn't made a difference at all. She did not give more information. She is ok with staff leaving a detailed VM.

## 2024-12-30 NOTE — PATIENT INSTRUCTIONS
Normal gynecological physical examination.  Self-breast examination stressed.  Mammogram ordered.  Discussed regular exercise, healthy diet, importance of vitamin D and calcium supplements.  Discussed importance of sun block use during periods of prolonged sun exposure.  Patient will be seen in 1 year for routine gynecologic and medical examination.  Patient will call office for any problems, concerns, or issues which may arise during the interim.  Refills for estrogen replacement therapy sent to pharmacy via the EMR system at today's visit

## 2024-12-30 NOTE — PROGRESS NOTES
Assessment/Plan:    No problem-specific Assessment & Plan notes found for this encounter.       Diagnoses and all orders for this visit:    Women's annual routine gynecological examination  -     Liquid-based pap, screening    Pap smear, as part of routine gynecological examination    Encounter for screening mammogram for malignant neoplasm of breast    Atrophic vaginitis    Perimenopausal vasomotor symptoms  -     Estradiol-Progesterone 0.5-100 MG CAPS; Take 1 tablet daily as directed          Normal gynecological physical examination.  Self-breast examination stressed.  Mammogram ordered.  Discussed regular exercise, healthy diet, importance of vitamin D and calcium supplements.  Discussed importance of sun block use during periods of prolonged sun exposure.  Patient will be seen in 1 year for routine gynecologic and medical examination.  Patient will call office for any problems, concerns, or issues which may arise during the interim.     Subjective:          HPI    Patient ID: Isabel Queen is a 44 y.o. female who presents today for her annual gynecologic and medical examination    Menstrual status: Patient is postmenopausal and denies any vaginal bleeding on estrogen replacement therapy    Vasomotor symptoms: Vasomotor symptoms successfully treated with estrogen replacement therapy    Patient reports normal appetite    Patient reports normal bowel and bladder habits    Patient denies any significant pelvic or abdominal pain    Patient denies any headaches, chest pain, shortness of breath fever shakes or chills    Patient denies any COVID 19 symptoms including cough or loss of taste or smell    COVID vaccine status: Aware of COVID vaccination protocols    Medical problems: No significant medical problems     Colonoscopy status: Aware of colonoscopy screening protocols    Mammography status: Patient does regular self breast exams and is up-to-date with screening mammography.  Appropriate arrangements for her  annual screening mammogram were placed in the [facility] medical record system at today's visit    The following portions of the patient's history were reviewed and updated as appropriate: allergies, current medications, past family history, past medical history, past social history, past surgical history and problem list.    Review of Systems   Constitutional: Negative.  Negative for appetite change, diaphoresis, fatigue, fever and unexpected weight change.   HENT: Negative.     Eyes: Negative.    Respiratory: Negative.     Cardiovascular: Negative.    Gastrointestinal: Negative.  Negative for abdominal pain, blood in stool, constipation, diarrhea, nausea and vomiting.   Endocrine: Negative.  Negative for cold intolerance and heat intolerance.   Genitourinary: Negative.  Negative for dysuria, frequency, hematuria, urgency, vaginal bleeding, vaginal discharge and vaginal pain.   Musculoskeletal: Negative.    Skin: Negative.    Allergic/Immunologic: Negative.    Neurological: Negative.    Hematological: Negative.  Negative for adenopathy.   Psychiatric/Behavioral: Negative.           Objective:      /90   Wt 60.8 kg (134 lb)   LMP  (LMP Unknown)   BMI 23.74 kg/m²          Physical Exam  Constitutional:       General: She is not in acute distress.     Appearance: Normal appearance. She is well-developed. She is not diaphoretic.   HENT:      Head: Normocephalic and atraumatic.   Eyes:      Pupils: Pupils are equal, round, and reactive to light.   Cardiovascular:      Rate and Rhythm: Normal rate and regular rhythm.      Heart sounds: Normal heart sounds. No murmur heard.     No friction rub. No gallop.   Pulmonary:      Effort: Pulmonary effort is normal.      Breath sounds: Normal breath sounds.   Chest:   Breasts:     Breasts are symmetrical.      Right: No inverted nipple, mass, nipple discharge, skin change or tenderness.      Left: No inverted nipple, mass, nipple discharge, skin change or tenderness.    Abdominal:      General: Bowel sounds are normal.      Palpations: Abdomen is soft.   Genitourinary:     General: Normal vulva.      Exam position: Supine.      Labia:         Right: No rash or lesion.         Left: No rash or lesion.       Urethra: No urethral swelling or urethral lesion.      Vagina: Normal. No vaginal discharge, erythema, tenderness or bleeding.      Cervix: No discharge or friability.      Uterus: Not enlarged and not tender.       Adnexa:         Right: No mass, tenderness or fullness.          Left: No mass, tenderness or fullness.        Rectum: Normal. Guaiac result positive (Bleeding hemorrhoid present).      Comments: Had bleeding hemorrhoid present on Hemoccult  Will be seeing GI for follow-up in the near future  Musculoskeletal:         General: Normal range of motion.      Cervical back: Normal range of motion and neck supple.   Lymphadenopathy:      Cervical: No cervical adenopathy.      Upper Body:      Right upper body: No supraclavicular adenopathy.      Left upper body: No supraclavicular adenopathy.   Skin:     General: Skin is warm and dry.      Findings: No rash.   Neurological:      General: No focal deficit present.      Mental Status: She is alert and oriented to person, place, and time.   Psychiatric:         Mood and Affect: Mood normal.         Speech: Speech normal.         Behavior: Behavior normal.         Thought Content: Thought content normal.         Judgment: Judgment normal.

## 2024-12-31 LAB
HPV HR 12 DNA CVX QL NAA+PROBE: NEGATIVE
HPV16 DNA CVX QL NAA+PROBE: NEGATIVE
HPV18 DNA CVX QL NAA+PROBE: NEGATIVE

## 2024-12-31 RX ORDER — DEXTROAMPHETAMINE SACCHARATE, AMPHETAMINE ASPARTATE, DEXTROAMPHETAMINE SULFATE AND AMPHETAMINE SULFATE 5; 5; 5; 5 MG/1; MG/1; MG/1; MG/1
20 TABLET ORAL
Qty: 60 TABLET | Refills: 0 | Status: SHIPPED | OUTPATIENT
Start: 2024-12-31

## 2024-12-31 RX ORDER — ALPRAZOLAM 1 MG/1
1 TABLET ORAL 4 TIMES DAILY PRN
Qty: 120 TABLET | Refills: 0 | Status: SHIPPED | OUTPATIENT
Start: 2024-12-31

## 2025-01-06 LAB
LAB AP GYN PRIMARY INTERPRETATION: NORMAL
Lab: NORMAL

## 2025-01-28 ENCOUNTER — PREP FOR PROCEDURE (OUTPATIENT)
Dept: GASTROENTEROLOGY | Facility: CLINIC | Age: 45
End: 2025-01-28

## 2025-01-28 ENCOUNTER — TELEPHONE (OUTPATIENT)
Dept: GASTROENTEROLOGY | Facility: CLINIC | Age: 45
End: 2025-01-28

## 2025-01-28 DIAGNOSIS — Z53.8 PROCEDURE NOT CARRIED OUT FOR OTHER REASONS: Primary | ICD-10-CM

## 2025-01-28 DIAGNOSIS — Z12.11 SCREENING FOR COLON CANCER: Primary | ICD-10-CM

## 2025-01-28 NOTE — TELEPHONE ENCOUNTER
Lmm for pt to call back and schedule recall colon . Order in chart . Pt would need extended prep. Ltr to be mailed as well. Sb

## 2025-01-29 DIAGNOSIS — Z53.8 PROCEDURE NOT CARRIED OUT FOR OTHER REASONS: Primary | ICD-10-CM

## 2025-01-29 DIAGNOSIS — F33.9 EPISODE OF RECURRENT MAJOR DEPRESSIVE DISORDER, UNSPECIFIED DEPRESSION EPISODE SEVERITY (HCC): ICD-10-CM

## 2025-01-29 DIAGNOSIS — F31.81 BIPOLAR II DISORDER (HCC): ICD-10-CM

## 2025-01-29 DIAGNOSIS — F41.1 GAD (GENERALIZED ANXIETY DISORDER): ICD-10-CM

## 2025-01-29 RX ORDER — PAROXETINE 10 MG/1
10 TABLET, FILM COATED ORAL DAILY
Qty: 30 TABLET | Refills: 2 | Status: SHIPPED | OUTPATIENT
Start: 2025-01-29 | End: 2025-02-06 | Stop reason: DRUGHIGH

## 2025-01-29 RX ORDER — LAMOTRIGINE 200 MG/1
200 TABLET ORAL
Qty: 30 TABLET | Refills: 2 | Status: SHIPPED | OUTPATIENT
Start: 2025-01-29

## 2025-01-29 NOTE — TELEPHONE ENCOUNTER
Scheduled date of colonoscopy (as of today):2/5/25    Physician performing colonoscopy:Dr Sunshine    Location of colonoscopy:[facility]    Bowel prep reviewed with patient:2 day golytely    Instructions reviewed with patient by:prep instructions sent via email    Clearances: N/A

## 2025-01-29 NOTE — TELEPHONE ENCOUNTER
25  Screened by: Gunjan Castañeda    Referring Provider Dr Sunshine    Pre- Screenin' 3 121 BMI 21.4    Has patient been referred for a routine screening Colonoscopy? no  Is the patient between 45-75 years old? no      Previous Colonoscopy yes   If yes:    Date: 24    Facility:     Reason:         Does the patient want to see a Gastroenterologist prior to their procedure OR are they having any GI symptoms? no    Has the patient been hospitalized or had abdominal surgery in the past 6 months? no    Does the patient use supplemental oxygen? no    Does the patient take Coumadin, Lovenox, Plavix, Elliquis, Xarelto, or other blood thinning medication? no    Has the patient had a stroke, cardiac event, or stent placed in the past year? no      If patient is between 45yrs - 49yrs, please advise patient that we will have to confirm benefits & coverage with their insurance company for a routine screening colonoscopy.     The pt. C/o wheezing and short of breath on a continuous basis x 6 mos. Reyna Smaller. Cough of white sputum    Smokes 1 pack every couple of days. Zhane Gonzalez presents today for   Chief Complaint   Patient presents with    Cough     NP [facility],  CXR 11/22, PFT [facility] 12/16, Echo 12/12    Breathing Problem       Is someone accompanying this pt? No    Is the patient using any DME equipment during OV? No   -DME Company N/A    Depression Screening:  No flowsheet data found. Learning Assessment:  completed    Abuse Screening:  Alcoholism which she ceased 15 yrs. ago    Fall Risk  The pt. Is ambulatory and denies falls  Coordination of Care:  1. Have you been to the ER, urgent care clinic since your last visit? Hospitalized since your last visit? No    2. Have you seen or consulted any other health care providers outside of the [address] [place] [address] since your last visit? Psychology    Medication variance in dosage/sig per patient as follows: Per Med. Rec.

## 2025-01-29 NOTE — TELEPHONE ENCOUNTER
Medication Refill Request     Name of Medication Paxil  Dose/Frequency 10mg, Take 1 tablet (10 mg total) by mouth daily   Quantity 30 tablet  Verified pharmacy   [x]  Verified ordering Provider   [x]  Does patient have enough for the next 3 days? Yes [] No [x]  Does patient have a follow-up appointment scheduled? Yes [x] No []   If so when is appointment: 3/3/25 @8:30 virtual    Medication Refill Request     Name of Medication Lamictal  Dose/Frequency 200mg, Take 1 tablet (200 mg total) by mouth daily at bedtime   Quantity 30 tablet  Verified pharmacy   [x]  Verified ordering Provider   [x]  Does patient have enough for the next 3 days? Yes [] No [x]  Does patient have a follow-up appointment scheduled? Yes [x] No []   If so when is appointment: 3/3/25 @8:30 virtually    Medication Refill Request     Name of Medication Adderall  Dose/Frequency 20mg, Take 1 tablet (20 mg total) by mouth 2 (two) times a day   Quantity 60 tablet  Verified pharmacy   [x]  Verified ordering Provider   [x]  Does patient have enough for the next 3 days? Yes [] No [x]  Does patient have a follow-up appointment scheduled? Yes [x] No []   If so when is appointment: 3/3/25 @8:30 virtually    Medication Refill Request     Name of Medication Xanax  Dose/Frequency 1mg, Take 1 tablet (1 mg total) by mouth 4 (four) times a day as needed for anxiety   Quantity 120 tablet  Verified pharmacy   [x]  Verified ordering Provider   [x]  Does patient have enough for the next 3 days? Yes [] No [x]  Does patient have a follow-up appointment scheduled? Yes [x] No []   If so when is appointment: 3/3/25 @8:30 virtually

## 2025-01-30 RX ORDER — ALPRAZOLAM 1 MG/1
1 TABLET ORAL 4 TIMES DAILY PRN
Qty: 120 TABLET | Refills: 0 | Status: SHIPPED | OUTPATIENT
Start: 2025-01-30

## 2025-01-30 RX ORDER — DEXTROAMPHETAMINE SACCHARATE, AMPHETAMINE ASPARTATE, DEXTROAMPHETAMINE SULFATE AND AMPHETAMINE SULFATE 5; 5; 5; 5 MG/1; MG/1; MG/1; MG/1
20 TABLET ORAL
Qty: 60 TABLET | Refills: 0 | Status: SHIPPED | OUTPATIENT
Start: 2025-01-30

## 2025-01-31 ENCOUNTER — ANESTHESIA EVENT (OUTPATIENT)
Dept: ANESTHESIOLOGY | Facility: HOSPITAL | Age: 45
End: 2025-01-31

## 2025-01-31 ENCOUNTER — ANESTHESIA (OUTPATIENT)
Dept: ANESTHESIOLOGY | Facility: HOSPITAL | Age: 45
End: 2025-01-31

## 2025-02-04 RX ORDER — SODIUM CHLORIDE, SODIUM LACTATE, POTASSIUM CHLORIDE, CALCIUM CHLORIDE 600; 310; 30; 20 MG/100ML; MG/100ML; MG/100ML; MG/100ML
125 INJECTION, SOLUTION INTRAVENOUS CONTINUOUS
OUTPATIENT
Start: 2025-02-04

## 2025-02-06 ENCOUNTER — TELEPHONE (OUTPATIENT)
Age: 45
End: 2025-02-06

## 2025-02-06 DIAGNOSIS — F41.1 GENERALIZED ANXIETY DISORDER: Primary | ICD-10-CM

## 2025-02-06 RX ORDER — PAROXETINE 20 MG/1
20 TABLET, FILM COATED ORAL DAILY
Qty: 30 TABLET | Refills: 3 | Status: SHIPPED | OUTPATIENT
Start: 2025-02-06

## 2025-02-06 NOTE — TELEPHONE ENCOUNTER
Patient returning call to Dr. Farley. Please call patient back when available. Pt just missed the call.

## 2025-02-06 NOTE — TELEPHONE ENCOUNTER
Pt called requesting callback from provider, feeling anxiety. Writer transferred to nurse line or further assistance.

## 2025-02-06 NOTE — PROGRESS NOTES
Increased anxiety Paxil increased to 20 mg. Marital conflict, she is going to therapy and eventually they will have couples therapy.

## 2025-02-06 NOTE — TELEPHONE ENCOUNTER
Spoke with Isabel. She said her anxiety has been very bad lately to the point she feels like she can't breath. She is utilizing her xanax as prescribed with little relief. She said she has a lot of stuff going on right now which is adding to her anxiety. She last took a xanax around noon. I also told her she could try taking a really cold shower. Her next appt is 3/3. I transferred her to clerical to see if Dr. Farley has anything sooner. She also was looking for a call back from provider directly.

## 2025-02-11 ENCOUNTER — TELEPHONE (OUTPATIENT)
Age: 45
End: 2025-02-11

## 2025-02-11 NOTE — TELEPHONE ENCOUNTER
Left message on machine for patient to call office back. There is an open appointment at 4pm today with Dr Lombardi at this moment. If patient returns call, please check if spot is still available and offer to patient it if it not taken.     Merced Figueroa LPN

## 2025-02-11 NOTE — TELEPHONE ENCOUNTER
Patient called stating she thinks she has the flu.  Symptoms:  Fever- 100.9  Congestion  Body aches  Severe nausea    There were no available appts to offer patient.  Pt is asking if something can be called into her pharmacy for her.  Please advise

## 2025-02-17 ENCOUNTER — OFFICE VISIT (OUTPATIENT)
Dept: PSYCHIATRY | Facility: CLINIC | Age: 45
End: 2025-02-17
Payer: COMMERCIAL

## 2025-02-17 VITALS
HEIGHT: 63 IN | BODY MASS INDEX: 23.04 KG/M2 | WEIGHT: 130 LBS | HEART RATE: 104 BPM | SYSTOLIC BLOOD PRESSURE: 136 MMHG | DIASTOLIC BLOOD PRESSURE: 80 MMHG

## 2025-02-17 DIAGNOSIS — F90.0 ATTENTION DEFICIT HYPERACTIVITY DISORDER (ADHD), PREDOMINANTLY INATTENTIVE TYPE: ICD-10-CM

## 2025-02-17 DIAGNOSIS — F31.81 BIPOLAR II DISORDER (HCC): ICD-10-CM

## 2025-02-17 DIAGNOSIS — F41.0 PANIC DISORDER WITHOUT AGORAPHOBIA WITH MODERATE PANIC ATTACKS: ICD-10-CM

## 2025-02-17 DIAGNOSIS — F33.1 MODERATE EPISODE OF RECURRENT MAJOR DEPRESSIVE DISORDER (HCC): Primary | ICD-10-CM

## 2025-02-17 PROCEDURE — 99214 OFFICE O/P EST MOD 30 MIN: CPT | Performed by: NURSE PRACTITIONER

## 2025-02-17 PROCEDURE — 90833 PSYTX W PT W E/M 30 MIN: CPT | Performed by: NURSE PRACTITIONER

## 2025-02-17 RX ORDER — BUPROPION HYDROCHLORIDE 150 MG/1
150 TABLET ORAL DAILY
Qty: 30 TABLET | Refills: 3 | Status: SHIPPED | OUTPATIENT
Start: 2025-02-17

## 2025-02-17 NOTE — ASSESSMENT & PLAN NOTE
Lamictal 200 mg daily at bedtime    Isabel reports basically medication helps monitor her mood keeping it stable.  She is trying to cope with the separation of her .  She has an apartment; however, she is staying at the home more often.  She continues to work out at the gym and compete at events.  She reports worry about her twin sister who just got out of skilled nursing and continues poor behavior in [place].  Discussed enabling sister and how to make her accountable for her actions.  Isabel reports realizing this and will not be quick to help her if things go wrong

## 2025-02-17 NOTE — ASSESSMENT & PLAN NOTE
Adderall 20 mg twice daily    Isabel reports medication is effective to help her focus and concentrate.  Sometimes if she forgets to take the second dose she will not take it if it is too close to evening time.

## 2025-02-17 NOTE — ASSESSMENT & PLAN NOTE
Paxil was stopped by patient due to weight gain. Wellbutrin resumed at 150 mg XL, Xanax 1 mg p.o. 4 times daily as needed for anxiety.  She reports medication is effective to help her anxiety she rarely takes it 4 times a day.    Isabel reports increase in Paxil helped her anxiety however it made her gain weight so she stopped the medication.  Options were discussed and Wellbutrin was decided upon and resumed at 150 mg.  She reports anxiety is better under control where panic attacks occur.  Her appetite is good and she continues to have problems sleeping unless she takes NyQuil.  Rozerem was not effective and she stopped taking it, side effect was increased insomnia.  Contract for controlled substance was signed

## 2025-02-17 NOTE — PSYCH
Visit Time    Visit Start Time: 830  Visit Stop Time: 9:00  Total Visit Duration:  30 minutes    Subjective:     Patient ID: Isabel Queen is a 44 y.o. female.  History of bipolar 2 disorder anxiety and depression ADHD seen for medication management and mood assessment  Assessment & Plan  Moderate episode of recurrent major depressive disorder (HCC)    Orders:    buPROPion (Wellbutrin XL) 150 mg 24 hr tablet; Take 1 tablet (150 mg total) by mouth daily    Bipolar II disorder (HCC)  Lamictal 200 mg daily at bedtime    Isabel reports basically medication helps monitor her mood keeping it stable.  She is trying to cope with the separation of her .  She has an apartment; however, she is staying at the home more often.  She continues to work out at the gym and compete at events.  She reports worry about her twin sister who just got out of long term and continues poor behavior in [place].  Discussed enabling sister and how to make her accountable for her actions.  Isabel reports realizing this and will not be quick to help her if things go wrong         Panic disorder without agoraphobia with moderate panic attacks  Paxil was stopped by patient due to weight gain. Wellbutrin resumed at 150 mg XL, Xanax 1 mg p.o. 4 times daily as needed for anxiety.  She reports medication is effective to help her anxiety she rarely takes it 4 times a day.    Isabel reports increase in Paxil helped her anxiety however it made her gain weight so she stopped the medication.  Options were discussed and Wellbutrin was decided upon and resumed at 150 mg.  She reports anxiety is better under control where panic attacks occur.  Her appetite is good and she continues to have problems sleeping unless she takes NyQuil.  Rozerem was not effective and she stopped taking it, side effect was increased insomnia.  Contract for controlled substance was signed         Attention deficit hyperactivity disorder (ADHD), predominantly inattentive type  Adderall 20  mg twice daily    Isabel reports medication is effective to help her focus and concentrate.  Sometimes if she forgets to take the second dose she will not take it if it is too close to evening time.            HPI ROS Appetite Changes and Sleep: normal appetite and normal energy level    Review Of Systems:     Mood Anxiety depression   Behavior Normal    Thought Content Normal   General Relationship Problems, Emotional Problems, and Sleep Disturbances   Personality Normal   Other Psych Symptoms Normal   Constitutional As Noted in HPI   ENT As Noted in HPI   Cardiovascular As Noted in HPI   Respiratory As Noted in HPI   Gastrointestinal As Noted in HPI   Genitourinary As Noted in HPI   Musculoskeletal As Noted in HPI   Integumentary As Noted in HPI   Neurological As Noted in HPI   Endocrine Normal    Other Symptoms Normal        Laboratory Results:     Substance Abuse History:  Social History     Substance and Sexual Activity   Drug Use No       Family Psychiatric History:   Family History   Problem Relation Age of Onset    Breast cancer Mother     No Known Problems Father     Bipolar disorder Sister     Asthma Sister     No Known Problems Brother     Anxiety disorder Daughter     Depression Daughter     No Known Problems Son        The following portions of the patient's history were reviewed and updated as appropriate: allergies, current medications, past family history, past medical history, past social history, past surgical history, and problem list.    Social History     Socioeconomic History    Marital status: /Civil Union     Spouse name: Not on file    Number of children: 2    Years of education: Not on file    Highest education level: Not on file   Occupational History    Not on file   Tobacco Use    Smoking status: Former     Current packs/day: 0.00     Average packs/day: 1 pack/day for 19.1 years (19.1 ttl pk-yrs)     Types: Cigarettes     Start date: 5/8/1995     Quit date: 6/15/2014     Years since  quitting: 10.6     Passive exposure: Past    Smokeless tobacco: Never   Vaping Use    Vaping status: Every Day    Substances: Nicotine, Flavoring   Substance and Sexual Activity    Alcohol use: Yes     Alcohol/week: 2.0 standard drinks of alcohol     Types: 2 Glasses of wine per week     Comment: occassional - wine 2 x month    Drug use: No    Sexual activity: Yes     Partners: Male     Birth control/protection: Female Sterilization     Comment: ablation   Other Topics Concern    Not on file   Social History Narrative    Not on file     Social Drivers of Health     Financial Resource Strain: Not on file   Food Insecurity: Not on file   Transportation Needs: Not on file   Physical Activity: Not on file   Stress: Not on file   Social Connections: Not on file   Intimate Partner Violence: Not on file   Housing Stability: Not on file     Social History     Social History Narrative    Not on file       Objective:     Mental status:  Appearance calm and cooperative , adequate hygiene and grooming, and good eye contact    Mood anxious   Affect affect appropriate    Speech a normal rate   Thought Processes normal thought processes   Hallucinations no hallucinations present    Thought Content no delusions   Abnormal Thoughts no suicidal thoughts  and no homicidal thoughts    Orientation  oriented to person and place and time   Remote Memory short term memory intact and long term memory intact   Attention Span concentration impaired medication helps   Intellect Appears to be of Average Intelligence   Insight Insight intact   Judgement judgment was intact   Muscle Strength Muscle strength and tone were normal   Language no difficulty naming common objects   Fund of Knowledge displays adequate knowledge of current events   Pain none   Pain Scale 0       Assessment/Plan:       Diagnoses and all orders for this visit:    Moderate episode of recurrent major depressive disorder (HCC)  -     buPROPion (Wellbutrin XL) 150 mg 24 hr  "tablet; Take 1 tablet (150 mg total) by mouth daily    Bipolar II disorder (HCC)    Panic disorder without agoraphobia with moderate panic attacks    Attention deficit hyperactivity disorder (ADHD), predominantly inattentive type          Treatment Recommendations- Risks Benefits      Immediate Medical/Psychiatric/Psychotherapy Treatments and Any Precautions: Continue with treatment plan, resume Wellbutrin due to patient's stopping Paxil    Risks, Benefits And Possible Side Effects Of Medications:  {PSYCH RISK, BENEFITS AND POSSIBLE SIDE EFFECTS (Optional):93232    Controlled Medication Discussion: She is aware of safe use and storage of medication    Psychotherapy Provided: 30 minutes individual psychotherapy provided.   Supportive therapy  Medication evaluation and education  Mood assessment  Treatment plan updated  Normalized and validated her feelings  Safety plan not compiled; however, she is aware of who to call in crisis, [phone] and ED if necessary  Controlled substance contract signed  Depression Follow-up Plan Completed: Yes  Call undersigned  Call [phone]  Go to ED if necessary  Goals discussed in session: Stable mood and good coping mechanisms for anxiety    \"Portions of the record may have been created with voice recognition software. Occasional wrong word or \"sound a like\" substitutions may have occurred due to the inherent limitations of voice recognition software. Read the chart carefully and recognize, using context, where substitutions have occurred. Please call if you have any questions. \"                                       "

## 2025-02-17 NOTE — BH TREATMENT PLAN
[facility]     Name and Date of Birth:  Isabel NAIR Bret 44 y.o. 1980  Date of Treatment Plan: July 21, 2020, October 20, 2020, August 25, 2021, March 30, 2022, November 21, 2022, June 14, 2023, February 26, 2024, August 29, 2024 February 17, 2025  Diagnosis/Diagnoses:    1. Bipolar II disorder (HCC)    2. Panic disorder without agoraphobia with moderate panic attacks    3. Attention deficit hyperactivity disorder (ADHD), predominantly inattentive type       Strengths/Personal Resources for Self-Care: supportive family, taking medications as prescribed, ability to communicate needs, ability to listen, motivation for treatment.  Area/Areas of need (in own words): anxiety symptoms, depressive symptoms  1.         Long Term Goal: improve control of anxiety.  And mood, improve self-care  Target Date: 6 months -8/17/2025  Person/Persons responsible for completion of goal: Isabel and provider  2.         Short Term Objective (s) - How will we reach this goal?:   A. Provider new recommended medication/dosage changes and/or continue medication(s):   B. Create a structure in her day and self care  C. Create realistic expectations.  Target Date: 6 months -8/17/2025  Person/Persons Responsible for Completion of Goal: Isabel and provider  Progress Towards Goals: progressing slowly  Treatment Modality: medication management every 1-3 month  Review due 180 days from date of this plan: 6 months -8/17/2025  Expected length of service: ongoing treatment  My Physician/PA/NP and I have developed this plan together and I agree to work on the goals and objectives. I understand the treatment goals that were developed for my treatment.

## 2025-02-19 ENCOUNTER — HOSPITAL ENCOUNTER (EMERGENCY)
Facility: HOSPITAL | Age: 45
Discharge: HOME | End: 2025-02-19
Payer: COMMERCIAL

## 2025-02-19 VITALS
DIASTOLIC BLOOD PRESSURE: 72 MMHG | OXYGEN SATURATION: 99 % | TEMPERATURE: 97.6 F | HEART RATE: 95 BPM | SYSTOLIC BLOOD PRESSURE: 123 MMHG | RESPIRATION RATE: 18 BRPM

## 2025-02-19 DIAGNOSIS — R09.81 NASAL CONGESTION: ICD-10-CM

## 2025-02-19 DIAGNOSIS — R51.9 ACUTE HEADACHE: Primary | ICD-10-CM

## 2025-02-19 DIAGNOSIS — J02.9 ACUTE PHARYNGITIS: ICD-10-CM

## 2025-02-19 DIAGNOSIS — H60.92 LEFT OTITIS EXTERNA: ICD-10-CM

## 2025-02-19 LAB
FLUAV AG UPPER RESP QL IA.RAPID: NEGATIVE
FLUBV AG UPPER RESP QL IA.RAPID: NEGATIVE
S PYO DNA THROAT QL NAA+PROBE: NOT DETECTED
SARS-COV+SARS-COV-2 AG RESP QL IA.RAPID: NEGATIVE

## 2025-02-19 PROCEDURE — 99284 EMERGENCY DEPT VISIT MOD MDM: CPT

## 2025-02-19 PROCEDURE — 87804 INFLUENZA ASSAY W/OPTIC: CPT

## 2025-02-19 PROCEDURE — 96372 THER/PROPH/DIAG INJ SC/IM: CPT

## 2025-02-19 PROCEDURE — 87811 SARS-COV-2 COVID19 W/OPTIC: CPT

## 2025-02-19 PROCEDURE — 87651 STREP A DNA AMP PROBE: CPT

## 2025-02-19 RX ORDER — NEOMYCIN SULFATE, POLYMYXIN B SULFATE AND HYDROCORTISONE 10; 3.5; 1 MG/ML; MG/ML; [USP'U]/ML
4 SUSPENSION/ DROPS AURICULAR (OTIC) 3 TIMES DAILY
Qty: 10 ML | Refills: 0 | Status: SHIPPED | OUTPATIENT
Start: 2025-02-19 | End: 2025-02-24

## 2025-02-19 RX ORDER — KETOROLAC TROMETHAMINE 30 MG/ML
15 INJECTION, SOLUTION INTRAMUSCULAR; INTRAVENOUS ONCE
Status: COMPLETED | OUTPATIENT
Start: 2025-02-19 | End: 2025-02-19

## 2025-02-19 RX ORDER — FLUTICASONE PROPIONATE 50 MCG
1 SPRAY, SUSPENSION (ML) NASAL DAILY
Qty: 16 G | Refills: 0 | Status: SHIPPED | OUTPATIENT
Start: 2025-02-19

## 2025-02-19 RX ORDER — NEOMYCIN SULFATE, POLYMYXIN B SULFATE AND HYDROCORTISONE 10; 3.5; 1 MG/ML; MG/ML; [USP'U]/ML
4 SUSPENSION/ DROPS AURICULAR (OTIC) ONCE
Status: COMPLETED | OUTPATIENT
Start: 2025-02-19 | End: 2025-02-19

## 2025-02-19 RX ORDER — DEXAMETHASONE 4 MG/1
8 TABLET ORAL ONCE
Status: COMPLETED | OUTPATIENT
Start: 2025-02-19 | End: 2025-02-19

## 2025-02-19 RX ADMIN — DEXAMETHASONE 8 MG: 4 TABLET ORAL at 21:53

## 2025-02-19 RX ADMIN — NEOMYCIN SULFATE, POLYMYXIN B SULFATE AND HYDROCORTISONE 4 DROP: 10; 3.5; 1 SUSPENSION/ DROPS AURICULAR (OTIC) at 21:53

## 2025-02-19 RX ADMIN — KETOROLAC TROMETHAMINE 15 MG: 30 INJECTION, SOLUTION INTRAMUSCULAR; INTRAVENOUS at 21:54

## 2025-02-20 NOTE — ED PROVIDER NOTES
Time reflects when diagnosis was documented in both MDM as applicable and the Disposition within this note       Time User Action Codes Description Comment    2/19/2025 10:05 PM Lm Lee [R51.9] Acute headache     2/19/2025 10:06 PM Lm Lee [H60.92] Left otitis externa     2/19/2025 10:06 PM Lm Lee Add [J02.9] Acute pharyngitis     2/19/2025 10:06 PM Lm Lee [R09.81] Nasal congestion           ED Disposition       ED Disposition   Discharge    Condition   Stable    Date/Time   Wed Feb 19, 2025 10:05 PM    Comment   Isabel Queen discharge to home/self care.                   Assessment & Plan       Medical Decision Making  Risk  Prescription drug management.        43 y/o F presenting with HA, left ear pain, sore throat. Pt was sick a few days ago, was feeling better but now feels unwell again. Left sided headache, earache, sore throat. Denies cp, sob, n/v/d.   VSS  Left external ear tenderness, no mastoid tenderness, canal swollen, TM partially visualized without bulging or erythema  Oropharynx clear  Enlarged left anterior cervical lymph nodes  Likely viral illness with acute left otitis externa  Recommend otic drops, analgesia. RTED precautions discussed, stable for discharge            Medications   ketorolac (TORADOL) injection 15 mg (15 mg Intramuscular Given 2/19/25 2154)   neomycin-polymyxin-hydrocortisone (CORTISPORIN) 0.35%-10,000 units/mL-1% otic suspension 4 drop (4 drops Left Ear Given 2/19/25 2153)   dexamethasone (DECADRON) tablet 8 mg (8 mg Oral Given 2/19/25 2153)       ED Risk Strat Scores                                                History of Present Illness       Chief Complaint   Patient presents with    Headache     last week  son was pos for flu pt  symptomatic a few days ago then started to feel better. Now pt is stating that she is having headache, L ear ache, dizziness and throat tenderness on L side.       Past Medical History:   Diagnosis Date     Abdominal pain     ADHD     Allergic rhinitis     Anxiety     panic attacks    Anxiety     Asthma     Bipolar disorder (HCC)     Bladder distention     came to the ED on 10/9/2020-greene in to drain then removed    Chronic constipation     Colon polyp     Contact lens/glasses fitting     and glasses    Depression     Depression     GERD (gastroesophageal reflux disease)     Psychiatric disorder     anxiety     Rectal bleeding     Sepsis (HCC) 2/8/2017      Past Surgical History:   Procedure Laterality Date    APPENDECTOMY      BAND HEMORRHOIDECTOMY      BREAST SURGERY      augmentation silicone    COLONOSCOPY N/A 01/24/2018    Procedure: COLONOSCOPY WITH HEMORRHOID BANDING;  Surgeon: Ozzy Hammonds MD;  Location: [facility];  Service: Gastroenterology    ENDOMETRIAL ABLATION  2016    TX SIGMOIDOSCOPY FLX DX W/COLLJ SPEC BR/WA IF PFRMD N/A 11/14/2018    Procedure: FLEXIBLE SIGMOIDOSCOPY WITH APC;  Surgeon: Ozzy Hammonds MD;  Location: [facility];  Service: Gastroenterology    UPPER GASTROINTESTINAL ENDOSCOPY        Family History   Problem Relation Age of Onset    Breast cancer Mother     No Known Problems Father     Bipolar disorder Sister     Asthma Sister     No Known Problems Brother     Anxiety disorder Daughter     Depression Daughter     No Known Problems Son       Social History     Tobacco Use    Smoking status: Former     Current packs/day: 0.00     Average packs/day: 1 pack/day for 19.1 years (19.1 ttl pk-yrs)     Types: Cigarettes     Start date: 5/8/1995     Quit date: 6/15/2014     Years since quitting: 10.6     Passive exposure: Past    Smokeless tobacco: Never   Vaping Use    Vaping status: Every Day    Substances: Nicotine, Flavoring   Substance Use Topics    Alcohol use: Yes     Alcohol/week: 2.0 standard drinks of alcohol     Types: 2 Glasses of wine per week     Comment: occassional - wine 2 x month    Drug use: No      E-Cigarette/Vaping    E-Cigarette Use Current Every Day User      Comments uses daily       E-Cigarette/Vaping Substances    Nicotine Yes     THC No     CBD No     Flavoring Yes     Other No     Unknown No       I have reviewed and agree with the history as documented.     HPI  See mdm  Review of Systems   Constitutional:  Negative for chills and fever.   HENT:  Positive for ear pain and sore throat.    Eyes:  Negative for pain and visual disturbance.   Respiratory:  Negative for cough and shortness of breath.    Cardiovascular:  Negative for chest pain and palpitations.   Gastrointestinal:  Negative for abdominal pain and vomiting.   Genitourinary:  Negative for dysuria and hematuria.   Musculoskeletal:  Negative for arthralgias and back pain.   Skin:  Negative for color change and rash.   Neurological:  Positive for headaches. Negative for seizures and syncope.   All other systems reviewed and are negative.          Objective       ED Triage Vitals [02/19/25 2111]   Temperature Pulse Blood Pressure Respirations SpO2 Patient Position - Orthostatic VS   97.6 °F (36.4 °C) 95 123/72 18 99 % --      Temp Source Heart Rate Source BP Location FiO2 (%) Pain Score    Temporal Monitor Right arm -- --      Vitals      Date and Time Temp Pulse SpO2 Resp BP Pain Score FACES Pain Rating User   02/19/25 2111 97.6 °F (36.4 °C) 95 99 % 18 123/72 -- -- CAC            Physical Exam  Vitals and nursing note reviewed.   Constitutional:       General: She is not in acute distress.     Appearance: She is well-developed. She is not ill-appearing.   HENT:      Head: Normocephalic and atraumatic.      Right Ear: Tympanic membrane and external ear normal.      Left Ear: Tenderness present.      Ears:      Comments: L external ear tenderness with canal swelling limiting view of TM. No mastoid tenderness     Mouth/Throat:      Pharynx: Oropharynx is clear. No oropharyngeal exudate or posterior oropharyngeal erythema.   Eyes:      Extraocular Movements: Extraocular movements intact.      Conjunctiva/sclera:  Conjunctivae normal.      Pupils: Pupils are equal, round, and reactive to light.   Cardiovascular:      Rate and Rhythm: Normal rate and regular rhythm.      Pulses: Normal pulses.      Heart sounds: Normal heart sounds. No murmur heard.  Pulmonary:      Effort: Pulmonary effort is normal. No respiratory distress.      Breath sounds: Normal breath sounds.   Abdominal:      Palpations: Abdomen is soft.      Tenderness: There is no abdominal tenderness.   Musculoskeletal:         General: No deformity.      Cervical back: Normal range of motion. No rigidity.   Lymphadenopathy:      Cervical: Cervical adenopathy present.   Skin:     General: Skin is warm and dry.      Capillary Refill: Capillary refill takes less than 2 seconds.   Neurological:      General: No focal deficit present.      Mental Status: She is alert.      Cranial Nerves: No cranial nerve deficit.   Psychiatric:         Mood and Affect: Mood normal.         Results Reviewed       Procedure Component Value Units Date/Time    Strep A PCR [[id number]]  (Normal) Collected: 02/19/25 2130    Lab Status: Final result Specimen: Throat Updated: 02/19/25 2203     STREP A PCR Not Detected    FLU/COVID Rapid Antigen (30 min. TAT) - Preferred screening test in ED [[id number]]  (Normal) Collected: 02/19/25 2130    Lab Status: Final result Specimen: Nares from Nose Updated: 02/19/25 2155     SARS COV Rapid Antigen Negative     Influenza A Rapid Antigen Negative     Influenza B Rapid Antigen Negative    Narrative:      This test has been performed using the Quidel Lidya 2 FLU+SARS Antigen test under the Emergency Use Authorization (EUA). This test has been validated by the  and verified by the performing laboratory. The Lidya uses lateral flow immunofluorescent sandwich assay to detect SARS-COV, Influenza A and Influenza B Antigen.     The Quidel Lidya 2 SARS Antigen test does not differentiate between SARS-CoV and SARS-CoV-2.     Negative results are  presumptive and may be confirmed with a molecular assay, if necessary, for patient management. Negative results do not rule out SARS-CoV-2 or influenza infection and should not be used as the sole basis for treatment or patient management decisions. A negative test result may occur if the level of antigen in a sample is below the limit of detection of this test.     Positive results are indicative of the presence of viral antigens, but do not rule out bacterial infection or co-infection with other viruses.     All test results should be used as an adjunct to clinical observations and other information available to the provider.    FOR PEDIATRIC PATIENTS - copy/paste COVID Guidelines URL to browser: [email or web address]            No orders to display       Procedures    ED Medication and Procedure Management   Prior to Admission Medications   Prescriptions Last Dose Informant Patient Reported? Taking?   ALPRAZolam (XANAX) 1 mg tablet   No No   Sig: Take 1 tablet (1 mg total) by mouth 4 (four) times a day as needed for anxiety   Breo Ellipta 100-25 MCG/ACT inhaler   No No   Sig: INHALE 1 PUFF BY MOUTHN ONCE DAILY. RINSE MOUTH AFTER USE   Estradiol-Progesterone 0.5-100 MG CAPS   No No   Sig: Take 1 tablet daily as directed   albuterol (PROVENTIL HFA,VENTOLIN HFA) 90 mcg/act inhaler   No No   Sig: Inhale 2 puffs every 4 (four) hours as needed for wheezing   amphetamine-dextroamphetamine (ADDERALL, 20MG,) 20 mg tablet   No No   Sig: Take 1 tablet (20 mg total) by mouth 2 (two) times a day Max Daily Amount: 40 mg   buPROPion (Wellbutrin XL) 150 mg 24 hr tablet   No No   Sig: Take 1 tablet (150 mg total) by mouth daily   estrogen, conjugated,-medroxyprogesterone (PREMPRO) 0.45-1.5 MG per tablet   No No   Sig: Take 1 tablet by mouth daily   fluconazole (DIFLUCAN) 150 mg tablet   Yes No   fluticasone (FLONASE) 50 mcg/act nasal spray  Self No No   Si sprays into each  nostril daily   hydrocortisone (ANUSOL-HC) 2.5 % rectal cream   No No   Sig: Apply topically 2 (two) times a day   ketoconazole (NIZORAL) 2 % shampoo  Self Yes No   Sig: PRN   lamoTRIgine (LaMICtal) 200 MG tablet   No No   Sig: Take 1 tablet (200 mg total) by mouth daily at bedtime   ondansetron (ZOFRAN) 4 mg tablet   No No   Sig: Take 1 tablet (4 mg total) by mouth every 6 (six) hours as needed for nausea or vomiting   polyethylene glycol (GOLYTELY) 4000 mL solution   No No   Sig: Take 4,000 mL by mouth once for 1 dose      Facility-Administered Medications: None     Discharge Medication List as of 2/19/2025 10:17 PM        START taking these medications    Details   !! fluticasone (FLONASE) 50 mcg/act nasal spray 1 spray into each nostril daily, Starting Wed 2/19/2025, Normal      neomycin-polymyxin-hydrocortisone (CORTISPORIN) 0.35%-10,000 units/mL-1% otic suspension Administer 4 drops into ears 3 (three) times a day for 5 days, Starting Wed 2/19/2025, Until Mon 2/24/2025, Normal       !! - Potential duplicate medications found. Please discuss with provider.        CONTINUE these medications which have NOT CHANGED    Details   albuterol (PROVENTIL HFA,VENTOLIN HFA) 90 mcg/act inhaler Inhale 2 puffs every 4 (four) hours as needed for wheezing, Starting Wed 8/28/2024, Normal      ALPRAZolam (XANAX) 1 mg tablet Take 1 tablet (1 mg total) by mouth 4 (four) times a day as needed for anxiety, Starting Thu 1/30/2025, Normal      amphetamine-dextroamphetamine (ADDERALL, 20MG,) 20 mg tablet Take 1 tablet (20 mg total) by mouth 2 (two) times a day Max Daily Amount: 40 mg, Starting Thu 1/30/2025, Normal      Breo Ellipta 100-25 MCG/ACT inhaler INHALE 1 PUFF BY MOUTHN ONCE DAILY. RINSE MOUTH AFTER USE, Normal      buPROPion (Wellbutrin XL) 150 mg 24 hr tablet Take 1 tablet (150 mg total) by mouth daily, Starting Mon 2/17/2025, Normal      Estradiol-Progesterone 0.5-100 MG CAPS Take 1 tablet daily as directed, Normal       estrogen, conjugated,-medroxyprogesterone (PREMPRO) 0.45-1.5 MG per tablet Take 1 tablet by mouth daily, Starting Fri 9/20/2024, Normal      fluconazole (DIFLUCAN) 150 mg tablet Historical Med      !! fluticasone (FLONASE) 50 mcg/act nasal spray 2 sprays into each nostril daily, Starting Thu 12/28/2023, Normal      hydrocortisone (ANUSOL-HC) 2.5 % rectal cream Apply topically 2 (two) times a day, Starting Wed 9/4/2024, Normal      ketoconazole (NIZORAL) 2 % shampoo PRN, Starting Thu 4/14/2022, Historical Med      lamoTRIgine (LaMICtal) 200 MG tablet Take 1 tablet (200 mg total) by mouth daily at bedtime, Starting Wed 1/29/2025, Normal      ondansetron (ZOFRAN) 4 mg tablet Take 1 tablet (4 mg total) by mouth every 6 (six) hours as needed for nausea or vomiting, Starting Sun 9/29/2024, Normal      polyethylene glycol (GOLYTELY) 4000 mL solution Take 4,000 mL by mouth once for 1 dose, Starting Wed 1/29/2025, Normal       !! - Potential duplicate medications found. Please discuss with provider.        No discharge procedures on file.  ED SEPSIS DOCUMENTATION   Time reflects when diagnosis was documented in both MDM as applicable and the Disposition within this note       Time User Action Codes Description Comment    2/19/2025 10:05 PM Lm Lee [R51.9] Acute headache     2/19/2025 10:06 PM Lm Lee [H60.92] Left otitis externa     2/19/2025 10:06 PM Lm Lee [J02.9] Acute pharyngitis     2/19/2025 10:06 PM Lm Lee [R09.81] Nasal congestion                  Lm Lee MD  02/20/25 3620

## 2025-02-27 DIAGNOSIS — F31.81 BIPOLAR II DISORDER (HCC): ICD-10-CM

## 2025-02-27 DIAGNOSIS — F41.1 GAD (GENERALIZED ANXIETY DISORDER): ICD-10-CM

## 2025-02-27 NOTE — TELEPHONE ENCOUNTER
Medication Refill Request     Name of Medication     ALPRAZolam (XANAX) 1 mg tablet     Dose/Frequency     Take 1 tablet (1 mg total) by mouth 4 (four) times a day as needed for anxiety     Quantity 120    Verified pharmacy   [x]  [facility] [id number] [place] [address]     Verified ordering Provider   [x]    Does patient have enough for the next 3 days? Yes [x] No []    Does patient have a follow-up appointment scheduled? Yes [x] No []     If so when is appointment: 3.3.25 @ 8:30AM      Medication Refill Request     Name of Medication amphetamine-dextroamphetamine (ADDERALL, 20MG,) 20 mg tablet     Dose/Frequency     Take 1 tablet (20 mg total) by mouth 2 (two) times a day Max Daily Amount: 40 mg     Quantity 60    Verified pharmacy   [x]  [facility] [id number] [place] [address]     Verified ordering Provider   [x]    Does patient have enough for the next 3 days? Yes [x] No []    Does patient have a follow-up   appointment scheduled? Yes [x] No []     If so when is appointment: 3.3.25 @ 8:30AM

## 2025-02-28 NOTE — TELEPHONE ENCOUNTER
Patient called in to advise that the refill has not been received at the pharmacy.    Patient is requesting  today.    Writer advised the patient that they would route the message as high priority to the provider.    Please review, thank you.

## 2025-03-03 ENCOUNTER — TELEMEDICINE (OUTPATIENT)
Dept: PSYCHIATRY | Facility: CLINIC | Age: 45
End: 2025-03-03

## 2025-03-03 DIAGNOSIS — Z91.199 NO-SHOW FOR APPOINTMENT: Primary | ICD-10-CM

## 2025-03-03 PROCEDURE — NOSHOW: Performed by: NURSE PRACTITIONER

## 2025-03-03 RX ORDER — DEXTROAMPHETAMINE SACCHARATE, AMPHETAMINE ASPARTATE, DEXTROAMPHETAMINE SULFATE AND AMPHETAMINE SULFATE 5; 5; 5; 5 MG/1; MG/1; MG/1; MG/1
20 TABLET ORAL
Qty: 60 TABLET | Refills: 0 | Status: SHIPPED | OUTPATIENT
Start: 2025-03-03

## 2025-03-03 RX ORDER — ALPRAZOLAM 1 MG/1
1 TABLET ORAL 4 TIMES DAILY PRN
Qty: 120 TABLET | Refills: 0 | Status: SHIPPED | OUTPATIENT
Start: 2025-03-03

## 2025-03-03 NOTE — PSYCH
No Call. No Show. Charge    Isabel Queen no showed 03/03/25 appointment , staff called and left message to reschedule appointment     Treatment Plan not due at this session.

## 2025-04-01 DIAGNOSIS — F31.81 BIPOLAR II DISORDER (HCC): ICD-10-CM

## 2025-04-01 DIAGNOSIS — F41.1 GAD (GENERALIZED ANXIETY DISORDER): ICD-10-CM

## 2025-04-01 DIAGNOSIS — F33.1 MODERATE EPISODE OF RECURRENT MAJOR DEPRESSIVE DISORDER (HCC): ICD-10-CM

## 2025-04-01 NOTE — TELEPHONE ENCOUNTER
Medication Refill Request     Name of Medication ALPRAZolam (XANAX) 1 mg tablet, buPROPion (Wellbutrin XL) 150 mg 24 hr tablet, amphetamine-dextroamphetamine (ADDERALL, 20MG,) 20 mg tablet,     lamoTRIgine (LaMICtal) 200 MG tablet     Dose/Frequency   Quantity   Verified pharmacy   [x]  Verified ordering Provider   [x]  Does patient have enough for the next 3 days? Yes [] No [x]  Does patient have a follow-up appointment scheduled? Yes [x] No []   If so when is appointment: 6/2/25

## 2025-04-02 RX ORDER — ALPRAZOLAM 1 MG/1
1 TABLET ORAL 4 TIMES DAILY PRN
Qty: 120 TABLET | Refills: 0 | Status: SHIPPED | OUTPATIENT
Start: 2025-04-02

## 2025-04-02 RX ORDER — BUPROPION HYDROCHLORIDE 150 MG/1
150 TABLET ORAL DAILY
Qty: 30 TABLET | Refills: 3 | Status: SHIPPED | OUTPATIENT
Start: 2025-04-02

## 2025-04-02 RX ORDER — DEXTROAMPHETAMINE SACCHARATE, AMPHETAMINE ASPARTATE, DEXTROAMPHETAMINE SULFATE AND AMPHETAMINE SULFATE 5; 5; 5; 5 MG/1; MG/1; MG/1; MG/1
20 TABLET ORAL
Qty: 60 TABLET | Refills: 0 | Status: SHIPPED | OUTPATIENT
Start: 2025-04-02

## 2025-04-02 RX ORDER — LAMOTRIGINE 200 MG/1
200 TABLET ORAL
Qty: 30 TABLET | Refills: 2 | Status: SHIPPED | OUTPATIENT
Start: 2025-04-02

## 2025-05-02 ENCOUNTER — HOSPITAL ENCOUNTER (EMERGENCY)
Facility: HOSPITAL | Age: 45
Discharge: HOME | End: 2025-05-02
Attending: EMERGENCY MEDICINE
Payer: COMMERCIAL

## 2025-05-02 VITALS
RESPIRATION RATE: 18 BRPM | OXYGEN SATURATION: 98 % | DIASTOLIC BLOOD PRESSURE: 64 MMHG | SYSTOLIC BLOOD PRESSURE: 128 MMHG | HEART RATE: 80 BPM | TEMPERATURE: 97.7 F

## 2025-05-02 DIAGNOSIS — F41.1 GAD (GENERALIZED ANXIETY DISORDER): ICD-10-CM

## 2025-05-02 DIAGNOSIS — R51.9 SINUS HEADACHE: Primary | ICD-10-CM

## 2025-05-02 DIAGNOSIS — F31.81 BIPOLAR II DISORDER (HCC): ICD-10-CM

## 2025-05-02 LAB
ANION GAP SERPL CALCULATED.3IONS-SCNC: 9 MMOL/L (ref 4–13)
BASOPHILS # BLD AUTO: 0.06 THOUSANDS/ÂΜL (ref 0–0.1)
BASOPHILS NFR BLD AUTO: 1 % (ref 0–1)
BUN SERPL-MCNC: 21 MG/DL (ref 5–25)
CALCIUM SERPL-MCNC: 8.9 MG/DL (ref 8.4–10.2)
CHLORIDE SERPL-SCNC: 108 MMOL/L (ref 96–108)
CO2 SERPL-SCNC: 20 MMOL/L (ref 21–32)
CREAT SERPL-MCNC: 0.84 MG/DL (ref 0.6–1.3)
EOSINOPHIL # BLD AUTO: 0.11 THOUSAND/ÂΜL (ref 0–0.61)
EOSINOPHIL NFR BLD AUTO: 1 % (ref 0–6)
ERYTHROCYTE [DISTWIDTH] IN BLOOD BY AUTOMATED COUNT: 13.1 % (ref 11.6–15.1)
GFR SERPL CREATININE-BSD FRML MDRD: 83 ML/MIN/1.73SQ M
GLUCOSE SERPL-MCNC: 81 MG/DL (ref 65–140)
HCG SERPL QL: NEGATIVE
HCT VFR BLD AUTO: 45.7 % (ref 34.8–46.1)
HGB BLD-MCNC: 14.8 G/DL (ref 11.5–15.4)
IMM GRANULOCYTES # BLD AUTO: 0.03 THOUSAND/UL (ref 0–0.2)
IMM GRANULOCYTES NFR BLD AUTO: 0 % (ref 0–2)
LYMPHOCYTES # BLD AUTO: 2.47 THOUSANDS/ÂΜL (ref 0.6–4.47)
LYMPHOCYTES NFR BLD AUTO: 30 % (ref 14–44)
MCH RBC QN AUTO: 33 PG (ref 26.8–34.3)
MCHC RBC AUTO-ENTMCNC: 32.4 G/DL (ref 31.4–37.4)
MCV RBC AUTO: 102 FL (ref 82–98)
MONOCYTES # BLD AUTO: 0.88 THOUSAND/ÂΜL (ref 0.17–1.22)
MONOCYTES NFR BLD AUTO: 11 % (ref 4–12)
NEUTROPHILS # BLD AUTO: 4.83 THOUSANDS/ÂΜL (ref 1.85–7.62)
NEUTS SEG NFR BLD AUTO: 57 % (ref 43–75)
NRBC BLD AUTO-RTO: 0 /100 WBCS
PLATELET # BLD AUTO: 318 THOUSANDS/UL (ref 149–390)
PMV BLD AUTO: 9.3 FL (ref 8.9–12.7)
POTASSIUM SERPL-SCNC: 4.5 MMOL/L (ref 3.5–5.3)
RBC # BLD AUTO: 4.49 MILLION/UL (ref 3.81–5.12)
SODIUM SERPL-SCNC: 137 MMOL/L (ref 135–147)
WBC # BLD AUTO: 8.38 THOUSAND/UL (ref 4.31–10.16)

## 2025-05-02 PROCEDURE — 99284 EMERGENCY DEPT VISIT MOD MDM: CPT | Performed by: EMERGENCY MEDICINE

## 2025-05-02 PROCEDURE — 80048 BASIC METABOLIC PNL TOTAL CA: CPT | Performed by: EMERGENCY MEDICINE

## 2025-05-02 PROCEDURE — 85025 COMPLETE CBC W/AUTO DIFF WBC: CPT | Performed by: EMERGENCY MEDICINE

## 2025-05-02 PROCEDURE — 96361 HYDRATE IV INFUSION ADD-ON: CPT

## 2025-05-02 PROCEDURE — 96375 TX/PRO/DX INJ NEW DRUG ADDON: CPT

## 2025-05-02 PROCEDURE — 36415 COLL VENOUS BLD VENIPUNCTURE: CPT | Performed by: EMERGENCY MEDICINE

## 2025-05-02 PROCEDURE — 84703 CHORIONIC GONADOTROPIN ASSAY: CPT | Performed by: EMERGENCY MEDICINE

## 2025-05-02 PROCEDURE — 99284 EMERGENCY DEPT VISIT MOD MDM: CPT

## 2025-05-02 PROCEDURE — 96365 THER/PROPH/DIAG IV INF INIT: CPT

## 2025-05-02 RX ORDER — MAGNESIUM SULFATE HEPTAHYDRATE 40 MG/ML
2 INJECTION, SOLUTION INTRAVENOUS ONCE
Status: COMPLETED | OUTPATIENT
Start: 2025-05-02 | End: 2025-05-02

## 2025-05-02 RX ORDER — DEXTROAMPHETAMINE SACCHARATE, AMPHETAMINE ASPARTATE, DEXTROAMPHETAMINE SULFATE AND AMPHETAMINE SULFATE 5; 5; 5; 5 MG/1; MG/1; MG/1; MG/1
20 TABLET ORAL
Qty: 60 TABLET | Refills: 0 | Status: SHIPPED | OUTPATIENT
Start: 2025-05-02

## 2025-05-02 RX ORDER — METOCLOPRAMIDE HYDROCHLORIDE 5 MG/ML
10 INJECTION INTRAMUSCULAR; INTRAVENOUS ONCE
Status: COMPLETED | OUTPATIENT
Start: 2025-05-02 | End: 2025-05-02

## 2025-05-02 RX ORDER — DIPHENHYDRAMINE HCL 25 MG
25 TABLET ORAL
Qty: 20 TABLET | Refills: 0 | Status: SHIPPED | OUTPATIENT
Start: 2025-05-02

## 2025-05-02 RX ORDER — DIPHENHYDRAMINE HYDROCHLORIDE 50 MG/ML
25 INJECTION, SOLUTION INTRAMUSCULAR; INTRAVENOUS ONCE
Status: COMPLETED | OUTPATIENT
Start: 2025-05-02 | End: 2025-05-02

## 2025-05-02 RX ORDER — OXYMETAZOLINE HYDROCHLORIDE 0.05 G/100ML
1 SPRAY NASAL 2 TIMES DAILY
Qty: 15 ML | Refills: 0 | Status: SHIPPED | OUTPATIENT
Start: 2025-05-02

## 2025-05-02 RX ORDER — PSEUDOEPHEDRINE HCL 30 MG/1
60 TABLET, FILM COATED ORAL EVERY 8 HOURS PRN
Qty: 14 TABLET | Refills: 0 | Status: SHIPPED | OUTPATIENT
Start: 2025-05-02

## 2025-05-02 RX ORDER — KETOROLAC TROMETHAMINE 30 MG/ML
15 INJECTION, SOLUTION INTRAMUSCULAR; INTRAVENOUS ONCE
Status: COMPLETED | OUTPATIENT
Start: 2025-05-02 | End: 2025-05-02

## 2025-05-02 RX ORDER — NAPROXEN 500 MG/1
500 TABLET ORAL 2 TIMES DAILY WITH MEALS
Qty: 30 TABLET | Refills: 0 | Status: SHIPPED | OUTPATIENT
Start: 2025-05-02

## 2025-05-02 RX ORDER — ALPRAZOLAM 1 MG/1
1 TABLET ORAL 4 TIMES DAILY PRN
Qty: 90 TABLET | Refills: 0 | Status: SHIPPED | OUTPATIENT
Start: 2025-05-02

## 2025-05-02 RX ADMIN — MAGNESIUM SULFATE HEPTAHYDRATE 2 G: 40 INJECTION, SOLUTION INTRAVENOUS at 05:22

## 2025-05-02 RX ADMIN — SODIUM CHLORIDE 1000 ML: 0.9 INJECTION, SOLUTION INTRAVENOUS at 04:53

## 2025-05-02 RX ADMIN — KETOROLAC TROMETHAMINE 15 MG: 30 INJECTION, SOLUTION INTRAMUSCULAR; INTRAVENOUS at 04:54

## 2025-05-02 RX ADMIN — METOCLOPRAMIDE 10 MG: 5 INJECTION, SOLUTION INTRAMUSCULAR; INTRAVENOUS at 04:56

## 2025-05-02 RX ADMIN — DIPHENHYDRAMINE HYDROCHLORIDE 25 MG: 50 INJECTION, SOLUTION INTRAMUSCULAR; INTRAVENOUS at 04:55

## 2025-05-02 NOTE — ED PROVIDER NOTES
Final Diagnosis:  1. Sinus headache        Chief Complaint   Patient presents with    Headache     Pt reports 10/10 headache for past 2 days. Took excedrin migraine 2 hours ago       HPI  Pt pres w/ HA    10/10  2d  Gradual onset  Tried some excedrin migraine    Prior recent imaging  No trauma    No fever/chills    Having sinus dioni from allergies  No purulent dc    EMS additionally reports:     - Previous charting underwent limited review with attention to last ED visits, labs, ekgs, and prior imaging.  Chart review reveals :     Admission on 02/19/2025, Discharged on 02/19/2025   Component Date Value Ref Range Status    SARS COV Rapid Antigen 02/19/2025 Negative  Negative Final    Influenza A Rapid Antigen 02/19/2025 Negative  Negative Final    Influenza B Rapid Antigen 02/19/2025 Negative  Negative Final    STREP A PCR 02/19/2025 Not Detected  Not Detected Final       - No language barrier.   - History obtained from patient    - Discuss patient's care, with patient permission or by chart review, with      PMH:   has a past medical history of Abdominal pain, ADHD, Allergic rhinitis, Anxiety, Anxiety, Asthma, Bipolar disorder ([facility]), Bladder distention, Chronic constipation, Colon polyp, Contact lens/glasses fitting, Depression, Depression, GERD (gastroesophageal reflux disease), Psychiatric disorder, Rectal bleeding, and Sepsis ([facility]) (2/8/2017).    PSH:   has a past surgical history that includes Appendectomy; Colonoscopy (N/A, 01/24/2018); pr sigmoidoscopy flx dx w/collj spec br/wa if pfrmd (N/A, 11/14/2018); Breast surgery; Endometrial ablation (2016); Upper gastrointestinal endoscopy; and Band hemorrhoidectomy.     Social History:  Tobacco Use: Medium Risk (5/2/2025)    Patient History     Smoking Tobacco Use: Former     Smokeless Tobacco Use: Never     Passive Exposure: Past     Alcohol Use: Unknown (4/28/2021)    AUDIT-C     Frequency of Alcohol Consumption: 2-4 times a month     Average Number of Drinks: Not  on file     Frequency of Binge Drinking: Not on file     No illicit use       ROS:  Pertinent positives/negatives: .     Some ROS may be present in the HPI and would take precedent over these standard questions asked below.   Review of Systems   HENT:  Positive for congestion, sinus pressure and sinus pain.    Eyes:  Positive for photophobia.   Neurological:  Positive for headaches.        CONSTITUTIONAL:  No lethargy. No unexpected weight loss. No change in behavior.  EYES:  No pain, redness, or discharge. No loss of vision. No orbital trauma or pain.   ENT:  No tinnitus or decreased hearing. No epistaxis/purulent rhinorrhea. No voice change, airway closing, trismus.   CARDIOVASCULAR:  No chest pain. No skin mottling or pallor. No change in exertional capacity  RESPIRATORY:  No hemoptysis. No paroxysmal nocturnal dyspnea. No stridor. No apnea or bluing.   GASTROINTESTINAL:  No vomiting, diarrhea. No distension. No melena. No hematochezia.   GENITOURINARY:  No nocturia. No hematuria or foul smelling or cloudy urine. No discharge. No sores/adenopathy.   MUSCULOSKELETAL:  No contracture.  No new deformity.   INTEGUMENTARY:  No swelling. No unexpected contusions. No abrasions. No lymphangitis.  NEUROLOGIC:  No meningismus. No new numbness of the extremities. No new focal weakness. No postural instability  PSYCHIATRIC:  No SI HI AVH  HEMATOLOGICAL:  No bleeding. No petechiae. No bruising.  ALLERGIES:  No urticaria. No sudden abd cramping. No stridor.    PE:     Physical exam highlights:   Physical Exam       Vitals:    05/02/25 0431 05/02/25 0500 05/02/25 0600 05/02/25 0630   BP: 141/83 135/85 120/69 128/64   BP Location: Right arm Right arm Right arm Right arm   Pulse: 85 75 81 80   Resp: 14 15 16 18   Temp: 97.7 °F (36.5 °C)      TempSrc: Oral      SpO2: 99% 98% 97% 98%     Vitals reviewed by me.   Nursing note reviewed  Chaperone present for all sensitive exam.  Const: No acute distress. Alert. Nontoxic. Not  diaphoretic.    HEENT: External ears normal. No protrusion drainage swelling. Nose normal. No drainage/traumatic deformity. MM. Mouth with baseline/symmetric movement. No trismus.   Eyes: No squinting. No icterus. No tearing/swelling/drainage. Tracks through the room with normal EOM. Wright intact. Gross acuity intact.   Neck: ROM normal. No rigidity. No meningismus.  Cards: Rate as per vitals Compared to monitor sinus unless documented. Regular Well perfused.  Pulm: Effort and excursion normal. No distress. No audible wheezing/no stridor. Normal resp rate without retraction or change in work of breathing.  Abd: No distension beyond baseline. No fluctuant wave. Patient without peritoneal pain with shifting/bumping the bed.   MSK: ROM normal baseline. No deformity. No contractures from baseline.   Skin: No new rashes visible. Well perfused. No wounds visualized on exposed skin  Neuro: Nonfocal. Baseline. CN grossly intact. Moving all four with coordination. Gait normal. No central ataxia.  Psych: Normal behavior and affect.        A:  - Nursing note reviewed.    Ddx and MDM  Considered diagnoses    Migraine?  Trial cocktail  Feels better    Recent imaging - mass lesion less likely  Will trial tx and see if needs advanced imaging    No red flags for meningitis    Does have sinusitis, infection? Doesn't examine like and duration seems short.  Nasal decong    No trauam to head  Not sudden like SAH/aneurysmal bleed        Dispo decision       My conversation with consultant reveals:        Decision rules:                    ED Course as of 05/03/25 0329   Fri May 02, 2025   0438 Ct from 2023       IMPRESSION:     No acute intracranial abnormality.              My read of the XR/CT scan reveals:     No orders to display       Orders Placed This Encounter   Procedures    hCG, qualitative pregnancy    CBC and differential    Basic metabolic panel     Labs Reviewed   CBC AND DIFFERENTIAL - Abnormal       Result Value Ref  Range Status    WBC 8.38  4.31 - 10.16 Thousand/uL Final    RBC 4.49  3.81 - 5.12 Million/uL Final    Hemoglobin 14.8  11.5 - 15.4 g/dL Final    Hematocrit 45.7  34.8 - 46.1 % Final     (*) 82 - 98 fL Final    MCH 33.0  26.8 - 34.3 pg Final    MCHC 32.4  31.4 - 37.4 g/dL Final    RDW 13.1  11.6 - 15.1 % Final    MPV 9.3  8.9 - 12.7 fL Final    Platelets 318  149 - 390 Thousands/uL Final    nRBC 0  /100 WBCs Final    Segmented % 57  43 - 75 % Final    Immature Grans % 0  0 - 2 % Final    Lymphocytes % 30  14 - 44 % Final    Monocytes % 11  4 - 12 % Final    Eosinophils Relative 1  0 - 6 % Final    Basophils Relative 1  0 - 1 % Final    Absolute Neutrophils 4.83  1.85 - 7.62 Thousands/µL Final    Absolute Immature Grans 0.03  0.00 - 0.20 Thousand/uL Final    Absolute Lymphocytes 2.47  0.60 - 4.47 Thousands/µL Final    Absolute Monocytes 0.88  0.17 - 1.22 Thousand/µL Final    Eosinophils Absolute 0.11  0.00 - 0.61 Thousand/µL Final    Basophils Absolute 0.06  0.00 - 0.10 Thousands/µL Final   BASIC METABOLIC PANEL - Abnormal    Sodium 137  135 - 147 mmol/L Final    Potassium 4.5  3.5 - 5.3 mmol/L Final    Comment: Slightly Hemolyzed:Results may be affected.    Chloride 108  96 - 108 mmol/L Final    CO2 20 (*) 21 - 32 mmol/L Final    ANION GAP 9  4 - 13 mmol/L Final    BUN 21  5 - 25 mg/dL Final    Creatinine 0.84  0.60 - 1.30 mg/dL Final    Comment: Standardized to IDMS reference method    Glucose 81  65 - 140 mg/dL Final    Comment: If the patient is fasting, the ADA then defines impaired fasting glucose as > 100 mg/dL and diabetes as > or equal to 123 mg/dL.    Calcium 8.9  8.4 - 10.2 mg/dL Final    eGFR 83  ml/min/1.73sq m Final    Narrative:     National Kid[employer] Foundation guidelines for Chronic Kidney Disease (CKD):     Stage 1 with normal or high GFR (GFR > 90 mL/min/1.73 square meters)    Stage 2 Mild CKD (GFR = 60-89 mL/min/1.73 square meters)    Stage 3A Moderate CKD (GFR = 45-59 mL/min/1.73  square meters)    Stage 3B Moderate CKD (GFR = 30-44 mL/min/1.73 square meters)    Stage 4 Severe CKD (GFR = 15-29 mL/min/1.73 square meters)    Stage 5 End Stage CKD (GFR <15 mL/min/1.73 square meters)  Note: GFR calculation is accurate only with a steady state creatinine   PREGNANCY TEST (HCG QUALITATIVE) - Normal    Preg, Serum Negative  Negative Final       *Each of these labs was reviewed. Particular standout labs will be noted in the ED Course above     Final Diagnosis:  1. Sinus headache          P:  - hospital tx includes   Medications   ketorolac (TORADOL) injection 15 mg (15 mg Intravenous Given 5/2/25 1544)   metoclopramide (REGLAN) injection 10 mg (10 mg Intravenous Given 5/2/25 5946)   diphenhydrAMINE (BENADRYL) injection 25 mg (25 mg Intravenous Given 5/2/25 0145)   magnesium sulfate 2 g/50 mL IVPB (premix) 2 g (0 g Intravenous Stopped 5/2/25 0622)   sodium chloride 0.9 % bolus 1,000 mL (0 mL Intravenous Stopped 5/2/25 0704)         - disposition  Time reflects when diagnosis was documented in both MDM as applicable and the Disposition within this note       Time User Action Codes Description Comment    5/2/2025  6:54 AM Alvarez Meléndez [G43.909] Migraine headache     5/2/2025  6:54 AM Alvarez Meléndez Remove [G43.909] Migraine headache     5/2/2025  6:54 AM Alvarez Meléndez [R51.9] Sinus headache           ED Disposition       ED Disposition   Discharge    Condition   Stable    Date/Time   Fri May 2, 2025  6:54 AM    Comment   Isabel Queen discharge to home/self care.                   Follow-up Information       Follow up With Specialties Details Why Contact Info    [facility], Nurse Practitioner   [address] [id number] [phone]              - patient will call their PCP to let them know they were in the emergency department. We discuss return precautions and patient is agreeable with plan and aformentioned disposition.       -  additional treatment intended, if consistent with primary provider:  - patient to follow with :      Discharge Medication List as of 5/2/2025  6:55 AM        START taking these medications    Details   diphenhydrAMINE (BENADRYL) 25 mg tablet Take 1 tablet (25 mg total) by mouth daily at bedtime as needed for allergies, Starting Fri 5/2/2025, Normal      naproxen (Naprosyn) 500 mg tablet Take 1 tablet (500 mg total) by mouth 2 (two) times a day with meals, Starting Fri 5/2/2025, Normal      oxymetazoline (AFRIN) 0.05 % nasal spray 1 spray by Each Nare route 2 (two) times a day Don't use longer than 3 days, Starting Fri 5/2/2025, Normal      pseudoephedrine (SUDAFED) 30 mg tablet Take 2 tablets (60 mg total) by mouth every 8 (eight) hours as needed for congestion, Starting Fri 5/2/2025, Normal           CONTINUE these medications which have NOT CHANGED    Details   albuterol (PROVENTIL HFA,VENTOLIN HFA) 90 mcg/act inhaler Inhale 2 puffs every 4 (four) hours as needed for wheezing, Starting Wed 8/28/2024, Normal      Breo Ellipta 100-25 MCG/ACT inhaler INHALE 1 PUFF BY MOUTHN ONCE DAILY. RINSE MOUTH AFTER USE, Normal      buPROPion (Wellbutrin XL) 150 mg 24 hr tablet Take 1 tablet (150 mg total) by mouth daily, Starting Wed 4/2/2025, Normal      Estradiol-Progesterone 0.5-100 MG CAPS Take 1 tablet daily as directed, Normal      estrogen, conjugated,-medroxyprogesterone (PREMPRO) 0.45-1.5 MG per tablet Take 1 tablet by mouth daily, Starting Fri 9/20/2024, Normal      fluconazole (DIFLUCAN) 150 mg tablet Historical Med      !! fluticasone (FLONASE) 50 mcg/act nasal spray 2 sprays into each nostril daily, Starting Thu 12/28/2023, Normal      !! fluticasone (FLONASE) 50 mcg/act nasal spray 1 spray into each nostril daily, Starting Wed 2/19/2025, Normal      hydrocortisone (ANUSOL-HC) 2.5 % rectal cream Apply topically 2 (two) times a day, Starting Wed 9/4/2024, Normal      ketoconazole (NIZORAL) 2 % shampoo PRN, Starting  Thu 2022, Historical Med      lamoTRIgine (LaMICtal) 200 MG tablet Take 1 tablet (200 mg total) by mouth daily at bedtime, Starting 2025, Normal      neomycin-polymyxin-hydrocortisone (CORTISPORIN) 0.35%-10,000 units/mL-1% otic suspension Administer 4 drops into ears 3 (three) times a day for 5 days, Starting 2025, Until 2025, Normal      ondansetron (ZOFRAN) 4 mg tablet Take 1 tablet (4 mg total) by mouth every 6 (six) hours as needed for nausea or vomiting, Starting Sun 2024, Normal      polyethylene glycol (GOLYTELY) 4000 mL solution Take 4,000 mL by mouth once for 1 dose, Starting 2025, Normal      ALPRAZolam (XANAX) 1 mg tablet Take 1 tablet (1 mg total) by mouth 4 (four) times a day as needed for anxiety, Starting 2025, Normal      amphetamine-dextroamphetamine (ADDERALL, 20MG,) 20 mg tablet Take 1 tablet (20 mg total) by mouth 2 (two) times a day Max Daily Amount: 40 mg, Starting 2025, Normal       !! - Potential duplicate medications found. Please discuss with provider.        No discharge procedures on file.  Prior to Admission Medications   Prescriptions Last Dose Informant Patient Reported? Taking?   Breo Ellipta 100-25 MCG/ACT inhaler   No No   Sig: INHALE 1 PUFF BY MOUTHN ONCE DAILY. RINSE MOUTH AFTER USE   Estradiol-Progesterone 0.5-100 MG CAPS   No No   Sig: Take 1 tablet daily as directed   albuterol (PROVENTIL HFA,VENTOLIN HFA) 90 mcg/act inhaler   No No   Sig: Inhale 2 puffs every 4 (four) hours as needed for wheezing   buPROPion (Wellbutrin XL) 150 mg 24 hr tablet   No No   Sig: Take 1 tablet (150 mg total) by mouth daily   estrogen, conjugated,-medroxyprogesterone (PREMPRO) 0.45-1.5 MG per tablet   No No   Sig: Take 1 tablet by mouth daily   fluconazole (DIFLUCAN) 150 mg tablet   Yes No   fluticasone (FLONASE) 50 mcg/act nasal spray  Self No No   Si sprays into each nostril daily   fluticasone (FLONASE) 50 mcg/act nasal spray   No  "No   Si spray into each nostril daily   hydrocortisone (ANUSOL-HC) 2.5 % rectal cream   No No   Sig: Apply topically 2 (two) times a day   ketoconazole (NIZORAL) 2 % shampoo  Self Yes No   Sig: PRN   lamoTRIgine (LaMICtal) 200 MG tablet   No No   Sig: Take 1 tablet (200 mg total) by mouth daily at bedtime   neomycin-polymyxin-hydrocortisone (CORTISPORIN) 0.35%-10,000 units/mL-1% otic suspension   No No   Sig: Administer 4 drops into ears 3 (three) times a day for 5 days   ondansetron (ZOFRAN) 4 mg tablet   No No   Sig: Take 1 tablet (4 mg total) by mouth every 6 (six) hours as needed for nausea or vomiting   polyethylene glycol (GOLYTELY) 4000 mL solution   No No   Sig: Take 4,000 mL by mouth once for 1 dose      Facility-Administered Medications: None       Portions of the record may have been created with voice recognition software. Occasional wrong word or \"sound a like\" substitutions may have occurred due to the inherent limitations of voice recognition software. Read the chart carefully and recognize, using context, where substitutions have occurred.    Electronically signed by:  MD Alvarez Locke MD  [phone]       Alvarez Meléndez MD  [phone]    "

## 2025-05-08 ENCOUNTER — TELEPHONE (OUTPATIENT)
Dept: FAMILY MEDICINE CLINIC | Facility: CLINIC | Age: 45
End: 2025-05-08

## 2025-06-02 ENCOUNTER — TELEMEDICINE (OUTPATIENT)
Dept: PSYCHIATRY | Facility: CLINIC | Age: 45
End: 2025-06-02
Payer: COMMERCIAL

## 2025-06-02 DIAGNOSIS — F90.0 ATTENTION DEFICIT HYPERACTIVITY DISORDER (ADHD), PREDOMINANTLY INATTENTIVE TYPE: ICD-10-CM

## 2025-06-02 DIAGNOSIS — F41.1 GAD (GENERALIZED ANXIETY DISORDER): ICD-10-CM

## 2025-06-02 DIAGNOSIS — F41.0 PANIC DISORDER WITHOUT AGORAPHOBIA WITH MODERATE PANIC ATTACKS: Primary | ICD-10-CM

## 2025-06-02 DIAGNOSIS — F31.81 BIPOLAR II DISORDER (HCC): ICD-10-CM

## 2025-06-02 PROCEDURE — 90833 PSYTX W PT W E/M 30 MIN: CPT | Performed by: NURSE PRACTITIONER

## 2025-06-02 PROCEDURE — 99214 OFFICE O/P EST MOD 30 MIN: CPT | Performed by: NURSE PRACTITIONER

## 2025-06-02 NOTE — ASSESSMENT & PLAN NOTE
Xanax 1 mg QID prn anxiety is effective to reduce anxiety prior to turning into a panic attack  Occasional use of Xanax is described.

## 2025-06-02 NOTE — PSYCH
Virtual Regular Visit    Patient is located at Home in the [facility] state in which I hold an active license [place].    Assessment & Plan  Panic disorder without agoraphobia with moderate panic attacks  Xanax 1 mg QID prn anxiety is effective to reduce anxiety prior to turning into a panic attack  Occasional use of Xanax is described.       RANJIT (generalized anxiety disorder)  Wellbutrin 150 mg XL is effective for depression and anxiety       Bipolar II disorder (HCC)  Lamictal 200 mg helps keep her mood stable, denies SE       Attention deficit hyperactivity disorder (ADHD), predominantly inattentive type  Adderall 20 mg BID prn for focus and concentration is effective. Denies SE                 Reason for visit is   Chief Complaint   Patient presents with    Anxiety    Depression    Mood Swings    ADHD    Medication Management        Visit Time  Visit Start Time: 5:45  Visit Stop Time: 6:15  Total Visit Duration: 30 minutes    Encounter provider: Ramona Farley, PhD  Provider located at [address]  [address]  [facility] [address] [phone]    Recent Visits  No visits were found meeting these conditions.  Showing recent visits within past 7 days and meeting all other requirements  Today's Visits  Date Type Provider Dept   06/02/25 Telemedicine Ramona Farley, PhD  Psychiatric [facility]   Showing today's visits and meeting all other requirements  Future Appointments  No visits were found meeting these conditions.  Showing future appointments within next 150 days and meeting all other requirements       The patient was identified by name and date of birth. Isabel KOREY Queen was informed that this is a telemedicine visit and that the visit is being conducted through the Epic Embedded platform. She agrees to proceed. My office door was closed. No one else was in the room. She acknowledged consent and understanding of privacy and  security of the video platform. The patient has agreed to participate and understands they can discontinue the visit at any time. Patient is aware this is a billable service.     MEDICATION MANAGEMENT NOTE        [facility]      Name and Date of Birth:  Isabel Queen 45 y.o. 1980 MRN: [id number]    Date of Visit: June 2, 2025         - Educated about healthy life style, risk of falls/sedation and addiction. Patient was receptive to education.  - Medications sent to the patient's pharmacy for 90 day supply    - RTC in 6 weeks  - The patient was educated about 24 hour and weekend coverage for urgent situations accessed by calling [facility] main practice number  - Patient was educated to call [place] Suicide Prevention Lifeline (1-178-996-QDBT [4447]) for behavioral crisis at anytime or 911 for any safety concerns, or go to nearest ER if the symptoms become overwhelming or unmanageable.         Subjective        Isabel Queen is a 45 y.o. female, visited for Anxiety, Depression, Mood Swings, ADHD, and Medication Management, who was virtually seen and evaluated today at the [facility] outpatient clinic for follow-up and medication management. Completes psychiatric assessment without difficulty.     At previous outpatient psychiatric appointment with this writer, Isabel reports basically medication helps monitor her mood keeping it stable.  She is trying to cope with the separation of her .  She has an apartment; however, she is staying at the home more often.  She continues to work out at the gym and compete at events.  She reports worry about her twin sister who just got out of shelter and continues poor behavior in [place].  Discussed enabling sister and how to make her accountable for her actions.  Isabel reports realizing this and will not be quick to help her if things go wrong.    Isabel reports increase in Paxil  helped her anxiety however it made her gain weight so she stopped the medication.  Options were discussed and Wellbutrin was decided upon and resumed at 150 mg.  She reports anxiety is better under control where panic attacks occur.  Her appetite is good and she continues to have problems sleeping unless she takes NyQuil.  Rozerem was not effective and she stopped taking it, side effect was increased insomnia.  Contract for controlled substance was signed   She denies any current adverse medication side effects.      Isabel is not at goal.  Reports anxiety and depression denies suicidal ideation.  They have their house on the market and the septic system failed.  She complains of increased anxiety to decide what to do, either sell the house with the credit to the Buyers, or take it off the market and repair the septic system then placed the house on the market with a higher price.  She reports she is feeling exhausted due to recently returning from [place] for bodybuilding competition, her  met with her because the plans were for her daughter to participate in her first competition.  However, her daughter was eliminated and not invited to the [place] competition.  She reports getting along with her  and now they are thinking about staying together.  Discussed possible marriage counseling.  Cautioned her due to being tired, not to make decisions today and to think about them.  Supportive therapy was provided she reports having appetite and is sleeping with the use of NyQuil.  Denies side effects of medication.          Review Of Systems:  Pertinent items are noted in HPI; all others are negative; no recent changes in medications or health status reported.    PHQ-2/9 Depression Screening                 Historical Information        Past Medical History:    Past Medical History[1]     Past Surgical History[2]  Allergies[3]    Substance Abuse History:    Social History     Substance and Sexual Activity    Alcohol Use Yes    Alcohol/week: 2.0 standard drinks of alcohol    Types: 2 Glasses of wine per week    Comment: occassional - wine 2 x month     Social History     Substance and Sexual Activity   Drug Use No       Social History:    Social History     Socioeconomic History    Marital status: /Civil Union     Spouse name: Not on file    Number of children: 2    Years of education: Not on file    Highest education level: Not on file   Occupational History    Not on file   Tobacco Use    Smoking status: Former     Current packs/day: 0.00     Average packs/day: 1 pack/day for 19.1 years (19.1 ttl pk-yrs)     Types: Cigarettes     Start date: 5/8/1995     Quit date: 6/15/2014     Years since quitting: 10.9     Passive exposure: Past    Smokeless tobacco: Never   Vaping Use    Vaping status: Every Day    Substances: Nicotine, Flavoring   Substance and Sexual Activity    Alcohol use: Yes     Alcohol/week: 2.0 standard drinks of alcohol     Types: 2 Glasses of wine per week     Comment: occassional - wine 2 x month    Drug use: No    Sexual activity: Yes     Partners: Male     Birth control/protection: Female Sterilization     Comment: ablation   Other Topics Concern    Not on file   Social History Narrative    Not on file     Social Drivers of Health     Financial Resource Strain: Not on file   Food Insecurity: Not on file   Transportation Needs: Not on file   Physical Activity: Not on file   Stress: Not on file   Social Connections: Not on file   Intimate Partner Violence: Not on file   Housing Stability: Not on file       Family Psychiatric History:     Family History[4]    History Review: The following portions of the patient's history were reviewed and updated as appropriate: allergies, current medications, past family history, past medical history, past social history, past surgical history and problem list.           Objective      Vital signs in last 24 hours:  There were no vitals filed for this  visit.    Mental Status Evaluation:    Appearance age appropriate, casually dressed   Behavior cooperative, calm   Speech normal rate, normal volume, normal pitch   Mood depressed, anxious   Affect normal range and intensity, appropriate   Thought Processes organized, goal directed   Associations intact associations   Thought Content no overt delusions   Perceptual Disturbances: no auditory hallucinations, no visual hallucinations   Abnormal Thoughts  Risk Potential Suicidal ideation - None  Homicidal ideation - None  Potential for aggression - No   Orientation oriented to: person, place, time/date, and situation   Memory recent and remote memory grossly intact   Consciousness alert and awake   Attention Span Concentration Span attention span and concentration are age appropriate   Intellect appears to be of average intelligence   Insight intact   Judgement intact   Muscle Strength and  Gait unable to assess today due to virtual visit   Motor activity unable to assess today due to virtual visit   Language no difficulty naming common objects, no difficulty repeating a phrase   Fund of Knowledge adequate knowledge of current events  adequate fund of knowledge regarding past history  adequate fund of knowledge regarding vocabulary    Pain none   Pain Scale 0     Laboratory Results: I have personally reviewed all pertinent laboratory/tests results  Recent Labs (last 6 months):   Admission on 05/02/2025, Discharged on 05/02/2025   Component Date Value    Preg, Serum 05/02/2025 Negative     WBC 05/02/2025 8.38     RBC 05/02/2025 4.49     Hemoglobin 05/02/2025 14.8     Hematocrit 05/02/2025 45.7     MCV 05/02/2025 102 (H)     MCH 05/02/2025 33.0     MCHC 05/02/2025 32.4     RDW 05/02/2025 13.1     MPV 05/02/2025 9.3     Platelets 05/02/2025 318     nRBC 05/02/2025 0     Segmented % 05/02/2025 57     Immature Grans % 05/02/2025 0     Lymphocytes % 05/02/2025 30     Monocytes % 05/02/2025 11     Eosinophils Relative  05/02/2025 1     Basophils Relative 05/02/2025 1     Absolute Neutrophils 05/02/2025 4.83     Absolute Immature Grans 05/02/2025 0.03     Absolute Lymphocytes 05/02/2025 2.47     Absolute Monocytes 05/02/2025 0.88     Eosinophils Absolute 05/02/2025 0.11     Basophils Absolute 05/02/2025 0.06     Sodium 05/02/2025 137     Potassium 05/02/2025 4.5     Chloride 05/02/2025 108     CO2 05/02/2025 20 (L)     ANION GAP 05/02/2025 9     BUN 05/02/2025 21     Creatinine 05/02/2025 0.84     Glucose 05/02/2025 81     Calcium 05/02/2025 8.9     eGFR 05/02/2025 83    Admission on 02/19/2025, Discharged on 02/19/2025   Component Date Value    SARS COV Rapid Antigen 02/19/2025 Negative     Influenza A Rapid Antigen 02/19/2025 Negative     Influenza B Rapid Antigen 02/19/2025 Negative     STREP A PCR 02/19/2025 Not Detected    Annual Exam on 12/30/2024   Component Date Value    Case Report 12/30/2024                      Value:Gynecologic Cytology Report                       Case: [id number]                                  Authorizing Provider:  Carlton Santana MD   Collected:           12/30/2024 1546              Ordering Location:     [facility] OB Received:            12/30/2024 1546                                     GYN                                                                          First Screen:          Yocasta Toure                                                                  Specimen:    LIQUID-BASED PAP, SCREENING, Cervix                                                        Primary Interpretation 12/30/2024 Negative for intraepithelial lesion or malignancy     Specimen Adequacy 12/30/2024 Satisfactory for evaluation. Endocervical/transformation zone component present.     Note 12/30/2024                      Value:Screening performed at [facility], [address] [facility] [place] [address].        Additional Information 12/30/2024                      Value:[employer] FDA approved  ,  and ThinPrep Imaging Duo System are utilized with strict adherence to the 's instruction manual to prepare gynecologic and non-gynecologic cytology specimens for the production of ThinPrep slides as well as for gynecologic ThinPrep imaging. These processes have been validated by our laboratory and/or by the .  The Pap test is not a diagnostic procedure and should not be used as the sole means to detect cervical cancer. It is only a screening procedure to aid in the detection of cervical cancer and its precursors. Both false-negative and false-positive results have been experienced. Your patient's test result should be interpreted in this context together with the history and clinical findings.      Gross Description 12/30/2024                      Value:A. 20 ml , colorless, cloudy received in a ThinPrep vial.      HPV Other HR 12/30/2024 Negative     HPV16 12/30/2024 Negative     HPV18 12/30/2024 Negative                Current Outpatient Medications   Medication Sig Dispense Refill    albuterol (PROVENTIL HFA,VENTOLIN HFA) 90 mcg/act inhaler Inhale 2 puffs every 4 (four) hours as needed for wheezing 8.5 g 2    ALPRAZolam (XANAX) 1 mg tablet Take 1 tablet (1 mg total) by mouth 4 (four) times a day as needed for anxiety 90 tablet 0    amphetamine-dextroamphetamine (ADDERALL, 20MG,) 20 mg tablet Take 1 tablet (20 mg total) by mouth 2 (two) times a day Max Daily Amount: 40 mg 60 tablet 0    Breo Ellipta 100-25 MCG/ACT inhaler INHALE 1 PUFF BY MOUTHN ONCE DAILY. RINSE MOUTH AFTER USE 60 each 2    buPROPion (Wellbutrin XL) 150 mg 24 hr tablet Take 1 tablet (150 mg total) by mouth daily 30 tablet 3    diphenhydrAMINE (BENADRYL) 25 mg tablet Take 1 tablet (25 mg total) by mouth daily at bedtime as needed for allergies 20 tablet 0    Estradiol-Progesterone 0.5-100 MG CAPS Take 1 tablet daily as directed 90 capsule 3    estrogen, conjugated,-medroxyprogesterone (PREMPRO) 0.45-1.5 MG per  tablet Take 1 tablet by mouth daily 30 tablet 5    fluconazole (DIFLUCAN) 150 mg tablet       fluticasone (FLONASE) 50 mcg/act nasal spray 2 sprays into each nostril daily 9.9 mL 0    fluticasone (FLONASE) 50 mcg/act nasal spray 1 spray into each nostril daily 16 g 0    hydrocortisone (ANUSOL-HC) 2.5 % rectal cream Apply topically 2 (two) times a day 28 g 1    ketoconazole (NIZORAL) 2 % shampoo PRN      lamoTRIgine (LaMICtal) 200 MG tablet Take 1 tablet (200 mg total) by mouth daily at bedtime 30 tablet 2    naproxen (Naprosyn) 500 mg tablet Take 1 tablet (500 mg total) by mouth 2 (two) times a day with meals 30 tablet 0    neomycin-polymyxin-hydrocortisone (CORTISPORIN) 0.35%-10,000 units/mL-1% otic suspension Administer 4 drops into ears 3 (three) times a day for 5 days 10 mL 0    ondansetron (ZOFRAN) 4 mg tablet Take 1 tablet (4 mg total) by mouth every 6 (six) hours as needed for nausea or vomiting 12 tablet 0    oxymetazoline (AFRIN) 0.05 % nasal spray 1 spray by Each Nare route 2 (two) times a day Don't use longer than 3 days 15 mL 0    polyethylene glycol (GOLYTELY) 4000 mL solution Take 4,000 mL by mouth once for 1 dose 4000 mL 0    pseudoephedrine (SUDAFED) 30 mg tablet Take 2 tablets (60 mg total) by mouth every 8 (eight) hours as needed for congestion 14 tablet 0     No current facility-administered medications for this visit.         Medications Risks/Benefits      Risks, Benefits And Possible Side Effects Of Medications:    Risks, benefits, and possible side effects of medications explained to Isabel and she verbalizes understanding and agreement for treatment.    Controlled Medication Discussion:     Isabel has been filling controlled prescriptions on time as prescribed according to [place] Prescription Drug Monitoring Program    Psychotherapy Provided:     Individual psychotherapy provided: Yes  Counseling was provided during the session today for 20 minutes.  Medications, treatment progress and  treatment plan reviewed with Isabel.  Coping strategies reviewed with Isabel.   Crisis/safety plan discussed with Isabel not compiled, but she is aware of who to call, 988, or ED if necessary.   Psychoeducation provided to the patient and was educated about the importance of compliance with the medications and psychiatric treatment  Supportive psychotherapy provided to the patient  Solution Focused Brief Therapy (SFBT) provided  Patient's emotions were validated and specific labeled praise provided.   [place] suggestions were offered in a supportive non-critical way.     Treatment Plan:    Completed and signed during the session: Not applicable - Treatment Plan not due at this session    Note Share    This note was not shared with the patient due to this is a psychotherapy note    Ramona Farley, PhD 06/02/25    This note was completed in part utilizing Dragon dictation Software. Grammatical, translation, syntax errors, random word insertions, spelling mistakes, and incomplete sentences may be an occasional consequence of this system secondary to software limitations with voice recognition, ambient noise, and hardware issues. If you have any questions or concerns about the content, text, or information contained within the body of this dictation, please contact the provider for clarification.          [1]   Past Medical History:  Diagnosis Date    Abdominal pain     ADHD     Allergic rhinitis     Anxiety     panic attacks    Anxiety     Asthma     Bipolar disorder (HCC)     Bladder distention     came to the ED on 10/9/2020-greene in to drain then removed    Chronic constipation     Colon polyp     Contact lens/glasses fitting     and glasses    Depression     Depression     GERD (gastroesophageal reflux disease)     Psychiatric disorder     anxiety     Rectal bleeding     Sepsis (HCC) 2/8/2017   [2]   Past Surgical History:  Procedure Laterality Date    APPENDECTOMY      BAND HEMORRHOIDECTOMY      BREAST SURGERY       augmentation silicone    COLONOSCOPY N/A 01/24/2018    Procedure: COLONOSCOPY WITH HEMORRHOID BANDING;  Surgeon: Ozzy Hammonds MD;  Location: [facility];  Service: Gastroenterology    ENDOMETRIAL ABLATION  2016    TN SIGMOIDOSCOPY FLX DX W/COLLJ SPEC BR/WA IF PFRMD N/A 11/14/2018    Procedure: FLEXIBLE SIGMOIDOSCOPY WITH APC;  Surgeon: Ozzy Hammonds MD;  Location: [facility];  Service: Gastroenterology    UPPER GASTROINTESTINAL ENDOSCOPY     [3]   Allergies  Allergen Reactions    Doxycycline Other (See Comments)       stomach  upset   [4]   Family History  Problem Relation Name Age of Onset    Breast cancer Mother Alejandra helms     No Known Problems Father      Bipolar disorder Sister Twin     Asthma Sister Twin     No Known Problems Brother      Anxiety disorder Daughter      Depression Daughter      No Known Problems Son

## 2025-06-03 DIAGNOSIS — F41.1 GAD (GENERALIZED ANXIETY DISORDER): ICD-10-CM

## 2025-06-03 DIAGNOSIS — F31.81 BIPOLAR II DISORDER (HCC): ICD-10-CM

## 2025-06-03 RX ORDER — ALPRAZOLAM 1 MG/1
1 TABLET ORAL 4 TIMES DAILY PRN
Qty: 90 TABLET | Refills: 0 | Status: SHIPPED | OUTPATIENT
Start: 2025-06-03

## 2025-06-03 RX ORDER — DEXTROAMPHETAMINE SACCHARATE, AMPHETAMINE ASPARTATE, DEXTROAMPHETAMINE SULFATE AND AMPHETAMINE SULFATE 5; 5; 5; 5 MG/1; MG/1; MG/1; MG/1
20 TABLET ORAL
Qty: 60 TABLET | Refills: 0 | Status: SHIPPED | OUTPATIENT
Start: 2025-06-03

## 2025-06-24 ENCOUNTER — OFFICE VISIT (OUTPATIENT)
Dept: OBGYN CLINIC | Facility: CLINIC | Age: 45
End: 2025-06-24
Payer: COMMERCIAL

## 2025-06-24 ENCOUNTER — TELEPHONE (OUTPATIENT)
Dept: PSYCHIATRY | Facility: CLINIC | Age: 45
End: 2025-06-24

## 2025-06-24 ENCOUNTER — APPOINTMENT (OUTPATIENT)
Dept: RADIOLOGY | Facility: CLINIC | Age: 45
End: 2025-06-24
Attending: STUDENT IN AN ORGANIZED HEALTH CARE EDUCATION/TRAINING PROGRAM
Payer: COMMERCIAL

## 2025-06-24 DIAGNOSIS — M79.642 BILATERAL HAND PAIN: ICD-10-CM

## 2025-06-24 DIAGNOSIS — M79.641 BILATERAL HAND PAIN: ICD-10-CM

## 2025-06-24 DIAGNOSIS — R20.0 BILATERAL HAND NUMBNESS: Primary | ICD-10-CM

## 2025-06-24 PROCEDURE — 99213 OFFICE O/P EST LOW 20 MIN: CPT | Performed by: STUDENT IN AN ORGANIZED HEALTH CARE EDUCATION/TRAINING PROGRAM

## 2025-06-24 PROCEDURE — 73130 X-RAY EXAM OF HAND: CPT

## 2025-06-24 NOTE — TELEPHONE ENCOUNTER
This writer attempted to contact patient and advise her that her appt on 7/14/25 with Dr Farley needs to be rescheduled due to change in provider schedule.  Phone rang then disconnected

## 2025-06-24 NOTE — PROGRESS NOTES
ORTHOPAEDIC HAND, WRIST, AND ELBOW OFFICE  VISIT     Name: Isabel Queen      : 1980      MRN: [id number]  Encounter Provider: Dayne Zavala MD  Encounter Date: 2025   Encounter department: [facility]  :  Assessment & Plan  Bilateral hand numbness  Isabel Queen is a 45 y.o. RHD female who presents with cervical radiculopathy versus carpal and cubital tunnel syndromes    Repeat EMG of bilateral upper extremities was ordered today, given her symptoms have worsened since her last test demonstrate C7 radiculopathy on the right.    Recommend beginning use of night splints for bilateral carpal tunnel and right cubital tunnel symptoms. These can be purchased by an  at her convenience.    Plan to follow-up after obtaining EMG for further discussion of treatment options.  Orders:    XR hand 3+ vw right; Future    XR hand 3+ vw left; Future    EMG; Future    Follow-up:  Return for review of EMG.     ____________________________________________________________________________________________________________________________________________      CHIEF COMPLAINT:  Bilateral hand pain and numbness    SUBJECTIVE:  Isabel Queen is a 45 y.o. female who presents with bilateral hand pain and numbness. She was previously being treated for bilateral carpal tunnel syndrome by Dr. Christos Linton in , who provided bilateral night splints and ordered an EMG. Today, she reports the right side is worse than the left. She states her right long, ring, and small fingers are numb constantly. She states there is an aching that radiates into the right forearm. She notes decreased strength when opening jars and reports dropping objects. She feels all the fingers on her left hand go numb intermittently. She notices this mainly when weightlifting. Both hands are numb at night and wake her up. She recalls the night splints helping; however, they were lost during a  "move.  Radiation: Yes to the  arm  Previous Treatments: bracing with only partial relief  Associated symptoms: Pain, Weakness, and Numbness  Handedness: right  Work status:  and     I have personally reviewed all the relevant PMH, PSH, SH, FH, Medications and allergies      PAST MEDICAL HISTORY:  Past Medical History[1]    PAST SURGICAL HISTORY:  Past Surgical History[2]    FAMILY HISTORY:  Family History[3]    SOCIAL HISTORY:  Social History[4]    MEDICATIONS:  Current Medications[5]    ALLERGIES:  Allergies[6]      REVIEW OF SYSTEMS:  Pertinent items are noted in HPI.  A comprehensive review of systems was negative.    VITALS:  There were no vitals filed for this visit.    LABS:  HgA1c: No results found for: \"HGBA1C\"  BMP:   Lab Results   Component Value Date    CALCIUM 8.9 05/02/2025    K 4.5 05/02/2025    CO2 20 (L) 05/02/2025     05/02/2025    BUN 21 05/02/2025    CREATININE 0.84 05/02/2025       _____________________________________________________  PHYSICAL EXAMINATION:  General: well developed and well nourished, alert, oriented times 3, and appears comfortable  Psychiatric: Normal  HEENT: Normocephalic, Atraumatic Trachea Midline, No torticollis  Pulmonary: No audible wheezing or respiratory distress   Abdomen/GI: Non tender, non distended   Cardiovascular: Regular Rate and Rhythm. No pitting edema, 2+ radial pulse   Skin: No masses, erythema, lacerations, fluctation, ulcerations  Neurovascular: Sensation Intact to the Median, Ulnar, Radial Nerve, Motor Intact to the Median, Ulnar, Radial Nerve, and Pulses Intact  Musculoskeletal: Normal, except as noted in detailed exam and in HPI.        FOCUSED MUSCULOSKELETAL EXAMINATION:  Right Upper Extremity  Inspection: no gross anatomical deformities  Palpation: thenar eminence tenderness  Neurologic: 5/5 elbow flexion, 5/5 elbow extension, 5/5 wrist extension, 5/5 wrist flexion, 5/5 finger flexion, 5/5 finger extension, 5/5 FPL, 5/5 " EPL, 5/5 APB, 5/5 intrinsics, sensation intact to median, radial, and ulnar nerve distributions  Vascular: Palpable radial pulse, brisk cap refill <2sec, hand warm and well perfused  MSK:   Full FDS, FDP, extensor mechanisms are intact  Negative Tinel's at carpal tunnel  Negative Carpal Tunnel Compression  Negative Elbow Flexion Compression  Demonstrates normal wrist, elbow, and shoulder motion  Forearm compartments are soft and supple  2+ Distal radial pulse with brisk capillary refill to the fingers  Radial, median, ulnar motor and sensory distribution intact  Sensation to light touch intact distally     Left Upper Extremity  Inspection: no gross anatomical deformities  Palpation: no specific tenderness  Neurologic: 5/5 elbow flexion, 5/5 elbow extension, 5/5 wrist extension, 5/5 wrist flexion, 5/5 finger flexion, 5/5 finger extension, 5/5 FPL, 5/5 EPL, 5/5 APB, 5/5 intrinsics, sensation intact to median, radial, and ulnar nerve distributions  Vascular: Palpable radial pulse, brisk cap refill <2sec, hand warm and well perfused  MSK:   Full FDS, FDP, extensor mechanisms are intact  Negative Tinel's at carpal tunnel  Negative Carpal Tunnel Compression  Positive Elbow Flexion Compression  Demonstrates normal wrist, elbow, and shoulder motion  Forearm compartments are soft and supple  2+ Distal radial pulse with brisk capillary refill to the fingers  Radial, median, ulnar motor and sensory distribution intact  Sensation to light touch intact distally     ___________________________________________________  STUDIES REVIEWED:  Xrays of the bilateral hands were obtained on 6/24/2025 were independently reviewed which demonstrates no significant degenerative changes. No acute fractures or dislocations.    I have personally reviewed and interpreted EMG of the bilateral upper extremities obtained on 8/12/2021 which demonstrates no evidence of peripheral neuropathy. There is evidence of very mild right C7 chronic  radiculopathy     PROCEDURES PERFORMED:  No Procedures performed today    _____________________________________________________      Scribe Attestation      I,:  Bebe Bustillos am acting as a scribe while in the presence of the attending physician.:       I,:  Dayne Zavala MD personally performed the services described in this documentation    as scribed in my presence.:               I agree with the history, physical examination, assessment and plan of care as documented above.    Dayne Zavala M.D.  Attending, Orthopaedic Surgery  Hand, Wrist, and Elbow Surgery  [facility]         [1]   Past Medical History:  Diagnosis Date    Abdominal pain     ADHD     Allergic rhinitis     Anxiety     panic attacks    Anxiety     Asthma     Bipolar disorder (HCC)     Bladder distention     came to the ED on 10/9/2020-greene in to drain then removed    Chronic constipation     Colon polyp     Contact lens/glasses fitting     and glasses    Depression     Depression     GERD (gastroesophageal reflux disease)     Psychiatric disorder     anxiety     Rectal bleeding     Sepsis (HCC) 2/8/2017   [2]   Past Surgical History:  Procedure Laterality Date    APPENDECTOMY      BAND HEMORRHOIDECTOMY      BREAST SURGERY      augmentation silicone    COLONOSCOPY N/A 01/24/2018    Procedure: COLONOSCOPY WITH HEMORRHOID BANDING;  Surgeon: Ozzy Hammonds MD;  Location: [facility];  Service: Gastroenterology    ENDOMETRIAL ABLATION  2016    MS SIGMOIDOSCOPY FLX DX W/COLLJ SPEC BR/WA IF PFRMD N/A 11/14/2018    Procedure: FLEXIBLE SIGMOIDOSCOPY WITH APC;  Surgeon: Ozzy Hammonds MD;  Location: [facility];  Service: Gastroenterology    UPPER GASTROINTESTINAL ENDOSCOPY     [3]   Family History  Problem Relation Name Age of Onset    Breast cancer Mother Alejandra helms     No Known Problems Father      Bipolar disorder Sister Twin     Asthma Sister Twin     No Known Problems Brother      Anxiety disorder Daughter       Depression Daughter      No Known Problems Son     [4]   Social History  Tobacco Use    Smoking status: Former     Current packs/day: 0.00     Average packs/day: 1 pack/day for 19.1 years (19.1 ttl pk-yrs)     Types: Cigarettes     Start date: 1995     Quit date: 6/15/2014     Years since quittin.0     Passive exposure: Past    Smokeless tobacco: Never   Vaping Use    Vaping status: Every Day    Substances: Nicotine, Flavoring   Substance Use Topics    Alcohol use: Yes     Alcohol/week: 2.0 standard drinks of alcohol     Types: 2 Glasses of wine per week     Comment: occassional - wine 2 x month    Drug use: No   [5]   Current Outpatient Medications:     albuterol (PROVENTIL HFA,VENTOLIN HFA) 90 mcg/act inhaler, Inhale 2 puffs every 4 (four) hours as needed for wheezing, Disp: 8.5 g, Rfl: 2    ALPRAZolam (XANAX) 1 mg tablet, Take 1 tablet (1 mg total) by mouth 4 (four) times a day as needed for anxiety, Disp: 90 tablet, Rfl: 0    amphetamine-dextroamphetamine (ADDERALL, 20MG,) 20 mg tablet, Take 1 tablet (20 mg total) by mouth 2 (two) times a day Max Daily Amount: 40 mg, Disp: 60 tablet, Rfl: 0    Breo Ellipta 100-25 MCG/ACT inhaler, INHALE 1 PUFF BY MOUTHN ONCE DAILY. RINSE MOUTH AFTER USE, Disp: 60 each, Rfl: 2    buPROPion (Wellbutrin XL) 150 mg 24 hr tablet, Take 1 tablet (150 mg total) by mouth daily, Disp: 30 tablet, Rfl: 3    diphenhydrAMINE (BENADRYL) 25 mg tablet, Take 1 tablet (25 mg total) by mouth daily at bedtime as needed for allergies, Disp: 20 tablet, Rfl: 0    Estradiol-Progesterone 0.5-100 MG CAPS, Take 1 tablet daily as directed, Disp: 90 capsule, Rfl: 3    estrogen, conjugated,-medroxyprogesterone (PREMPRO) 0.45-1.5 MG per tablet, Take 1 tablet by mouth daily, Disp: 30 tablet, Rfl: 5    fluconazole (DIFLUCAN) 150 mg tablet, , Disp: , Rfl:     fluticasone (FLONASE) 50 mcg/act nasal spray, 2 sprays into each nostril daily, Disp: 9.9 mL, Rfl: 0    fluticasone (FLONASE) 50 mcg/act nasal  spray, 1 spray into each nostril daily, Disp: 16 g, Rfl: 0    hydrocortisone (ANUSOL-HC) 2.5 % rectal cream, Apply topically 2 (two) times a day, Disp: 28 g, Rfl: 1    ketoconazole (NIZORAL) 2 % shampoo, PRN, Disp: , Rfl:     lamoTRIgine (LaMICtal) 200 MG tablet, Take 1 tablet (200 mg total) by mouth daily at bedtime, Disp: 30 tablet, Rfl: 2    naproxen (Naprosyn) 500 mg tablet, Take 1 tablet (500 mg total) by mouth 2 (two) times a day with meals, Disp: 30 tablet, Rfl: 0    ondansetron (ZOFRAN) 4 mg tablet, Take 1 tablet (4 mg total) by mouth every 6 (six) hours as needed for nausea or vomiting, Disp: 12 tablet, Rfl: 0    oxymetazoline (AFRIN) 0.05 % nasal spray, 1 spray by Each Nare route 2 (two) times a day Don't use longer than 3 days, Disp: 15 mL, Rfl: 0    pseudoephedrine (SUDAFED) 30 mg tablet, Take 2 tablets (60 mg total) by mouth every 8 (eight) hours as needed for congestion, Disp: 14 tablet, Rfl: 0    neomycin-polymyxin-hydrocortisone (CORTISPORIN) 0.35%-10,000 units/mL-1% otic suspension, Administer 4 drops into ears 3 (three) times a day for 5 days, Disp: 10 mL, Rfl: 0    polyethylene glycol (GOLYTELY) 4000 mL solution, Take 4,000 mL by mouth once for 1 dose, Disp: 4000 mL, Rfl: 0  [6]   Allergies  Allergen Reactions    Doxycycline Other (See Comments)       stomach  upset

## 2025-06-25 NOTE — TELEPHONE ENCOUNTER
This writer left a message for patient advising her that appt on 7/14 is cancelled and rescheduled to 7/15 @ 700pm virtually.  Asked her to contact the office if this date and time are not good for her.

## 2025-07-03 DIAGNOSIS — F31.81 BIPOLAR II DISORDER (HCC): ICD-10-CM

## 2025-07-03 DIAGNOSIS — F33.1 MODERATE EPISODE OF RECURRENT MAJOR DEPRESSIVE DISORDER (HCC): ICD-10-CM

## 2025-07-03 DIAGNOSIS — F41.1 GAD (GENERALIZED ANXIETY DISORDER): ICD-10-CM

## 2025-07-03 RX ORDER — DEXTROAMPHETAMINE SACCHARATE, AMPHETAMINE ASPARTATE, DEXTROAMPHETAMINE SULFATE AND AMPHETAMINE SULFATE 5; 5; 5; 5 MG/1; MG/1; MG/1; MG/1
20 TABLET ORAL
Qty: 60 TABLET | Refills: 0 | Status: SHIPPED | OUTPATIENT
Start: 2025-07-03

## 2025-07-03 RX ORDER — ALPRAZOLAM 1 MG/1
1 TABLET ORAL 4 TIMES DAILY PRN
Qty: 90 TABLET | Refills: 0 | Status: SHIPPED | OUTPATIENT
Start: 2025-07-03

## 2025-07-03 RX ORDER — BUPROPION HYDROCHLORIDE 150 MG/1
150 TABLET ORAL DAILY
Qty: 30 TABLET | Refills: 2 | Status: SHIPPED | OUTPATIENT
Start: 2025-07-03

## 2025-07-03 NOTE — TELEPHONE ENCOUNTER
Medication Refill Request     Name of Medication xanax  Dose/Frequency 20mg  Quantity 90  Verified pharmacy   [x]  Verified ordering Provider   [x]  Does patient have enough for the next 3 days? Yes [] No [x]  Does patient have a follow-up appointment scheduled? Yes [x] No []   If so when is appointment: 7/15/25 at 7pm    Medication Refill Request     Name of Medication adderall  Dose/Frequency 20mg  Quantity 60  Verified pharmacy   [x]  Verified ordering Provider   [x]  Does patient have enough for the next 3 days? Yes [] No [x]  Does patient have a follow-up appointment scheduled? Yes [x] No []   If so when is appointment: 7/15/25 at 7pm    Medication Refill Request     Name of Medication Wellbutrin  Dose/Frequency 150mg  Quantity 30  Verified pharmacy   [x]  Verified ordering Provider   [x]  Does patient have enough for the next 3 days? Yes [] No [x]  Does patient have a follow-up appointment scheduled? Yes [x] No []   If so when is appointment: 7/15/25 at 7pm

## 2025-07-15 ENCOUNTER — TELEPHONE (OUTPATIENT)
Age: 45
End: 2025-07-15

## 2025-07-15 ENCOUNTER — TELEMEDICINE (OUTPATIENT)
Dept: PSYCHIATRY | Facility: CLINIC | Age: 45
End: 2025-07-15

## 2025-07-15 DIAGNOSIS — Z91.199 NO-SHOW FOR APPOINTMENT: Primary | ICD-10-CM

## 2025-07-15 PROCEDURE — NOSHOW: Performed by: NURSE PRACTITIONER

## 2025-07-23 ENCOUNTER — TELEPHONE (OUTPATIENT)
Age: 45
End: 2025-07-23

## 2025-08-06 ENCOUNTER — TELEPHONE (OUTPATIENT)
Age: 45
End: 2025-08-06

## 2025-08-07 ENCOUNTER — TELEPHONE (OUTPATIENT)
Age: 45
End: 2025-08-07

## 2025-08-07 DIAGNOSIS — F31.81 BIPOLAR II DISORDER (HCC): ICD-10-CM

## 2025-08-07 DIAGNOSIS — F41.1 GAD (GENERALIZED ANXIETY DISORDER): ICD-10-CM

## 2025-08-07 DIAGNOSIS — F33.1 MODERATE EPISODE OF RECURRENT MAJOR DEPRESSIVE DISORDER (HCC): ICD-10-CM

## 2025-08-07 RX ORDER — BUPROPION HYDROCHLORIDE 150 MG/1
150 TABLET ORAL DAILY
Qty: 30 TABLET | Refills: 2 | Status: SHIPPED | OUTPATIENT
Start: 2025-08-07

## 2025-08-07 RX ORDER — ALPRAZOLAM 1 MG/1
1 TABLET ORAL 4 TIMES DAILY PRN
Qty: 90 TABLET | Refills: 0 | Status: SHIPPED | OUTPATIENT
Start: 2025-08-07

## 2025-08-07 RX ORDER — DEXTROAMPHETAMINE SACCHARATE, AMPHETAMINE ASPARTATE, DEXTROAMPHETAMINE SULFATE AND AMPHETAMINE SULFATE 5; 5; 5; 5 MG/1; MG/1; MG/1; MG/1
20 TABLET ORAL
Qty: 60 TABLET | Refills: 0 | Status: SHIPPED | OUTPATIENT
Start: 2025-08-07

## (undated) DEVICE — LIGATOR MULTI 7 BANDING KIT

## (undated) DEVICE — GLOVE EXAM NON-STRL NTRL PLUS LRG PF

## (undated) DEVICE — SOLIDIFIER FLUID WASTE CONTROL 1500ML

## (undated) DEVICE — LUBRICANT SURGILUBE TUBE 4 OZ  FLIP TOP

## (undated) DEVICE — AIRLIFE™  ADULT CUSHION NASAL CANNULA WITH 7 FOOT (2.1 M) CRUSH-RESISTANT OXYGEN TUBING, AND U/CONNECT-IT ADAPTER: Brand: AIRLIFE™

## (undated) DEVICE — TUBING BUBBLE CLEAR 5MM X 100 FT NS

## (undated) DEVICE — BRUSH ENDO CLEANING DBL-HEADER

## (undated) DEVICE — DISPOSABLE BIOPSY VALVE MAJ-1555: Brand: SINGLE USE BIOPSY VALVE (STERILE)

## (undated) DEVICE — TUBING AUX CHANNEL

## (undated) DEVICE — "MAJ-901 WATER CONTAINER SET CV-160/140": Brand: WATER CONTAINER

## (undated) DEVICE — "MH-443 SUCTION VALVE F/EVIS140 EVIS160": Brand: SUCTION VALVE

## (undated) DEVICE — "MH-438 A/W VLVE F/140 EVIS-140": Brand: AIR/WATER VALVE

## (undated) DEVICE — FIAPC® PROBE W/ FILTER 3000 A OD 2.3MM/6.9FR; L 3M/9.8FT: Brand: ERBE

## (undated) DEVICE — 1200CC GUARDIAN II: Brand: GUARDIAN

## (undated) DEVICE — MEDI-VAC YANKAUER SUCTION HANDLE: Brand: CARDINAL HEALTH

## (undated) DEVICE — REM POLYHESIVE ADULT PATIENT RETURN ELECTRODE: Brand: VALLEYLAB

## (undated) DEVICE — GAUZE SPONGES,16 PLY: Brand: CURITY